# Patient Record
Sex: FEMALE | Race: WHITE | Employment: OTHER | URBAN - METROPOLITAN AREA
[De-identification: names, ages, dates, MRNs, and addresses within clinical notes are randomized per-mention and may not be internally consistent; named-entity substitution may affect disease eponyms.]

---

## 2017-01-23 ENCOUNTER — OFFICE VISIT (OUTPATIENT)
Dept: FAMILY MEDICINE CLINIC | Age: 67
End: 2017-01-23

## 2017-01-23 VITALS
DIASTOLIC BLOOD PRESSURE: 69 MMHG | WEIGHT: 280.3 LBS | HEIGHT: 63 IN | RESPIRATION RATE: 16 BRPM | SYSTOLIC BLOOD PRESSURE: 152 MMHG | HEART RATE: 56 BPM | BODY MASS INDEX: 49.66 KG/M2 | TEMPERATURE: 97.6 F

## 2017-01-23 DIAGNOSIS — M15.9 PRIMARY OSTEOARTHRITIS INVOLVING MULTIPLE JOINTS: ICD-10-CM

## 2017-01-23 DIAGNOSIS — M25.569 ARTHRALGIA OF LOWER LEG, UNSPECIFIED LATERALITY: ICD-10-CM

## 2017-01-23 DIAGNOSIS — M79.672 LEFT FOOT PAIN: Primary | ICD-10-CM

## 2017-01-23 DIAGNOSIS — E03.4 HYPOTHYROIDISM DUE TO ACQUIRED ATROPHY OF THYROID: ICD-10-CM

## 2017-01-23 DIAGNOSIS — E66.01 MORBID OBESITY WITH BMI OF 45.0-49.9, ADULT (HCC): ICD-10-CM

## 2017-01-23 DIAGNOSIS — M25.532 PAIN IN BOTH WRISTS: ICD-10-CM

## 2017-01-23 DIAGNOSIS — M25.531 PAIN IN BOTH WRISTS: ICD-10-CM

## 2017-01-23 PROCEDURE — 99214 OFFICE O/P EST MOD 30 MIN: CPT | Performed by: FAMILY MEDICINE

## 2017-01-23 RX ORDER — ALLOPURINOL 300 MG/1
300 TABLET ORAL DAILY
Qty: 30 TABLET | Refills: 3 | Status: SHIPPED | OUTPATIENT
Start: 2017-01-23 | End: 2017-07-11 | Stop reason: SDUPTHER

## 2017-01-23 ASSESSMENT — ENCOUNTER SYMPTOMS
EYE PAIN: 0
TROUBLE SWALLOWING: 0
COUGH: 0
SHORTNESS OF BREATH: 0
DIARRHEA: 0
ABDOMINAL PAIN: 0
NAUSEA: 0
BLOOD IN STOOL: 0
CONSTIPATION: 0
VOMITING: 0

## 2017-02-01 ENCOUNTER — OFFICE VISIT (OUTPATIENT)
Dept: PSYCHOLOGY | Age: 67
End: 2017-02-01

## 2017-02-01 DIAGNOSIS — E66.9 OBESITY, UNSPECIFIED OBESITY SEVERITY, UNSPECIFIED OBESITY TYPE: Primary | ICD-10-CM

## 2017-02-01 PROCEDURE — 96150 PR HEAL & BEHAV ASSESS,EA 15 MIN,INIT: CPT | Performed by: PSYCHOLOGIST

## 2017-02-01 PROCEDURE — 96152 PR HEAL & BEHAV INTERVENT,EA 15 MIN,INDIV: CPT | Performed by: PSYCHOLOGIST

## 2017-02-15 ENCOUNTER — OFFICE VISIT (OUTPATIENT)
Dept: PSYCHOLOGY | Age: 67
End: 2017-02-15

## 2017-02-15 DIAGNOSIS — E66.9 OBESITY, UNSPECIFIED OBESITY SEVERITY, UNSPECIFIED OBESITY TYPE: Primary | ICD-10-CM

## 2017-02-15 PROCEDURE — 96152 PR HEAL & BEHAV INTERVENT,EA 15 MIN,INDIV: CPT | Performed by: PSYCHOLOGIST

## 2017-02-21 ENCOUNTER — OFFICE VISIT (OUTPATIENT)
Dept: FAMILY MEDICINE CLINIC | Age: 67
End: 2017-02-21

## 2017-02-21 VITALS
HEIGHT: 62 IN | HEART RATE: 51 BPM | WEIGHT: 282.4 LBS | SYSTOLIC BLOOD PRESSURE: 164 MMHG | DIASTOLIC BLOOD PRESSURE: 76 MMHG | RESPIRATION RATE: 14 BRPM | BODY MASS INDEX: 51.97 KG/M2 | TEMPERATURE: 97.6 F

## 2017-02-21 DIAGNOSIS — I10 HTN, GOAL BELOW 140/80: ICD-10-CM

## 2017-02-21 DIAGNOSIS — M1A.0390 CHRONIC GOUT OF WRIST, UNSPECIFIED CAUSE, UNSPECIFIED LATERALITY: Primary | ICD-10-CM

## 2017-02-21 DIAGNOSIS — E03.4 HYPOTHYROIDISM DUE TO ACQUIRED ATROPHY OF THYROID: ICD-10-CM

## 2017-02-21 DIAGNOSIS — M72.2 PLANTAR FASCIA SYNDROME: ICD-10-CM

## 2017-02-21 DIAGNOSIS — M19.90 ARTHRITIS: ICD-10-CM

## 2017-02-21 DIAGNOSIS — M25.562 ARTHRALGIA OF LEFT LOWER LEG: ICD-10-CM

## 2017-02-21 PROCEDURE — 99214 OFFICE O/P EST MOD 30 MIN: CPT | Performed by: FAMILY MEDICINE

## 2017-02-21 RX ORDER — LISINOPRIL 10 MG/1
10 TABLET ORAL DAILY
Qty: 30 TABLET | Refills: 3 | Status: SHIPPED | OUTPATIENT
Start: 2017-02-21 | End: 2017-07-11 | Stop reason: SDUPTHER

## 2017-02-21 RX ORDER — COLCHICINE 0.6 MG/1
CAPSULE ORAL
Qty: 30 CAPSULE | Refills: 2 | Status: SHIPPED | OUTPATIENT
Start: 2017-02-21 | End: 2017-07-11 | Stop reason: SDUPTHER

## 2017-02-21 ASSESSMENT — ENCOUNTER SYMPTOMS
TROUBLE SWALLOWING: 0
SHORTNESS OF BREATH: 0
EYE PAIN: 0
COUGH: 0
ABDOMINAL PAIN: 0

## 2017-03-08 ENCOUNTER — TELEPHONE (OUTPATIENT)
Dept: FAMILY MEDICINE CLINIC | Age: 67
End: 2017-03-08

## 2017-05-26 ENCOUNTER — OFFICE VISIT (OUTPATIENT)
Dept: FAMILY MEDICINE CLINIC | Age: 67
End: 2017-05-26

## 2017-05-26 VITALS
BODY MASS INDEX: 52.33 KG/M2 | WEIGHT: 284.4 LBS | SYSTOLIC BLOOD PRESSURE: 157 MMHG | HEIGHT: 62 IN | TEMPERATURE: 97.7 F | DIASTOLIC BLOOD PRESSURE: 79 MMHG | HEART RATE: 47 BPM | RESPIRATION RATE: 16 BRPM

## 2017-05-26 DIAGNOSIS — E79.0 ELEVATED URIC ACID IN BLOOD: Primary | ICD-10-CM

## 2017-05-26 DIAGNOSIS — R79.83 HOMOCYSTEINEMIA: ICD-10-CM

## 2017-05-26 DIAGNOSIS — E03.9 HYPOTHYROIDISM, UNSPECIFIED TYPE: ICD-10-CM

## 2017-05-26 DIAGNOSIS — R63.5 ABNORMAL WEIGHT GAIN: ICD-10-CM

## 2017-05-26 DIAGNOSIS — J01.00 SUBACUTE MAXILLARY SINUSITIS: ICD-10-CM

## 2017-05-26 DIAGNOSIS — E78.89 LIPIDS ABNORMAL: ICD-10-CM

## 2017-05-26 DIAGNOSIS — R79.89 POSITIVE SEROLOGICAL TEST RESULT: ICD-10-CM

## 2017-05-26 DIAGNOSIS — K21.00 GASTROESOPHAGEAL REFLUX DISEASE WITH ESOPHAGITIS: ICD-10-CM

## 2017-05-26 DIAGNOSIS — M15.9 PRIMARY OSTEOARTHRITIS INVOLVING MULTIPLE JOINTS: ICD-10-CM

## 2017-05-26 DIAGNOSIS — E03.4 HYPOTHYROIDISM DUE TO ACQUIRED ATROPHY OF THYROID: ICD-10-CM

## 2017-05-26 DIAGNOSIS — R63.5 WEIGHT GAIN: ICD-10-CM

## 2017-05-26 DIAGNOSIS — G44.031 INTRACTABLE EPISODIC PAROXYSMAL HEMICRANIA: ICD-10-CM

## 2017-05-26 DIAGNOSIS — R89.4 NONSPECIFIC IMMUNOLOGICAL FINDINGS: ICD-10-CM

## 2017-05-26 DIAGNOSIS — R89.4 OTHER AND UNSPECIFIED NONSPECIFIC IMMUNOLOGICAL FINDINGS: ICD-10-CM

## 2017-05-26 DIAGNOSIS — E55.9 VITAMIN D DEFICIENCY: ICD-10-CM

## 2017-05-26 DIAGNOSIS — M25.569 ARTHRALGIA OF LOWER LEG, UNSPECIFIED LATERALITY: ICD-10-CM

## 2017-05-26 DIAGNOSIS — G56.00 CARPAL TUNNEL SYNDROME, UNSPECIFIED LATERALITY: ICD-10-CM

## 2017-05-26 DIAGNOSIS — B35.1 TOENAIL FUNGUS: ICD-10-CM

## 2017-05-26 PROCEDURE — 99214 OFFICE O/P EST MOD 30 MIN: CPT | Performed by: FAMILY MEDICINE

## 2017-05-26 RX ORDER — LEVOTHYROXINE SODIUM 0.12 MG/1
125 TABLET ORAL DAILY
Qty: 90 TABLET | Refills: 3 | Status: SHIPPED | OUTPATIENT
Start: 2017-05-26 | End: 2017-07-11 | Stop reason: SDUPTHER

## 2017-05-26 RX ORDER — NAPROXEN 500 MG/1
500 TABLET ORAL PRN
COMMUNITY
End: 2020-03-09

## 2017-05-26 RX ORDER — LIOTHYRONINE SODIUM 5 UG/1
5 TABLET ORAL DAILY
Qty: 90 TABLET | Refills: 3 | Status: SHIPPED | OUTPATIENT
Start: 2017-05-26 | End: 2017-07-11 | Stop reason: SDUPTHER

## 2017-05-26 ASSESSMENT — ENCOUNTER SYMPTOMS
ROS SKIN COMMENTS: HAIR LOSS
BACK PAIN: 1
ABDOMINAL DISTENTION: 1
ABDOMINAL PAIN: 1

## 2017-07-11 ENCOUNTER — OFFICE VISIT (OUTPATIENT)
Dept: FAMILY MEDICINE CLINIC | Age: 67
End: 2017-07-11

## 2017-07-11 VITALS
TEMPERATURE: 98 F | SYSTOLIC BLOOD PRESSURE: 140 MMHG | HEIGHT: 64 IN | BODY MASS INDEX: 48.83 KG/M2 | WEIGHT: 286 LBS | DIASTOLIC BLOOD PRESSURE: 78 MMHG | HEART RATE: 58 BPM

## 2017-07-11 DIAGNOSIS — R77.8 OTHER SPECIFIED ABNORMALITIES OF PLASMA PROTEINS: Primary | ICD-10-CM

## 2017-07-11 DIAGNOSIS — M25.569 ARTHRALGIA OF LOWER LEG, UNSPECIFIED LATERALITY: ICD-10-CM

## 2017-07-11 DIAGNOSIS — I10 HTN, GOAL BELOW 140/80: ICD-10-CM

## 2017-07-11 DIAGNOSIS — M1A.0390 CHRONIC GOUT OF WRIST, UNSPECIFIED CAUSE, UNSPECIFIED LATERALITY: ICD-10-CM

## 2017-07-11 DIAGNOSIS — R63.5 WEIGHT GAIN: ICD-10-CM

## 2017-07-11 DIAGNOSIS — E03.9 HYPOTHYROIDISM, UNSPECIFIED TYPE: ICD-10-CM

## 2017-07-11 DIAGNOSIS — E78.89 LIPIDS ABNORMAL: ICD-10-CM

## 2017-07-11 DIAGNOSIS — M79.672 LEFT FOOT PAIN: ICD-10-CM

## 2017-07-11 DIAGNOSIS — Z12.31 VISIT FOR SCREENING MAMMOGRAM: ICD-10-CM

## 2017-07-11 PROCEDURE — 99214 OFFICE O/P EST MOD 30 MIN: CPT | Performed by: FAMILY MEDICINE

## 2017-07-11 PROCEDURE — G0009 ADMIN PNEUMOCOCCAL VACCINE: HCPCS | Performed by: FAMILY MEDICINE

## 2017-07-11 PROCEDURE — 90670 PCV13 VACCINE IM: CPT | Performed by: FAMILY MEDICINE

## 2017-07-11 PROCEDURE — G8510 SCR DEP NEG, NO PLAN REQD: HCPCS | Performed by: FAMILY MEDICINE

## 2017-07-11 PROCEDURE — 3288F FALL RISK ASSESSMENT DOCD: CPT | Performed by: FAMILY MEDICINE

## 2017-07-11 RX ORDER — COLCHICINE 0.6 MG/1
CAPSULE ORAL
Qty: 30 CAPSULE | Refills: 5 | Status: SHIPPED | OUTPATIENT
Start: 2017-07-11 | End: 2017-07-11

## 2017-07-11 RX ORDER — LIOTHYRONINE SODIUM 5 UG/1
5 TABLET ORAL DAILY
Qty: 90 TABLET | Refills: 1 | Status: SHIPPED | OUTPATIENT
Start: 2017-07-11 | End: 2017-09-18 | Stop reason: SDUPTHER

## 2017-07-11 RX ORDER — LEVOTHYROXINE SODIUM 0.12 MG/1
125 TABLET ORAL DAILY
Qty: 90 TABLET | Refills: 1 | Status: SHIPPED | OUTPATIENT
Start: 2017-07-11 | End: 2017-09-18 | Stop reason: SDUPTHER

## 2017-07-11 RX ORDER — ALLOPURINOL 300 MG/1
300 TABLET ORAL DAILY
Qty: 30 TABLET | Refills: 5 | Status: SHIPPED | OUTPATIENT
Start: 2017-07-11 | End: 2017-07-11

## 2017-07-11 RX ORDER — LISINOPRIL 10 MG/1
10 TABLET ORAL DAILY
Qty: 30 TABLET | Refills: 5 | Status: SHIPPED | OUTPATIENT
Start: 2017-07-11 | End: 2017-09-18 | Stop reason: SDUPTHER

## 2017-07-11 ASSESSMENT — ENCOUNTER SYMPTOMS
ABDOMINAL PAIN: 0
NAUSEA: 0
COUGH: 0
EYE PAIN: 0
VOMITING: 0
TROUBLE SWALLOWING: 0
SHORTNESS OF BREATH: 0
CONSTIPATION: 0
BLOOD IN STOOL: 0
DIARRHEA: 0

## 2017-07-11 ASSESSMENT — PATIENT HEALTH QUESTIONNAIRE - PHQ9
SUM OF ALL RESPONSES TO PHQ QUESTIONS 1-9: 0
2. FEELING DOWN, DEPRESSED OR HOPELESS: 0
SUM OF ALL RESPONSES TO PHQ9 QUESTIONS 1 & 2: 0
1. LITTLE INTEREST OR PLEASURE IN DOING THINGS: 0

## 2017-07-14 ENCOUNTER — TELEPHONE (OUTPATIENT)
Dept: FAMILY MEDICINE CLINIC | Age: 67
End: 2017-07-14

## 2017-07-31 ENCOUNTER — HOSPITAL ENCOUNTER (OUTPATIENT)
Age: 67
Discharge: HOME | End: 2017-07-31
Payer: MEDICARE

## 2017-07-31 DIAGNOSIS — E03.4 HYPOTHYROIDISM DUE TO ACQUIRED ATROPHY OF THYROID: ICD-10-CM

## 2017-07-31 DIAGNOSIS — R79.89 POSITIVE SEROLOGICAL TEST RESULT: ICD-10-CM

## 2017-07-31 DIAGNOSIS — B35.1 TOENAIL FUNGUS: ICD-10-CM

## 2017-07-31 DIAGNOSIS — R77.8 OTHER SPECIFIED ABNORMALITIES OF PLASMA PROTEINS: ICD-10-CM

## 2017-07-31 DIAGNOSIS — M25.569 ARTHRALGIA OF LOWER LEG, UNSPECIFIED LATERALITY: ICD-10-CM

## 2017-07-31 DIAGNOSIS — E78.89 LIPIDS ABNORMAL: ICD-10-CM

## 2017-07-31 DIAGNOSIS — G44.031 INTRACTABLE EPISODIC PAROXYSMAL HEMICRANIA: ICD-10-CM

## 2017-07-31 DIAGNOSIS — K21.00 GASTROESOPHAGEAL REFLUX DISEASE WITH ESOPHAGITIS: ICD-10-CM

## 2017-07-31 DIAGNOSIS — E03.9 HYPOTHYROIDISM, UNSPECIFIED TYPE: ICD-10-CM

## 2017-07-31 DIAGNOSIS — R63.5 ABNORMAL WEIGHT GAIN: ICD-10-CM

## 2017-07-31 DIAGNOSIS — G56.00 CARPAL TUNNEL SYNDROME, UNSPECIFIED LATERALITY: ICD-10-CM

## 2017-07-31 DIAGNOSIS — R79.83 HOMOCYSTEINEMIA: ICD-10-CM

## 2017-07-31 DIAGNOSIS — E79.0 ELEVATED URIC ACID IN BLOOD: ICD-10-CM

## 2017-07-31 DIAGNOSIS — J01.00 SUBACUTE MAXILLARY SINUSITIS: ICD-10-CM

## 2017-07-31 DIAGNOSIS — R89.4 NONSPECIFIC IMMUNOLOGICAL FINDINGS: ICD-10-CM

## 2017-07-31 DIAGNOSIS — E55.9 VITAMIN D DEFICIENCY: ICD-10-CM

## 2017-07-31 DIAGNOSIS — R63.5 WEIGHT GAIN: ICD-10-CM

## 2017-07-31 DIAGNOSIS — R89.4 OTHER AND UNSPECIFIED NONSPECIFIC IMMUNOLOGICAL FINDINGS: ICD-10-CM

## 2017-07-31 DIAGNOSIS — M15.9 PRIMARY OSTEOARTHRITIS INVOLVING MULTIPLE JOINTS: ICD-10-CM

## 2017-07-31 LAB
ALBUMIN SERPL-MCNC: 3.7 G/DL (ref 3.5–5.1)
ALP BLD-CCNC: 72 U/L (ref 38–126)
ALT SERPL-CCNC: 23 U/L (ref 11–66)
AST SERPL-CCNC: 31 U/L (ref 5–40)
BASOPHILS # BLD: 0.7 %
BASOPHILS ABSOLUTE: 0.1 THOU/MM3 (ref 0–0.1)
BILIRUB SERPL-MCNC: 0.8 MG/DL (ref 0.3–1.2)
BILIRUBIN DIRECT: < 0.2 MG/DL (ref 0–0.3)
CALCIUM SERPL-MCNC: 9 MG/DL (ref 8.5–10.5)
EOSINOPHIL # BLD: 2.5 %
EOSINOPHILS ABSOLUTE: 0.2 THOU/MM3 (ref 0–0.4)
HCT VFR BLD CALC: 42.1 % (ref 37–47)
HEMOGLOBIN: 14 GM/DL (ref 12–16)
LYMPHOCYTES # BLD: 31.4 %
LYMPHOCYTES ABSOLUTE: 2.4 THOU/MM3 (ref 1–4.8)
MAGNESIUM: 1.8 MG/DL (ref 1.6–2.4)
MCH RBC QN AUTO: 29.5 PG (ref 27–31)
MCHC RBC AUTO-ENTMCNC: 33.3 GM/DL (ref 33–37)
MCV RBC AUTO: 88.7 FL (ref 81–99)
MONOCYTES # BLD: 7.5 %
MONOCYTES ABSOLUTE: 0.6 THOU/MM3 (ref 0.4–1.3)
NUCLEATED RED BLOOD CELLS: 0 /100 WBC
PDW BLD-RTO: 14.4 % (ref 11.5–14.5)
PLATELET # BLD: 266 THOU/MM3 (ref 130–400)
PMV BLD AUTO: 8.1 MCM (ref 7.4–10.4)
PTH INTACT: 43.5 PG/ML (ref 15–65)
RBC # BLD: 4.75 MILL/MM3 (ref 4.2–5.4)
RBC # BLD: NORMAL 10*6/UL
RHEUMATOID FACTOR: < 10 IU/ML (ref 0–13)
SEDIMENTATION RATE, ERYTHROCYTE: 49 MM/HR (ref 0–20)
SEG NEUTROPHILS: 57.9 %
SEGMENTED NEUTROPHILS ABSOLUTE COUNT: 4.5 THOU/MM3 (ref 1.8–7.7)
TOTAL PROTEIN: 8 G/DL (ref 6.1–8)
URIC ACID: 8.9 MG/DL (ref 2.4–5.7)
VITAMIN D 25-HYDROXY: 41 NG/ML (ref 30–100)
WBC # BLD: 7.8 THOU/MM3 (ref 4.8–10.8)

## 2017-07-31 PROCEDURE — 86038 ANTINUCLEAR ANTIBODIES: CPT

## 2017-07-31 PROCEDURE — 83735 ASSAY OF MAGNESIUM: CPT

## 2017-07-31 PROCEDURE — 84550 ASSAY OF BLOOD/URIC ACID: CPT

## 2017-07-31 PROCEDURE — 83090 ASSAY OF HOMOCYSTEINE: CPT

## 2017-07-31 PROCEDURE — 83970 ASSAY OF PARATHORMONE: CPT

## 2017-07-31 PROCEDURE — 84165 PROTEIN E-PHORESIS SERUM: CPT

## 2017-07-31 PROCEDURE — 83883 ASSAY NEPHELOMETRY NOT SPEC: CPT

## 2017-07-31 PROCEDURE — 82310 ASSAY OF CALCIUM: CPT

## 2017-07-31 PROCEDURE — 82306 VITAMIN D 25 HYDROXY: CPT

## 2017-07-31 PROCEDURE — 86430 RHEUMATOID FACTOR TEST QUAL: CPT

## 2017-07-31 PROCEDURE — 84156 ASSAY OF PROTEIN URINE: CPT

## 2017-07-31 PROCEDURE — 86335 IMMUNFIX E-PHORSIS/URINE/CSF: CPT

## 2017-07-31 PROCEDURE — 85025 COMPLETE CBC W/AUTO DIFF WBC: CPT

## 2017-07-31 PROCEDURE — 36415 COLL VENOUS BLD VENIPUNCTURE: CPT

## 2017-07-31 PROCEDURE — 85651 RBC SED RATE NONAUTOMATED: CPT

## 2017-07-31 PROCEDURE — 84160 ASSAY OF PROTEIN ANY SOURCE: CPT

## 2017-07-31 PROCEDURE — 80076 HEPATIC FUNCTION PANEL: CPT

## 2017-07-31 PROCEDURE — 86141 C-REACTIVE PROTEIN HS: CPT

## 2017-07-31 PROCEDURE — 86235 NUCLEAR ANTIGEN ANTIBODY: CPT

## 2017-08-01 ENCOUNTER — HOSPITAL ENCOUNTER (OUTPATIENT)
Dept: WOMENS IMAGING | Age: 67
Discharge: HOME | End: 2017-08-01
Payer: MEDICARE

## 2017-08-01 DIAGNOSIS — Z12.31 VISIT FOR SCREENING MAMMOGRAM: ICD-10-CM

## 2017-08-01 LAB
ANTI SSA: NORMAL
ANTI SSB: 0 AU/ML (ref 0–40)
ANTI-SMITH: 0 AU/ML (ref 0–40)
C-REACTIVE PROTEIN, HIGH SENSITIVITY: 25.4 MG/L
HOMOCYSTEINE, TOTAL: 9 UMOL/L

## 2017-08-01 PROCEDURE — G0202 SCR MAMMO BI INCL CAD: HCPCS

## 2017-08-02 ENCOUNTER — TELEPHONE (OUTPATIENT)
Dept: FAMILY MEDICINE CLINIC | Age: 67
End: 2017-08-02

## 2017-08-02 LAB
ANA SCREEN: NORMAL
PROTEIN ELECTROPHORESIS, SERUM: NORMAL
PROTEIN ELECTROPHORESIS, URINE: NORMAL

## 2017-08-03 ENCOUNTER — TELEPHONE (OUTPATIENT)
Dept: FAMILY MEDICINE CLINIC | Age: 67
End: 2017-08-03

## 2017-08-03 DIAGNOSIS — R79.82 ELEVATED C-REACTIVE PROTEIN (CRP): Primary | ICD-10-CM

## 2017-08-03 DIAGNOSIS — R70.0 ELEVATED SED RATE: ICD-10-CM

## 2017-08-07 ENCOUNTER — OFFICE VISIT (OUTPATIENT)
Dept: FAMILY MEDICINE CLINIC | Age: 67
End: 2017-08-07
Payer: MEDICARE

## 2017-08-07 VITALS
BODY MASS INDEX: 51.07 KG/M2 | TEMPERATURE: 97.7 F | WEIGHT: 288.2 LBS | SYSTOLIC BLOOD PRESSURE: 134 MMHG | HEIGHT: 63 IN | DIASTOLIC BLOOD PRESSURE: 82 MMHG | RESPIRATION RATE: 12 BRPM | HEART RATE: 60 BPM

## 2017-08-07 DIAGNOSIS — R89.4 OTHER AND UNSPECIFIED NONSPECIFIC IMMUNOLOGICAL FINDINGS: ICD-10-CM

## 2017-08-07 DIAGNOSIS — M25.561 PAIN IN BOTH KNEES, UNSPECIFIED CHRONICITY: ICD-10-CM

## 2017-08-07 DIAGNOSIS — E55.9 VITAMIN D DEFICIENCY: ICD-10-CM

## 2017-08-07 DIAGNOSIS — M25.562 PAIN IN BOTH KNEES, UNSPECIFIED CHRONICITY: ICD-10-CM

## 2017-08-07 DIAGNOSIS — J01.00 SUBACUTE MAXILLARY SINUSITIS: ICD-10-CM

## 2017-08-07 DIAGNOSIS — E03.4 HYPOTHYROIDISM DUE TO ACQUIRED ATROPHY OF THYROID: ICD-10-CM

## 2017-08-07 DIAGNOSIS — M15.9 PRIMARY OSTEOARTHRITIS INVOLVING MULTIPLE JOINTS: ICD-10-CM

## 2017-08-07 DIAGNOSIS — R79.83 HOMOCYSTEINEMIA: ICD-10-CM

## 2017-08-07 DIAGNOSIS — B35.1 TOENAIL FUNGUS: ICD-10-CM

## 2017-08-07 DIAGNOSIS — R89.4 NONSPECIFIC IMMUNOLOGICAL FINDINGS: ICD-10-CM

## 2017-08-07 DIAGNOSIS — K21.00 GASTROESOPHAGEAL REFLUX DISEASE WITH ESOPHAGITIS: ICD-10-CM

## 2017-08-07 DIAGNOSIS — R63.5 ABNORMAL WEIGHT GAIN: ICD-10-CM

## 2017-08-07 DIAGNOSIS — R63.5 WEIGHT GAIN: ICD-10-CM

## 2017-08-07 DIAGNOSIS — R79.89 POSITIVE SEROLOGICAL TEST RESULT: ICD-10-CM

## 2017-08-07 DIAGNOSIS — G44.031 INTRACTABLE PAROXYSMAL HEMICRANIA, UNSPECIFIED CHRONICITY PATTERN: ICD-10-CM

## 2017-08-07 DIAGNOSIS — E78.89 LIPIDS ABNORMAL: ICD-10-CM

## 2017-08-07 PROCEDURE — 99214 OFFICE O/P EST MOD 30 MIN: CPT | Performed by: FAMILY MEDICINE

## 2017-08-07 RX ORDER — ALLOPURINOL 300 MG/1
1 TABLET ORAL PRN
COMMUNITY
Start: 2017-07-11 | End: 2018-02-09 | Stop reason: ALTCHOICE

## 2017-08-07 ASSESSMENT — ENCOUNTER SYMPTOMS
BACK PAIN: 1
NAUSEA: 1

## 2017-08-08 ENCOUNTER — TELEPHONE (OUTPATIENT)
Dept: FAMILY MEDICINE CLINIC | Age: 67
End: 2017-08-08

## 2017-08-08 ENCOUNTER — HOSPITAL ENCOUNTER (OUTPATIENT)
Dept: WOMENS IMAGING | Age: 67
Discharge: HOME | End: 2017-08-08
Payer: MEDICARE

## 2017-08-08 DIAGNOSIS — Z00.6 EXAMINATION FOR NORMAL COMPARISON OR CONTROL IN CLINICAL RESEARCH: ICD-10-CM

## 2017-08-08 PROCEDURE — 3209999900 MAM COMPARISON OF OUTSIDE IMAGES

## 2017-08-16 ENCOUNTER — TELEPHONE (OUTPATIENT)
Dept: FAMILY MEDICINE CLINIC | Age: 67
End: 2017-08-16

## 2017-08-16 DIAGNOSIS — E79.0 ELEVATED URIC ACID IN BLOOD: ICD-10-CM

## 2017-08-16 DIAGNOSIS — R77.8 ABNORMAL SPEP: Primary | ICD-10-CM

## 2017-09-18 ENCOUNTER — OFFICE VISIT (OUTPATIENT)
Dept: FAMILY MEDICINE CLINIC | Age: 67
End: 2017-09-18
Payer: MEDICARE

## 2017-09-18 VITALS
HEIGHT: 64 IN | BODY MASS INDEX: 49 KG/M2 | TEMPERATURE: 97.8 F | SYSTOLIC BLOOD PRESSURE: 127 MMHG | DIASTOLIC BLOOD PRESSURE: 62 MMHG | RESPIRATION RATE: 12 BRPM | WEIGHT: 287 LBS | HEART RATE: 56 BPM | OXYGEN SATURATION: 98 %

## 2017-09-18 DIAGNOSIS — E03.9 HYPOTHYROIDISM, UNSPECIFIED TYPE: ICD-10-CM

## 2017-09-18 DIAGNOSIS — I10 HTN, GOAL BELOW 140/80: ICD-10-CM

## 2017-09-18 DIAGNOSIS — R79.83 HOMOCYSTEINEMIA: ICD-10-CM

## 2017-09-18 DIAGNOSIS — M25.569 ARTHRALGIA OF LOWER LEG, UNSPECIFIED LATERALITY: Primary | ICD-10-CM

## 2017-09-18 DIAGNOSIS — E78.89 LIPIDS ABNORMAL: ICD-10-CM

## 2017-09-18 DIAGNOSIS — E03.4 HYPOTHYROIDISM DUE TO ACQUIRED ATROPHY OF THYROID: ICD-10-CM

## 2017-09-18 PROCEDURE — 99214 OFFICE O/P EST MOD 30 MIN: CPT | Performed by: FAMILY MEDICINE

## 2017-09-18 RX ORDER — LEVOTHYROXINE SODIUM 0.12 MG/1
125 TABLET ORAL DAILY
Qty: 90 TABLET | Refills: 1 | Status: SHIPPED | OUTPATIENT
Start: 2017-09-18 | End: 2018-06-08 | Stop reason: SDUPTHER

## 2017-09-18 RX ORDER — LISINOPRIL 10 MG/1
10 TABLET ORAL DAILY
Qty: 30 TABLET | Refills: 5 | Status: SHIPPED | OUTPATIENT
Start: 2017-09-18 | End: 2018-07-09 | Stop reason: SDUPTHER

## 2017-09-18 RX ORDER — LIOTHYRONINE SODIUM 5 UG/1
5 TABLET ORAL DAILY
Qty: 90 TABLET | Refills: 1 | Status: SHIPPED | OUTPATIENT
Start: 2017-09-18 | End: 2018-06-08 | Stop reason: SDUPTHER

## 2017-09-18 ASSESSMENT — ENCOUNTER SYMPTOMS
VOMITING: 0
EYE PAIN: 0
TROUBLE SWALLOWING: 0
DIARRHEA: 0
ABDOMINAL PAIN: 0
SHORTNESS OF BREATH: 0
NAUSEA: 0
BLOOD IN STOOL: 0
CONSTIPATION: 0
COUGH: 0

## 2017-11-02 ENCOUNTER — HOSPITAL ENCOUNTER (OUTPATIENT)
Age: 67
Discharge: HOME | End: 2017-11-02
Payer: MEDICARE

## 2017-11-02 DIAGNOSIS — M25.561 PAIN IN BOTH KNEES, UNSPECIFIED CHRONICITY: ICD-10-CM

## 2017-11-02 DIAGNOSIS — R63.5 ABNORMAL WEIGHT GAIN: ICD-10-CM

## 2017-11-02 DIAGNOSIS — E03.4 HYPOTHYROIDISM DUE TO ACQUIRED ATROPHY OF THYROID: ICD-10-CM

## 2017-11-02 DIAGNOSIS — R79.89 POSITIVE SEROLOGICAL TEST RESULT: ICD-10-CM

## 2017-11-02 DIAGNOSIS — B35.1 TOENAIL FUNGUS: ICD-10-CM

## 2017-11-02 DIAGNOSIS — R63.5 WEIGHT GAIN: ICD-10-CM

## 2017-11-02 DIAGNOSIS — R89.4 NONSPECIFIC IMMUNOLOGICAL FINDINGS: ICD-10-CM

## 2017-11-02 DIAGNOSIS — G44.031 INTRACTABLE PAROXYSMAL HEMICRANIA, UNSPECIFIED CHRONICITY PATTERN: ICD-10-CM

## 2017-11-02 DIAGNOSIS — R79.83 HOMOCYSTEINEMIA: ICD-10-CM

## 2017-11-02 DIAGNOSIS — K21.00 GASTROESOPHAGEAL REFLUX DISEASE WITH ESOPHAGITIS: ICD-10-CM

## 2017-11-02 DIAGNOSIS — E55.9 VITAMIN D DEFICIENCY: ICD-10-CM

## 2017-11-02 DIAGNOSIS — M15.9 PRIMARY OSTEOARTHRITIS INVOLVING MULTIPLE JOINTS: ICD-10-CM

## 2017-11-02 DIAGNOSIS — M25.562 PAIN IN BOTH KNEES, UNSPECIFIED CHRONICITY: ICD-10-CM

## 2017-11-02 DIAGNOSIS — E78.89 LIPIDS ABNORMAL: ICD-10-CM

## 2017-11-02 DIAGNOSIS — J01.00 SUBACUTE MAXILLARY SINUSITIS: ICD-10-CM

## 2017-11-02 LAB
ANISOCYTOSIS: ABNORMAL
BASOPHILS # BLD: 0.6 %
BASOPHILS ABSOLUTE: 0.1 THOU/MM3 (ref 0–0.1)
CALCIUM SERPL-MCNC: 9.5 MG/DL (ref 8.5–10.5)
EOSINOPHIL # BLD: 2 %
EOSINOPHILS ABSOLUTE: 0.2 THOU/MM3 (ref 0–0.4)
ESTRADIOL LEVEL: 113.2 PG/ML
HCT VFR BLD CALC: 44.8 % (ref 37–47)
HEMOGLOBIN: 14.7 GM/DL (ref 12–16)
LYMPHOCYTES # BLD: 26.8 %
LYMPHOCYTES ABSOLUTE: 2.7 THOU/MM3 (ref 1–4.8)
MAGNESIUM: 2.2 MG/DL (ref 1.6–2.4)
MCH RBC QN AUTO: 29.1 PG (ref 27–31)
MCHC RBC AUTO-ENTMCNC: 32.8 GM/DL (ref 33–37)
MCV RBC AUTO: 88.9 FL (ref 81–99)
MONOCYTES # BLD: 7.1 %
MONOCYTES ABSOLUTE: 0.7 THOU/MM3 (ref 0.4–1.3)
NUCLEATED RED BLOOD CELLS: 0 /100 WBC
PDW BLD-RTO: 14.8 % (ref 11.5–14.5)
PLATELET # BLD: 304 THOU/MM3 (ref 130–400)
PMV BLD AUTO: 8.2 MCM (ref 7.4–10.4)
PTH INTACT: 36.3 PG/ML (ref 15–65)
RBC # BLD: 5.04 MILL/MM3 (ref 4.2–5.4)
SEDIMENTATION RATE, ERYTHROCYTE: 35 MM/HR (ref 0–20)
SEG NEUTROPHILS: 63.5 %
SEGMENTED NEUTROPHILS ABSOLUTE COUNT: 6.5 THOU/MM3 (ref 1.8–7.7)
URIC ACID: 9 MG/DL (ref 2.4–5.7)
VITAMIN D 25-HYDROXY: 32 NG/ML (ref 30–100)
WBC # BLD: 10.2 THOU/MM3 (ref 4.8–10.8)

## 2017-11-02 PROCEDURE — 82627 DEHYDROEPIANDROSTERONE: CPT

## 2017-11-02 PROCEDURE — 84403 ASSAY OF TOTAL TESTOSTERONE: CPT

## 2017-11-02 PROCEDURE — 84550 ASSAY OF BLOOD/URIC ACID: CPT

## 2017-11-02 PROCEDURE — 83970 ASSAY OF PARATHORMONE: CPT

## 2017-11-02 PROCEDURE — 82626 DEHYDROEPIANDROSTERONE: CPT

## 2017-11-02 PROCEDURE — 84270 ASSAY OF SEX HORMONE GLOBUL: CPT

## 2017-11-02 PROCEDURE — 86141 C-REACTIVE PROTEIN HS: CPT

## 2017-11-02 PROCEDURE — 82306 VITAMIN D 25 HYDROXY: CPT

## 2017-11-02 PROCEDURE — 85651 RBC SED RATE NONAUTOMATED: CPT

## 2017-11-02 PROCEDURE — 83735 ASSAY OF MAGNESIUM: CPT

## 2017-11-02 PROCEDURE — 36415 COLL VENOUS BLD VENIPUNCTURE: CPT

## 2017-11-02 PROCEDURE — 82310 ASSAY OF CALCIUM: CPT

## 2017-11-02 PROCEDURE — 85025 COMPLETE CBC W/AUTO DIFF WBC: CPT

## 2017-11-02 PROCEDURE — 82670 ASSAY OF TOTAL ESTRADIOL: CPT

## 2017-11-03 LAB
C-REACTIVE PROTEIN, HIGH SENSITIVITY: 45 MG/L
DHEAS (DHEA SULFATE): 235 UG/DL (ref 13–130)

## 2017-11-04 LAB — DHEA UNCONJUGATED: 4.26 NG/ML (ref 0.63–4.7)

## 2017-11-05 LAB — TESTOSTERONE, FREE W SHGB, FEMALES/CHILDREN: NORMAL

## 2017-11-09 ENCOUNTER — OFFICE VISIT (OUTPATIENT)
Dept: FAMILY MEDICINE CLINIC | Age: 67
End: 2017-11-09
Payer: MEDICARE

## 2017-11-09 VITALS
HEART RATE: 49 BPM | SYSTOLIC BLOOD PRESSURE: 124 MMHG | WEIGHT: 284 LBS | TEMPERATURE: 97.5 F | DIASTOLIC BLOOD PRESSURE: 80 MMHG | BODY MASS INDEX: 50.32 KG/M2 | OXYGEN SATURATION: 98 % | HEIGHT: 63 IN

## 2017-11-09 DIAGNOSIS — K90.89 OTHER INTESTINAL MALABSORPTION (CODE): ICD-10-CM

## 2017-11-09 DIAGNOSIS — J01.00 SUBACUTE MAXILLARY SINUSITIS: ICD-10-CM

## 2017-11-09 DIAGNOSIS — R79.83 HOMOCYSTEINEMIA: ICD-10-CM

## 2017-11-09 DIAGNOSIS — G44.049 CHRONIC PAROXYSMAL HEMICRANIA, NOT INTRACTABLE: ICD-10-CM

## 2017-11-09 DIAGNOSIS — E03.8 HYPOTHYROIDISM DUE TO HASHIMOTO'S THYROIDITIS: ICD-10-CM

## 2017-11-09 DIAGNOSIS — R89.4 NONSPECIFIC IMMUNOLOGICAL FINDINGS: ICD-10-CM

## 2017-11-09 DIAGNOSIS — E55.9 VITAMIN D DEFICIENCY: ICD-10-CM

## 2017-11-09 DIAGNOSIS — R10.9 FLANK PAIN: Primary | ICD-10-CM

## 2017-11-09 DIAGNOSIS — M25.561 ACUTE PAIN OF RIGHT KNEE: ICD-10-CM

## 2017-11-09 DIAGNOSIS — E06.3 HYPOTHYROIDISM DUE TO HASHIMOTO'S THYROIDITIS: ICD-10-CM

## 2017-11-09 DIAGNOSIS — E78.89 LIPIDS ABNORMAL: ICD-10-CM

## 2017-11-09 DIAGNOSIS — R63.5 WEIGHT GAIN: ICD-10-CM

## 2017-11-09 DIAGNOSIS — K21.00 GASTROESOPHAGEAL REFLUX DISEASE WITH ESOPHAGITIS: ICD-10-CM

## 2017-11-09 DIAGNOSIS — M15.9 PRIMARY OSTEOARTHRITIS INVOLVING MULTIPLE JOINTS: ICD-10-CM

## 2017-11-09 DIAGNOSIS — B35.1 TOENAIL FUNGUS: ICD-10-CM

## 2017-11-09 DIAGNOSIS — R79.89 POSITIVE SEROLOGICAL TEST RESULT: ICD-10-CM

## 2017-11-09 DIAGNOSIS — R73.9 BLOOD GLUCOSE ELEVATED: ICD-10-CM

## 2017-11-09 DIAGNOSIS — R63.5 ABNORMAL WEIGHT GAIN: ICD-10-CM

## 2017-11-09 PROCEDURE — 99214 OFFICE O/P EST MOD 30 MIN: CPT | Performed by: FAMILY MEDICINE

## 2017-11-09 RX ORDER — NITROFURANTOIN 25; 75 MG/1; MG/1
100 CAPSULE ORAL 2 TIMES DAILY
Qty: 20 CAPSULE | Refills: 0 | Status: SHIPPED | OUTPATIENT
Start: 2017-11-09 | End: 2017-11-19

## 2017-12-13 ENCOUNTER — TELEPHONE (OUTPATIENT)
Dept: FAMILY MEDICINE CLINIC | Age: 67
End: 2017-12-13

## 2017-12-13 NOTE — LETTER
[address] [place] [address]  Phone: [phone]  Fax: [phone] Geoff Debby,         December 18, 2017    [facility]  [place] [address] [address]  [place] [address]      Dear Ronny George:    Our office has been trying to contact you. Please contact our office at your first convenience. Our phone number is [phone] and our hours are Monday through Friday 08:00-16:00. If you have any questions or concerns, please don't hesitate to call.     Sincerely,        Rashad Driscoll DO

## 2018-02-02 ENCOUNTER — HOSPITAL ENCOUNTER (OUTPATIENT)
Age: 68
Discharge: HOME | End: 2018-02-02
Payer: MEDICARE

## 2018-02-02 DIAGNOSIS — R10.9 FLANK PAIN: ICD-10-CM

## 2018-02-02 DIAGNOSIS — R79.83 HOMOCYSTEINEMIA: ICD-10-CM

## 2018-02-02 DIAGNOSIS — B35.1 TOENAIL FUNGUS: ICD-10-CM

## 2018-02-02 DIAGNOSIS — J01.00 SUBACUTE MAXILLARY SINUSITIS: ICD-10-CM

## 2018-02-02 DIAGNOSIS — K90.89 OTHER INTESTINAL MALABSORPTION (CODE): ICD-10-CM

## 2018-02-02 DIAGNOSIS — R73.9 BLOOD GLUCOSE ELEVATED: ICD-10-CM

## 2018-02-02 DIAGNOSIS — M15.9 PRIMARY OSTEOARTHRITIS INVOLVING MULTIPLE JOINTS: ICD-10-CM

## 2018-02-02 DIAGNOSIS — R63.5 WEIGHT GAIN: ICD-10-CM

## 2018-02-02 DIAGNOSIS — E03.8 HYPOTHYROIDISM DUE TO HASHIMOTO'S THYROIDITIS: ICD-10-CM

## 2018-02-02 DIAGNOSIS — E78.89 LIPIDS ABNORMAL: ICD-10-CM

## 2018-02-02 DIAGNOSIS — R89.4 NONSPECIFIC IMMUNOLOGICAL FINDINGS: ICD-10-CM

## 2018-02-02 DIAGNOSIS — G44.049 CHRONIC PAROXYSMAL HEMICRANIA, NOT INTRACTABLE: ICD-10-CM

## 2018-02-02 DIAGNOSIS — E06.3 HYPOTHYROIDISM DUE TO HASHIMOTO'S THYROIDITIS: ICD-10-CM

## 2018-02-02 DIAGNOSIS — R63.5 ABNORMAL WEIGHT GAIN: ICD-10-CM

## 2018-02-02 DIAGNOSIS — K21.00 GASTROESOPHAGEAL REFLUX DISEASE WITH ESOPHAGITIS: ICD-10-CM

## 2018-02-02 DIAGNOSIS — R79.89 POSITIVE SEROLOGICAL TEST RESULT: ICD-10-CM

## 2018-02-02 LAB
BILIRUBIN URINE: NEGATIVE
BLOOD, URINE: NEGATIVE
CHARACTER, URINE: CLEAR
COLOR: NORMAL
GLUCOSE URINE: NEGATIVE MG/DL
KETONES, URINE: NEGATIVE
LEUKOCYTE ESTERASE, URINE: NEGATIVE
NITRITE, URINE: NEGATIVE
PH UA: 5
PROTEIN UA: NEGATIVE
SEDIMENTATION RATE, ERYTHROCYTE: 43 MM/HR (ref 0–20)
SPECIFIC GRAVITY, URINE: <= 1.005 (ref 1–1.03)
URIC ACID: 10 MG/DL (ref 2.4–5.7)
UROBILINOGEN, URINE: 0.2 EU/DL

## 2018-02-02 PROCEDURE — 36415 COLL VENOUS BLD VENIPUNCTURE: CPT

## 2018-02-02 PROCEDURE — 84550 ASSAY OF BLOOD/URIC ACID: CPT

## 2018-02-02 PROCEDURE — 81003 URINALYSIS AUTO W/O SCOPE: CPT

## 2018-02-02 PROCEDURE — 85651 RBC SED RATE NONAUTOMATED: CPT

## 2018-02-02 PROCEDURE — 86141 C-REACTIVE PROTEIN HS: CPT

## 2018-02-03 LAB — C-REACTIVE PROTEIN, HIGH SENSITIVITY: 30.4 MG/L

## 2018-02-09 ENCOUNTER — OFFICE VISIT (OUTPATIENT)
Dept: FAMILY MEDICINE CLINIC | Age: 68
End: 2018-02-09
Payer: MEDICARE

## 2018-02-09 ENCOUNTER — HOSPITAL ENCOUNTER (OUTPATIENT)
Age: 68
Discharge: HOME | End: 2018-02-09
Payer: MEDICARE

## 2018-02-09 VITALS
DIASTOLIC BLOOD PRESSURE: 84 MMHG | RESPIRATION RATE: 16 BRPM | BODY MASS INDEX: 48.94 KG/M2 | HEART RATE: 102 BPM | TEMPERATURE: 97.8 F | SYSTOLIC BLOOD PRESSURE: 123 MMHG | WEIGHT: 276.2 LBS | HEIGHT: 63 IN

## 2018-02-09 DIAGNOSIS — R79.83 HOMOCYSTEINEMIA: ICD-10-CM

## 2018-02-09 DIAGNOSIS — R89.4 NONSPECIFIC IMMUNOLOGICAL FINDINGS: ICD-10-CM

## 2018-02-09 DIAGNOSIS — E06.3 HYPOTHYROIDISM DUE TO HASHIMOTO'S THYROIDITIS: ICD-10-CM

## 2018-02-09 DIAGNOSIS — G44.001 INTRACTABLE CLUSTER HEADACHE SYNDROME, UNSPECIFIED CHRONICITY PATTERN: ICD-10-CM

## 2018-02-09 DIAGNOSIS — M25.562 PAIN IN BOTH KNEES, UNSPECIFIED CHRONICITY: ICD-10-CM

## 2018-02-09 DIAGNOSIS — B35.1 TOENAIL FUNGUS: ICD-10-CM

## 2018-02-09 DIAGNOSIS — M25.561 PAIN IN BOTH KNEES, UNSPECIFIED CHRONICITY: ICD-10-CM

## 2018-02-09 DIAGNOSIS — M15.9 PRIMARY OSTEOARTHRITIS INVOLVING MULTIPLE JOINTS: ICD-10-CM

## 2018-02-09 DIAGNOSIS — E78.89 LIPIDS ABNORMAL: ICD-10-CM

## 2018-02-09 DIAGNOSIS — E55.9 VITAMIN D DEFICIENCY: ICD-10-CM

## 2018-02-09 DIAGNOSIS — J01.00 SUBACUTE MAXILLARY SINUSITIS: ICD-10-CM

## 2018-02-09 DIAGNOSIS — R79.89 POSITIVE SEROLOGICAL TEST RESULT: ICD-10-CM

## 2018-02-09 DIAGNOSIS — G56.00 CARPAL TUNNEL SYNDROME, UNSPECIFIED LATERALITY: ICD-10-CM

## 2018-02-09 DIAGNOSIS — E03.8 HYPOTHYROIDISM DUE TO HASHIMOTO'S THYROIDITIS: ICD-10-CM

## 2018-02-09 DIAGNOSIS — K21.00 GASTROESOPHAGEAL REFLUX DISEASE WITH ESOPHAGITIS: ICD-10-CM

## 2018-02-09 DIAGNOSIS — R63.5 ABNORMAL WEIGHT GAIN: ICD-10-CM

## 2018-02-09 DIAGNOSIS — M25.569 ARTHRALGIA OF LOWER LEG, UNSPECIFIED LATERALITY: ICD-10-CM

## 2018-02-09 DIAGNOSIS — R63.5 WEIGHT GAIN: ICD-10-CM

## 2018-02-09 LAB
ANION GAP SERPL CALCULATED.3IONS-SCNC: 14 MEQ/L (ref 8–16)
ANISOCYTOSIS: ABNORMAL
BASOPHILS # BLD: 0.5 %
BASOPHILS ABSOLUTE: 0 THOU/MM3 (ref 0–0.1)
BUN BLDV-MCNC: 10 MG/DL (ref 7–22)
CALCIUM SERPL-MCNC: 9.4 MG/DL (ref 8.5–10.5)
CHLORIDE BLD-SCNC: 102 MEQ/L (ref 98–111)
CO2: 25 MEQ/L (ref 23–33)
CREAT SERPL-MCNC: 0.6 MG/DL (ref 0.4–1.2)
EOSINOPHIL # BLD: 2.3 %
EOSINOPHILS ABSOLUTE: 0.2 THOU/MM3 (ref 0–0.4)
GFR SERPL CREATININE-BSD FRML MDRD: > 90 ML/MIN/1.73M2
GLUCOSE BLD-MCNC: 97 MG/DL (ref 70–108)
HCT VFR BLD CALC: 44.6 % (ref 37–47)
HEMOGLOBIN: 14.7 GM/DL (ref 12–16)
LYMPHOCYTES # BLD: 30.4 %
LYMPHOCYTES ABSOLUTE: 2.7 THOU/MM3 (ref 1–4.8)
MAGNESIUM: 2 MG/DL (ref 1.6–2.4)
MCH RBC QN AUTO: 29.2 PG (ref 27–31)
MCHC RBC AUTO-ENTMCNC: 32.9 GM/DL (ref 33–37)
MCV RBC AUTO: 88.9 FL (ref 81–99)
MONOCYTES # BLD: 6.8 %
MONOCYTES ABSOLUTE: 0.6 THOU/MM3 (ref 0.4–1.3)
NUCLEATED RED BLOOD CELLS: 0 /100 WBC
PDW BLD-RTO: 15.2 % (ref 11.5–14.5)
PLATELET # BLD: 304 THOU/MM3 (ref 130–400)
PMV BLD AUTO: 8.6 FL (ref 7.4–10.4)
POTASSIUM SERPL-SCNC: 4 MEQ/L (ref 3.5–5.2)
PTH INTACT: 33.5 PG/ML (ref 15–65)
RBC # BLD: 5.02 MILL/MM3 (ref 4.2–5.4)
SEDIMENTATION RATE, ERYTHROCYTE: 43 MM/HR (ref 0–20)
SEG NEUTROPHILS: 60 %
SEGMENTED NEUTROPHILS ABSOLUTE COUNT: 5.3 THOU/MM3 (ref 1.8–7.7)
SODIUM BLD-SCNC: 141 MEQ/L (ref 135–145)
URIC ACID: 9.6 MG/DL (ref 2.4–5.7)
WBC # BLD: 8.9 THOU/MM3 (ref 4.8–10.8)

## 2018-02-09 PROCEDURE — 83970 ASSAY OF PARATHORMONE: CPT

## 2018-02-09 PROCEDURE — 86141 C-REACTIVE PROTEIN HS: CPT

## 2018-02-09 PROCEDURE — 85025 COMPLETE CBC W/AUTO DIFF WBC: CPT

## 2018-02-09 PROCEDURE — 36415 COLL VENOUS BLD VENIPUNCTURE: CPT

## 2018-02-09 PROCEDURE — 84550 ASSAY OF BLOOD/URIC ACID: CPT

## 2018-02-09 PROCEDURE — 99214 OFFICE O/P EST MOD 30 MIN: CPT | Performed by: FAMILY MEDICINE

## 2018-02-09 PROCEDURE — 83090 ASSAY OF HOMOCYSTEINE: CPT

## 2018-02-09 PROCEDURE — 85651 RBC SED RATE NONAUTOMATED: CPT

## 2018-02-09 PROCEDURE — 80048 BASIC METABOLIC PNL TOTAL CA: CPT

## 2018-02-09 PROCEDURE — 83735 ASSAY OF MAGNESIUM: CPT

## 2018-02-09 ASSESSMENT — ENCOUNTER SYMPTOMS: BACK PAIN: 1

## 2018-02-09 NOTE — PROGRESS NOTES
daily ., Disp: , Rfl:     B Complex-Biotin-FA (B-100 TR) TBCR, Take 1 tablet by mouth Taking 1-2 times per day, Disp: , Rfl:     aspirin (LUCY ASPIRIN) 325 MG tablet, Take 325 mg by mouth daily Ice cream or half and half Taking 1-2 tabs daily, Disp: , Rfl:     MAGNESIUM CITRATE PO, Take 200 mg by mouth 4 times daily (with meals and nightly) Now, [facility] at [facility] is 200 mg TABLET before and as finish breakfast and dinner, Disp: , Rfl:     Ferrous Sulfate (IRON) 325 (65 FE) MG TABS, Take 1 tablet by mouth once a week , Disp: , Rfl:   Allergies: Review of patient's allergies indicates no known allergies. ,  Immunizations:   Immunization History   Administered Date(s) Administered    Influenza Virus Vaccine 11/03/2011, 10/09/2014, 11/17/2015    Influenza, MDCK, Preservative free 10/31/2017    Influenza, Quadv, 3 yrs and older, IM, Preservative Free 01/19/2017    Pneumococcal 13-valent Conjugate ([id number]) 07/11/2017    Tdap (Boostrix, Adacel) 10/09/2014    Zoster 05/05/2014        History of Present Illness:     Leah's had concerns including 3 Month Follow-Up (WEE review labs completed 02/02/2018); Wrist Pain (bilateral wrists); Arthritis; Hypothyroidism; Other (surgery on Left foot 01/02/2018 with Dr. Graciela Biggs); Weight Gain; and Other (trouble getting enough water and dehydration). Ish Morales  presents to the [facility] today for;   Chief Complaint   Patient presents with    3 Month Follow-Up     WEE review labs completed 02/02/2018    Wrist Pain     bilateral wrists    Arthritis    Hypothyroidism    Other     surgery on Left foot 01/02/2018 with Dr. Garrett Hector Weight Gain    Other     trouble getting enough water and dehydration   , ,  abnormal labs follow up and these conditions as she  Is looking today for:     Carpal tunnel syndrome, unspecified laterality    Gastroesophageal reflux disease with esophagitis    Intractable cluster headache syndrome, unspecified chronicity Weight gain    13. Subacute maxillary sinusitis    14. Homocysteinemia ([address]    15. Lipids abnormal    16. Vitamin D deficiency      Assessment and Plan:  After reviewing the patients chief complaints, reviewing their lab findings in great detail (with the patient and those accompanying them) which correlate to their chief complaints, symptoms, and or medical conditions; suggestions were made relating to changes in diet and or supplements which may improve the complaints and which will be reflected in their future lab findings; Chief Complaint   Patient presents with    3 Month Follow-Up     WEE review labs completed 02/02/2018    Wrist Pain     bilateral wrists    Arthritis    Hypothyroidism    Other     surgery on Left foot 01/02/2018 with Dr. Kashif Stuart Weight Gain    Other     trouble getting enough water and dehydration   ;    Plans for the next visits:  - Abnormal and non-optimal Labs were ordered today to be repeated in the next 120-365 days to assess changes from adjustments in nutrition and or nutrients. - Patient instructed when having a blood draw to ask the  to divide their lab draws into multiple draws over several days if not feeling good at the time of the lab draw or if either prefers to do several smaller blood draws over several days  - Patient instructed to check with insurer before each lab draw and to go to the lab which the insurer directs them for the most cost effective lab draw with the least patient's cost  - Saran Lopez  will be scheduled subsequent to those results. Haydee Carnes will bring in her drink and food log to her next visit    Chronic Problems Addressed on this Visit:                                   1.  Intensity of Service; Uncontrolled items at this visit;   Chief Complaint   Patient presents with    3 Month Follow-Up     WEE review labs completed 02/02/2018    Wrist Pain     bilateral wrists    Arthritis    Hypothyroidism    Other     surgery on Left foot B-100 time released balanced B complex tabs not containing Vit C in tablet  - Cinnamon bark 500 mg (with Chromium but not extracts)   some brands list 1000 mg / serving of 2 500 mg capsules,    some brands have 1000 mg caps with the chromium but capsule size is huge  - Calcium carbonate/citrate, magnesium oxide/citrate, Vit D 3  as 3-4 tabs/caps/serving     Some Local Brands may contain Zinc which is acceptable for the first bottle or two  - Magnesium oxide 250 mg tabs for those having < 2 bowel movements daily  - Magnesium citrate TABLETS  or Slow Mag, or magnesium gluconate if having > 2 bowel movement/day  - Centrum Silver or look-a-like for most patients, Centrum plain or look-a-like with iron  - Vitamin D-3 comes as 1,000 IU or 2,000 IU or 5,000 IU gel caps or Liquid drops      Some brands containing or derived from soy oil or corn oil are OK if not allergic to soy  - Elemental Iron 65 mg tabs at bedtime is available over the counter if need more iron     Usually turns bowel movements grey, green or black but not a concern  - Apricot Kernel Oil (by Now) for dry skin and use in sensitive perineal area skin    Nutrients for ongoing use by Mail order for less expense from www.amazon. com, Link[employer], www.RooT   - Triple Strength Fish Oil , Softgels   - B-100 time released balanced B complex   - Cinnamon bark 500 mg with or without Chromium but not extract (ineffective)  - Calcium carbonate 1000 mg, Magnesium oxide 500 mg, Vit D 3 best as individual  - Magnesium oxide 250 mg, 400 mg, or 500 mg tabs if < 2 bowel movements daily  - Multivitamin Seniors for most patients, One Daily or Item with iron  - Vit D 3  1,000IU ,   2,000 IU,  5,000 IU, can start low and buy larger on 2nd bottle     Some brands containing or derived from soy oil or corn oil are OK if not allergic to soy    Nutrients for Special Needs by Mail order for less expense;  - Elemental Iron 65 mg tabs if need more iron for low iron on

## 2018-02-11 LAB
C-REACTIVE PROTEIN, HIGH SENSITIVITY: 38 MG/L
HOMOCYSTEINE, TOTAL: 10 UMOL/L

## 2018-02-28 ENCOUNTER — OFFICE VISIT (OUTPATIENT)
Dept: FAMILY MEDICINE CLINIC | Age: 68
End: 2018-02-28
Payer: MEDICARE

## 2018-02-28 VITALS
DIASTOLIC BLOOD PRESSURE: 76 MMHG | RESPIRATION RATE: 16 BRPM | HEIGHT: 63 IN | TEMPERATURE: 97.6 F | SYSTOLIC BLOOD PRESSURE: 139 MMHG | HEART RATE: 49 BPM | WEIGHT: 276.6 LBS | BODY MASS INDEX: 49.01 KG/M2

## 2018-02-28 DIAGNOSIS — M25.532 LEFT WRIST PAIN: ICD-10-CM

## 2018-02-28 DIAGNOSIS — K90.89 OTHER INTESTINAL MALABSORPTION (CODE): ICD-10-CM

## 2018-02-28 DIAGNOSIS — M25.569 ARTHRALGIA OF LOWER LEG, UNSPECIFIED LATERALITY: ICD-10-CM

## 2018-02-28 DIAGNOSIS — G44.031 INTRACTABLE EPISODIC PAROXYSMAL HEMICRANIA: ICD-10-CM

## 2018-02-28 DIAGNOSIS — E79.0 ABNORMAL BLOOD LEVEL OF URIC ACID: Primary | ICD-10-CM

## 2018-02-28 DIAGNOSIS — M15.9 PRIMARY OSTEOARTHRITIS INVOLVING MULTIPLE JOINTS: ICD-10-CM

## 2018-02-28 PROCEDURE — 99214 OFFICE O/P EST MOD 30 MIN: CPT | Performed by: FAMILY MEDICINE

## 2018-02-28 RX ORDER — ALLOPURINOL 100 MG/1
100 TABLET ORAL DAILY
Qty: 100 TABLET | Refills: 3 | Status: SHIPPED | OUTPATIENT
Start: 2018-02-28 | End: 2018-06-08

## 2018-02-28 NOTE — PROGRESS NOTES
drugs.    Medications/Allergies/Immunizations:     Her current medication(s) include   Current Outpatient Prescriptions:     allopurinol (ZYLOPRIM) 100 MG tablet, Take 1 tablet by mouth daily, Disp: 100 tablet, Rfl: 3    lisinopril (PRINIVIL;ZESTRIL) 10 MG tablet, Take 1 tablet by mouth daily (Patient taking differently: Take 10 mg by mouth daily Taking 1/2 tab daily), Disp: 30 tablet, Rfl: 5    liothyronine (CYTOMEL) 5 MCG tablet, Take 1 tablet by mouth daily, Disp: 90 tablet, Rfl: 1    levothyroxine (SYNTHROID) 125 MCG tablet, Take 1 tablet by mouth daily, Disp: 90 tablet, Rfl: 1    Lysine 500 MG TABS, Take 1 tablet by mouth 4 times daily (before meals and nightly) , Disp: , Rfl:     Menaquinone-7 (VITAMIN K2 PO), Take 1 capsule by mouth 2 times daily, Disp: , Rfl:     naproxen (NAPROSYN) 500 MG tablet, Take 500 mg by mouth as needed for Pain, Disp: , Rfl:     Omega 3 1000 MG CAPS, Take 1 capsule by mouth 2 times daily 1 before and as finish breakfast and dinner , Disp: , Rfl:     Specialty Vitamins Products (MAGNESIUM, AMINO ACID CHELATE,) 133 MG tablet, Take 2 tablets by mouth daily , Disp: , Rfl:     DHEA 25 MG CAPS, Take 1 capsule by mouth daily. , Disp: , Rfl:     Ferrous Sulfate (IRON) 325 (65 FE) MG TABS, Take 1 tablet by mouth once a week , Disp: , Rfl:     Cinnamon 500 MG CAPS, Take 2 capsules by mouth 4 times daily (before meals and nightly) , Disp: , Rfl:     Vitamin D (CHOLECALCIFEROL) 1000 UNITS CAPS capsule, Take 5,000 Units by mouth daily . , Disp: , Rfl:     B Complex-Biotin-FA (B-100 TR) TBCR, Take 1 tablet by mouth 3 times daily (before meals) , Disp: , Rfl:     aspirin (LUCY ASPIRIN) 325 MG tablet, Take 325 mg by mouth daily Ice cream or half and half Taking 1-2 tabs daily, Disp: , Rfl:     ascorbic acid (VITAMIN C) 1000 MG tablet, Take 500 mg by mouth 4 times daily (before meals and nightly) , Disp: , Rfl:   Allergies: Patient has no known allergies. ,  Immunizations: Immunization History   Administered Date(s) Administered    Influenza Virus Vaccine 11/03/2011, 10/09/2014, 11/17/2015    Influenza, MDCK, Preservative free 10/31/2017    Influenza, Rachelle Caban, 3 yrs and older, IM, Preservative Free 01/19/2017    Pneumococcal 13-valent Conjugate (Leamon Passey) 07/11/2017    Tdap (Boostrix, Adacel) 10/09/2014    Zoster Live (Zostavax) 05/05/2014        History of Present Illness:     Leah's had concerns including 2 Week Follow-Up (WEE- discuss uric acid and allopurinol) and Discuss Labs (hasn't made any supplement changes yet). Jose Solis  presents to the [address] today for;   Chief Complaint   Patient presents with    2 Week Follow-Up     WEE- discuss uric acid and allopurinol    Discuss Labs     hasn't made any supplement changes yet   , ,  abnormal labs follow up and these conditions as she  Is looking today for:     Abnormal blood level of uric acid    Left wrist pain    Intractable episodic paroxysmal hemicrania    Arthralgia of lower leg, unspecified laterality    Primary osteoarthritis involving multiple joints    Other intestinal malabsorption (CODE)          Review of Systems   Musculoskeletal: Positive for joint swelling. All other systems reviewed and are negative. Objective:   Physical Exam   Constitutional: She is oriented to person, place, and time. She appears well-developed and well-nourished. HENT:   Head: Normocephalic. Pulmonary/Chest: Effort normal and breath sounds normal.   Neurological: She is alert and oriented to person, place, and time. Psychiatric: She has a normal mood and affect. Thought content normal.   Nursing note and vitals reviewed.       Assessment:      Laboratory Data:   Lab results were searched in Care Everywhere and/or those brought by the pateint were reviewed today with Jonathan Frederick and she has a copy of their most recent labs to take home with them as noted below;       Imaging Data:   Imaging Data: Improved items reviewed at this visit; Stable items noted at this visit;  2. Patient's foods and drinks were reviewed with the patient,       - Gely Mckeon will bring food+drink symptom log to next visit for inclusion in their record      - 75 better food list reviewed & given to patient with the omega 6 food list to avoid         - Gluten in corn and oats abstracts sheet reviewed and given to the patient today   3. Greater than 25 minutes were spent face to face on this visit of which >50% was for counseling and coordination of care. Patient's foods, drinks and supplements were reviewed with the patient,   - they will bring a food drink symptom log to future visits for inclusion in their record    - 75 better food list reviewed & given to patient along with the omega 6 food list to avoid      - Gluten in corn and oats abstracts sheet reviewed and given to the patient today    - 51 Foods containing Latex-like proteins was reviewed and copy given to the patient     - Nutrient Supplements list provided and copy given to the patient     - [employer] [employer] web site offered to patient to review at their convenience by staff with login information    - www.efaeducation. org site reviewed with the patient so they can identify better foods    - www.cornicopia. org site reviewed with the patient so they can reduce carrageenan by reviewing the carrageenan buyers guide on that site and picking safer foods    Note:   I have discussed with the patient that with all nutraceuticals, there is often mixed data and emerging research which needs to be monitored; as well as an array of NIH fact sheets on nutrients and supplements. If I have recommended cinnamon at the request of this patient to assist them in control of their blood sugar, triglyceride and or weight issues.   I discussed that the patient's clinical use of cinnamon bark, calcium, magnesium, Vitamin D and pharmaceutical grade [employer] #[id number] fish

## 2018-06-01 ENCOUNTER — HOSPITAL ENCOUNTER (OUTPATIENT)
Age: 68
Discharge: HOME | End: 2018-06-01
Payer: MEDICARE

## 2018-06-01 DIAGNOSIS — M25.532 LEFT WRIST PAIN: ICD-10-CM

## 2018-06-01 DIAGNOSIS — E79.0 ABNORMAL BLOOD LEVEL OF URIC ACID: ICD-10-CM

## 2018-06-01 DIAGNOSIS — M25.569 ARTHRALGIA OF LOWER LEG, UNSPECIFIED LATERALITY: ICD-10-CM

## 2018-06-01 DIAGNOSIS — G44.031 INTRACTABLE EPISODIC PAROXYSMAL HEMICRANIA: ICD-10-CM

## 2018-06-01 DIAGNOSIS — M15.9 PRIMARY OSTEOARTHRITIS INVOLVING MULTIPLE JOINTS: ICD-10-CM

## 2018-06-01 DIAGNOSIS — K90.89 OTHER INTESTINAL MALABSORPTION (CODE): ICD-10-CM

## 2018-06-01 LAB
CALCIUM SERPL-MCNC: 9.6 MG/DL (ref 8.5–10.5)
MAGNESIUM: 2 MG/DL (ref 1.6–2.4)
PTH INTACT: 30.4 PG/ML (ref 15–65)
URIC ACID: 9.7 MG/DL (ref 2.4–5.7)
VITAMIN D 25-HYDROXY: 38 NG/ML (ref 30–100)

## 2018-06-01 PROCEDURE — 36415 COLL VENOUS BLD VENIPUNCTURE: CPT

## 2018-06-01 PROCEDURE — 84550 ASSAY OF BLOOD/URIC ACID: CPT

## 2018-06-01 PROCEDURE — 82306 VITAMIN D 25 HYDROXY: CPT

## 2018-06-01 PROCEDURE — 83970 ASSAY OF PARATHORMONE: CPT

## 2018-06-01 PROCEDURE — 83735 ASSAY OF MAGNESIUM: CPT

## 2018-06-01 PROCEDURE — 82310 ASSAY OF CALCIUM: CPT

## 2018-06-08 ENCOUNTER — OFFICE VISIT (OUTPATIENT)
Dept: FAMILY MEDICINE CLINIC | Age: 68
End: 2018-06-08
Payer: MEDICARE

## 2018-06-08 VITALS
BODY MASS INDEX: 49.96 KG/M2 | SYSTOLIC BLOOD PRESSURE: 132 MMHG | HEART RATE: 51 BPM | HEIGHT: 63 IN | WEIGHT: 282 LBS | TEMPERATURE: 97.6 F | DIASTOLIC BLOOD PRESSURE: 64 MMHG

## 2018-06-08 DIAGNOSIS — E06.3 HYPOTHYROIDISM DUE TO HASHIMOTO'S THYROIDITIS: ICD-10-CM

## 2018-06-08 DIAGNOSIS — M15.9 PRIMARY OSTEOARTHRITIS INVOLVING MULTIPLE JOINTS: ICD-10-CM

## 2018-06-08 DIAGNOSIS — R63.5 ABNORMAL WEIGHT GAIN: ICD-10-CM

## 2018-06-08 DIAGNOSIS — G44.011 INTRACTABLE EPISODIC CLUSTER HEADACHE: ICD-10-CM

## 2018-06-08 DIAGNOSIS — E55.9 VITAMIN D DEFICIENCY: ICD-10-CM

## 2018-06-08 DIAGNOSIS — M25.532 LEFT WRIST PAIN: ICD-10-CM

## 2018-06-08 DIAGNOSIS — E79.0 ABNORMAL BLOOD LEVEL OF URIC ACID: ICD-10-CM

## 2018-06-08 DIAGNOSIS — R79.83 HOMOCYSTEINEMIA: ICD-10-CM

## 2018-06-08 DIAGNOSIS — E03.9 HYPOTHYROIDISM, UNSPECIFIED TYPE: ICD-10-CM

## 2018-06-08 DIAGNOSIS — B35.1 TOENAIL FUNGUS: ICD-10-CM

## 2018-06-08 DIAGNOSIS — K90.89 OTHER INTESTINAL MALABSORPTION (CODE): ICD-10-CM

## 2018-06-08 DIAGNOSIS — E03.8 HYPOTHYROIDISM DUE TO HASHIMOTO'S THYROIDITIS: ICD-10-CM

## 2018-06-08 DIAGNOSIS — M25.569 ARTHRALGIA OF LOWER LEG, UNSPECIFIED LATERALITY: ICD-10-CM

## 2018-06-08 DIAGNOSIS — R63.5 WEIGHT GAIN: ICD-10-CM

## 2018-06-08 DIAGNOSIS — E78.89 LIPIDS ABNORMAL: ICD-10-CM

## 2018-06-08 DIAGNOSIS — R89.4 NONSPECIFIC IMMUNOLOGICAL FINDINGS: ICD-10-CM

## 2018-06-08 DIAGNOSIS — K21.00 GASTROESOPHAGEAL REFLUX DISEASE WITH ESOPHAGITIS: ICD-10-CM

## 2018-06-08 DIAGNOSIS — J01.00 SUBACUTE MAXILLARY SINUSITIS: ICD-10-CM

## 2018-06-08 DIAGNOSIS — G56.00 CARPAL TUNNEL SYNDROME, UNSPECIFIED LATERALITY: ICD-10-CM

## 2018-06-08 DIAGNOSIS — R79.89 POSITIVE SEROLOGICAL TEST RESULT: ICD-10-CM

## 2018-06-08 PROCEDURE — 99214 OFFICE O/P EST MOD 30 MIN: CPT | Performed by: FAMILY MEDICINE

## 2018-06-08 RX ORDER — LEVOTHYROXINE SODIUM 0.12 MG/1
125 TABLET ORAL DAILY
Qty: 90 TABLET | Refills: 1 | Status: SHIPPED | OUTPATIENT
Start: 2018-06-08 | End: 2018-12-03 | Stop reason: SDUPTHER

## 2018-06-08 RX ORDER — LIOTHYRONINE SODIUM 5 UG/1
5 TABLET ORAL DAILY
Qty: 90 TABLET | Refills: 1 | Status: SHIPPED | OUTPATIENT
Start: 2018-06-08 | End: 2018-12-03 | Stop reason: SDUPTHER

## 2018-06-08 ASSESSMENT — ENCOUNTER SYMPTOMS: ABDOMINAL DISTENTION: 1

## 2018-07-09 ENCOUNTER — OFFICE VISIT (OUTPATIENT)
Dept: FAMILY MEDICINE CLINIC | Age: 68
End: 2018-07-09
Payer: MEDICARE

## 2018-07-09 VITALS
OXYGEN SATURATION: 99 % | BODY MASS INDEX: 49.19 KG/M2 | DIASTOLIC BLOOD PRESSURE: 63 MMHG | HEART RATE: 51 BPM | HEIGHT: 63 IN | SYSTOLIC BLOOD PRESSURE: 123 MMHG | WEIGHT: 277.6 LBS | RESPIRATION RATE: 12 BRPM

## 2018-07-09 DIAGNOSIS — I10 HTN, GOAL BELOW 140/80: ICD-10-CM

## 2018-07-09 DIAGNOSIS — E03.9 HYPOTHYROIDISM, UNSPECIFIED TYPE: ICD-10-CM

## 2018-07-09 DIAGNOSIS — Z00.00 ROUTINE GENERAL MEDICAL EXAMINATION AT A HEALTH CARE FACILITY: Primary | ICD-10-CM

## 2018-07-09 PROCEDURE — G0439 PPPS, SUBSEQ VISIT: HCPCS | Performed by: FAMILY MEDICINE

## 2018-07-09 RX ORDER — LIOTHYRONINE SODIUM 5 UG/1
5 TABLET ORAL DAILY
Qty: 90 TABLET | Refills: 1 | Status: CANCELLED | OUTPATIENT
Start: 2018-07-09

## 2018-07-09 RX ORDER — LISINOPRIL 10 MG/1
10 TABLET ORAL DAILY
Qty: 90 TABLET | Refills: 1 | Status: SHIPPED | OUTPATIENT
Start: 2018-07-09 | End: 2018-11-07 | Stop reason: SDUPTHER

## 2018-07-09 RX ORDER — LEVOTHYROXINE SODIUM 0.12 MG/1
125 TABLET ORAL DAILY
Qty: 90 TABLET | Refills: 1 | Status: CANCELLED | OUTPATIENT
Start: 2018-07-09

## 2018-07-09 ASSESSMENT — ENCOUNTER SYMPTOMS
NAUSEA: 0
EYE PAIN: 0
ABDOMINAL PAIN: 0
CONSTIPATION: 0
TROUBLE SWALLOWING: 0
BLOOD IN STOOL: 0
DIARRHEA: 0
SHORTNESS OF BREATH: 0
COUGH: 0
VOMITING: 0

## 2018-07-09 ASSESSMENT — PATIENT HEALTH QUESTIONNAIRE - PHQ9: SUM OF ALL RESPONSES TO PHQ QUESTIONS 1-9: 0

## 2018-07-09 ASSESSMENT — ANXIETY QUESTIONNAIRES: GAD7 TOTAL SCORE: 1

## 2018-07-09 ASSESSMENT — LIFESTYLE VARIABLES: HOW OFTEN DO YOU HAVE A DRINK CONTAINING ALCOHOL: 0

## 2018-07-09 NOTE — PROGRESS NOTES
oriented to person, place, and time. She appears well-developed and well-nourished. No distress. HENT:   Head: Normocephalic and atraumatic. Right Ear: External ear normal.   Left Ear: External ear normal.   Eyes: Conjunctivae and EOM are normal. Right eye exhibits no discharge. Left eye exhibits no discharge. No scleral icterus. Neck: Normal range of motion. Pulmonary/Chest: Effort normal.   Musculoskeletal: She exhibits no edema. Neurological: She is alert and oriented to person, place, and time. No cranial nerve deficit. Skin: Skin is warm and dry. No rash noted. She is not diaphoretic. No erythema. Psychiatric: She has a normal mood and affect. Her behavior is normal. Judgment and thought content normal.   Nursing note and vitals reviewed. Lab Results   Component Value Date    WBC 8.9 02/09/2018    HGB 14.7 02/09/2018    HCT 44.6 02/09/2018     02/09/2018    CHOL 147 11/02/2016    TRIG 44 11/02/2016    HDL 55 11/02/2016    LDLDIRECT 80.00 12/12/2013    LDLCALC 83 11/02/2016    AST 31 07/31/2017     02/09/2018    K 4.0 02/09/2018     02/09/2018    CREATININE 0.6 02/09/2018    BUN 10 02/09/2018    CO2 25 02/09/2018    TSH 0.835 03/22/2017    LABA1C 6.0 11/02/2016    LABGLOM >90 02/09/2018    MG 2.0 06/01/2018    CALCIUM 9.6 06/01/2018    VITD25 38 06/01/2018       Imaging Results:    No results found. Assessment:      Diagnosis Orders   1. Routine general medical examination at a health care facility     2. Hypothyroidism, unspecified type  levothyroxine (SYNTHROID) 125 MCG tablet   3. HTN, goal below 140/80  lisinopril (PRINIVIL;ZESTRIL) 10 MG tablet       Plan:         Ketogenic diet can also help to lose weight if the blueberries and chex are too boring. This would be only meat and cheese and vegetables switching over from sugar to fat as energy.     Will see You Marks in August    did see Dr Charlie Hutchinson for the EGD - Elan Mott would also explain things to you like he Take 1 capsule by mouth 2 times daily Yes Historical Provider, MD   naproxen (NAPROSYN) 500 MG tablet Take 500 mg by mouth as needed for Pain Yes Historical Provider, MD   Omega 3 1000 MG CAPS Take 1 capsule by mouth 2 times daily  Yes Historical Provider, MD   DHEA 25 MG CAPS Take 1 capsule by mouth daily. Yes Historical Provider, MD   Cinnamon 500 MG CAPS Take 2 capsules by mouth 2 times daily  Yes Historical Provider, MD   Vitamin D (CHOLECALCIFEROL) 1000 UNITS CAPS capsule Take 5,000 Units by mouth daily .  Yes Historical Provider, MD   B Complex-Biotin-FA (B-100 TR) TBCR Take 1 tablet by mouth 2 times daily  Yes Historical Provider, MD   aspirin (LUYC ASPIRIN) 325 MG tablet Take 325 mg by mouth daily  Yes Historical Provider, MD       Past Medical History:   Diagnosis Date    Carpal tunnel syndrome     GERD (gastroesophageal reflux disease)     Headache(784.0)     Hypothyroidism     Osteoarthritis     Tendonitis of both wrists 2017    Toenail fungus      Past Surgical History:   Procedure Laterality Date    CARPAL TUNNEL RELEASE      both hands    Lizbeth Villasenorinstein Left 01/02/2018    JEFRYO Dr. Jorgito Foster Left 09/2010    Left Total Knee    JOINT REPLACEMENT Right 08/2016    Right Total Knee       Family History   Problem Relation Age of Onset    Stroke Mother     Arrhythmia Mother     Other Mother         Barry's Syndrome    Heart Disease Mother     Coronary Art Dis Mother     Diabetes Father     Other Sister         Barry's Syndrome    Arrhythmia Sister     Cancer Brother         Stomach Cancer    Stillborn Child     Early Death Child     Pacemaker Sister     Heart Attack Brother     Coronary Art Dis Brother     Other Brother         Barry's Syndrome       CareTeam (Including outside providers/suppliers regularly involved in providing care):   Patient Care Team:  Simon Cowart Maintenance Status  Immunization History   Administered Date(s) Administered    Influenza Virus Vaccine 11/03/2011, 10/09/2014, 11/17/2015    Influenza, MDCK, Preservative free 10/31/2017    Influenza, Quadv, 3 yrs and older, IM, Preservative Free 01/19/2017    Pneumococcal 13-valent Conjugate ([id number]) 07/11/2017    Tdap (Boostrix, Adacel) 10/09/2014    Zoster Live (Zostavax) 05/05/2014        Health Maintenance   Topic Date Due    Shingles Vaccine (1 of 2 - 2 Dose Series) 03/07/2001    A1C test (Diabetic or Prediabetic)  11/02/2017    TSH testing  03/22/2018    Pneumococcal low/med risk (2 of 2 - PPSV23) 07/11/2018    Flu vaccine (1) 09/01/2018    Potassium monitoring  02/09/2019    Creatinine monitoring  02/09/2019    Breast cancer screen  08/01/2019    Colon cancer screen colonoscopy  07/01/2020    Lipid screen  11/02/2021    DTaP/Tdap/Td vaccine (2 - Td) 10/09/2024    DEXA (modify frequency per FRAX score)  Addressed    Hepatitis C screen  Addressed     Recommendations for Preventive Services Due: see orders.   Recommended screening schedule for the next 5-10 years is provided to the patient in written form: see Patient Instructions/AVS.

## 2018-07-09 NOTE — PATIENT INSTRUCTIONS
power of . Your living will is used only if you can't make or communicate decisions for yourself anymore. If you become able to make decisions again, you can accept or refuse any treatment, no matter what you wrote in your living will. Your state may offer an online registry. This is a place where you can store your living will online so the doctors and nurses who need to treat you can find it right away. What should you think about when creating a living will? Talk about your end-of-life wishes with your family members and your doctor. Let them know what you want. That way the people making decisions for you won't be surprised by your choices. Think about these questions as you make your living will:  Do you know enough about life support methods that might be used? If not, talk to your doctor so you know what might be done if you can't breathe on your own, your heart stops, or you're unable to swallow. What things would you still want to be able to do after you receive life-support methods? Would you want to be able to walk? To speak? To eat on your own? To live without the help of machines? If you have a choice, where do you want to be cared for? In your home? At a hospital or nursing home? Do you want certain Gnosticism practices performed if you become very ill? If you have a choice at the end of your life, where would you prefer to die? At home? In a hospital or nursing home? Somewhere else? Would you prefer to be buried or cremated? Do you want your organs to be donated after you die? What should you do with your living will? Make sure that your family members and your health care agent have copies of your living will. Give your doctor a copy of your living will to keep in your medical record. If you have more than one doctor, make sure that each one has a copy. You may want to put a copy of your living will where it can be easily found. Where can you learn more?   Go to [employer] disclaims any warranty or liability for your use of this information. Patient Education            Current as of: April 3, 2017  Content Version: 11.6  © 6274-1217 [employer]. Care instructions adapted under license by [facility] ([facility]). If you have questions about a medical condition or this instruction, always ask your healthcare professional. [address] any warranty or liability for your use of this information. Patient Education        Preventing Outdoor Falls: Care Instructions  Your Care Instructions    Worries about falls don't need to keep you indoors. Outdoor activities like walking have big benefits for your health. You will need to watch your step and learn a few safety measures. If you are worried about having a fall outdoors, ask your doctor about exercises, classes, or physical therapy that may help. You can learn ways to gain strength, flexibility, and balance. Ask if it might help to use a cane or walker. You can make your time outdoors safer with a few simple measures. Follow-up care is a key part of your treatment and safety. Be sure to make and go to all appointments, and call your doctor if you are having problems. It's also a good idea to know your test results and keep a list of the medicines you take. How can you prevent falls outdoors? Wear shoes with firm soles and low heels. If you have to walk on an icy surface, use grippers that can be worn over your shoes in bad weather. Be extra careful if weather is bad. Walk on the grass when the sidewalks are slick. If you live in a place that gets snow and ice in the winter, sprinkle salt on slippery stairs and sidewalks. Be careful getting on or off buses and trains or getting in and out of cars. If handrails are available, use them. Be careful when you cross the street. Look for crosswalks or places where curb cuts or ramps are present.   Try not to hurry, especially if

## 2018-08-06 ENCOUNTER — HOSPITAL ENCOUNTER (OUTPATIENT)
Age: 68
Discharge: HOME | End: 2018-08-06
Payer: MEDICARE

## 2018-08-06 DIAGNOSIS — K90.89 OTHER INTESTINAL MALABSORPTION (CODE): ICD-10-CM

## 2018-08-06 DIAGNOSIS — R89.4 NONSPECIFIC IMMUNOLOGICAL FINDINGS: ICD-10-CM

## 2018-08-06 DIAGNOSIS — R63.5 ABNORMAL WEIGHT GAIN: ICD-10-CM

## 2018-08-06 DIAGNOSIS — M25.569 ARTHRALGIA OF LOWER LEG, UNSPECIFIED LATERALITY: ICD-10-CM

## 2018-08-06 DIAGNOSIS — K21.00 GASTROESOPHAGEAL REFLUX DISEASE WITH ESOPHAGITIS: ICD-10-CM

## 2018-08-06 DIAGNOSIS — B35.1 TOENAIL FUNGUS: ICD-10-CM

## 2018-08-06 DIAGNOSIS — E03.9 HYPOTHYROIDISM, UNSPECIFIED TYPE: ICD-10-CM

## 2018-08-06 DIAGNOSIS — M25.532 LEFT WRIST PAIN: ICD-10-CM

## 2018-08-06 DIAGNOSIS — J01.00 SUBACUTE MAXILLARY SINUSITIS: ICD-10-CM

## 2018-08-06 DIAGNOSIS — E79.0 ABNORMAL BLOOD LEVEL OF URIC ACID: ICD-10-CM

## 2018-08-06 DIAGNOSIS — E06.3 HYPOTHYROIDISM DUE TO HASHIMOTO'S THYROIDITIS: ICD-10-CM

## 2018-08-06 DIAGNOSIS — G44.011 INTRACTABLE EPISODIC CLUSTER HEADACHE: ICD-10-CM

## 2018-08-06 DIAGNOSIS — E03.8 HYPOTHYROIDISM DUE TO HASHIMOTO'S THYROIDITIS: ICD-10-CM

## 2018-08-06 DIAGNOSIS — R63.5 WEIGHT GAIN: ICD-10-CM

## 2018-08-06 DIAGNOSIS — R79.83 HOMOCYSTEINEMIA: ICD-10-CM

## 2018-08-06 DIAGNOSIS — E78.89 LIPIDS ABNORMAL: ICD-10-CM

## 2018-08-06 DIAGNOSIS — R79.89 POSITIVE SEROLOGICAL TEST RESULT: ICD-10-CM

## 2018-08-06 DIAGNOSIS — E55.9 VITAMIN D DEFICIENCY: ICD-10-CM

## 2018-08-06 DIAGNOSIS — G56.00 CARPAL TUNNEL SYNDROME, UNSPECIFIED LATERALITY: ICD-10-CM

## 2018-08-06 DIAGNOSIS — M15.9 PRIMARY OSTEOARTHRITIS INVOLVING MULTIPLE JOINTS: ICD-10-CM

## 2018-08-06 LAB
ALBUMIN SERPL-MCNC: 3.7 G/DL (ref 3.5–5.1)
ALP BLD-CCNC: 75 U/L (ref 38–126)
ALT SERPL-CCNC: 24 U/L (ref 11–66)
ANION GAP SERPL CALCULATED.3IONS-SCNC: 16 MEQ/L (ref 8–16)
AST SERPL-CCNC: 30 U/L (ref 5–40)
AVERAGE GLUCOSE: 129 MG/DL (ref 70–126)
BASOPHILS # BLD: 0.9 %
BASOPHILS ABSOLUTE: 0.1 THOU/MM3 (ref 0–0.1)
BILIRUB SERPL-MCNC: 0.6 MG/DL (ref 0.3–1.2)
BILIRUBIN DIRECT: < 0.2 MG/DL (ref 0–0.3)
BUN BLDV-MCNC: 13 MG/DL (ref 7–22)
CALCIUM SERPL-MCNC: 9.6 MG/DL (ref 8.5–10.5)
CHLORIDE BLD-SCNC: 103 MEQ/L (ref 98–111)
CHOLESTEROL, TOTAL: 157 MG/DL (ref 100–199)
CO2: 23 MEQ/L (ref 23–33)
CREAT SERPL-MCNC: 0.8 MG/DL (ref 0.4–1.2)
EOSINOPHIL # BLD: 2.6 %
EOSINOPHILS ABSOLUTE: 0.2 THOU/MM3 (ref 0–0.4)
ERYTHROCYTE [DISTWIDTH] IN BLOOD BY AUTOMATED COUNT: 14.4 % (ref 11.5–14.5)
ERYTHROCYTE [DISTWIDTH] IN BLOOD BY AUTOMATED COUNT: 46.4 FL (ref 35–45)
ESTRADIOL LEVEL: 97.5 PG/ML
FOLLICLE STIMULATING HORMONE: 11.4 MIU/ML (ref 16–160)
GFR SERPL CREATININE-BSD FRML MDRD: 71 ML/MIN/1.73M2
GLUCOSE BLD-MCNC: 117 MG/DL (ref 70–108)
HBA1C MFR BLD: 6.3 % (ref 4.4–6.4)
HCT VFR BLD CALC: 45.3 % (ref 37–47)
HDLC SERPL-MCNC: 58 MG/DL
HEMOGLOBIN: 15 GM/DL (ref 12–16)
IMMATURE GRANS (ABS): 0.03 THOU/MM3 (ref 0–0.07)
IMMATURE GRANULOCYTES: 0.4 %
LDL CHOLESTEROL CALCULATED: 88 MG/DL
LUTEINIZING HORMONE: 14.1 MIU/ML (ref 3.3–70.6)
LYMPHOCYTES # BLD: 36.1 %
LYMPHOCYTES ABSOLUTE: 3.1 THOU/MM3 (ref 1–4.8)
MAGNESIUM: 2 MG/DL (ref 1.6–2.4)
MCH RBC QN AUTO: 29.2 PG (ref 26–33)
MCHC RBC AUTO-ENTMCNC: 33.1 GM/DL (ref 32.2–35.5)
MCV RBC AUTO: 88.1 FL (ref 81–99)
MONOCYTES # BLD: 7.3 %
MONOCYTES ABSOLUTE: 0.6 THOU/MM3 (ref 0.4–1.3)
NUCLEATED RED BLOOD CELLS: 0 /100 WBC
PLATELET # BLD: 325 THOU/MM3 (ref 130–400)
PMV BLD AUTO: 9.6 FL (ref 9.4–12.4)
POTASSIUM SERPL-SCNC: 4.4 MEQ/L (ref 3.5–5.2)
PROGESTERONE LEVEL: < 0.05 NG/ML
PTH INTACT: 42.8 PG/ML (ref 15–65)
RBC # BLD: 5.14 MILL/MM3 (ref 4.2–5.4)
RHEUMATOID FACTOR: < 10 IU/ML (ref 0–13)
SEDIMENTATION RATE, ERYTHROCYTE: 33 MM/HR (ref 0–20)
SEG NEUTROPHILS: 52.7 %
SEGMENTED NEUTROPHILS ABSOLUTE COUNT: 4.5 THOU/MM3 (ref 1.8–7.7)
SODIUM BLD-SCNC: 142 MEQ/L (ref 135–145)
T3 FREE: 3.32 PG/ML (ref 2.02–4.43)
T3 TOTAL: 116 NG/DL (ref 72–181)
T4 FREE: 1.67 NG/DL (ref 0.93–1.76)
THYROXINE (T4): 9.4 UG/DL (ref 4.5–12)
TOTAL PROTEIN: 7.7 G/DL (ref 6.1–8)
TRIGL SERPL-MCNC: 55 MG/DL (ref 0–199)
TSH SERPL DL<=0.05 MIU/L-ACNC: 0.03 UIU/ML (ref 0.4–4.2)
URIC ACID: 9.5 MG/DL (ref 2.4–5.7)
VITAMIN D 25-HYDROXY: 39 NG/ML (ref 30–100)
WBC # BLD: 8.5 THOU/MM3 (ref 4.8–10.8)

## 2018-08-06 PROCEDURE — 83970 ASSAY OF PARATHORMONE: CPT

## 2018-08-06 PROCEDURE — 83036 HEMOGLOBIN GLYCOSYLATED A1C: CPT

## 2018-08-06 PROCEDURE — 86376 MICROSOMAL ANTIBODY EACH: CPT

## 2018-08-06 PROCEDURE — 80053 COMPREHEN METABOLIC PANEL: CPT

## 2018-08-06 PROCEDURE — 83001 ASSAY OF GONADOTROPIN (FSH): CPT

## 2018-08-06 PROCEDURE — 84550 ASSAY OF BLOOD/URIC ACID: CPT

## 2018-08-06 PROCEDURE — 86038 ANTINUCLEAR ANTIBODIES: CPT

## 2018-08-06 PROCEDURE — 84403 ASSAY OF TOTAL TESTOSTERONE: CPT

## 2018-08-06 PROCEDURE — 82626 DEHYDROEPIANDROSTERONE: CPT

## 2018-08-06 PROCEDURE — 85025 COMPLETE CBC W/AUTO DIFF WBC: CPT

## 2018-08-06 PROCEDURE — 80061 LIPID PANEL: CPT

## 2018-08-06 PROCEDURE — 84443 ASSAY THYROID STIM HORMONE: CPT

## 2018-08-06 PROCEDURE — 83002 ASSAY OF GONADOTROPIN (LH): CPT

## 2018-08-06 PROCEDURE — 82248 BILIRUBIN DIRECT: CPT

## 2018-08-06 PROCEDURE — 82670 ASSAY OF TOTAL ESTRADIOL: CPT

## 2018-08-06 PROCEDURE — 84270 ASSAY OF SEX HORMONE GLOBUL: CPT

## 2018-08-06 PROCEDURE — 84436 ASSAY OF TOTAL THYROXINE: CPT

## 2018-08-06 PROCEDURE — 85651 RBC SED RATE NONAUTOMATED: CPT

## 2018-08-06 PROCEDURE — 83735 ASSAY OF MAGNESIUM: CPT

## 2018-08-06 PROCEDURE — 86141 C-REACTIVE PROTEIN HS: CPT

## 2018-08-06 PROCEDURE — 82306 VITAMIN D 25 HYDROXY: CPT

## 2018-08-06 PROCEDURE — 84439 ASSAY OF FREE THYROXINE: CPT

## 2018-08-06 PROCEDURE — 84481 FREE ASSAY (FT-3): CPT

## 2018-08-06 PROCEDURE — 86430 RHEUMATOID FACTOR TEST QUAL: CPT

## 2018-08-06 PROCEDURE — 84144 ASSAY OF PROGESTERONE: CPT

## 2018-08-06 PROCEDURE — 83516 IMMUNOASSAY NONANTIBODY: CPT

## 2018-08-06 PROCEDURE — 83090 ASSAY OF HOMOCYSTEINE: CPT

## 2018-08-06 PROCEDURE — 84480 ASSAY TRIIODOTHYRONINE (T3): CPT

## 2018-08-06 PROCEDURE — 82627 DEHYDROEPIANDROSTERONE: CPT

## 2018-08-06 PROCEDURE — 36415 COLL VENOUS BLD VENIPUNCTURE: CPT

## 2018-08-07 LAB
C-REACTIVE PROTEIN, HIGH SENSITIVITY: 32.3 MG/L
HOMOCYSTEINE, TOTAL: 10 UMOL/L
THYROID PEROXIDASE ANTIBODY: 0.3 IU/ML (ref 0–9)

## 2018-08-08 LAB
ANA SCREEN: NORMAL
DHEAS (DHEA SULFATE): 171 UG/DL (ref 13–130)
GLIADIN PEPTIDE IGG, IGA: NORMAL

## 2018-08-09 LAB
DHEA UNCONJUGATED: 4.15 NG/ML (ref 0.63–4.7)
TESTOSTERONE, FREE W SHGB, FEMALES/CHILDREN: NORMAL

## 2018-08-10 ENCOUNTER — OFFICE VISIT (OUTPATIENT)
Dept: FAMILY MEDICINE CLINIC | Age: 68
End: 2018-08-10
Payer: MEDICARE

## 2018-08-10 VITALS
HEIGHT: 63 IN | OXYGEN SATURATION: 97 % | HEART RATE: 55 BPM | WEIGHT: 273.6 LBS | TEMPERATURE: 97.9 F | SYSTOLIC BLOOD PRESSURE: 128 MMHG | BODY MASS INDEX: 48.48 KG/M2 | DIASTOLIC BLOOD PRESSURE: 74 MMHG | RESPIRATION RATE: 20 BRPM

## 2018-08-10 DIAGNOSIS — J01.00 SUBACUTE MAXILLARY SINUSITIS: ICD-10-CM

## 2018-08-10 DIAGNOSIS — R63.5 WEIGHT GAIN: ICD-10-CM

## 2018-08-10 DIAGNOSIS — R89.4 NONSPECIFIC IMMUNOLOGICAL FINDINGS: ICD-10-CM

## 2018-08-10 DIAGNOSIS — R79.83 HOMOCYSTEINEMIA: ICD-10-CM

## 2018-08-10 DIAGNOSIS — M25.562 PAIN IN BOTH KNEES, UNSPECIFIED CHRONICITY: ICD-10-CM

## 2018-08-10 DIAGNOSIS — B35.1 TOENAIL FUNGUS: ICD-10-CM

## 2018-08-10 DIAGNOSIS — G56.03 BILATERAL CARPAL TUNNEL SYNDROME: ICD-10-CM

## 2018-08-10 DIAGNOSIS — E03.8 HYPOTHYROIDISM DUE TO HASHIMOTO'S THYROIDITIS: ICD-10-CM

## 2018-08-10 DIAGNOSIS — G44.211 INTRACTABLE EPISODIC TENSION-TYPE HEADACHE: ICD-10-CM

## 2018-08-10 DIAGNOSIS — M15.9 PRIMARY OSTEOARTHRITIS INVOLVING MULTIPLE JOINTS: ICD-10-CM

## 2018-08-10 DIAGNOSIS — M25.561 PAIN IN BOTH KNEES, UNSPECIFIED CHRONICITY: ICD-10-CM

## 2018-08-10 DIAGNOSIS — K90.89 OTHER INTESTINAL MALABSORPTION: Primary | ICD-10-CM

## 2018-08-10 DIAGNOSIS — E55.9 VITAMIN D DEFICIENCY: ICD-10-CM

## 2018-08-10 DIAGNOSIS — R79.89 POSITIVE SEROLOGICAL TEST RESULT: ICD-10-CM

## 2018-08-10 DIAGNOSIS — E06.3 HYPOTHYROIDISM DUE TO HASHIMOTO'S THYROIDITIS: ICD-10-CM

## 2018-08-10 DIAGNOSIS — M25.532 LEFT WRIST PAIN: ICD-10-CM

## 2018-08-10 DIAGNOSIS — R63.5 ABNORMAL WEIGHT GAIN: ICD-10-CM

## 2018-08-10 DIAGNOSIS — M25.569 ARTHRALGIA OF LOWER LEG, UNSPECIFIED LATERALITY: ICD-10-CM

## 2018-08-10 DIAGNOSIS — K21.00 GASTROESOPHAGEAL REFLUX DISEASE WITH ESOPHAGITIS: ICD-10-CM

## 2018-08-10 DIAGNOSIS — E78.89 LIPIDS ABNORMAL: ICD-10-CM

## 2018-08-10 DIAGNOSIS — E79.0 ABNORMAL BLOOD LEVEL OF URIC ACID: ICD-10-CM

## 2018-08-10 DIAGNOSIS — K21.9 GASTROESOPHAGEAL REFLUX DISEASE, ESOPHAGITIS PRESENCE NOT SPECIFIED: ICD-10-CM

## 2018-08-10 PROCEDURE — 99214 OFFICE O/P EST MOD 30 MIN: CPT | Performed by: FAMILY MEDICINE

## 2018-08-10 RX ORDER — GRAPE SEED OIL 100 %
500 OIL (ML) MISCELLANEOUS DAILY
COMMUNITY

## 2018-08-10 NOTE — PROGRESS NOTES
8/10/2018    Coleman Carrel (:  1950) is a 80 y.o. female, here for evaluation of the following medical concerns:    HPI Coleman Carrel is a 80 y.o. White female. Brice Callaway  presents to the [address] [place] [address] today for   Chief Complaint   Patient presents with    Follow-up     2 Month Follow-up Wee Protocol and review labs completed 2018    Other     Discuss immunizations    Hypothyroidism     Hashimoto's   ,  and;   1. Other intestinal malabsorption    2. Bilateral carpal tunnel syndrome    3. Gastroesophageal reflux disease with esophagitis    4. Intractable episodic tension-type headache    5. Hypothyroidism due to Hashimoto's thyroiditis    6. Arthralgia of lower leg, unspecified laterality    7. Abnormal weight gain    8. Toenail fungus    9. Primary osteoarthritis involving multiple joints    10. Nonspecific immunological findings    11. Gastroesophageal reflux disease, esophagitis presence not specified    12. Positive serological test result    13. Pain in both knees, unspecified chronicity    14. Weight gain    15. Subacute maxillary sinusitis    16. Homocysteinemia ([address]    17. Lipids abnormal    18. Vitamin D deficiency    19. Abnormal blood level of uric acid    20. Left wrist pain      I have reviewed Coleman Carrel medical, surgical and other pertinent history in detail, and have updated medication and allergy information in the computerized patient record. Clinical Care Team:     -Referring Provider for today's consult: Self Referred  -Primary Care Provider: Maria Teresa Grady DO    Medical/Surgical History:   She  has a past medical history of Carpal tunnel syndrome; GERD (gastroesophageal reflux disease); Headache(784.0); Hypothyroidism; Osteoarthritis; Tendonitis of both wrists; and Toenail fungus. Her  has a past surgical history that includes Carpal tunnel release; hernia repair;  section;  section;  section;  Foot surgery (Left, 2018); joint replacement (Left, 09/2010); and joint replacement (Right, 08/2016). Family/Social History:     Her family history includes Arrhythmia in her mother and sister; Cancer in her brother; Coronary Art Dis in her brother and mother; Diabetes in her father; Early Death in her child; Heart Attack in her brother; Heart Disease in her mother; Other in her brother, mother, and sister; Pacemaker in her sister; Janes Greenhouse in her child; Stroke in her mother. She  reports that she has never smoked. She has never used smokeless tobacco. She reports that she drinks alcohol. She reports that she does not use drugs. Medications/Allergies/Immunizations:     Her current medication(s) include   Current Outpatient Prescriptions:     Grape Seed OIL, 500 mg by Does not apply route daily Oregan Grape Root, Disp: , Rfl:     lisinopril (PRINIVIL;ZESTRIL) 10 MG tablet, Take 1 tablet by mouth daily, Disp: 90 tablet, Rfl: 1    levothyroxine (SYNTHROID) 125 MCG tablet, Take 1 tablet by mouth daily, Disp: 90 tablet, Rfl: 1    liothyronine (CYTOMEL) 5 MCG tablet, Take 1 tablet by mouth daily, Disp: 90 tablet, Rfl: 1    Lysine 500 MG TABS, Take 1 tablet by mouth 2 times daily , Disp: , Rfl:     Menaquinone-7 (VITAMIN K2 PO), Take 1 capsule by mouth 2 times daily, Disp: , Rfl:     naproxen (NAPROSYN) 500 MG tablet, Take 500 mg by mouth as needed for Pain, Disp: , Rfl:     Omega 3 1000 MG CAPS, Take 2 capsules by mouth 4 times daily (before meals and nightly) , Disp: , Rfl:     DHEA 25 MG CAPS, Take 1 capsule by mouth daily. , Disp: , Rfl:     Cinnamon 500 MG CAPS, Take 3 capsules by mouth 4 times daily (before meals and nightly) , Disp: , Rfl:     Vitamin D (CHOLECALCIFEROL) 1000 UNITS CAPS capsule, Take 7,000 Units by mouth daily . , Disp: , Rfl:     B Complex-Biotin-FA (B-100 TR) TBCR, Take 1 tablet by mouth three times daily Before and as finish breakfast and before lunch, Disp: , Rfl:     aspirin (LUCY ASPIRIN) 325 MG systems reviewed and are negative. Prior to Visit Medications    Medication Sig Taking? Authorizing Provider   Grape Seed  mg by Does not apply route daily Oregan Grape Root Yes Historical Provider, MD   lisinopril (PRINIVIL;ZESTRIL) 10 MG tablet Take 1 tablet by mouth daily Yes Rinku Moore DO   levothyroxine (SYNTHROID) 125 MCG tablet Take 1 tablet by mouth daily Yes Adebayo Morrison MD   liothyronine (CYTOMEL) 5 MCG tablet Take 1 tablet by mouth daily Yes Adebayo Morrison MD   Lysine 500 MG TABS Take 1 tablet by mouth 2 times daily  Yes Historical Provider, MD   Menaquinone-7 (VITAMIN K2 PO) Take 1 capsule by mouth 2 times daily Yes Historical Provider, MD   naproxen (NAPROSYN) 500 MG tablet Take 500 mg by mouth as needed for Pain Yes Historical Provider, MD   Omega 3 1000 MG CAPS Take 2 capsules by mouth 4 times daily (before meals and nightly)  Yes Historical Provider, MD   DHEA 25 MG CAPS Take 1 capsule by mouth daily. Yes Historical Provider, MD   Cinnamon 500 MG CAPS Take 3 capsules by mouth 4 times daily (before meals and nightly)  Yes Historical Provider, MD   Vitamin D (CHOLECALCIFEROL) 1000 UNITS CAPS capsule Take 7,000 Units by mouth daily .  Yes Historical Provider, MD   B Complex-Biotin-FA (B-100 TR) TBCR Take 1 tablet by mouth three times daily Before and as finish breakfast and before lunch Yes Historical Provider, MD   aspirin (LUCY ASPIRIN) 325 MG tablet Take 325 mg by mouth daily  Yes Historical Provider, MD        No Known Allergies    Past Medical History:   Diagnosis Date    Carpal tunnel syndrome     GERD (gastroesophageal reflux disease)     Headache(784.0)     Hypothyroidism     Osteoarthritis     Tendonitis of both wrists 2017    Toenail fungus        Past Surgical History:   Procedure Laterality Date    CARPAL TUNNEL RELEASE      both hands     SECTION       SECTION      Kimberly Salmeron Left 2018    AM[facility] Combs HERNIA REPAIR      JOINT REPLACEMENT Left 09/2010    Left Total Knee    JOINT REPLACEMENT Right 08/2016    Right Total Knee       Social History     Social History    Marital status:      Spouse name: N/A    Number of children: N/A    Years of education: N/A     Occupational History    Not on file. Social History Main Topics    Smoking status: Never Smoker    Smokeless tobacco: Never Used    Alcohol use Yes      Comment: rarely    Drug use: No    Sexual activity: Not on file     Other Topics Concern    Not on file     Social History Narrative    No narrative on file        Family History   Problem Relation Age of Onset    Stroke Mother     Arrhythmia Mother     Other Mother         Barry's Syndrome    Heart Disease Mother     Coronary Art Dis Mother     Diabetes Father     Other Sister         Barry's Syndrome    Arrhythmia Sister     Cancer Brother         Stomach Cancer    Stillborn Child     Early Death Child     Pacemaker Sister     Heart Attack Brother     Coronary Art Dis Brother     Other Brother         Barry's Syndrome       Vitals:    08/10/18 0957   BP: 128/74   Site: Left Arm  Comment: Forearm   Position: Sitting   Cuff Size: Large Adult   Pulse: 55   Resp: 20   Temp: 97.9 °F (36.6 °C)   TempSrc: Oral   SpO2: 97%   Weight: 273 lb 9.6 oz (124.1 kg)   Height: 5' 3\" (1.6 m)     Estimated body mass index is 48.47 kg/m² as calculated from the following:    Height as of this encounter: 5' 3\" (1.6 m). Weight as of this encounter: 273 lb 9.6 oz (124.1 kg). Physical Exam   Constitutional: She is oriented to person, place, and time. She appears well-developed and well-nourished. HENT:   Head: Normocephalic. Pulmonary/Chest: Effort normal.   Neurological: She is alert and oriented to person, place, and time. Psychiatric: She has a normal mood and affect. Thought content normal.   Nursing note and vitals reviewed. ASSESSMENT/PLAN:  1.  Other intestinal the omega 6 food list to avoid         - Gluten in corn and oats abstracts sheet reviewed and given to the patient today   3. Greater than 25 minutes were spent face to face on this visit of which >50% was for counseling and coordination of care. Patient's foods, drinks and supplements were reviewed with the patient,   - they will bring a food drink symptom log to future visits for inclusion in their record    - 75 better food list reviewed & given to patient along with the omega 6 food list to avoid      - Gluten in corn and oats abstracts sheet reviewed and given to the patient today    - 51 Foods containing Latex-like proteins was reviewed and copy given to the patient     - Nutrient Supplements list provided and copy given to the patient     - [employer] web site offered to patient to review at their convenience by staff with login information    - www.efaeducation. org site reviewed with the patient so they can identify better foods    - www.cornicopia. org site reviewed with the patient so they can reduce carrageenan by reviewing the carrageenan buyers guide on that site and picking safer foods    Note:   I have discussed with the patient that with all nutraceuticals, there is often mixed data and emerging research which needs to be monitored; as well as an array of NIH fact sheets on nutrients and supplements. If I have recommended cinnamon at the request of this patient to assist them in control of their blood sugar, triglyceride and or weight issues. I discussed that the patient's clinical use of cinnamon bark, calcium, magnesium, Vitamin D and pharmaceutical grade CVS #[id number] fish oil or triple-strength fish oil, and balanced B-50 or B-100 time-released B complex which does not contain Vit C in the tablet. The dose will be for a time-limited trial to determine their individual effectiveness and tolerance in this patient.   I also referred the patient to the reviewing such items the first bottle or two  - Magnesium oxide 250 mg tabs for those having < 2 bowel movements daily  - Magnesium citrate TABLETS  or Slow Mag, or magnesium gluconate if having > 2 bowel movement/day  - Centrum Silver or look-a-like for most patients, Centrum plain or look-a-like with iron  - Vitamin D-3 comes as 1,000 IU or 2,000 IU or 5,000 IU gel caps or Liquid drops      Some brands containing or derived from soy oil or corn oil are OK if not allergic to soy  - Elemental Iron 65 mg tabs at bedtime is available over the counter if need more iron     Usually turns bowel movements grey, green or black but not a concern  - Apricot Kernel Oil (by Now) for dry skin and use in sensitive perineal area skin    Nutrients for ongoing use by Mail order for less expense from www.amazon. com, [employer], [employer]   - Triple Strength Fish Oil , Softgels   - B-100 time released balanced B complex   - Cinnamon bark 500 mg with or without Chromium but not extract (ineffective)  - Calcium carbonate 1000 mg, Magnesium oxide 500 mg, Vit D 3 best as individual  - Magnesium oxide 250 mg, 400 mg, or 500 mg tabs if < 2 bowel movements daily  - Multivitamin Seniors for most patients, One Daily or Item with iron  - Vit D 3  1,000IU ,   2,000 IU,  5,000 IU, can start low and buy larger on 2nd bottle     Some brands containing or derived from soy oil or corn oil are OK if not allergic to soy    Nutrients for Special Needs by Mail order for less expense;  - Elemental Iron 65 mg tabs if need more iron for low iron on labs    Usually turns bowel movements grey, green or black but not a concern  - Time released Niacin 250 mg for cold intolerance, low libido or impotence  - DHEA 10 mg, 25 mg, or 50 mg for improving DHEA levels on labs if having Fatigue    If stools too loose substitute for your Magnesium oxide using;   Magnesium citrate 200 mg tabs(NOT liquid, NOT caps) amazon. com  Magnesium gluconate 550 mg by Paulo at to half of latex-sensitive patients show allergic reactions to fruits (avocados, bananas, kiwifruits, papayas, peaches),   Annals of Allergy, 1994. These plants contain the same proteins that are allergens in latex. People with fruit allergies should warn physicians before undergoing procedures which may cause anaphylactic reaction if in contact with latex gloves. Some of the common foods with defined cross-reactivity to latex are avocado, banana, kiwi, chestnut, raw potato, tomato, stone fruits (e.g., peach, cherry), hazelnut, melons, celery, carrot, apple, pear, papaya, and almond. Foods with less well-defined cross-reactivity to latex are peanuts, peppers, citrus fruits, coconut, pineapple, naveed, fig, passion fruit, Ugli fruit, and grape    This fruit/latex cross-reactivity is worsened by ethylene, a gas used to hasten commercial ripening. In nature, plants produce low levels of the hormone ethylene, which regulates germination, flowering, and ripening. Forced ripening by high ethylene concentrations, plants produce allergenic wound-repair proteins, which are similar to wound-repair proteins made during the tapping of rubber trees. Sensitive individuals who ingest the fruit get a higher dose and worse reaction. Some people may even first become sensitized to latex through fruit. Can food processing increase the concentrations of allergenic proteins? Latex-sensitized children (and adults) in [place] often experience allergic reactions after eating bananas ripened artificially with ethylene. In the [place], food distribution centers treat unripe bananas and other produce with ethylene to ripen; not commonly done in [facility] since fruit is tree-ripened there. Does treatment of food with ethylene induce banana proteins that cross-react with latex?  (Miguel Angel et al.    References:   Latex in Foods Allergy, http://ehp.niehs.nih.gov/members/2003/5811/5811.html    Search web for [employer] in

## 2018-11-02 ENCOUNTER — HOSPITAL ENCOUNTER (OUTPATIENT)
Age: 68
Discharge: HOME | End: 2018-11-02
Payer: MEDICARE

## 2018-11-02 DIAGNOSIS — M25.562 PAIN IN BOTH KNEES, UNSPECIFIED CHRONICITY: ICD-10-CM

## 2018-11-02 DIAGNOSIS — E06.3 HYPOTHYROIDISM DUE TO HASHIMOTO'S THYROIDITIS: ICD-10-CM

## 2018-11-02 DIAGNOSIS — J01.00 SUBACUTE MAXILLARY SINUSITIS: ICD-10-CM

## 2018-11-02 DIAGNOSIS — G56.03 BILATERAL CARPAL TUNNEL SYNDROME: ICD-10-CM

## 2018-11-02 DIAGNOSIS — E78.89 LIPIDS ABNORMAL: ICD-10-CM

## 2018-11-02 DIAGNOSIS — R79.89 POSITIVE SEROLOGICAL TEST RESULT: ICD-10-CM

## 2018-11-02 DIAGNOSIS — E79.0 ABNORMAL BLOOD LEVEL OF URIC ACID: ICD-10-CM

## 2018-11-02 DIAGNOSIS — R63.5 WEIGHT GAIN: ICD-10-CM

## 2018-11-02 DIAGNOSIS — R63.5 ABNORMAL WEIGHT GAIN: ICD-10-CM

## 2018-11-02 DIAGNOSIS — M25.569 ARTHRALGIA OF LOWER LEG, UNSPECIFIED LATERALITY: ICD-10-CM

## 2018-11-02 DIAGNOSIS — R79.83 HOMOCYSTEINEMIA: ICD-10-CM

## 2018-11-02 DIAGNOSIS — K90.89 OTHER INTESTINAL MALABSORPTION: ICD-10-CM

## 2018-11-02 DIAGNOSIS — M15.9 PRIMARY OSTEOARTHRITIS INVOLVING MULTIPLE JOINTS: ICD-10-CM

## 2018-11-02 DIAGNOSIS — M25.561 PAIN IN BOTH KNEES, UNSPECIFIED CHRONICITY: ICD-10-CM

## 2018-11-02 DIAGNOSIS — M25.532 LEFT WRIST PAIN: ICD-10-CM

## 2018-11-02 DIAGNOSIS — E55.9 VITAMIN D DEFICIENCY: ICD-10-CM

## 2018-11-02 DIAGNOSIS — K21.00 GASTROESOPHAGEAL REFLUX DISEASE WITH ESOPHAGITIS: ICD-10-CM

## 2018-11-02 DIAGNOSIS — K21.9 GASTROESOPHAGEAL REFLUX DISEASE, ESOPHAGITIS PRESENCE NOT SPECIFIED: ICD-10-CM

## 2018-11-02 DIAGNOSIS — E03.8 HYPOTHYROIDISM DUE TO HASHIMOTO'S THYROIDITIS: ICD-10-CM

## 2018-11-02 DIAGNOSIS — R89.4 NONSPECIFIC IMMUNOLOGICAL FINDINGS: ICD-10-CM

## 2018-11-02 DIAGNOSIS — B35.1 TOENAIL FUNGUS: ICD-10-CM

## 2018-11-02 DIAGNOSIS — G44.211 INTRACTABLE EPISODIC TENSION-TYPE HEADACHE: ICD-10-CM

## 2018-11-02 LAB
BASOPHILS # BLD: 1 %
BASOPHILS ABSOLUTE: 0.1 THOU/MM3 (ref 0–0.1)
CALCIUM SERPL-MCNC: 9.9 MG/DL (ref 8.5–10.5)
CHOLESTEROL, TOTAL: 158 MG/DL (ref 100–199)
EOSINOPHIL # BLD: 2.7 %
EOSINOPHILS ABSOLUTE: 0.3 THOU/MM3 (ref 0–0.4)
ERYTHROCYTE [DISTWIDTH] IN BLOOD BY AUTOMATED COUNT: 13.9 % (ref 11.5–14.5)
ERYTHROCYTE [DISTWIDTH] IN BLOOD BY AUTOMATED COUNT: 45.3 FL (ref 35–45)
ESTRADIOL LEVEL: 86.7 PG/ML
HCT VFR BLD CALC: 46 % (ref 37–47)
HDLC SERPL-MCNC: 62 MG/DL
HEMOGLOBIN: 14.9 GM/DL (ref 12–16)
IMMATURE GRANS (ABS): 0.03 THOU/MM3 (ref 0–0.07)
IMMATURE GRANULOCYTES: 0.3 %
LDL CHOLESTEROL CALCULATED: 83 MG/DL
LYMPHOCYTES # BLD: 30.3 %
LYMPHOCYTES ABSOLUTE: 2.9 THOU/MM3 (ref 1–4.8)
MAGNESIUM: 2.3 MG/DL (ref 1.6–2.4)
MCH RBC QN AUTO: 29.2 PG (ref 26–33)
MCHC RBC AUTO-ENTMCNC: 32.4 GM/DL (ref 32.2–35.5)
MCV RBC AUTO: 90.2 FL (ref 81–99)
MONOCYTES # BLD: 6.7 %
MONOCYTES ABSOLUTE: 0.6 THOU/MM3 (ref 0.4–1.3)
NUCLEATED RED BLOOD CELLS: 0 /100 WBC
PLATELET # BLD: 336 THOU/MM3 (ref 130–400)
PMV BLD AUTO: 10.1 FL (ref 9.4–12.4)
PTH INTACT: 39.3 PG/ML (ref 15–65)
RBC # BLD: 5.1 MILL/MM3 (ref 4.2–5.4)
SEDIMENTATION RATE, ERYTHROCYTE: 62 MM/HR (ref 0–20)
SEG NEUTROPHILS: 59 %
SEGMENTED NEUTROPHILS ABSOLUTE COUNT: 5.7 THOU/MM3 (ref 1.8–7.7)
TRIGL SERPL-MCNC: 63 MG/DL (ref 0–199)
VITAMIN D 25-HYDROXY: 45 NG/ML (ref 30–100)
WBC # BLD: 9.7 THOU/MM3 (ref 4.8–10.8)

## 2018-11-02 PROCEDURE — 86141 C-REACTIVE PROTEIN HS: CPT

## 2018-11-02 PROCEDURE — 82627 DEHYDROEPIANDROSTERONE: CPT

## 2018-11-02 PROCEDURE — 84403 ASSAY OF TOTAL TESTOSTERONE: CPT

## 2018-11-02 PROCEDURE — 82670 ASSAY OF TOTAL ESTRADIOL: CPT

## 2018-11-02 PROCEDURE — 83970 ASSAY OF PARATHORMONE: CPT

## 2018-11-02 PROCEDURE — 85025 COMPLETE CBC W/AUTO DIFF WBC: CPT

## 2018-11-02 PROCEDURE — 82306 VITAMIN D 25 HYDROXY: CPT

## 2018-11-02 PROCEDURE — 83735 ASSAY OF MAGNESIUM: CPT

## 2018-11-02 PROCEDURE — 85651 RBC SED RATE NONAUTOMATED: CPT

## 2018-11-02 PROCEDURE — 82310 ASSAY OF CALCIUM: CPT

## 2018-11-02 PROCEDURE — 84270 ASSAY OF SEX HORMONE GLOBUL: CPT

## 2018-11-02 PROCEDURE — 36415 COLL VENOUS BLD VENIPUNCTURE: CPT

## 2018-11-02 PROCEDURE — 82626 DEHYDROEPIANDROSTERONE: CPT

## 2018-11-02 PROCEDURE — 80061 LIPID PANEL: CPT

## 2018-11-03 LAB — C-REACTIVE PROTEIN, HIGH SENSITIVITY: 70.4 MG/L

## 2018-11-04 LAB — DHEAS (DHEA SULFATE): 152 UG/DL (ref 13–130)

## 2018-11-06 LAB
DHEA UNCONJUGATED: 2.94 NG/ML (ref 0.63–4.7)
TESTOSTERONE, FREE W SHGB, FEMALES/CHILDREN: NORMAL

## 2018-11-07 ENCOUNTER — OFFICE VISIT (OUTPATIENT)
Dept: FAMILY MEDICINE CLINIC | Age: 68
End: 2018-11-07
Payer: MEDICARE

## 2018-11-07 VITALS
RESPIRATION RATE: 12 BRPM | SYSTOLIC BLOOD PRESSURE: 122 MMHG | WEIGHT: 270.6 LBS | DIASTOLIC BLOOD PRESSURE: 62 MMHG | TEMPERATURE: 98.2 F | HEIGHT: 63 IN | BODY MASS INDEX: 47.95 KG/M2

## 2018-11-07 DIAGNOSIS — E06.3 HYPOTHYROIDISM DUE TO HASHIMOTO'S THYROIDITIS: ICD-10-CM

## 2018-11-07 DIAGNOSIS — R63.5 ABNORMAL WEIGHT GAIN: ICD-10-CM

## 2018-11-07 DIAGNOSIS — M25.532 LEFT WRIST PAIN: ICD-10-CM

## 2018-11-07 DIAGNOSIS — R89.4 NONSPECIFIC IMMUNOLOGICAL FINDINGS: ICD-10-CM

## 2018-11-07 DIAGNOSIS — E03.8 HYPOTHYROIDISM DUE TO HASHIMOTO'S THYROIDITIS: ICD-10-CM

## 2018-11-07 DIAGNOSIS — R79.83 HOMOCYSTEINEMIA: ICD-10-CM

## 2018-11-07 DIAGNOSIS — K21.00 GASTROESOPHAGEAL REFLUX DISEASE WITH ESOPHAGITIS: ICD-10-CM

## 2018-11-07 DIAGNOSIS — E78.89 LIPIDS ABNORMAL: ICD-10-CM

## 2018-11-07 DIAGNOSIS — G56.00 CARPAL TUNNEL SYNDROME, UNSPECIFIED LATERALITY: ICD-10-CM

## 2018-11-07 DIAGNOSIS — B35.1 TOENAIL FUNGUS: ICD-10-CM

## 2018-11-07 DIAGNOSIS — M15.9 PRIMARY OSTEOARTHRITIS INVOLVING MULTIPLE JOINTS: ICD-10-CM

## 2018-11-07 DIAGNOSIS — J01.00 SUBACUTE MAXILLARY SINUSITIS: ICD-10-CM

## 2018-11-07 DIAGNOSIS — R79.89 POSITIVE SEROLOGICAL TEST RESULT: ICD-10-CM

## 2018-11-07 DIAGNOSIS — K90.89 OTHER INTESTINAL MALABSORPTION: ICD-10-CM

## 2018-11-07 DIAGNOSIS — G44.001 INTRACTABLE CLUSTER HEADACHE SYNDROME, UNSPECIFIED CHRONICITY PATTERN: ICD-10-CM

## 2018-11-07 DIAGNOSIS — R63.5 WEIGHT GAIN: ICD-10-CM

## 2018-11-07 DIAGNOSIS — E55.9 VITAMIN D DEFICIENCY: ICD-10-CM

## 2018-11-07 DIAGNOSIS — M25.569 ARTHRALGIA OF LOWER LEG, UNSPECIFIED LATERALITY: ICD-10-CM

## 2018-11-07 DIAGNOSIS — E79.0 ABNORMAL BLOOD LEVEL OF URIC ACID: ICD-10-CM

## 2018-11-07 DIAGNOSIS — I10 HTN, GOAL BELOW 140/80: ICD-10-CM

## 2018-11-07 PROCEDURE — 99214 OFFICE O/P EST MOD 30 MIN: CPT | Performed by: FAMILY MEDICINE

## 2018-11-07 RX ORDER — MAGNESIUM CHLORIDE 71.5 G/G
1 TABLET ORAL
COMMUNITY
End: 2022-10-06

## 2018-11-07 RX ORDER — LISINOPRIL 10 MG/1
10 TABLET ORAL DAILY
Qty: 90 TABLET | Refills: 1 | Status: SHIPPED | OUTPATIENT
Start: 2018-11-07 | End: 2018-12-10 | Stop reason: SDUPTHER

## 2018-11-07 NOTE — PROGRESS NOTES
magnesium gluconate if having > 2 bowel movement/day  - Centrum Silver or look-a-like for most patients, Centrum plain or look-a-like with iron  - Vitamin D-3 comes as 1,000 IU or 2,000 IU or 5,000 IU gel caps or Liquid drops      Some brands containing or derived from soy oil or corn oil are OK if not allergic to soy  - Elemental Iron 65 mg tabs at bedtime is available over the counter if need more iron     Usually turns bowel movements grey, green or black but not a concern  - Apricot Kernel Oil (by Now) for dry skin and use in sensitive perineal area skin    Nutrients for ongoing use by Mail order for less expense from www.amazon. com, wwwFusion Smoothies, wwwRelay Network   - Triple Strength Fish Oil , Softgels   - B-100 time released balanced B complex   - Cinnamon bark 500 mg with or without Chromium but not extract (ineffective)  - Calcium carbonate 1000 mg, Magnesium oxide 500 mg, Vit D 3 best as individual  - Magnesium oxide 250 mg, 400 mg, or 500 mg tabs if < 2 bowel movements daily  - Multivitamin Seniors for most patients, One Daily or Item with iron  - Vit D 3  1,000IU ,   2,000 IU,  5,000 IU, can start low and buy larger on 2nd bottle     Some brands containing or derived from soy oil or corn oil are OK if not allergic to soy    Nutrients for Special Needs by Mail order for less expense;  - Elemental Iron 65 mg tabs if need more iron for low iron on labs    Usually turns bowel movements grey, green or black but not a concern  - Time released Niacin 250 mg for cold intolerance, low libido or impotence  - DHEA 10 mg, 25 mg, or 50 mg for improving DHEA levels on labs if having Fatigue    If stools too loose substitute for your Magnesium oxide using;   Magnesium citrate 200 mg tabs(NOT liquid, NOT caps) amazon. com  Magnesium gluconate 550 mg by [employer] at [employer]  Magnesium chloride foot soaks or body sprays  www.[employer]. [employer]   Magnesium chloride flakes for soaks, or magnesium spray or cream    Food requires little of no ethylene exposure in food distribution so has less latex content. Out of season, use canned, frozen or dried since processed ripe and are latex lower!!! Month     [place] [employer] Produ[employer]  January, February, March: use canned, frozen or dried fruits since lower in latex  April; asparagus, radishes  May; asparagus, broccoli, green onions, greens, peas, radishes, rhubarb  June; asparagus, beets, beans, broccoli, cabbage, cantaloupe, carrots, green onions, greens, lettuce, onions, parsley, peas, radishes, rhubarb, strawberries, watermelons  July; beans, beets, blueberries, broccoli, cabbage, cantaloupe, carrots, cauliflower, celery, cucumbers, eggplant, grapes, green onions, greens, lettuce, onions, parsley, peas, peaches, bell peppers, potatoes, radishes, summer raspberries, squash, sweet corn, tomatoes, turnips, watermelons  August; apples, beans, beets, blueberries, cabbage, cantaloupe, carrots, cauliflower, celery, cucumbers, eggplant, grapes, green onions, greens, lettuce, onions, parsley, peas, peaches, pears, bell peppers, potatoes, radishes, squash, sweet corn, tomatoes, turnips, watermelons  September; apples, beans, beets, blueberries, cabbage, cantaloupe, carrots, cauliflower, celery, cucumbers, eggplant, grapes, green onions, greens, lettuce, onions, parsley, peas, peaches, pears, bell peppers, plums, potatoes, pumpkins, radishes, fall red raspberries, squash, sweet corn, tomatoes, turnips, watermelons  October; apples, beets, broccoli, cabbage, carrots, cauliflower, celery, green onions, greens, lettuce, parsley, peas, pears, potatoes, pumpkins, radishes, fall red raspberries, squash, turnips  November; broccoli, cabbage, carrots, parsley, pears, peas  December: use canned, frozen or dried fruits since lower in latex    Up to half of latex-sensitive patients show allergic reactions to fruits (avocados, bananas, kiwifruits, papayas, peaches),   Annals of Allergy, 1994.  These plants

## 2018-11-07 NOTE — LETTER
[address] [place] [address],[address] [facility]  Jaswant Wilkinson 84  Phone: [phone]  Fax: [phone]    Kamran Thomas MD        November 7, 2018     Eneidajackie Shankar  [address]  [place] [id number]  [address] [place] [address]      Dear Tonio Anderson: These are your final results, so review them, send questions, and come back in to see me if not clear on how to proceed on to your correct path, with the correct supplements, the 75 Better Foods which do not require fish oil, etc    Resulted Orders   Calcium   Result Value Ref Range    Calcium 9.9 8.5 - 10.5 mg/dL      Comment:      Performed at [address] [place] [id number]   Magnesium   Result Value Ref Range    Magnesium 2.3 1.6 - 2.4 mg/dL      Comment:      Performed at [address] [place] [address], [address]   Vitamin D 25 Hydroxy   Result Value Ref Range    Vit D, 25-Hydroxy 45 30 - 100 ng/ml      Comment:      Vitamin D Status           Range    Deficiency                 <20 ng/ml  Insuffiency                20-30 ng/ml  Sufficiency                 ng/ml  Toxicity                   >100 ng/ml  Performed at [address] [place] [id number]     PTH, Intact   Result Value Ref Range    Pth Intact 39.3 15.0 - 65.0 pg/mL      Comment:      Performed at [address] [place] [id number]   Lipid Panel   Result Value Ref Range    Cholesterol, Total 158 100 - 199 mg/dL      Comment:           <200          Desirable       200 - 239     Borderline High       >239          High      Triglycerides 63 0 - 199 mg/dL      Comment:           <150          Desirable       150 - 199     Borderline High       200 - 499     High       >449          Very High       Ranges are based upon NCEP/ATP III guidelines. HDL 62 mg/dL      Comment:           Refer to General Chemistry for CHOL and TRIG results. HDL CLASSIFICATIONS FOR PATIENTS > 21YEARS OLD. <40               Undesirable (Major Risk Factor)       >60               Protective (Negative Risk Factor)      LDL Calculated 83 mg/dL      Comment:           Refer to General Chemistry for CHOL and TRIG results. LDL CLASSIFICATIONS FOR PATIENTS >21YEARS OLD:          ** Determination Invalid if TRIG >400 **       <100          Optimal       100 - 129     Near or Above Optimal       130 - 159     Borderline High       160 - 189     High Risk       >189          Very High Risk  Performed at [address] [place] [address], [address]     CBC Auto Differential   Result Value Ref Range    WBC 9.7 4.8 - 10.8 thou/mm3    RBC 5.10 4.20 - 5.40 mill/mm3    Hemoglobin 14.9 12.0 - 16.0 gm/dl    Hematocrit 46.0 37.0 - 47.0 %    MCV 90.2 81.0 - 99.0 fL    MCH 29.2 26.0 - 33.0 pg    MCHC 32.4 32.2 - 35.5 gm/dl    RDW-CV 13.9 11.5 - 14.5 %    RDW-SD 45.3 (H) 35.0 - 45.0 fL    Platelets 227 451 - 999 thou/mm3    MPV 10.1 9.4 - 12.4 fL    Seg Neutrophils 59.0 %    Lymphocytes 30.3 %    Monocytes 6.7 %    Eosinophils 2.7 %    Basophils 1.0 %    Immature Granulocytes 0.3 %    Segs Absolute 5.7 1.8 - 7.7 thou/mm3    Lymphocytes # 2.9 1.0 - 4.8 thou/mm3    Monocytes # 0.6 0.4 - 1.3 thou/mm3    Eosinophils # 0.3 0.0 - 0.4 thou/mm3    Basophils # 0.1 0.0 - 0.1 thou/mm3    Immature Grans (Abs) 0.03 0.00 - 0.07 thou/mm3    nRBC 0 /100 wbc      Comment:      Performed at [address] [place] [id number]   High sensitivity CRP   Result Value Ref Range    CRP High Sensitivity 70.4 (H) <=3.0 mg/L      Comment:      INTERPRETIVE INFORMATION:  CRP, High Sensitivity  Patients with higher hs-CRP concentrations are more likely to  develop stroke, myocardial infarction, and severe peripheral  vascular disease. CRP is a nonspecific marker of inflammation and a variety of  conditions other than atherosclerosis may cause elevated  concentrations.  If the first result is greater than 3.0 mg/L, Result Value Ref Range    TESTOSTERONE, FREE W SHGB, FEMALES/CHILDREN SEE BELOW       Comment:      Testosterone, LC-MS/MS                          85 H   5-32          ng/dL  Total Testosterone, Females 18 years and older   Premenopausal  9-55 ng/dL   Postmenopausal 5-32 ng/dL  REFERENCE INTERVAL: Testosterone, LC-MS/MS  Access complete set of age- and/or gender-specific reference  intervals for this test in the [facility] [facility] Test Directory  ([facility]). Test developed and characteristics determined by Corey Jimenez. See Compliance Statement B: [facility]/CS  Sex Hormone Binding Globulin                    73             nmol/L  REFERENCE INTERVAL: Sex Hormone Binding Globulin  Access complete set of age- and/or gender-specific reference  intervals for this test in the [employer] Test Directory  ([facility]). Testosterone, Free LC-MS/MS                    8.6 H   0.6-3.8       pg/mL  To convert to pmol/L, multiply pg/mL by 3.47  The concentration of Free Testosterone is derived from a  mathematical expression based on the constant for the binding of  testoste  jono to sex hormone binding globulin. REFERENCE INTERVAL: Testosterone, Free LC-MS/MS  Access complete set of age- and/or gender-specific reference  intervals for this test in the [employer] Test Directory  ([facility]). Test developed and characteristics determined by Corey Jimenez. See Compliance Statement B: [facility]/CS  Performed by Corey Jimenez,  [place] , [address] [facility] [phone]  www. Angelita Lees MD - Lab.  Director     Estradiol   Result Value Ref Range    Estradiol 86.7 pg/mL      Comment:      Normally Menstruating Females    Follicular Phase      19 - 247 pg/ml    Mid-Cycle             36 - 571 pg/ml    Luteal Phase          22 - 256 pg/ml  Postmenopausal          <7 - 45 pg/ml  Males                   12 - 41 pg/ml Performed at [place] [facility], [address]       11/7/2018      Dear Russel Naranjo can consider these Rylan Bauer suggestions based on these results received from your recent lab visit. Always check with each of your other health advisors / doctors before making any changes. As we do, you can research any issues at www.nih.gov     Send any questions by My Chart or call for a lab review appointment with me at which time I can also write follow-up lab orders based on symptoms, conditions and results    If you do not use My Chart, then call for an appointment in the office within the next few weeks so we can update your supplement list to correctly include these suggestions and get going    Bring your questions, bottles, food drink symptom log and outside lab reports when you come in to discuss in more detail why/how you might benefit from adding or changing each item. You may record your visit or bring someone with you to take notes. Bring to each and every visit, all your bottles, your food-drink-symptom log, and all outside test results to be integrated into the Rylan Bauer protocol being developed just for you. Life changes are not easy, the Wee protocol considers the complexity of altering your symptom-generating diet to the more healthful Wee MAREN-2 based the 76 Better Health Foods which are gluten, carrageenan, latex-reduced, as well as glycemic controlled. We are fortunate to have been available to assist you in developing a your personalized change plan for Better Health in 120 days and beyond. The Choices are always yours. Your Options to improve your health over the next 120 days from these results are;    Since your [place] stabilize your entire metabolic system:   For your PTH to reach our ideal goal of less than 15;:  Lab Results   Component Value Date    IPTH 39.3 11/02/2018    Which is ~ 39.3 % of your bone being resorbed by your body to adjust the For these, you can add a 500 mg cinnamon cap before each meal or at mealtimes and at bedtime up to 12 caps/day, Cinnamon lowers triglycerides to <100(when fish oil added), lowers A1c to <5.5 when magnesium added, and also lowers LDL and total Cholesterol plus boosts weight loss and energy level if needed. Note: Patients new to cinnamon can start with 1 cap before meals (mealtimes) and at bedtime chased with 2+ ounces of Half and Half to prevent acid reflux symptoms  Chromium enhances cinnamon's effect, but Cinnamon Extracts Do Not Work,     For lowering Total Cholesterol and LDL Cholesterol, we want PTH less than 15:  Lab Results   Component Value Date    CHOL 158 11/02/2018     Lab Results   Component Value Date    LDLCALC 83 11/02/2018   These are good    To reverse Inflammation Related to Omega 6 Plant oils from seeds, nuts, poultry: For your Monocytes, Eosinophils, RDW, and Platelets stability, you can;  Lab Results   Component Value Date    MONOPCT 6.7 11/02/2018     Lab Results   Component Value Date    LABEOS 2.7 11/02/2018     Lab Results   Component Value Date    RDW 15.2 02/09/2018     Lab Results   Component Value Date     11/02/2018   You can add 1 [employer] pharmaceutical grade oil before each meal (mealtimes) and at bedtime to reduce your Monocyte % by 1 % for each % >6% and to get your Eosinophil % (allergies) to <2%. Triple strength fish oil will not work quite as well as pharmaceutical  Do not add fish oil if on warfarin or thinners, but remove more offending foods  Best results come if you remove seeds, nuts, flax, soy, avocado, hummus, granola, poultry and chips from your diet to reduce plant oil injury; see www.efaeducation. org    Search how each of your foods reacts from very good to awful in your body at http://smzsv7emmfiq. com/  Flax and Servando seeds are Not Healthful so AVOID these    Homocystinemia,MTHFR related Short-term Memory, Mental Fog; & Dementia;    For your Homocysteine:

## 2018-11-07 NOTE — PATIENT INSTRUCTIONS
1. You may receive a survey about your visit with us today. The feedback from our patients helps us identify what is working well and where the service to all patients can be enhanced. Thank you! 2. Please bring in ALL medications BOTTLES, including inhalers, herbal supplements, over the counter, prescribed & non-prescribed medicine. The office would like actual medication bottles and a list.  3. Please note our OFFICE POLICIES:  a. Prior to getting your labs drawn, please check with your insurance company for benefits and eligibility of lab services. Often, insurance companies cover certain tests for preventative visits only. It is patient's responsibility to see what is covered. b. We are unable to change a diagnosis after the test has been performed. c. Lab orders will not be re-printed. Please hold onto your original lab orders and take them to your lab to be completed. d. If you no show your schedule appointment three times, you be dismissed from this practice. e. [place] must be completed 24 hours prior to your schedule appointment. 4. If the list below has been completed, PLEASE FAX RECORDS TO OUR OFFICE @ [phone]. Once the records have been received we will update your records at our office. Health Maintenance Due   Topic Date Due    Shingles Vaccine (1 of 2 - 2 Dose Series) 07/05/2014    Pneumococcal low/med risk (2 of 2 - PPSV23) 07/11/2018    Flu vaccine (1) 09/01/2018       Schedule your 2018 flu vaccine with Dr. Vicky Montoya call [phone]!   [facility], for anyone 9 years or older   [address]   Every Monday   8:00am-11:00am and 1:00pm-3:00pm

## 2018-12-03 DIAGNOSIS — E03.9 HYPOTHYROIDISM, UNSPECIFIED TYPE: ICD-10-CM

## 2018-12-04 RX ORDER — LIOTHYRONINE SODIUM 5 UG/1
TABLET ORAL
Qty: 90 TABLET | Refills: 1 | Status: SHIPPED | OUTPATIENT
Start: 2018-12-04 | End: 2018-12-10 | Stop reason: SDUPTHER

## 2018-12-04 RX ORDER — LEVOTHYROXINE SODIUM 0.12 MG/1
TABLET ORAL
Qty: 90 TABLET | Refills: 1 | Status: SHIPPED | OUTPATIENT
Start: 2018-12-04 | End: 2018-12-10 | Stop reason: SDUPTHER

## 2018-12-04 NOTE — TELEPHONE ENCOUNTER
Last visit- 6/8/2018  Next visit- 12/01/18    Requested Prescriptions     Pending Prescriptions Disp Refills    liothyronine (CYTOMEL) 5 MCG tablet [Pharmacy Med Name: LIOTHYRONINE SOD 5 MCG TAB] 90 tablet 1     Sig: take 1 tablet by mouth once daily    levothyroxine (SYNTHROID) 125 MCG tablet [Pharmacy Med Name: LEVOTHYROXINE 125 MCG TABLET] 90 tablet 1     Sig: take 1 tablet by mouth once daily

## 2018-12-10 ENCOUNTER — OFFICE VISIT (OUTPATIENT)
Dept: FAMILY MEDICINE CLINIC | Age: 68
End: 2018-12-10
Payer: MEDICARE

## 2018-12-10 VITALS
DIASTOLIC BLOOD PRESSURE: 78 MMHG | HEIGHT: 59 IN | BODY MASS INDEX: 55.92 KG/M2 | TEMPERATURE: 97.5 F | HEART RATE: 52 BPM | SYSTOLIC BLOOD PRESSURE: 124 MMHG | WEIGHT: 277.4 LBS | OXYGEN SATURATION: 100 %

## 2018-12-10 DIAGNOSIS — E03.9 HYPOTHYROIDISM, UNSPECIFIED TYPE: ICD-10-CM

## 2018-12-10 DIAGNOSIS — I10 HTN, GOAL BELOW 140/80: ICD-10-CM

## 2018-12-10 PROCEDURE — 99214 OFFICE O/P EST MOD 30 MIN: CPT | Performed by: FAMILY MEDICINE

## 2018-12-10 RX ORDER — LIOTHYRONINE SODIUM 5 UG/1
TABLET ORAL
Qty: 90 TABLET | Refills: 1 | Status: SHIPPED | OUTPATIENT
Start: 2018-12-10 | End: 2019-06-03 | Stop reason: SDUPTHER

## 2018-12-10 RX ORDER — LEVOTHYROXINE SODIUM 0.12 MG/1
TABLET ORAL
Qty: 90 TABLET | Refills: 1 | Status: SHIPPED | OUTPATIENT
Start: 2018-12-10 | End: 2019-06-03 | Stop reason: SDUPTHER

## 2018-12-10 RX ORDER — LISINOPRIL 10 MG/1
10 TABLET ORAL DAILY
Qty: 90 TABLET | Refills: 1 | Status: SHIPPED | OUTPATIENT
Start: 2018-12-10 | End: 2019-06-03 | Stop reason: SDUPTHER

## 2018-12-10 ASSESSMENT — ENCOUNTER SYMPTOMS
DIARRHEA: 0
BLOOD IN STOOL: 0
SHORTNESS OF BREATH: 0
NAUSEA: 0
CONSTIPATION: 0
EYE PAIN: 0
COUGH: 0
VOMITING: 0
ABDOMINAL PAIN: 0
TROUBLE SWALLOWING: 0

## 2018-12-10 NOTE — PROGRESS NOTES
Adult   Pulse: 52   Temp: 97.5 °F (36.4 °C)   TempSrc: Oral   SpO2: 100%   Weight: 277 lb 6.4 oz (125.8 kg)   Height: 4' 11.45\" (1.51 m)       Body mass index is 55.18 kg/m². Wt Readings from Last 3 Encounters:   12/10/18 277 lb 6.4 oz (125.8 kg)   11/07/18 270 lb 9.6 oz (122.7 kg)   08/10/18 273 lb 9.6 oz (124.1 kg)     BP Readings from Last 3 Encounters:   12/10/18 124/78   11/07/18 122/62   08/10/18 128/74       Physical Exam   Constitutional: She is oriented to person, place, and time. She appears well-developed and well-nourished. No distress. HENT:   Head: Normocephalic and atraumatic. Right Ear: External ear normal.   Left Ear: External ear normal.   Eyes: Conjunctivae and EOM are normal. Right eye exhibits no discharge. Left eye exhibits no discharge. No scleral icterus. Neck: Normal range of motion. Cardiovascular: Normal rate, regular rhythm and normal heart sounds. No murmur heard. Pulmonary/Chest: Effort normal and breath sounds normal.   Musculoskeletal: She exhibits no edema. Neurological: She is alert and oriented to person, place, and time. No cranial nerve deficit. Skin: Skin is warm and dry. No rash noted. She is not diaphoretic. No erythema. Psychiatric: She has a normal mood and affect. Her behavior is normal. Judgment and thought content normal.   Nursing note and vitals reviewed.         Lab Results   Component Value Date    WBC 9.7 11/02/2018    HGB 14.9 11/02/2018    HCT 46.0 11/02/2018     11/02/2018    CHOL 158 11/02/2018    TRIG 63 11/02/2018    HDL 62 11/02/2018    LDLDIRECT 80.00 12/12/2013    LDLCALC 83 11/02/2018    AST 30 08/06/2018     08/06/2018    K 4.4 08/06/2018     08/06/2018    CREATININE 0.8 08/06/2018    BUN 13 08/06/2018    CO2 23 08/06/2018    TSH 0.031 (L) 08/06/2018    LABA1C 6.3 08/06/2018    LABGLOM 71 (A) 08/06/2018    MG 2.3 11/02/2018    CALCIUM 9.9 11/02/2018    VITD25 45 11/02/2018       Imaging Results:    No results

## 2019-02-02 ENCOUNTER — HOSPITAL ENCOUNTER (OUTPATIENT)
Age: 69
Discharge: HOME | End: 2019-02-02
Payer: MEDICARE

## 2019-02-02 DIAGNOSIS — R63.5 WEIGHT GAIN: ICD-10-CM

## 2019-02-02 DIAGNOSIS — M15.9 PRIMARY OSTEOARTHRITIS INVOLVING MULTIPLE JOINTS: ICD-10-CM

## 2019-02-02 DIAGNOSIS — R89.4 NONSPECIFIC IMMUNOLOGICAL FINDINGS: ICD-10-CM

## 2019-02-02 DIAGNOSIS — K21.00 GASTROESOPHAGEAL REFLUX DISEASE WITH ESOPHAGITIS: ICD-10-CM

## 2019-02-02 DIAGNOSIS — E55.9 VITAMIN D DEFICIENCY: ICD-10-CM

## 2019-02-02 DIAGNOSIS — E03.8 HYPOTHYROIDISM DUE TO HASHIMOTO'S THYROIDITIS: ICD-10-CM

## 2019-02-02 DIAGNOSIS — M25.569 ARTHRALGIA OF LOWER LEG, UNSPECIFIED LATERALITY: ICD-10-CM

## 2019-02-02 DIAGNOSIS — E06.3 HYPOTHYROIDISM DUE TO HASHIMOTO'S THYROIDITIS: ICD-10-CM

## 2019-02-02 DIAGNOSIS — G44.001 INTRACTABLE CLUSTER HEADACHE SYNDROME, UNSPECIFIED CHRONICITY PATTERN: ICD-10-CM

## 2019-02-02 DIAGNOSIS — J01.00 SUBACUTE MAXILLARY SINUSITIS: ICD-10-CM

## 2019-02-02 DIAGNOSIS — K90.89 OTHER INTESTINAL MALABSORPTION: ICD-10-CM

## 2019-02-02 DIAGNOSIS — E78.89 LIPIDS ABNORMAL: ICD-10-CM

## 2019-02-02 DIAGNOSIS — R63.5 ABNORMAL WEIGHT GAIN: ICD-10-CM

## 2019-02-02 DIAGNOSIS — R79.83 HOMOCYSTEINEMIA: ICD-10-CM

## 2019-02-02 DIAGNOSIS — G56.00 CARPAL TUNNEL SYNDROME, UNSPECIFIED LATERALITY: ICD-10-CM

## 2019-02-02 DIAGNOSIS — E79.0 ABNORMAL BLOOD LEVEL OF URIC ACID: ICD-10-CM

## 2019-02-02 DIAGNOSIS — M25.532 LEFT WRIST PAIN: ICD-10-CM

## 2019-02-02 LAB
BASOPHILS # BLD: 0.7 %
BASOPHILS ABSOLUTE: 0.1 THOU/MM3 (ref 0–0.1)
CALCIUM SERPL-MCNC: 9.6 MG/DL (ref 8.5–10.5)
EOSINOPHIL # BLD: 3.1 %
EOSINOPHILS ABSOLUTE: 0.3 THOU/MM3 (ref 0–0.4)
ERYTHROCYTE [DISTWIDTH] IN BLOOD BY AUTOMATED COUNT: 14.4 % (ref 11.5–14.5)
ERYTHROCYTE [DISTWIDTH] IN BLOOD BY AUTOMATED COUNT: 46.6 FL (ref 35–45)
HCT VFR BLD CALC: 43.4 % (ref 37–47)
HEMOGLOBIN: 14.1 GM/DL (ref 12–16)
IMMATURE GRANS (ABS): 0.02 THOU/MM3 (ref 0–0.07)
IMMATURE GRANULOCYTES: 0.2 %
LYMPHOCYTES # BLD: 30.7 %
LYMPHOCYTES ABSOLUTE: 2.6 THOU/MM3 (ref 1–4.8)
MAGNESIUM: 2.2 MG/DL (ref 1.6–2.4)
MCH RBC QN AUTO: 29.1 PG (ref 26–33)
MCHC RBC AUTO-ENTMCNC: 32.5 GM/DL (ref 32.2–35.5)
MCV RBC AUTO: 89.7 FL (ref 81–99)
MONOCYTES # BLD: 7.6 %
MONOCYTES ABSOLUTE: 0.6 THOU/MM3 (ref 0.4–1.3)
NUCLEATED RED BLOOD CELLS: 0 /100 WBC
PLATELET # BLD: 304 THOU/MM3 (ref 130–400)
PMV BLD AUTO: 10 FL (ref 9.4–12.4)
PTH INTACT: 25.7 PG/ML (ref 15–65)
RBC # BLD: 4.84 MILL/MM3 (ref 4.2–5.4)
SEDIMENTATION RATE, ERYTHROCYTE: 42 MM/HR (ref 0–20)
SEG NEUTROPHILS: 57.7 %
SEGMENTED NEUTROPHILS ABSOLUTE COUNT: 4.8 THOU/MM3 (ref 1.8–7.7)
VITAMIN D 25-HYDROXY: 39 NG/ML (ref 30–100)
WBC # BLD: 8.4 THOU/MM3 (ref 4.8–10.8)

## 2019-02-02 PROCEDURE — 82310 ASSAY OF CALCIUM: CPT

## 2019-02-02 PROCEDURE — 85651 RBC SED RATE NONAUTOMATED: CPT

## 2019-02-02 PROCEDURE — 83970 ASSAY OF PARATHORMONE: CPT

## 2019-02-02 PROCEDURE — 86141 C-REACTIVE PROTEIN HS: CPT

## 2019-02-02 PROCEDURE — 36415 COLL VENOUS BLD VENIPUNCTURE: CPT

## 2019-02-02 PROCEDURE — 82306 VITAMIN D 25 HYDROXY: CPT

## 2019-02-02 PROCEDURE — 85025 COMPLETE CBC W/AUTO DIFF WBC: CPT

## 2019-02-02 PROCEDURE — 83735 ASSAY OF MAGNESIUM: CPT

## 2019-02-06 LAB — C-REACTIVE PROTEIN, HIGH SENSITIVITY: 28.1 MG/L

## 2019-02-07 ENCOUNTER — OFFICE VISIT (OUTPATIENT)
Dept: FAMILY MEDICINE CLINIC | Age: 69
End: 2019-02-07
Payer: MEDICARE

## 2019-02-07 VITALS
SYSTOLIC BLOOD PRESSURE: 134 MMHG | RESPIRATION RATE: 10 BRPM | DIASTOLIC BLOOD PRESSURE: 64 MMHG | TEMPERATURE: 97.7 F | HEIGHT: 59 IN | BODY MASS INDEX: 53.79 KG/M2 | WEIGHT: 266.8 LBS

## 2019-02-07 DIAGNOSIS — R79.83 HOMOCYSTEINEMIA: ICD-10-CM

## 2019-02-07 DIAGNOSIS — R63.5 WEIGHT GAIN: ICD-10-CM

## 2019-02-07 DIAGNOSIS — M15.9 PRIMARY OSTEOARTHRITIS INVOLVING MULTIPLE JOINTS: ICD-10-CM

## 2019-02-07 DIAGNOSIS — R79.89 POSITIVE SEROLOGICAL TEST RESULT: ICD-10-CM

## 2019-02-07 DIAGNOSIS — E06.3 HYPOTHYROIDISM DUE TO HASHIMOTO'S THYROIDITIS: ICD-10-CM

## 2019-02-07 DIAGNOSIS — K21.00 GASTROESOPHAGEAL REFLUX DISEASE WITH ESOPHAGITIS: ICD-10-CM

## 2019-02-07 DIAGNOSIS — R89.4 NONSPECIFIC IMMUNOLOGICAL FINDINGS: ICD-10-CM

## 2019-02-07 DIAGNOSIS — M25.532 LEFT WRIST PAIN: ICD-10-CM

## 2019-02-07 DIAGNOSIS — E78.89 LIPIDS ABNORMAL: ICD-10-CM

## 2019-02-07 DIAGNOSIS — G44.001 INTRACTABLE CLUSTER HEADACHE SYNDROME, UNSPECIFIED CHRONICITY PATTERN: ICD-10-CM

## 2019-02-07 DIAGNOSIS — B35.1 TOENAIL FUNGUS: ICD-10-CM

## 2019-02-07 DIAGNOSIS — K90.89 OTHER INTESTINAL MALABSORPTION: ICD-10-CM

## 2019-02-07 DIAGNOSIS — M25.569 ARTHRALGIA OF LOWER LEG, UNSPECIFIED LATERALITY: ICD-10-CM

## 2019-02-07 DIAGNOSIS — E03.8 HYPOTHYROIDISM DUE TO HASHIMOTO'S THYROIDITIS: ICD-10-CM

## 2019-02-07 DIAGNOSIS — E55.9 VITAMIN D DEFICIENCY: ICD-10-CM

## 2019-02-07 DIAGNOSIS — R63.5 ABNORMAL WEIGHT GAIN: ICD-10-CM

## 2019-02-07 DIAGNOSIS — J01.00 SUBACUTE MAXILLARY SINUSITIS: ICD-10-CM

## 2019-02-07 DIAGNOSIS — G56.03 BILATERAL CARPAL TUNNEL SYNDROME: ICD-10-CM

## 2019-02-07 DIAGNOSIS — E79.0 ABNORMAL BLOOD LEVEL OF URIC ACID: ICD-10-CM

## 2019-02-07 PROCEDURE — 99214 OFFICE O/P EST MOD 30 MIN: CPT | Performed by: FAMILY MEDICINE

## 2019-02-07 ASSESSMENT — PATIENT HEALTH QUESTIONNAIRE - PHQ9
SUM OF ALL RESPONSES TO PHQ9 QUESTIONS 1 & 2: 0
SUM OF ALL RESPONSES TO PHQ QUESTIONS 1-9: 0
1. LITTLE INTEREST OR PLEASURE IN DOING THINGS: 0
SUM OF ALL RESPONSES TO PHQ QUESTIONS 1-9: 0
2. FEELING DOWN, DEPRESSED OR HOPELESS: 0

## 2019-02-07 ASSESSMENT — ENCOUNTER SYMPTOMS: ABDOMINAL DISTENTION: 1

## 2019-02-18 ENCOUNTER — TELEPHONE (OUTPATIENT)
Dept: FAMILY MEDICINE CLINIC | Age: 69
End: 2019-02-18

## 2019-03-06 ENCOUNTER — OFFICE VISIT (OUTPATIENT)
Dept: FAMILY MEDICINE CLINIC | Age: 69
End: 2019-03-06
Payer: MEDICARE

## 2019-03-06 VITALS
BODY MASS INDEX: 50.16 KG/M2 | SYSTOLIC BLOOD PRESSURE: 136 MMHG | WEIGHT: 272.6 LBS | TEMPERATURE: 97.9 F | HEART RATE: 54 BPM | OXYGEN SATURATION: 98 % | HEIGHT: 62 IN | DIASTOLIC BLOOD PRESSURE: 80 MMHG

## 2019-03-06 DIAGNOSIS — E03.8 HYPOTHYROIDISM DUE TO HASHIMOTO'S THYROIDITIS: Primary | ICD-10-CM

## 2019-03-06 DIAGNOSIS — E06.3 HYPOTHYROIDISM DUE TO HASHIMOTO'S THYROIDITIS: Primary | ICD-10-CM

## 2019-03-06 PROCEDURE — 99213 OFFICE O/P EST LOW 20 MIN: CPT | Performed by: FAMILY MEDICINE

## 2019-03-06 ASSESSMENT — ENCOUNTER SYMPTOMS
EYE PAIN: 0
ABDOMINAL PAIN: 0
NAUSEA: 0
COUGH: 0
DIARRHEA: 0
SHORTNESS OF BREATH: 0
CONSTIPATION: 0
TROUBLE SWALLOWING: 0
VOMITING: 0
BLOOD IN STOOL: 0

## 2019-04-03 ENCOUNTER — OFFICE VISIT (OUTPATIENT)
Dept: FAMILY MEDICINE CLINIC | Age: 69
End: 2019-04-03
Payer: MEDICARE

## 2019-04-03 VITALS
SYSTOLIC BLOOD PRESSURE: 136 MMHG | OXYGEN SATURATION: 97 % | HEIGHT: 62 IN | BODY MASS INDEX: 48.4 KG/M2 | TEMPERATURE: 97.6 F | WEIGHT: 263 LBS | HEART RATE: 60 BPM | DIASTOLIC BLOOD PRESSURE: 66 MMHG

## 2019-04-03 DIAGNOSIS — I10 ESSENTIAL HYPERTENSION: ICD-10-CM

## 2019-04-03 DIAGNOSIS — E06.3 HYPOTHYROIDISM DUE TO HASHIMOTO'S THYROIDITIS: Primary | ICD-10-CM

## 2019-04-03 DIAGNOSIS — E03.8 HYPOTHYROIDISM DUE TO HASHIMOTO'S THYROIDITIS: Primary | ICD-10-CM

## 2019-04-03 DIAGNOSIS — M25.551 PAIN OF RIGHT HIP JOINT: ICD-10-CM

## 2019-04-03 PROCEDURE — 99213 OFFICE O/P EST LOW 20 MIN: CPT | Performed by: FAMILY MEDICINE

## 2019-04-03 ASSESSMENT — ENCOUNTER SYMPTOMS
EYE PAIN: 0
CONSTIPATION: 0
SHORTNESS OF BREATH: 0
TROUBLE SWALLOWING: 0
VOMITING: 0
BACK PAIN: 1
ABDOMINAL PAIN: 0
DIARRHEA: 0
BLOOD IN STOOL: 0
NAUSEA: 0
COUGH: 0

## 2019-04-03 NOTE — PATIENT INSTRUCTIONS
Will order the xray any time you would like - will keep up the weight loss    Will keep up the lower sugar diet    May need to decrease the lisinopril depending on how much weight you lose    Will see you back in a month    Will see Summer Aldrich in May

## 2019-04-03 NOTE — PROGRESS NOTES
Alex Miller is a 77 y.o. female who presents today for:  Chief Complaint   Patient presents with    Hypertension    Nutrition Counseling     keto group , down 6 lbs       Goals      Reduce sugar intake to X grams per day           HPI:     HPI     Her  just got 2 stents - has to do the heart diet    Having a tough time with all of the lettuce - has to be more vigilant with the lettuce and buns - and less processed foods -   Stopped buying the pop    Her knees have been replaced - the hip pain is still there - she takes asa to help that - the lighting will go through  eh and she feels like she may fall - happens more with worse weather or weather changes      Cold at the same time every day - but better lately - thinks the thyroid is doing ok so far  No question data found.     Past Medical History:   Diagnosis Date    Carpal tunnel syndrome     GERD (gastroesophageal reflux disease)     Headache(784.0)     Hypothyroidism     Osteoarthritis     Tendonitis of both wrists 2017    Toenail fungus       Past Surgical History:   Procedure Laterality Date    CARPAL TUNNEL RELEASE      both hands     SECTION       SECTION       SECTION      FOOT SURGERY Left 2018    JEFRYO Dr. Weiner Sat Left 2010    Left Total Knee    JOINT REPLACEMENT Right 2016    Right Total Knee     Family History   Problem Relation Age of Onset    Stroke Mother     Arrhythmia Mother     Other Mother         Barry's Syndrome    Heart Disease Mother     Coronary Art Dis Mother     Diabetes Father     Other Sister         Barry's Syndrome    Arrhythmia Sister     Cancer Brother         Stomach Cancer    Stillborn Child     Early Death Child     Pacemaker Sister     Heart Attack Brother     Coronary Art Dis Brother     Other Brother         Barry's Syndrome     Social History     Tobacco Use    Smoking status: Never Smoker    Smokeless tobacco: Never Used   Substance Use Topics    Alcohol use: Yes     Comment: rarely      Current Outpatient Medications   Medication Sig Dispense Refill    Magnesium ([employer] TRIPLE MAGNESIUM COMPLEX) 400 MG CAPS Take 1 capsule by mouth 2 times daily      levothyroxine (SYNTHROID) 125 MCG tablet take 1 tablet by mouth once daily 90 tablet 1    liothyronine (CYTOMEL) 5 MCG tablet take 1 tablet by mouth once daily 90 tablet 1    lisinopril (PRINIVIL;ZESTRIL) 10 MG tablet Take 1 tablet by mouth daily 90 tablet 1    magnesium cl-calcium carbonate (SLOW-MAG) 71.5-119 MG TBEC tablet Take 1 tablet by mouth daily Take before and when stressed with 2 extra fish oil      Grape Seed  mg by Does not apply route daily Oregan Grape Root      Lysine 500 MG TABS Take 1 tablet by mouth 2 times daily       Menaquinone-7 (VITAMIN K2 PO) Take 1 capsule by mouth 2 times daily      naproxen (NAPROSYN) 500 MG tablet Take 500 mg by mouth as needed for Pain      Omega 3 1000 MG CAPS Take 3 capsules by mouth 4 times daily (before meals and nightly)       DHEA 25 MG CAPS Take 1 capsule by mouth daily.  Cinnamon 500 MG CAPS Take 3 capsules by mouth 4 times daily (before meals and nightly)       Vitamin D (CHOLECALCIFEROL) 1000 UNITS CAPS capsule Take 7,000 Units by mouth daily .  B Complex-Biotin-FA (B-100 TR) TBCR Take 1 tablet by mouth 2 times daily Before and as finish breakfast and before lunch      aspirin (LUCY ASPIRIN) 325 MG tablet Take 325 mg by mouth daily        No current facility-administered medications for this visit.       No Known Allergies    Health Maintenance   Topic Date Due    Shingles Vaccine (2 of 3) 06/30/2014    Breast cancer screen  08/01/2019    A1C test (Diabetic or Prediabetic)  08/06/2019    TSH testing  08/06/2019    Potassium monitoring  08/06/2019    Creatinine monitoring  08/06/2019    Colon cancer screen colonoscopy  07/01/2020    Lipid screen  11/02/2023    DTaP/Tdap/Td vaccine (2 - Td) 10/09/2024    Flu vaccine  Completed    Pneumococcal 65+ years Vaccine  Completed    DEXA (modify frequency per FRAX score)  Addressed    Hepatitis C screen  Addressed       Subjective:      Review of Systems   Constitutional: Negative for chills, fatigue and fever. HENT: Negative for ear pain, postnasal drip and trouble swallowing. Eyes: Negative for pain and visual disturbance. Respiratory: Negative for cough and shortness of breath. Cardiovascular: Negative for chest pain and palpitations. Gastrointestinal: Negative for abdominal pain, blood in stool, constipation, diarrhea, nausea and vomiting. Musculoskeletal: Positive for back pain and gait problem. Skin: Negative for rash. Neurological: Negative for headaches. Objective:     Vitals:    04/03/19 1152   BP: 136/66   Site: Right Upper Arm   Position: Sitting   Cuff Size: Large Adult   Pulse: 60   Temp: 97.6 °F (36.4 °C)   TempSrc: Oral   SpO2: 97%   Weight: 263 lb (119.3 kg)   Height: 5' 2\" (1.575 m)       Body mass index is 48.1 kg/m². Wt Readings from Last 3 Encounters:   04/03/19 263 lb (119.3 kg)   03/06/19 272 lb 9.6 oz (123.7 kg)   02/07/19 266 lb 12.8 oz (121 kg)     BP Readings from Last 3 Encounters:   04/03/19 136/66   03/06/19 136/80   02/07/19 134/64       Physical Exam   Constitutional: She is oriented to person, place, and time. She appears well-developed and well-nourished. No distress. HENT:   Head: Normocephalic and atraumatic. Right Ear: External ear normal.   Left Ear: External ear normal.   Eyes: Conjunctivae and EOM are normal. Right eye exhibits no discharge. Left eye exhibits no discharge. No scleral icterus. Neck: Normal range of motion. Pulmonary/Chest: Effort normal.   Musculoskeletal: She exhibits no edema. Neurological: She is alert and oriented to person, place, and time. No cranial nerve deficit. Skin: Skin is warm and dry. No rash noted.  She is not continue current medications, diet and exercise. Patient agreed with treatment plan. Follow up as directed.      Electronically signed by Adina Ryan DO on 4/3/2019 at 12:53 PM

## 2019-05-03 ENCOUNTER — HOSPITAL ENCOUNTER (OUTPATIENT)
Age: 69
Discharge: HOME | End: 2019-05-03
Payer: MEDICARE

## 2019-05-03 DIAGNOSIS — E78.89 LIPIDS ABNORMAL: ICD-10-CM

## 2019-05-03 DIAGNOSIS — R79.83 HOMOCYSTEINEMIA: ICD-10-CM

## 2019-05-03 DIAGNOSIS — E03.8 HYPOTHYROIDISM DUE TO HASHIMOTO'S THYROIDITIS: ICD-10-CM

## 2019-05-03 DIAGNOSIS — K90.89 OTHER INTESTINAL MALABSORPTION: ICD-10-CM

## 2019-05-03 DIAGNOSIS — G56.03 BILATERAL CARPAL TUNNEL SYNDROME: ICD-10-CM

## 2019-05-03 DIAGNOSIS — R79.89 POSITIVE SEROLOGICAL TEST RESULT: ICD-10-CM

## 2019-05-03 DIAGNOSIS — G44.001 INTRACTABLE CLUSTER HEADACHE SYNDROME, UNSPECIFIED CHRONICITY PATTERN: ICD-10-CM

## 2019-05-03 DIAGNOSIS — M25.569 ARTHRALGIA OF LOWER LEG, UNSPECIFIED LATERALITY: ICD-10-CM

## 2019-05-03 DIAGNOSIS — B35.1 TOENAIL FUNGUS: ICD-10-CM

## 2019-05-03 DIAGNOSIS — R89.4 NONSPECIFIC IMMUNOLOGICAL FINDINGS: ICD-10-CM

## 2019-05-03 DIAGNOSIS — R63.5 ABNORMAL WEIGHT GAIN: ICD-10-CM

## 2019-05-03 DIAGNOSIS — R63.5 WEIGHT GAIN: ICD-10-CM

## 2019-05-03 DIAGNOSIS — M25.532 LEFT WRIST PAIN: ICD-10-CM

## 2019-05-03 DIAGNOSIS — M15.9 PRIMARY OSTEOARTHRITIS INVOLVING MULTIPLE JOINTS: ICD-10-CM

## 2019-05-03 DIAGNOSIS — J01.00 SUBACUTE MAXILLARY SINUSITIS: ICD-10-CM

## 2019-05-03 DIAGNOSIS — E06.3 HYPOTHYROIDISM DUE TO HASHIMOTO'S THYROIDITIS: ICD-10-CM

## 2019-05-03 DIAGNOSIS — E55.9 VITAMIN D DEFICIENCY: ICD-10-CM

## 2019-05-03 DIAGNOSIS — K21.00 GASTROESOPHAGEAL REFLUX DISEASE WITH ESOPHAGITIS: ICD-10-CM

## 2019-05-03 DIAGNOSIS — E79.0 ABNORMAL BLOOD LEVEL OF URIC ACID: ICD-10-CM

## 2019-05-03 LAB
AVERAGE GLUCOSE: 126 MG/DL (ref 70–126)
BASOPHILS # BLD: 0.8 %
BASOPHILS ABSOLUTE: 0.1 THOU/MM3 (ref 0–0.1)
CALCIUM SERPL-MCNC: 9.5 MG/DL (ref 8.5–10.5)
CHOLESTEROL, TOTAL: 145 MG/DL (ref 100–199)
EOSINOPHIL # BLD: 2.8 %
EOSINOPHILS ABSOLUTE: 0.2 THOU/MM3 (ref 0–0.4)
ERYTHROCYTE [DISTWIDTH] IN BLOOD BY AUTOMATED COUNT: 14.4 % (ref 11.5–14.5)
ERYTHROCYTE [DISTWIDTH] IN BLOOD BY AUTOMATED COUNT: 46.6 FL (ref 35–45)
ESTRADIOL LEVEL: 77.1 PG/ML
HBA1C MFR BLD: 6.2 % (ref 4.4–6.4)
HCT VFR BLD CALC: 41.1 % (ref 37–47)
HDLC SERPL-MCNC: 53 MG/DL
HEMOGLOBIN: 13.5 GM/DL (ref 12–16)
IMMATURE GRANS (ABS): 0.01 THOU/MM3 (ref 0–0.07)
IMMATURE GRANULOCYTES: 0.1 %
LDL CHOLESTEROL CALCULATED: 84 MG/DL
LYMPHOCYTES # BLD: 34.7 %
LYMPHOCYTES ABSOLUTE: 2.6 THOU/MM3 (ref 1–4.8)
MAGNESIUM: 2.3 MG/DL (ref 1.6–2.4)
MCH RBC QN AUTO: 29.5 PG (ref 26–33)
MCHC RBC AUTO-ENTMCNC: 32.8 GM/DL (ref 32.2–35.5)
MCV RBC AUTO: 89.7 FL (ref 81–99)
MONOCYTES # BLD: 8.2 %
MONOCYTES ABSOLUTE: 0.6 THOU/MM3 (ref 0.4–1.3)
NUCLEATED RED BLOOD CELLS: 0 /100 WBC
PLATELET # BLD: 270 THOU/MM3 (ref 130–400)
PMV BLD AUTO: 9.8 FL (ref 9.4–12.4)
PTH INTACT: 43.4 PG/ML (ref 15–65)
RBC # BLD: 4.58 MILL/MM3 (ref 4.2–5.4)
SEDIMENTATION RATE, ERYTHROCYTE: 48 MM/HR (ref 0–20)
SEG NEUTROPHILS: 53.4 %
SEGMENTED NEUTROPHILS ABSOLUTE COUNT: 4.1 THOU/MM3 (ref 1.8–7.7)
T3 TOTAL: 122 NG/DL (ref 72–181)
THYROXINE (T4): 8.9 UG/DL (ref 4.5–12)
TRIGL SERPL-MCNC: 42 MG/DL (ref 0–199)
TSH SERPL DL<=0.05 MIU/L-ACNC: 0.03 UIU/ML (ref 0.4–4.2)
VITAMIN D 25-HYDROXY: 45 NG/ML (ref 30–100)
WBC # BLD: 7.6 THOU/MM3 (ref 4.8–10.8)

## 2019-05-03 PROCEDURE — 83036 HEMOGLOBIN GLYCOSYLATED A1C: CPT

## 2019-05-03 PROCEDURE — 82626 DEHYDROEPIANDROSTERONE: CPT

## 2019-05-03 PROCEDURE — 85025 COMPLETE CBC W/AUTO DIFF WBC: CPT

## 2019-05-03 PROCEDURE — 86141 C-REACTIVE PROTEIN HS: CPT

## 2019-05-03 PROCEDURE — 36415 COLL VENOUS BLD VENIPUNCTURE: CPT

## 2019-05-03 PROCEDURE — 84443 ASSAY THYROID STIM HORMONE: CPT

## 2019-05-03 PROCEDURE — 84403 ASSAY OF TOTAL TESTOSTERONE: CPT

## 2019-05-03 PROCEDURE — 82670 ASSAY OF TOTAL ESTRADIOL: CPT

## 2019-05-03 PROCEDURE — 82627 DEHYDROEPIANDROSTERONE: CPT

## 2019-05-03 PROCEDURE — 82306 VITAMIN D 25 HYDROXY: CPT

## 2019-05-03 PROCEDURE — 83970 ASSAY OF PARATHORMONE: CPT

## 2019-05-03 PROCEDURE — 83735 ASSAY OF MAGNESIUM: CPT

## 2019-05-03 PROCEDURE — 84480 ASSAY TRIIODOTHYRONINE (T3): CPT

## 2019-05-03 PROCEDURE — 84270 ASSAY OF SEX HORMONE GLOBUL: CPT

## 2019-05-03 PROCEDURE — 83090 ASSAY OF HOMOCYSTEINE: CPT

## 2019-05-03 PROCEDURE — 86376 MICROSOMAL ANTIBODY EACH: CPT

## 2019-05-03 PROCEDURE — 83516 IMMUNOASSAY NONANTIBODY: CPT

## 2019-05-03 PROCEDURE — 85651 RBC SED RATE NONAUTOMATED: CPT

## 2019-05-03 PROCEDURE — 82310 ASSAY OF CALCIUM: CPT

## 2019-05-03 PROCEDURE — 84436 ASSAY OF TOTAL THYROXINE: CPT

## 2019-05-03 PROCEDURE — 80061 LIPID PANEL: CPT

## 2019-05-04 LAB
C-REACTIVE PROTEIN, HIGH SENSITIVITY: 29.5 MG/L
DHEAS (DHEA SULFATE): 156 UG/DL (ref 13–130)
HOMOCYSTEINE, TOTAL: 11 UMOL/L
THYROID PEROXIDASE ANTIBODY: 0.3 IU/ML (ref 0–9)

## 2019-05-05 LAB — GLIADIN PEPTIDE IGG, IGA: NORMAL

## 2019-05-06 LAB
DHEA UNCONJUGATED: 2.57 NG/ML (ref 0.63–4.7)
TESTOSTERONE, FREE W SHGB, FEMALES/CHILDREN: NORMAL

## 2019-05-07 ENCOUNTER — NURSE ONLY (OUTPATIENT)
Dept: LAB | Age: 69
End: 2019-05-07

## 2019-05-07 ENCOUNTER — OFFICE VISIT (OUTPATIENT)
Dept: FAMILY MEDICINE CLINIC | Age: 69
End: 2019-05-07
Payer: MEDICARE

## 2019-05-07 VITALS
RESPIRATION RATE: 12 BRPM | OXYGEN SATURATION: 98 % | SYSTOLIC BLOOD PRESSURE: 138 MMHG | HEIGHT: 62 IN | WEIGHT: 267.2 LBS | DIASTOLIC BLOOD PRESSURE: 70 MMHG | HEART RATE: 54 BPM | TEMPERATURE: 97.4 F | BODY MASS INDEX: 49.17 KG/M2

## 2019-05-07 DIAGNOSIS — E06.3 HYPOTHYROIDISM DUE TO HASHIMOTO'S THYROIDITIS: Primary | ICD-10-CM

## 2019-05-07 DIAGNOSIS — R79.83 HOMOCYSTEINEMIA: ICD-10-CM

## 2019-05-07 DIAGNOSIS — E03.8 HYPOTHYROIDISM DUE TO HASHIMOTO'S THYROIDITIS: Primary | ICD-10-CM

## 2019-05-07 DIAGNOSIS — R63.5 ABNORMAL WEIGHT GAIN: ICD-10-CM

## 2019-05-07 DIAGNOSIS — I10 ESSENTIAL HYPERTENSION: ICD-10-CM

## 2019-05-07 DIAGNOSIS — E55.9 VITAMIN D DEFICIENCY: ICD-10-CM

## 2019-05-07 DIAGNOSIS — K21.00 GASTROESOPHAGEAL REFLUX DISEASE WITH ESOPHAGITIS: ICD-10-CM

## 2019-05-07 DIAGNOSIS — K90.89 OTHER INTESTINAL MALABSORPTION: ICD-10-CM

## 2019-05-07 LAB
TOTAL CK: 69 U/L (ref 30–135)
TROPONIN T: < 0.01 NG/ML

## 2019-05-07 PROCEDURE — 99214 OFFICE O/P EST MOD 30 MIN: CPT | Performed by: FAMILY MEDICINE

## 2019-05-07 ASSESSMENT — ENCOUNTER SYMPTOMS
ABDOMINAL DISTENTION: 1
DIARRHEA: 1

## 2019-05-07 NOTE — LETTER
[address] [place] [address],[address] [address]  Phone: [phone]  Fax: [phone]    Carlota Koch MD        May 7, 2019     [address]   [address]  [place] [address]      Dear Sarah Hy:    Below are the final results from your recent visit:    Resulted Orders   Testosterone, Free W SHGB, Females/Child   Result Value Ref Range    TESTOSTERONE, FREE W SHGB, FEMALES/CHILDREN SEE BELOW       Comment:      Testosterone, LC-MS/MS                          62 H   5-32          ng/dL  Total Testosterone, Females 18 years and older   Premenopausal  9-55 ng/dL   Postmenopausal 5-32 ng/dL  REFERENCE INTERVAL: Testosterone, LC-MS/MS  Access complete set of age- and/or gender-specific reference  intervals for this test in the [facility] Test Directory  ([employer]). Test developed and characteristics determined by [facility]. See Compliance Statement B: [email or web address]  Sex Hormone Binding Globulin                    66             nmol/L  REFERENCE INTERVAL: Sex Hormone Binding Globulin  Access complete set of age- and/or gender-specific reference  intervals for this test in the [facility] Test Directory  ([employer]). Testosterone, Free LC-MS/MS                    6.7 H   0.6-3.8       pg/mL  To convert to pmol/L, multiply pg/mL by 3.47  The concentration of Free Testosterone is derived from a  mathematical expression based on the constant for the binding of  testoste  jono to sex hormone binding globulin. REFERENCE INTERVAL: Testosterone, Free LC-MS/MS  Access complete set of age- and/or gender-specific reference  intervals for this test in the [facility] Test Directory  ([employer]). Test developed and characteristics determined by [facility].  See Compliance Statement B: [email or web address]  Performed by [facility],  [place] , [address] [phone] [email or web address], Jennifer Gaytan MD - Lab. Director     Calcium   Result Value Ref Range    Calcium 9.5 8.5 - 10.5 mg/dL      Comment:      Performed at [address] [place] [id number]   Magnesium   Result Value Ref Range    Magnesium 2.3 1.6 - 2.4 mg/dL      Comment:      Performed at [address] [place] [address], [address]   Vitamin D 25 Hydroxy   Result Value Ref Range    Vit D, 25-Hydroxy 45 30 - 100 ng/ml      Comment:      Vitamin D Status           Range    Deficiency                 <20 ng/ml  Insuffiency                20-30 ng/ml  Sufficiency                 ng/ml  Toxicity                   >100 ng/ml  Performed at [address] [place] [address]     PTH, Intact   Result Value Ref Range    Pth Intact 43.4 15.0 - 65.0 pg/mL      Comment:      Performed at [address] [place] [address]   Lipid Panel   Result Value Ref Range    Cholesterol, Total 145 100 - 199 mg/dL      Comment:           <200          Desirable       200 - 239     Borderline High       >239          High      Triglycerides 42 0 - 199 mg/dL      Comment:           <150          Desirable       150 - 199     Borderline High       200 - 499     High       >449          Very High       Ranges are based upon NCEP/ATP III guidelines. HDL 53 mg/dL      Comment:           Refer to General Chemistry for CHOL and TRIG results. HDL CLASSIFICATIONS FOR PATIENTS > 21YEARS OLD. <40               Undesirable (Major Risk Factor)       >60               Protective (Negative Risk Factor)      LDL Calculated 84 mg/dL      Comment:           Refer to General Chemistry for CHOL and TRIG results.        LDL CLASSIFICATIONS FOR PATIENTS >21YEARS OLD:          ** Determination Invalid if TRIG >400 **       <100          Optimal       100 - 129     Near or Above Optimal       130 - 159     Borderline High       160 - 189     High Risk >189          Very High Risk  Performed at [address] [facility], [address]     CBC Auto Differential   Result Value Ref Range    WBC 7.6 4.8 - 10.8 thou/mm3    RBC 4.58 4.20 - 5.40 mill/mm3    Hemoglobin 13.5 12.0 - 16.0 gm/dl    Hematocrit 41.1 37.0 - 47.0 %    MCV 89.7 81.0 - 99.0 fL    MCH 29.5 26.0 - 33.0 pg    MCHC 32.8 32.2 - 35.5 gm/dl    RDW-CV 14.4 11.5 - 14.5 %    RDW-SD 46.6 (H) 35.0 - 45.0 fL    Platelets 992 964 - 660 thou/mm3    MPV 9.8 9.4 - 12.4 fL    Seg Neutrophils 53.4 %    Lymphocytes 34.7 %    Monocytes 8.2 %    Eosinophils 2.8 %    Basophils 0.8 %    Immature Granulocytes 0.1 %    Segs Absolute 4.1 1.8 - 7.7 thou/mm3    Lymphocytes # 2.6 1.0 - 4.8 thou/mm3    Monocytes # 0.6 0.4 - 1.3 thou/mm3    Eosinophils # 0.2 0.0 - 0.4 thou/mm3    Basophils # 0.1 0.0 - 0.1 thou/mm3    Immature Grans (Abs) 0.01 0.00 - 0.07 thou/mm3    nRBC 0 /100 wbc      Comment:      Performed at [address] [place] [address]   High sensitivity CRP   Result Value Ref Range    CRP High Sensitivity 29.5 (H) <=3.0 mg/L      Comment:      INTERPRETIVE INFORMATION:  CRP, High Sensitivity  Patients with higher hs-CRP concentrations are more likely to  develop stroke, myocardial infarction, and severe peripheral  vascular disease. CRP is a nonspecific marker of inflammation and a variety of  conditions other than atherosclerosis may cause elevated  concentrations. If the first result is greater than 3.0 mg/L,  recommend repeating test at least 2 weeks later in a metabolically  stable state, free of infection or acute illness. The lower of the  two results should be used to determine the patient's risk. Significantly decreased CRP values may result in specimens from  patients treated with carboxypenicillins. hs-CRP results are used to assign risk as follows (Clin Chem  2009;55:378-84):    less than 1.0 mg/L . Tres Varner .. low risk    1.0 - 3.0 mg/L . .......  average risk 3.1 - 9.9 mg/L . ....... high risk    greater than 9.9 mg/L . very high risk  Performed by [place] [address], [address] [facility] [phone]  www. [employer], Yvonne Bucio MD - Lab. Director     Sedimentation Rate   Result Value Ref Range    Sed Rate 48 (H) 0 - 20 mm/hr      Comment:      Performed at [address] [place] [address], [address]   Homocysteine, Serum   Result Value Ref Range    Homocysteine, Total 11 (H) <=10 umol/L      Comment:      INTERPRETIVE INFORMATION: Homocysteine, Total  Elevated total homocysteine (tHcy) concentrations may be  associated with vitamin B12 deficiency, folate deficiency, or  inherited disorders of methionine metabolism. tHcy may also be  used as a weak-graded risk factor for cardiovascular disease or  stroke. Performed by [facility]  [place] [address], [address] [phone]  www. Nahum Deleon MD - Lab. Director     Hemoglobin A1C   Result Value Ref Range    Hemoglobin A1C 6.2 4.4 - 6.4 %    AVERAGE GLUCOSE 126 70 - 126 mg/dL      Comment:      Performed at [address] [place] [address], [address]   TSH without Reflex   Result Value Ref Range    TSH 0.034 (L) 0.400 - 4.20 uIU/mL      Comment:      Performed at [address] [place] [address]   Thyroid Peroxidase Antibody   Result Value Ref Range    Thyroid Peroxidase Antibody 0.3 0.0 - 9.0 IU/mL      Comment:      Performed by [facility]  [facility], [address] [phone]  www. Nahum Deleon MD - Lab.  Director     T4   Result Value Ref Range    Thyroxine (T4) 8.9 4.5 - 12.0 ug/dL      Comment:      [facility] [address] [facility], [address] [place] [address] [phone]   T3   Result Value Ref Range    T3, Total 122 72 - 181 ng/dL      Comment:      Performed at [address] [place] [address]   Gliadin Antibody, IgG   Result Value Ref Range    GLIADIN PEPTIDE IGG, IGA SEE BELOW Comment:      Deamidated Gliadin Peptide (DGP) Ab,             5     0-19          Units  INTERPRETIVE INFORMATION: Deamidated Gliadin Peptide (DGP) Ab, IgA  19 Units or less: . .......... Negative  20-30 Units: . ............... Weak Positive  31 Units or greater: . ....... Positive  Deamidated Gliadin Peptide (DGP) Ab,             4     0-19          Units  INTERPRETIVE INFORMATION: Deamidated Gliadin Peptide                            (DGP) Ab, IgG  19 Units or less: . .......... Negative  20-30 Units: . ............... Weak Positive  31 Units or greater: . ....... Positive  Performed by [place] [address], [address] [phone]  www. Juan R Woods MD - Lab. Director     DHEA   Result Value Ref Range    DHEA UNCONJUGATED 2.570 0.630 - 4.70 ng/mL      Comment:      INTERPRETIVE INFORMATION: Dehydroepiandrosterone, Females 18 years  and older:    Postmenopausal: 0.60-5.73 ng/mL  REFERENCE INTERVAL: Dehydroepiandrosterone by [address] [place] [address]  Access complete set of age- and/or gender-specific reference  intervals for this test in the [facility] Test Directory  ([employer]). Test developed and characteristics determined by Corey Jimenez. See Compliance Statement B: [employer]/CS  Performed by [place] , [address] [phone]  www. Juan R Woods MD - Lab.  Director     Estradiol   Result Value Ref Range    Estradiol 77.1 pg/mL      Comment:      Normally Menstruating Females    Follicular Phase      19 - 247 pg/ml    Mid-Cycle             36 - 571 pg/ml    Luteal Phase          22 - 256 pg/ml  Postmenopausal          <7 - 45 pg/ml  Males                   12 - 41 pg/ml  Performed at [address] [place] [address], [address]     DHEA-Sulfate   Result Value Ref Range    DHEAS (DHEA Sulfate) 156 (H) 13 - 130 ug/dL      Comment:      REFERENCE INTERVAL: DHEAS  Access complete set of age- and/or gender-specific reference intervals for this test in the [employer] [facility] Test Directory  ([employer]). Performed by [facility],  [place] [address], [address] [phone]  www. Bro Plummer MD - Lab. Director       5/7/2019      Dear Sandra Silva can consider these [facility] suggestions based on these final results received so far from your recent lab visit. Always check with each of your other health advisors / doctors before making any changes. As we do, you can research any issues at www.nih.gov     Send any questions by My Chart or call for a lab review appointment with me at which time I can also write follow-up lab orders based on symptoms, conditions and results    If you do not use My Chart, then call for an appointment in the office within the next few weeks so we can update your supplement list to correctly include these suggestions and get going    Bring your questions, bottles, food drink symptom log and outside lab reports when you come in to discuss in more detail why/how you might benefit from adding or changing each item. You may record your visit or bring someone with you to take notes. Bring to each and every visit, all your bottles, your food-drink-symptom log, and all outside test results to be integrated into the [facility] protocol being developed just for you. Life changes are not easy, the Wee protocol considers the complexity of altering your symptom-generating diet to the more healthful Wee MAREN-2 based the 76 Better Health Foods which are gluten, carrageenan, latex-reduced, as well as glycemic controlled. We are fortunate to have been available to assist you in developing a your personalized change plan for Better Health in 120 days and beyond. The Choices are always yours. Your Options to improve your health over the next 120 days from these results are;    Since your [place] stabilize your entire metabolic system:   For your PTH to reach our ideal goal of less than 15;: Lab Results   Component Value Date    IPTH 43.4 05/03/2019    Which is ~ 38.4 % of your bone being resorbed by your body to adjust the relative levels of Vitamin D, Calcium and/or Magnesium by raising lowest. So you can help lower the highest one, and/or raising the lower ones to get PTH <15. For your Vitamin D to get into the ideal 50-60 range:  Lab Results   Component Value Date    VITD25 45 05/03/2019    You can add 1,000 IU more weekly of Vit D-3     For your Calcium to get into the ideal 9.5-9.6 range:  Lab Results   Component Value Date    CALCIUM 9.5 05/03/2019    You can use current calcium tabs and or calcium foods/day     For your Magnesium to get into the ideal 2.3-2.4 range:  Lab Results   Component Value Date    MG 2.3 05/03/2019    You can add 1 More magnesium/day, so try magnesium gluconate or Slow Mag(magnesium chloride) from local stores or online at [employer]    Your choice of magnesium type is based on how your bowels are moving currently;   so if bowels are;  · Sensitive to magnesium, loose already or having 2 + movements per day,, Slow Mag and magnesium gluconate are both available from [employer], [employer]. · Extremely sensitive to magnesium or having more than 3-4 movements per day you can try mgplusprotein tabs (go to www.mgplusprotein. com for a free 30 tab bottle or call [phone] ). If have loose bowels on this then come back in.  · If Not slow nor loose at this time with 1-3 movements per day, then you can:add Triple Magnesium Complex by [employer] or other companies, or magnesium citrate (which MUST BE TABLET FORM to not cause diarrhea), such as; Vitamin Shoppe, Solgar, Vitacost or Now brands, come as 200 mg TABLETS(some labeled 400 mg per serving of 2). Magnesium citrate as liquid or caps which then become liquid in the stomach are often use to cleanse bowels for colonoscopy. · If bowels too slow:  You can use magnesium oxide 250-500 mg tabs each dose, the 400 mg magnesium oxide tablet may not be as effective as a laxative    Tip:Magnesium before meals makes your bowels go; during or after meals not so much    To improve HDL & Carbohydrate Metabolism, Memory, Sleep, Bladder, Nocturia; For raising HDL cholesterol, Long-term Memory, & lowering C-reactive Protein:  Lab Results   Component Value Date    HDL 53 05/03/2019     Lab Results   Component Value Date    CRP 2.26 08/12/2014    CRPHS 29.5 05/03/2019    Be sure to get in a time-released(TR) B-50 or B-100 tablet before each meal(mealtime), up to 3 times / day  Since [place]. [place].foods are 38% deficient in B vitamins, to get HDL >60, CRP to 0.00, improve sleep by making your melatonin from B2, as well as not getting you up at night to empty your bladder. Note:  use time released B-50 or B-100 from [address] [facility], [place], or [employer] (not containing Vitamin C in the tablet)    For lowering your A1c,Triglycerides, low blood sugar symptoms and weight:   Lab Results   Component Value Date    LABA1C 6.2 05/03/2019     Lab Results   Component Value Date    TRIG 42 05/03/2019    For these, you can add more 500 mg cinnamon cap before each meal or at mealtimes and at bedtime up to 12 caps/day, Cinnamon lowers triglycerides to <100(when fish oil added), lowers A1c to <5.5 when magnesium added, and also lowers LDL and total Cholesterol plus boosts weight loss and energy level if needed.     Note: Patients new to cinnamon can start with 1 cap before meals (mealtimes) and at bedtime chased with 2+ ounces of Half and Half to prevent acid reflux symptoms  Chromium enhances cinnamon's effect, but Cinnamon Extracts Do Not Work,     For lowering Total Cholesterol and LDL Cholesterol, we want PTH less than 15:  Lab Results   Component Value Date    CHOL 145 05/03/2019     Lab Results   Component Value Date    LDLCALC 84 05/03/2019   you are good for these    To reverse Inflammation Related to Omega 6 Plant oils from seeds, nuts, poultry: For your Monocytes, Eosinophils, RDW, and Platelets stability, you can;  Lab Results   Component Value Date    MONOPCT 8.2 05/03/2019     Lab Results   Component Value Date    LABEOS 2.8 05/03/2019     Lab Results   Component Value Date    RDW 15.2 02/09/2018     Lab Results   Component Value Date     05/03/2019   You can add 1 [employer] pharmaceutical grade oil before each meal (mealtimes) and at bedtime to reduce your Monocyte % by 1 % for each % >6% and to get your Eosinophil % (allergies) to <2%. Triple strength fish oil will not work quite as well as pharmaceutical. Anthony Noirega not add fish oil if on warfarin or other blood thinners or if have elevated D-dimer level but remove more offending foods  As Always, best results come if you remove seeds, nuts, flax, soy, avocado, hummus, granola, poultry and chips from your diet to reduce plant oil injury; see www.efaeducation. org    Search how each of your foods reacts from very good to awful in your body at http://Echograph. Doctolib/  Flax and Servando seeds are Not Healthful so AVOID these    Homocystinemia,MTHFR related Short-term Memory, Mental Fog; & Dementia; For your Homocysteine:   Lab Results   Component Value Date    HOMOTOT 11 05/03/2019    You are at risk for having issues with Short-term memory or mental cloudiness, so you can use 1000 mcg of methyl B12 with 800 mcg methyl folate each morning by Luciano Aguilera Norlene [employer]avia. com)      Prescription Metanx, Foltanx or Deplin may be covered by some insurance plans so check with your pharmacy benefits or your pharmacy (if so call the office for an Rx and [employer] info    To Improve Adrenal, Hormonal Support, Brain and Body Energy Levels;   For your DHEA sulfate suggesting % brain energy:  Lab Results   Component Value Date    DHEAS 156 05/03/2019    DHEAUNCONJUGATED 2.570 05/03/2019     For your Testosterone suggesting % body energy:  Lab Results   Component Value Date    TESTFRFEMCHI SEE BELOW 05/03/2019 FTES SEE BELOW 03/22/2017   You can increase your dose of DHEA by 5-10 mg early each morning for better brain and body energy if you desire as reflected in DHEA-s, DHEA, testosterone and estradiol levels. See Johan Vitaliy. com for low 5 mg or 10 mg tabs    To Improve Thyroid Functions related to Grains(gluten),Carrageenan, Latex foods; For your TSH, T3,T4, and Thyroid Peroxidase(TPO) which are related to Gliadin IGA/IGG = % immune/ % bowel damage from grains, carrageenan in beer, juices and dairy products, and Latex-like proteins in foods:  Lab Results   Component Value Date    TSH 0.034 05/03/2019     Lab Results   Component Value Date    U8WAOMU 122 05/03/2019     Lab Results   Component Value Date    THYROXINE 8.9 05/03/2019     Lab Results   Component Value Date    TPO 0.3 05/03/2019     Lab Results   Component Value Date    GLIAIGGIGA SEE BELOW 05/03/2019    These are related to Gliadin IGA 5 which =  16 % immune damage and Gliadin IGG 4 which = 13 % bowel damage from grains and carrageenan in beer, juices and dairy products , Removing grains, carrageenan and latex-like-protein foods from the diet improves thyroid gland functioning, bowel health, and several auto-immune tests results as well as preventing grain brain, wheat belly and so many more symptoms / conditions. To Improve Auto-Immune Tests related to Grains, Latex and Plant oil in our diet;   For your Sedimentation Rate, Rheumatoid Factor, and LJ titer:  Lab Results   Component Value Date    SEDRATE 48 05/03/2019     Lab Results   Component Value Date    RF < 10 08/06/2018     Lab Results   Component Value Date    ANASCRN None Detected 08/06/2018    Remove grains, carrageenan and latex-like-proteins foods, and excess plant oil foods such as seeds, nuts, flax, soy, avocado, hummus, granola, poultry and chips to improve immune system    Uric Acid:   Lab Results   Component Value Date    URICACID 9.5 08/06/2018 Other Non-Optimal Lab results to review on your next visit; Reminder; Free On-line Classes on Nutritional Principles, Removing Toxic Foods, Celiac/ Gluten / Gliadin, 75 Foods for Better Health, Carrageenan, Natamycin and other toxic food additives have been video taped and are now available 24/7 on line to you at [email or web address]. [employer] once you activate your FREE login and password. To access these classes, call [phone] for login and [address] need a Lab Review Visit before I write future orders; These are your final results, so review them, then call to schedule a video lab review visit or come back in to see me if not clear on how to proceed on to your correct path, with the correct supplements, the 75 Better Foods which do not require fish oil, etc    Your Next Lab Orders based on this lab letter and/or your new or continuing issues need to happen During a Lab Review Visit OR at the Time of Your Next Visit. In keeping with accountable health plans, at that visit you can let us know which of the lab tests you feel you need to provide the results to indicate to you how you are doing. Before each lab draw, check with your insurer to be sure you are covered at the level you want at the lab you use before getting any of the tests done there. We recommend that you repeat the above non-optimal test(s) in 3 months if not satisfied with your health at that time or as discussed at your lab review visit. If you have any questions or concerns, please call my staff or send them by my chart.    If You Are Not on mychart, call for an appointment in the next week since it is important for you to start and continue correctly      Sincerely,        Gregory Craft MD

## 2019-05-07 NOTE — PROGRESS NOTES
[id number] [facility] [address] [facility]  Jaswant Wilkinson 84  Dept: [phone]  Dept Fax: [phone]  Loc: [address] [facility] Pro Miranda is a 77 y.o. White female. Honey Carter  presents to the [facility] today for   Chief Complaint   Patient presents with    3 Month Follow-Up    Discuss Medications    Other     review chest pain episode   ,  and;   1. Other forms of angina pectoris ([address]    2. Hypothyroidism due to Hashimoto's thyroiditis    3. Essential hypertension    4. Homocysteinemia ([address]    5. Gastroesophageal reflux disease with esophagitis    6. Abnormal weight gain    7. Vitamin D deficiency    8. Other intestinal malabsorption      I have reviewed [facility] medical, surgical and other pertinent history in detail, and have updated medication and allergy information in the computerized patientrecord. Clinical Care Team:     -Referring Provider for today's consult: Self Referred  -Primary Care Provider: Horacio Garcia DO    Medical/Surgical History:   She  has a past medical history of Carpal tunnel syndrome, GERD (gastroesophageal reflux disease), Headache(784.0), Hypothyroidism, Osteoarthritis, Tendonitis of both wrists, and Toenail fungus. Her  has a past surgical history that includes Carpal tunnel release; hernia repair;  section;  section;  section; Foot surgery (Left, 2018); joint replacement (Left, 2010); and joint replacement (Right, 2016). Family/Social History:     Her family history includes Arrhythmia in her mother and sister; Cancer in her brother; Coronary Art Dis in her brother and mother; Diabetes in her father; Early Death in her child; Heart Attack in her brother; Heart Disease in her mother; Other in her brother, mother, and sister; Pacemaker in her sister; Maxime Solan in her child; Stroke in her mother. She  reports that she has never smoked.  She has never used smokeless tobacco. She reports that she drinks alcohol. She reports that she does not use drugs. Medications/Allergies/Immunizations:     Her current medication(s) include   Current Outpatient Medications:     DPVJYDCLQ-IOZRUUENK-RDPCXTNBYR PO, Place 1 tablet under the tongue every morning (before breakfast) Jimbo B12 plus or [place] methylB6, [id number], methylfolate, Disp: , Rfl:     Magnesium (CVS TRIPLE MAGNESIUM COMPLEX) 400 MG CAPS, Take 1 capsule by mouth 2 times daily, Disp: , Rfl:     levothyroxine (SYNTHROID) 125 MCG tablet, take 1 tablet by mouth once daily, Disp: 90 tablet, Rfl: 1    liothyronine (CYTOMEL) 5 MCG tablet, take 1 tablet by mouth once daily, Disp: 90 tablet, Rfl: 1    lisinopril (PRINIVIL;ZESTRIL) 10 MG tablet, Take 1 tablet by mouth daily, Disp: 90 tablet, Rfl: 1    magnesium cl-calcium carbonate (SLOW-MAG) 71.5-119 MG TBEC tablet, Take 1 tablet by mouth daily Take before and when stressed with 2 extra fish oil, Disp: , Rfl:     Lysine 500 MG TABS, Take 1 tablet by mouth 2 times daily , Disp: , Rfl:     Menaquinone-7 (VITAMIN K2 PO), Take 1 capsule by mouth 2 times daily, Disp: , Rfl:     naproxen (NAPROSYN) 500 MG tablet, Take 500 mg by mouth as needed for Pain, Disp: , Rfl:     Omega 3 1000 MG CAPS, Take 3 capsules by mouth 4 times daily (before meals and nightly) , Disp: , Rfl:     DHEA 25 MG CAPS, Take 1 capsule by mouth daily. , Disp: , Rfl:     Cinnamon 500 MG CAPS, Take 3 capsules by mouth 4 times daily (before meals and nightly) , Disp: , Rfl:     Vitamin D (CHOLECALCIFEROL) 1000 UNITS CAPS capsule, Take 5,000 Units by mouth 2 times daily . , Disp: , Rfl:     B Complex-Biotin-FA (B-100 TR) TBCR, Take 1 tablet by mouth 3 times daily (before meals) Before and as finish breakfast and before lunch, Disp: , Rfl:     aspirin (LUCY ASPIRIN) 325 MG tablet, Take 325 mg by mouth daily , Disp: , Rfl:     Grape Seed OIL, 500 mg by Does not apply route daily [facility] Grape Root, Disp: , Rfl:   Allergies: Patient has no known allergies. ,  Immunizations:   Immunization History   Administered Date(s) Administered    Influenza Virus Vaccine 11/03/2011, 10/09/2014, 11/17/2015    Influenza, High Dose (Fluzone 65 yrs and older) 10/11/2018    Influenza, MDCK, Preservative free 10/31/2017    Influenza, Quadv, 3 yrs and older, IM, PF (Fluzone 3 yrs and older or Afluria 5 yrs and older) 01/19/2017    Influenza, Triv, inactivated, subunit, adjuvanted, IM (Fluad 65 yrs and older) 10/11/2018    Pneumococcal 13-valent Conjugate (Wrpmlcq56) 07/11/2017    Pneumococcal Polysaccharide (Zyecbljrl93) 11/05/2018    Tdap (Boostrix, Adacel) 10/09/2014    Zoster Live (Zostavax) 05/05/2014        History of PresentIllness:     Leah's had concerns including 3 Month Follow-Up; Discuss Medications; and Other (review chest pain episode). Boby Amaya  presents to the [email or web address] [place] today for;   Chief Complaint   Patient presents with    3 Month Follow-Up    Discuss Medications    Other     review chest pain episode   , ,  abnormal labs follow up and these conditions as she  Is looking today for:     1. Other forms of angina pectoris ([address]    2. Hypothyroidism due to Hashimoto's thyroiditis    3. Essential hypertension    4. Homocysteinemia ([facility]    5. Gastroesophageal reflux disease with esophagitis    6. Abnormal weight gain    7. Vitamin D deficiency    8. Other intestinal malabsorption      HPI    Subjective:     Review of Systems   Constitutional: Positive for fatigue and unexpected weight change. Cardiovascular: Positive for chest pain. Gastrointestinal: Positive for abdominal distention and diarrhea. Genitourinary: Positive for frequency. Neurological: Positive for headaches. Psychiatric/Behavioral: The patient is nervous/anxious. All other systems reviewed and are negative.       Objective:     /70 (Site: Left Upper Arm, Position: Sitting, Cuff Size: Large Adult)   Pulse 54   Temp 97.4 °F (36.3 °C) (Oral)   Resp 12   Ht 5' 2.01\" (1.575 m)   Wt 267 lb 3.2 oz (121.2 kg)   SpO2 98%   BMI 48.86 kg/m²   Physical Exam   Constitutional: She is oriented to person, place, and time. She appears well-developed and well-nourished. HENT:   Head: Normocephalic. Pulmonary/Chest: Effort normal.   Neurological: She is alert and oriented to person, place, and time. Psychiatric: She has a normal mood and affect. Thought content normal.   Nursing note and vitals reviewed. Laboratory Data:   Lab results were searched in Care Everywhere and/or those brought by the pateint were reviewed today with Letty Hauser and she has a copy of their most recent labs to take home with them as notedbelow;       Imaging Data:   Imaging Data:       Assessment & Plan:       Impression:  1. Other forms of angina pectoris ([address]    2. Hypothyroidism due to Hashimoto's thyroiditis    3. Essential hypertension    4. Homocysteinemia ([address]    5. Gastroesophageal reflux disease with esophagitis    6. Abnormal weight gain    7. Vitamin D deficiency    8. Other intestinal malabsorption      Assessment and Plan:  After reviewing the patients chief complaints, reviewing their labfindings in great detail (with the patient and those accompanying them) which correlate to their chief complaints, symptoms, and or medical conditions; suggestions were made relating to changes in diet and or supplementswhich may improve the complaints and which will be reflected in their future lab findings; Chief Complaint   Patient presents with    3 Month Follow-Up    Discuss Medications    Other     review chest pain episode   ;    Plans for the next visits:  - Abnormal and non-optimal Labs were ordered today to be repeated in the next 120-365 days to assess changes from adjustments in nutrition and or nutrients.    - Patient instructed when having ablood draw to ask the  to divide their lab draws into multiple draws over several days if not feeling good at the time of the lab draw or if either prefers to do several smaller blood draws over several days  -Patient instructed to check with insurer before each lab draw and to to to the lab which the insurer directs them for the most cost effective lab draw with the least patient's cost  - Benwood Oris  will be scheduled subsequentto those results. Sudarshan Sumner will bring in her drink and food log to her next visit    Chronic Problems Addressed on this Visit:                                   1.  Intensity of Service; Uncontrolled items at this visit; Chief Complaint   Patient presents with    3 Month Follow-Up    Discuss Medications    Other     review chest pain episode   ; Improved items at this visit; Stable items atthis visit;  2. Patients food and drinks were reviewed with the patient,       - [facility] will bring food+drink symptom log to next visit for inclusion in their record      - 75 better food list reviewed & given topatient with the omega 6 food list to avoid         - Gluten in corn and oats abstracts sheet reviewed and given to the patient today   3. Greater than 250minutes were spent face to face on this visit of which >50% was for counseling and coordination of care. Patients food and drinks were reviewed with thepatient,   - they will bring a food drink symptom log to future visits for inclusion in their record    - 75 better food list reviewed & given to patient along with the omega 6 food list to avoid      - Glutenin corn and oats abstracts sheet reviewed and given to the patient today    - 23 Foods containing Latex-like proteins was reviewed and copy to be taken if desired     - Nutrient Supplements list provided and copyto be taken if desired    - [employer]. [employer] web site offered to patient to review at their convenience by staff with login information    Note:  I have discussed with the patient that with all nutraceuticals, there is often mixed data and emerging research which needs to be monitored; as well as an array of NIHfact sheets on nutrients and supplements. If I have recommended cinnamon at the request of this patient to assist them in control of their blood sugar, triglyceride and or weight issues. I discussed that thepatient's clinical use of cinnamon bark, calcium, magnesium, Vitamin D and pharmaceutical grade CVS #162270 fish oil or triple-strength fish oil, and B-75 two phase time-released B complex by Randee Rodriguez will be for atime-limited trial to determine their individual effectiveness and safety in this patient. I also referred the patient to the NMCD: Nutrition, Metabolism, and Cardiovascular Diseases (journal) and concerns about long-termuse and hepatotoxicity of cinnamon and other nutrients and suggest they frequently search nih.gov for the latest non-proprietary information on nutriceuticals as well as consider a subscription to Clear Standards fordetails on reviewed supplements, or at the least review the nutrient files at [address] at [facility], [facility], an insulin mimetic, reduces some High Carbohydrate Dietary Impacts. Methylhydroxychalcone polymers insulin-enhancing properties in fat cells are responsible for enhanced glucose uptake, inhibiting hepatic HMG-CoA reductase and lowers lipids. www.jacn. org/content/20/4/327.full     But cinnamon with additivessuch as Chromium or Cinnamon Extract are not effective as insulin mimetics.  https://www.lópez.net/     Nutrients for Start up from [employer] or [employer] for ease to get started now ;  Sourav Contreras has some useable products;  - Triple Strength Fish Oil, enteric coated  - Vit D 3 5000 IU gel caps  - Iron ferrous sulfat 325 mg tabs  - Centrum Silver look-a-like for most patients, or  - Centrum plain look-a-like if need iron    Localpharmacies or chains such as CVS, Walgreen, Wal-mart, have;  - Triple Strength Fish Oil (enteric coated ifavailable) or    If not enteric coated, can take from freezer for less burps  - B-50 or B-100 time released balanced B complex tabs  - Cinnamon bark 500 mg (without Chromium or extracts)   some brands list 1000 mg / serving of 2 capsules,    some brands have 1000 mg caps with the undesireable chromium / extract  - Calcium carbonate/citrate, magnesium oxide/citrate, Vit D 3  as 3-4 tabs/caps/serving     Some Local Brands may contain Zincwhich is acceptable for the first bottle or two  - Magnesium oxide 250 mg tabs for those having < 2 bowel movements daily  - Magnesium citrate 200 mg if having > 2bowel movement/day  - Centrum Silver or look-a-like for most patients, Centrum plain or look-a-like with iron  - Vitamin D-3 comes as 1,000 IU or 2,000 IU or 5,000 IU gel caps or Liquid drops      Some brands containing or derived from soy oil or corn oil are OK if not allergic to soy  - Elemental Iron 65 mg tabsat bedtime is available over the counter if need more iron     Usually turns bowel movements grey, green or black but not a concern  - Apricot Kernel Oil (by Now) for dry skin sensitive perineal or perianal area skin    Nutrients for ongoing use by Mail order for less expense from [employer]. puritan.com ;  - Triple Strength Fish Oil , 240 Softgels Item [id number]  -B-100 time released balanced B complex Item #[id number]  - Cinnamon bark 500 mg without Chromium or extract Item #931300  - Calcium carbonate 1000 mg, Magnesium oxide 500 mg, Vit D 3  400 IU Item #[id number]  - Magnesium oxide 500 mg tabs Item #[id number] if less than 2 bowel movements daily  - [employer] Sen[employer] Item #[id number] for mostpatients, One Daily Item #035236 with iron  - Vit D 3  1,000 Item #[id number]      2,000 IU Item #542713  5,000 IU Item #592263     Some brands containing orderived from soy oil or corn oil are OK if not allergic to soy    Nutrients for Special Needs by Ann Marie Shell for less expense from www. [employer].com ;  -Elemental Iron 65 mg tabs Item #572961 if need more iron for low iron on labs    Usually turns bowel movements grey, green or black but not a concern  - Time released Niacin 250 mg Item #858962 for cold intolerance, low libido or impotence  - DHEA 50 mg Item #904511 for improving DHEA levels on labs if having Fatigue    If stools too loose substitute for your Magnesium oxide using;   Magnesium citrate 200 mg tabs(NOT liquid) at [employer]   Magnesium gluconate 550 mgby Liban at [employer] com or amazon. com  Magnesium chloride foot soaks or body sprays  www.Aptidata   Magnesium chloride flakes 14.99 Item #: [id number] if Backordered get spray    Food Drink Symptom Log;  I asked this patient to track these items and any other symptoms on their list on a weekly basis to documenttheir progress or lack of same. This can be done on the symptom tracking sheet I gave them at today's visit but looks like this:                                                      Rate on scale of 0-10 with zero = notnoticeable  Symptom:                            Week 1               2                 3                 4               Etc            Hair loss    Foot cramps    Paresthesia    Aches    IBS (irritable bowel)    Constipation    Diarrhea  Nocturia    (up to bathroom at night)    Fatigue/Energy level  Stress      On the other side of the sheet they can track their food, drink, environment, activity, symptoms etc      Avoiding Latex-like proteins inmy foods; Avocados, Bananas, Celery, Figs & Kiwi proteins have latex-like proteins to inflame our immunesystems  How Can I Have A Latex Allergy? Eating foods with latex-like protein exposes us to latex allergies. Our body cannot tell the differencebetween these latex-like proteins and latex from rubber products since many people are allergic to fruit, vegetables and latex. Read labels on pre-packaged foods.  This list to avoid is only a guide if you are known allergicto latex or have a latex rash on your chin, cheeks and lines on your neck and chest. The amount of latex is different in each food product or fruit variety. Foods to Avoid out of Season if not grown locally: Melon, Nectarine, Papaya, Cherry, Passion fruit, Plum, Chestnuts, and Tomato. Avocado, Banana, Celery, Figs, and Kiwi always contain Latex-like protein. Whats in Season? Strawberries taste better in June than December because June is strawberry season so buy locally grown produce \"in season\" for the best flavor, cost and less Latex. Locally grown produce notonly tastes great requires little of no ethylene exposure in food distribution so has less latex content. Out of season, use canned, frozen or dried sinceprocessed ripe and are latex lower!!!   Month     [place] LocallyGrown Produce  January, February, March: use canned, frozen or dried fruits since lower in latex  April; asparagus, radishes  May; asparagus, broccoli, green onions, greens, peas, radishes,rhubarb  June; asparagus, beets, beans, broccoli, cabbage, cantaloupe, carrots, green onions, greens, lettuce,onions, parsley, peas, radishes, rhubarb, strawberries, watermelons  July; beans, beets, blueberries,broccoli, cabbage, cantaloupe, carrots, cauliflower, celery, cucumbers, eggplant, grapes, green onions, greens, lettuce, onions, parsley, peas, peaches, bell peppers, potatoes, radishes, summer raspberries, squash, sweetcorn, tomatoes, turnips, watermelons  August; apples, beans, beets, blueberries, cabbage, cantaloupe, carrots,cauliflower, celery, cucumbers, eggplant, grapes, green onions, greens, lettuce, onions, parsley, peas, peaches, pears, bell peppers, potatoes, radishes, squash, sweet corn, tomatoes, turnips, watermelons  September; apples, beans, beets, blueberries, cabbage, cantaloupe, carrots, cauliflower, celery, cucumbers, eggplant, grapes,green onions, greens, lettuce, onions, parsley, peas, peaches, pears, bell peppers, plums, potatoes, pumpkins, radishes, fall red raspberries, squash, sweet corn, tomatoes, turnips, watermelons  October; apples, beets, broccoli, cabbage, carrots, cauliflower, celery, green onions, greens, lettuce, parsley, peas, pears, potatoes,pumpkins, radishes, fall red raspberries, squash, turnips  November; broccoli, cabbage, carrots, parsley,pears, peas  December: use canned, frozen ordried fruits since lower in latex    Upto half of latex-sensitive patients show allergic reactions to fruits (avocados, bananas, kiwifruits, papayas, peaches),   Annals of Allergy, 1994. These plants contain the same proteins that are allergens in latex. People with fruit allergies should warn physicians beforeundergoing procedures which may cause anaphylactic reaction if in contact with latex gloves. Some of the common foods with defined cross-reactivity to latexare avocado, banana, kiwi, chestnut, raw potato, tomato,stone fruits (e.g., peach, cherry), hazelnut, melons, celery, carrot, apple, pear, papaya, and almond. Foods with less well-defined cross-reactivity to latex are peanuts, peppers, citrus fruits, coconut, pineapple, naveed,fig, passion fruit, Ugli fruit, and grape    This fruit/latex cross-reactivity is worsened by ethylene, a gas used to hasten commercial ripening. In nature, plants produce low levels of the hormone ethylene, which regulates germination, flowering, and ripening. Forced ripening by high ethyleneconcentrations, plants produce allergenic wound-repair proteins, which are similar to wound-repair proteins made during the tapping of rubber trees. Sensitive individualswho ingest the fruit get a higher dose and worse reaction. Some people may even first become sensitized to latex through fruit. Can food processing increase theconcentrations of allergenic proteins?  Latex-sensitized children (and adults) in [place] often experience allergic reactions after eating bananas ripenedartificially

## 2019-05-08 ENCOUNTER — TELEPHONE (OUTPATIENT)
Dept: FAMILY MEDICINE CLINIC | Age: 69
End: 2019-05-08

## 2019-05-08 DIAGNOSIS — K21.9 GASTROESOPHAGEAL REFLUX DISEASE, ESOPHAGITIS PRESENCE NOT SPECIFIED: ICD-10-CM

## 2019-05-08 DIAGNOSIS — R79.83 HOMOCYSTEINEMIA: ICD-10-CM

## 2019-05-08 DIAGNOSIS — M15.8 OTHER OSTEOARTHRITIS INVOLVING MULTIPLE JOINTS: ICD-10-CM

## 2019-05-08 DIAGNOSIS — I10 ESSENTIAL HYPERTENSION: ICD-10-CM

## 2019-05-08 DIAGNOSIS — R63.5 ABNORMAL WEIGHT GAIN: Primary | ICD-10-CM

## 2019-05-08 DIAGNOSIS — E55.9 VITAMIN D DEFICIENCY: ICD-10-CM

## 2019-05-08 DIAGNOSIS — E03.8 OTHER SPECIFIED HYPOTHYROIDISM: ICD-10-CM

## 2019-05-08 DIAGNOSIS — K90.89 OTHER INTESTINAL MALABSORPTION: ICD-10-CM

## 2019-05-08 DIAGNOSIS — E78.89 LIPIDS ABNORMAL: ICD-10-CM

## 2019-05-08 NOTE — TELEPHONE ENCOUNTER
Pt called, was seen yesterday. Said she did not get lab orders to be done before her appt in August.  She however, did get the 2 lab orders done which were ordered yesterday. She wants to know if she can get orders to be done before the next visit? Not sure if the results of the CK Isoenzymes and Troponin will change on what would be ordered. Please advise.

## 2019-05-21 ENCOUNTER — OFFICE VISIT (OUTPATIENT)
Dept: FAMILY MEDICINE CLINIC | Age: 69
End: 2019-05-21
Payer: MEDICARE

## 2019-05-21 VITALS
TEMPERATURE: 97.6 F | HEART RATE: 123 BPM | DIASTOLIC BLOOD PRESSURE: 74 MMHG | SYSTOLIC BLOOD PRESSURE: 122 MMHG | OXYGEN SATURATION: 97 %

## 2019-05-21 DIAGNOSIS — R00.0 TACHYCARDIA: ICD-10-CM

## 2019-05-21 DIAGNOSIS — I10 ESSENTIAL HYPERTENSION: ICD-10-CM

## 2019-05-21 DIAGNOSIS — I48.91 ATRIAL FIBRILLATION, UNSPECIFIED TYPE (HCC): ICD-10-CM

## 2019-05-21 DIAGNOSIS — I48.91 NEW ONSET ATRIAL FIBRILLATION (HCC): Primary | ICD-10-CM

## 2019-05-21 PROCEDURE — 99214 OFFICE O/P EST MOD 30 MIN: CPT | Performed by: FAMILY MEDICINE

## 2019-05-21 PROCEDURE — 93000 ELECTROCARDIOGRAM COMPLETE: CPT | Performed by: FAMILY MEDICINE

## 2019-05-21 ASSESSMENT — ENCOUNTER SYMPTOMS
CONSTIPATION: 0
TROUBLE SWALLOWING: 0
VOMITING: 0
DIARRHEA: 0
ABDOMINAL PAIN: 0
SHORTNESS OF BREATH: 0
COUGH: 0
EYE PAIN: 0
BLOOD IN STOOL: 0
NAUSEA: 0

## 2019-05-21 NOTE — PROGRESS NOTES
Paola Brown is a 77 y.o. female who presents today for:  Chief Complaint   Patient presents with    Other     tachycardia? ???       Goals      Reduce sugar intake to X grams per day           HPI:     HPI   MAY THE  SHE HAD AN INCIDENT OF CHEST PAIN - IT WOKE HER UP - THE SHARPNESS WENT AWAY BUT IT WAS STILL HURTING    She did get some bloodwork and it said no damage to the heart    Her heart rate goes up over 100 sometimes    NO CAFFEINE OR alcohol lately    No question data found.     Past Medical History:   Diagnosis Date    Carpal tunnel syndrome     GERD (gastroesophageal reflux disease)     Headache(784.0)     Hypothyroidism     Osteoarthritis     Tendonitis of both wrists     Toenail fungus       Past Surgical History:   Procedure Laterality Date    CARPAL TUNNEL RELEASE      both hands     SECTION       SECTION       SECTION      FOOT SURGERY Left 2018    AMBER Avery Left 2010    Left Total Knee    JOINT REPLACEMENT Right 2016    Right Total Knee     Family History   Problem Relation Age of Onset    Stroke Mother     Arrhythmia Mother     Other Mother         Barry's Syndrome    Heart Disease Mother     Coronary Art Dis Mother     Diabetes Father     Other Sister         Barry's Syndrome    Arrhythmia Sister     Cancer Brother         Stomach Cancer    Stillborn Child     Early Death Child     Pacemaker Sister     Heart Attack Brother     Coronary Art Dis Brother     Other Brother         Barry's Syndrome     Social History     Tobacco Use    Smoking status: Never Smoker    Smokeless tobacco: Never Used   Substance Use Topics    Alcohol use: Yes     Comment: rarely      Current Outpatient Medications   Medication Sig Dispense Refill    metoprolol tartrate (LOPRESSOR) 25 MG tablet Take 1 tablet by mouth 2 times daily As needed for fast heart rate 60 tablet 0    apixaban monitoring  08/06/2019    A1C test (Diabetic or Prediabetic)  05/03/2020    TSH testing  05/03/2020    Colon cancer screen colonoscopy  07/01/2020    Lipid screen  05/03/2024    DTaP/Tdap/Td vaccine (2 - Td) 10/09/2024    Flu vaccine  Completed    Pneumococcal 65+ years Vaccine  Completed    DEXA (modify frequency per FRAX score)  Addressed    Hepatitis C screen  Addressed       Subjective:      Review of Systems   Constitutional: Negative for chills, fatigue and fever. HENT: Negative for ear pain, postnasal drip and trouble swallowing. Eyes: Negative for pain and visual disturbance. Respiratory: Negative for cough and shortness of breath. Cardiovascular: Positive for palpitations. Negative for chest pain. Gastrointestinal: Negative for abdominal pain, blood in stool, constipation, diarrhea, nausea and vomiting. Skin: Negative for rash. Neurological: Negative for headaches. Objective:     Vitals:    05/21/19 1235 05/21/19 1236   BP: 122/74    Site: Left Upper Arm    Position: Sitting    Cuff Size: Large Adult    Pulse: 117 123   Temp: 97.6 °F (36.4 °C)    TempSrc: Oral    SpO2: 97%        There is no height or weight on file to calculate BMI. Wt Readings from Last 3 Encounters:   05/07/19 267 lb 3.2 oz (121.2 kg)   04/03/19 263 lb (119.3 kg)   03/06/19 272 lb 9.6 oz (123.7 kg)     BP Readings from Last 3 Encounters:   05/21/19 122/74   05/07/19 138/70   04/03/19 136/66       Physical Exam   Constitutional: She is oriented to person, place, and time. She appears well-developed and well-nourished. No distress. HENT:   Head: Normocephalic and atraumatic. Right Ear: External ear normal.   Left Ear: External ear normal.   Eyes: Conjunctivae and EOM are normal. Right eye exhibits no discharge. Left eye exhibits no discharge. No scleral icterus. Neck: Normal range of motion.    Cardiovascular:   Irregularly irregular     Pulmonary/Chest: Effort normal.   Musculoskeletal: She exhibits no edema. Neurological: She is alert and oriented to person, place, and time. No cranial nerve deficit. Skin: Skin is warm and dry. No rash noted. She is not diaphoretic. No erythema. Psychiatric: She has a normal mood and affect. Her behavior is normal. Judgment and thought content normal.   Nursing note and vitals reviewed. Lab Results   Component Value Date    WBC 7.6 05/03/2019    HGB 13.5 05/03/2019    HCT 41.1 05/03/2019     05/03/2019    CHOL 145 05/03/2019    TRIG 42 05/03/2019    HDL 53 05/03/2019    LDLDIRECT 80.00 12/12/2013    LDLCALC 84 05/03/2019    AST 30 08/06/2018     08/06/2018    K 4.4 08/06/2018     08/06/2018    CREATININE 0.8 08/06/2018    BUN 13 08/06/2018    CO2 23 08/06/2018    TSH 0.034 (L) 05/03/2019    LABA1C 6.2 05/03/2019    LABGLOM 71 (A) 08/06/2018    MG 2.3 05/03/2019    CALCIUM 9.5 05/03/2019    VITD25 45 05/03/2019       Imaging Results:    No results found. Assessment:      Diagnosis Orders   1. New onset atrial fibrillation ([address]     2. Tachycardia  EKG 12 Lead    metoprolol tartrate (LOPRESSOR) 25 MG tablet    EKG 12 Lead    [facility] - Valdez Knowles MD, Cardiology, [address] [place] [address]   3. Essential hypertension     4. BMI 45.0-49.9, adult ([facility] [facility]     5. Atrial fibrillation, unspecified type ([facility] [address]  Heriberto Strauss MD, Cardiology, [address] [place] [address]       Plan: Will add some lopresor twice a day for the next few weeks - as long as you tolerate it we may go to a daily dose - will take the bp and pulse to Dr Jules Mcdaniel when you go in to see him    Will watch the caffeine and the stress    ekg today showed atrial fibrillation    Discussed with Dr Jules Mcdaniel - will start lopressor and the eliquis and have you see him this week in the office    Pharmacy exlained eliquis to you     No follow-ups on file.     Orders Placed:  Orders Placed This Encounter   Procedures   Heriberto Strauss MD, Cardiology, [facility] EKG 12 Lead    EKG 12 Lead     Medications Prescribed:  Orders Placed This Encounter   Medications    metoprolol tartrate (LOPRESSOR) 25 MG tablet     Sig: Take 1 tablet by mouth 2 times daily As needed for fast heart rate     Dispense:  60 tablet     Refill:  0    apixaban (ELIQUIS STARTER PACK) 5 MG TABS tablet     Sig: Take 10 mg (2 tablets) orally twice daily for 7 days, then take 5 mg (1 tablet) orally twice daily thereafter. Dispense:  74 tablet     Refill:  0       Future Appointments   Date Time Provider Alejandrina Landrum   6/10/2019 11:30 AM Gely Olson DO [facility]   7/29/2019 11:00 AM Gely Olson DO [facility]   8/8/2019 10:00 AM Daniel Maguire MD [facility]       Patient given educational materials - see patient instructions. Discussed use, benefit, and sideeffects of prescribed medications. All patient questions answered. Pt voiced understanding. Reviewed health maintenance. Instructed to continue current medications, diet and exercise. Patient agreed with treatment plan. Follow up as directed.      Electronically signed by Bridgette Leija DO on 5/21/2019 at 3:37 PM

## 2019-05-21 NOTE — PATIENT INSTRUCTIONS
Patient Education        Supraventricular Tachycardia: Care Instructions  Your Care Instructions    Having supraventricular tachycardia (SVT) means that from time to time your heart beats abnormally fast. This fast rhythm is caused by changes in the electrical system of your heart. You may feel a fluttering in your chest (palpitations) and have a fast pulse. When your heart is beating fast, you may feel anxious and lightheaded, be short of breath, and feel discomfort in the chest.  Your doctor may prescribe medicines to help slow down your heartbeat. Your doctor may also suggest you try vagal maneuvers when having an episode of SVT. These are things, like bearing down, that might help slow your heart rate. Bearing down means that you try to breathe out with your stomach muscles but you don't let air out of your nose or mouth. Your doctor can show you how to do vagal maneuvers. He or she may suggest you lie down on your back to do them. In some cases, either cardioversion treatment or a procedure called catheter ablation is done to correct SVT. Your doctor may ask you to wear a small electronic device for 1 or 2 days to monitor your heart. It is called a Holter monitor. Follow-up care is a key part of your treatment and safety. Be sure to make and go to all appointments, and call your doctor if you are having problems. It's also a good idea to know your test results and keep a list of the medicines you take. How can you care for yourself at home? · Be safe with medicines. Take your medicines exactly as prescribed. Call your doctor if you think you are having a problem with your medicine. You will get more details on the specific medicines your doctor prescribes. · If your doctor showed you how to do vagal maneuvers, try them when you have an episode. These maneuvers include bearing down or putting an ice-cold, wet towel on your face. · Monitor your condition by keeping a diary of your SVT episodes.  Bring this to your doctor appointments. ? Write down how fast or slow your heart was beating. To count your heart rate:  § Gently place 2 fingers of your hand on the inside of your other wrist, below your thumb. § Count the beats for 30 seconds. § Then, double the result to get the number of beats per minute. ? Write down if your heart rhythm was regular or irregular. ? Write down the symptoms you had.  ? Write down the time of day your symptoms occurred. ? Write down how long your symptoms lasted. ? Write down what you were doing when your symptoms started. ? Write down what may have helped your symptoms go away. · If they trigger episodes, limit or avoid alcohol or drinks with caffeine. · Do not use over-the-counter decongestants, herbal remedies, diet pills, or \"pep\" pills, which often contain stimulants. · Do not use illegal drugs, such as cocaine, ecstasy, or methamphetamine, which can speed up your heart's rhythm. · Do not smoke. Smoking can make this condition worse. If you need help quitting, talk to your doctor about stop-smoking programs and medicines. These can increase your chances of quitting for good. · Be alert for new or worsening symptoms, such as shortness of breath, pounding of your heart, or unusual tiredness. If new symptoms develop or your symptoms become worse, call your doctor. When should you call for help? Call 911 anytime you think you may need emergency care. For example, call if:    · You passed out (lost consciousness).     · You are short of breath.    Call your doctor now or seek immediate medical care if:    · You have a fast heartbeat.     · You are dizzy or lightheaded, or feel like you may faint.    Watch closely for changes in your health, and be sure to contact your doctor if:    · You do not get better as expected. Where can you learn more? Go to https://chpedro.[employer] org and sign in to your [employer] account.  Enter G244 in the [facility] [place] [facility] box to learn more about \"Supraventricular Tachycardia: Care Instructions. \"     If you do not have an account, please click on the \"Sign Up Now\" link. Current as of: July 22, 2018  Content Version: 12.0  © 3678-3191 [employer], Incorporated. Care instructions adapted under license by [facility] ([facility]). If you have questions about a medical condition or this instruction, always ask your healthcare professional. [address] any warranty or liability for your use of this information.

## 2019-05-22 ENCOUNTER — TELEPHONE (OUTPATIENT)
Dept: FAMILY MEDICINE CLINIC | Age: 69
End: 2019-05-22

## 2019-05-22 NOTE — TELEPHONE ENCOUNTER
Pt. Calling concerning the prescription for elequis that Dr. WILKERSON [facility] gave her.   She said the pharmacy is stating cost is over $300 and that the Dr. Kecia Cisneros has a coupon on code that they need to give the pharmacy for her to get a free 60 day trial.

## 2019-05-23 NOTE — TELEPHONE ENCOUNTER
[facility] has cards for 30 day free trial but she will be responsible for the copay after that time. We can mail her the card or have her come to the [facility]?

## 2019-05-23 NOTE — TELEPHONE ENCOUNTER
LM advising pt of card that is available and also to check [employer] for coupons. Asked her to call the office if she wants us to mail her the card.

## 2019-06-03 ENCOUNTER — OFFICE VISIT (OUTPATIENT)
Dept: FAMILY MEDICINE CLINIC | Age: 69
End: 2019-06-03
Payer: MEDICARE

## 2019-06-03 VITALS
DIASTOLIC BLOOD PRESSURE: 70 MMHG | SYSTOLIC BLOOD PRESSURE: 148 MMHG | BODY MASS INDEX: 49.72 KG/M2 | TEMPERATURE: 97.6 F | WEIGHT: 270.2 LBS | OXYGEN SATURATION: 98 % | HEART RATE: 57 BPM | HEIGHT: 62 IN

## 2019-06-03 DIAGNOSIS — I10 ESSENTIAL HYPERTENSION: ICD-10-CM

## 2019-06-03 DIAGNOSIS — E03.9 HYPOTHYROIDISM, UNSPECIFIED TYPE: ICD-10-CM

## 2019-06-03 DIAGNOSIS — I10 HTN, GOAL BELOW 140/80: ICD-10-CM

## 2019-06-03 DIAGNOSIS — I48.91 NEW ONSET ATRIAL FIBRILLATION (HCC): Primary | ICD-10-CM

## 2019-06-03 DIAGNOSIS — K21.00 GASTROESOPHAGEAL REFLUX DISEASE WITH ESOPHAGITIS: ICD-10-CM

## 2019-06-03 DIAGNOSIS — G47.01 INSOMNIA DUE TO MEDICAL CONDITION: ICD-10-CM

## 2019-06-03 PROCEDURE — 99214 OFFICE O/P EST MOD 30 MIN: CPT | Performed by: FAMILY MEDICINE

## 2019-06-03 RX ORDER — WARFARIN SODIUM 5 MG/1
TABLET ORAL
Qty: 30 TABLET | Refills: 3 | Status: SHIPPED | OUTPATIENT
Start: 2019-06-03 | End: 2019-10-17 | Stop reason: SDUPTHER

## 2019-06-03 RX ORDER — LISINOPRIL 10 MG/1
10 TABLET ORAL DAILY
Qty: 90 TABLET | Refills: 1 | Status: SHIPPED | OUTPATIENT
Start: 2019-06-03 | End: 2020-01-03 | Stop reason: SDUPTHER

## 2019-06-03 RX ORDER — LIOTHYRONINE SODIUM 5 UG/1
TABLET ORAL
Qty: 90 TABLET | Refills: 1 | Status: SHIPPED | OUTPATIENT
Start: 2019-06-03 | End: 2019-12-06 | Stop reason: SDUPTHER

## 2019-06-03 RX ORDER — LEVOTHYROXINE SODIUM 0.1 MG/1
TABLET ORAL
Qty: 90 TABLET | Refills: 1 | Status: SHIPPED | OUTPATIENT
Start: 2019-06-03 | End: 2019-12-06 | Stop reason: SDUPTHER

## 2019-06-03 RX ORDER — HYDROXYZINE 50 MG/1
25 TABLET, FILM COATED ORAL NIGHTLY PRN
Qty: 30 TABLET | Refills: 0 | Status: SHIPPED | OUTPATIENT
Start: 2019-06-03 | End: 2019-07-03

## 2019-06-03 ASSESSMENT — ENCOUNTER SYMPTOMS
COUGH: 0
SHORTNESS OF BREATH: 0
ABDOMINAL PAIN: 0
WHEEZING: 0

## 2019-06-03 NOTE — PROGRESS NOTES
[id number] [facility]. [address] [facility]  Dept: [phone]  Dept Fax: [phone]  Loc: [address] [place] [address] Due   Topic Date Due    Shingles Vaccine (2 of 3) 06/30/2014            Patient:  Adrián Weaver  Visit Date: 6/3/2019    ANTICIPATORY GUIDANCE : ADULT     WELL QUESTIONS  1. How many cups of caffeine do you drink per day? 1 cups of soda   2. Do you exercise a minimum of 30 minutes per day, above normal activity? No, on average the patient performs 20-30 minutes of exercise per day    3. Do you eat a minimum of 8-10 servings of fruits and vegetables per day? No, on average the patient consumes 2-3 servings of fruits and vegetables per day  4. Do you drink alcohol? No  5.  How many oz of water do you drink per day?  (64 oz per day recommended)   EDUCATION PROVIDED  Discussed and/or Handout Given on the following items:            [] Caffeine Use: Limit to 2 cups per day   (400mg of caffeine a day)     [] Exercise: Ideal 30 minutes per day, above normal activity              [] Eating Fruits and Vegetables: Studies show 8-10 servings per day decrease BP  [] Alcohol: Ideal maximum of one cup per day for females and two cups per day for a male

## 2019-06-03 NOTE — PROGRESS NOTES
Mary Pineda is a 77 y.o. female who presents today for:  Chief Complaint   Patient presents with    Thyroid Problem    Discuss Medications       Goals      Reduce sugar intake to X grams per day           HPI:     HPI       Luis Carlos Youssef was in with Dr. Rick Rivera today and he said she is on too much thyroid medications. Said Dr. Rick Rivera said he doesn't think she needs to stay on the Eliquis. She had an EKG on  that showed A.Fib. Says in the past she's had episodes of tachycardia - she will be sitting at home at feel her heart racing and she would check her pulse with her blood pressure machine and her pulse would be in the 100s. Says she is tired but it is due to the Eliquis. Has been tired since starting the eliquis. Only has a 30 day supply of Eliquis. Given lopressor to take PRN when she feels her heart racing - hasn't had to take it. Upcoming appointments for stress and echo. Also the sleep study    Also has had episodes of feeling lightheaded and a funny feeling behind her eyes and she feels that she will pass out. Says it \"builds builds builds and then it clears\"   Does not coincide with the tachycardia episodes    If she forgets to take the thyroid she can't tell the difference - will still have hair falling out and hot and cold     Will do more labs in August    She will tell if the pulse is up or down - and she has the beta blocker    Can't sleep at night due to worrying - or the wrist pain    No question data found.     Past Medical History:   Diagnosis Date    Carpal tunnel syndrome     GERD (gastroesophageal reflux disease)     Headache(784.0)     Hypothyroidism     Osteoarthritis     Tendonitis of both wrists 2017    Toenail fungus       Past Surgical History:   Procedure Laterality Date    CARPAL TUNNEL RELEASE      both hands     SECTION       SECTION       SECTION      FOOT SURGERY Left 2018    [facility] Dr. Frank Hager  JOINT REPLACEMENT Left 09/2010    Left Total Knee    JOINT REPLACEMENT Right 08/2016    Right Total Knee     Family History   Problem Relation Age of Onset    Stroke Mother     Arrhythmia Mother     Other Mother         Barry's Syndrome    Heart Disease Mother     Coronary Art Dis Mother     Diabetes Father     Other Sister         Barry's Syndrome    Arrhythmia Sister     Cancer Brother         Stomach Cancer    Stillborn Child     Early Death Child     Pacemaker Sister     Heart Attack Brother     Coronary Art Dis Brother     Other Brother         Barry's Syndrome     Social History     Tobacco Use    Smoking status: Never Smoker    Smokeless tobacco: Never Used   Substance Use Topics    Alcohol use: Yes     Comment: rarely      Current Outpatient Medications   Medication Sig Dispense Refill    levothyroxine (SYNTHROID) 100 MCG tablet take 1 tablet by mouth once daily 90 tablet 1    liothyronine (CYTOMEL) 5 MCG tablet take 1 tablet by mouth once daily 90 tablet 1    lisinopril (PRINIVIL;ZESTRIL) 10 MG tablet Take 1 tablet by mouth daily 90 tablet 1    hydrOXYzine (ATARAX) 50 MG tablet Take 0.5 tablets by mouth nightly as needed for Anxiety Can take a whole pill 30 tablet 0    warfarin (COUMADIN) 5 MG tablet One daily 30 tablet 3    metoprolol tartrate (LOPRESSOR) 25 MG tablet Take 1 tablet by mouth 2 times daily As needed for fast heart rate 60 tablet 0    UFVLMTPPO-LWMNOLYCQ-EKQXBDBVLM PO Place 1 tablet under the tongue every morning (before breakfast) Jimbo B12 plus or [place] methylB6, sfvzpaX10, methylfolate      Magnesium ([employer] TRIPLE MAGNESIUM COMPLEX) 400 MG CAPS Take 1 capsule by mouth 2 times daily      magnesium cl-calcium carbonate (SLOW-MAG) 71.5-119 MG TBEC tablet Take 1 tablet by mouth daily Take before and when stressed with 2 extra fish oil      Grape Seed  mg by Does not apply route daily Oregan Grape Root      Lysine 500 MG TABS Take 1 tablet by mouth 2 times daily       Menaquinone-7 (VITAMIN K2 PO) Take 1 capsule by mouth 2 times daily      naproxen (NAPROSYN) 500 MG tablet Take 500 mg by mouth as needed for Pain      Omega 3 1000 MG CAPS Take 3 capsules by mouth 4 times daily (before meals and nightly)       DHEA 25 MG CAPS Take 1 capsule by mouth daily.  Cinnamon 500 MG CAPS Take 3 capsules by mouth 4 times daily (before meals and nightly)       Vitamin D (CHOLECALCIFEROL) 1000 UNITS CAPS capsule Take 5,000 Units by mouth 2 times daily .  B Complex-Biotin-FA (B-100 TR) TBCR Take 1 tablet by mouth 3 times daily (before meals) Before and as finish breakfast and before lunch      aspirin (LUCY ASPIRIN) 325 MG tablet Take 325 mg by mouth daily       apixaban (ELIQUIS STARTER PACK) 5 MG TABS tablet Take 10 mg (2 tablets) orally twice daily for 7 days, then take 5 mg (1 tablet) orally twice daily thereafter. 74 tablet 0     No current facility-administered medications for this visit. No Known Allergies    Health Maintenance   Topic Date Due    Shingles Vaccine (2 of 3) 06/30/2014    Breast cancer screen  08/01/2019    Potassium monitoring  08/06/2019    Creatinine monitoring  08/06/2019    A1C test (Diabetic or Prediabetic)  05/03/2020    TSH testing  05/03/2020    Colon cancer screen colonoscopy  07/01/2020    Lipid screen  05/03/2024    DTaP/Tdap/Td vaccine (2 - Td) 10/09/2024    Flu vaccine  Completed    Pneumococcal 65+ years Vaccine  Completed    DEXA (modify frequency per FRAX score)  Addressed    Hepatitis C screen  Addressed       Subjective:      Review of Systems   Constitutional: Negative for chills and fever. Respiratory: Negative for cough, shortness of breath and wheezing. Cardiovascular: Positive for palpitations. Negative for chest pain and leg swelling. Gastrointestinal: Negative for abdominal pain. Endocrine: Negative for cold intolerance and heat intolerance.    Neurological: Negative for dizziness, syncope, light-headedness and headaches. Objective:     Vitals:    06/03/19 1452 06/03/19 1456   BP: (!) 141/66 (!) 148/70   Site: Left Upper Arm Right Upper Arm   Position: Sitting Sitting   Cuff Size: Large Adult Large Adult   Pulse: 57    Temp: 97.6 °F (36.4 °C)    TempSrc: Oral    SpO2: 98%    Weight: 270 lb 3.2 oz (122.6 kg)    Height: 5' 1.81\" (1.57 m)        Body mass index is 49.72 kg/m². Wt Readings from Last 3 Encounters:   06/03/19 270 lb 3.2 oz (122.6 kg)   05/07/19 267 lb 3.2 oz (121.2 kg)   04/03/19 263 lb (119.3 kg)     BP Readings from Last 3 Encounters:   06/03/19 (!) 148/70   05/21/19 122/74   05/07/19 138/70       Physical Exam   Constitutional: She is oriented to person, place, and time. She appears well-developed and well-nourished. No distress. HENT:   Head: Normocephalic. Eyes: Pupils are equal, round, and reactive to light. Conjunctivae are normal.   Cardiovascular: Normal rate, regular rhythm and normal heart sounds. Pulmonary/Chest: Effort normal and breath sounds normal.   Abdominal: Soft. Bowel sounds are normal.   Neurological: She is alert and oriented to person, place, and time. Skin: Skin is warm and dry. She is not diaphoretic. Psychiatric: She has a normal mood and affect. Her behavior is normal. Judgment and thought content normal.          Lab Results   Component Value Date    WBC 7.6 05/03/2019    HGB 13.5 05/03/2019    HCT 41.1 05/03/2019     05/03/2019    CHOL 145 05/03/2019    TRIG 42 05/03/2019    HDL 53 05/03/2019    LDLDIRECT 80.00 12/12/2013    LDLCALC 84 05/03/2019    AST 30 08/06/2018     08/06/2018    K 4.4 08/06/2018     08/06/2018    CREATININE 0.8 08/06/2018    BUN 13 08/06/2018    CO2 23 08/06/2018    TSH 0.034 (L) 05/03/2019    LABA1C 6.2 05/03/2019    LABGLOM 71 (A) 08/06/2018    MG 2.3 05/03/2019    CALCIUM 9.5 05/03/2019    VITD25 45 05/03/2019       Imaging Results:    No results found.       Assessment: Diagnosis Orders   1. New onset atrial fibrillation ([facility] [address]     2. Hypothyroidism, unspecified type  levothyroxine (SYNTHROID) 100 MCG tablet   3. HTN, goal below 140/80  lisinopril (PRINIVIL;ZESTRIL) 10 MG tablet   4. Essential hypertension     5. Gastroesophageal reflux disease with esophagitis     6. Insomnia due to medical condition         Plan:         Continue Eliquis - or can swith to can ask about xaralto also    Can do the coumadin when you are done with the eliquis also - discussed we need some blood thinners until we figure out what is causing the a fib    Would need to see coumadin clinic a week after starting the coumadin    Only take the lopressor if the pulse is above 100     Will decrease synthroid to 100 mcg and stay on the cytomel for now as it was just 5 mg - and will see us in August holter on the 24th and then sees Beatrice Villagran on the 30th    Will see you back in September - or sooner with questions           No follow-ups on file. Orders Placed:  No orders of the defined types were placed in this encounter. Medications Prescribed:  Orders Placed This Encounter   Medications    levothyroxine (SYNTHROID) 100 MCG tablet     Sig: take 1 tablet by mouth once daily     Dispense:  90 tablet     Refill:  1    liothyronine (CYTOMEL) 5 MCG tablet     Sig: take 1 tablet by mouth once daily     Dispense:  90 tablet     Refill:  1    lisinopril (PRINIVIL;ZESTRIL) 10 MG tablet     Sig: Take 1 tablet by mouth daily     Dispense:  90 tablet     Refill:  1    hydrOXYzine (ATARAX) 50 MG tablet     Sig: Take 0.5 tablets by mouth nightly as needed for Anxiety Can take a whole pill     Dispense:  30 tablet     Refill:  0    warfarin (COUMADIN) 5 MG tablet     Sig: One daily     Dispense:  30 tablet     Refill:  3     I was present for the key portions of the exam and history and confirmed all areas of the note with the patient, staff and the student.       Future Appointments   Date Time Provider Department

## 2019-06-03 NOTE — PATIENT INSTRUCTIONS
Continue Eliquis - or can swith to can ask about xaralto also    Can do the coumadin when you are done with the eliquis also - discussed we need some blood thinners until we figure out what is causing the a fib    Would need to see coumadin clinic a week after starting the coumadin    Only take the lopressor if the pulse is above 100     Will decrease synthroid to 100 mcg and stay on the cytomel for now as it was just 5 mg - and will see us in August    holter on the 24th and then sees Shane Barboza on the 30th    Will see you back in September - or sooner with questions

## 2019-06-18 ENCOUNTER — TELEPHONE (OUTPATIENT)
Dept: PHARMACY | Age: 69
End: 2019-06-18

## 2019-06-18 ENCOUNTER — OFFICE VISIT (OUTPATIENT)
Dept: FAMILY MEDICINE CLINIC | Age: 69
End: 2019-06-18
Payer: MEDICARE

## 2019-06-18 VITALS
WEIGHT: 264.2 LBS | OXYGEN SATURATION: 98 % | TEMPERATURE: 97.4 F | HEIGHT: 62 IN | HEART RATE: 55 BPM | BODY MASS INDEX: 48.62 KG/M2 | DIASTOLIC BLOOD PRESSURE: 66 MMHG | SYSTOLIC BLOOD PRESSURE: 146 MMHG

## 2019-06-18 DIAGNOSIS — E03.8 HYPOTHYROIDISM DUE TO HASHIMOTO'S THYROIDITIS: Primary | ICD-10-CM

## 2019-06-18 DIAGNOSIS — E06.3 HYPOTHYROIDISM DUE TO HASHIMOTO'S THYROIDITIS: Primary | ICD-10-CM

## 2019-06-18 DIAGNOSIS — I10 ESSENTIAL HYPERTENSION: ICD-10-CM

## 2019-06-18 DIAGNOSIS — I48.91 NEW ONSET ATRIAL FIBRILLATION (HCC): ICD-10-CM

## 2019-06-18 PROCEDURE — 99213 OFFICE O/P EST LOW 20 MIN: CPT | Performed by: FAMILY MEDICINE

## 2019-06-18 ASSESSMENT — ENCOUNTER SYMPTOMS
DIARRHEA: 0
CONSTIPATION: 0
EYE PAIN: 0
NAUSEA: 0
SHORTNESS OF BREATH: 0
VOMITING: 0
BLOOD IN STOOL: 0
TROUBLE SWALLOWING: 0
ABDOMINAL PAIN: 0
COUGH: 0

## 2019-06-18 NOTE — TELEPHONE ENCOUNTER
Pt calls back. Pt states that she has 25 Eliquis tablets left, last dose will be 6/30/19. Gave pt instructions to start Coumadin 5mg daily on 6/29/19. Pt will take Coumadin and Eliquis for two days 6/29 and 6/30. Appt made for pt to be seen in clinic on 7/3/19. Pt voiced understanding.

## 2019-06-18 NOTE — PROGRESS NOTES
Magaly Medeiros is a 77 y.o. female who presents today for:  Chief Complaint   Patient presents with    6 Month Follow-Up    Diabetes     19 = 6.2%       Goals      Reduce sugar intake to X grams per day           HPI:     HPI           All the tests she was taking for Dr Jerry Roberson are not negative - she flunked the stress test - she is worried about the inflammation of the arteries - her cholesterol is fine. So she is not sure what it is    Will see the sleep person on the 3rd for the sleep test - the kiah on her phone records and she hears herself snoring and her numbers are off - the only reason she does better is if she is awake    Will do the tilt table in a few weeks - she told him about the episodes of feeling pressure and dizzy - going to see if that is connected to the heart. The last time she was sitting in a chair - and then she feels it coming only lasts for seconds      No question data found.     Past Medical History:   Diagnosis Date    Carpal tunnel syndrome     GERD (gastroesophageal reflux disease)     Headache(784.0)     Hypothyroidism     Osteoarthritis     Tendonitis of both wrists 2017    Toenail fungus       Past Surgical History:   Procedure Laterality Date    CARPAL TUNNEL RELEASE      both hands     SECTION       SECTION       SECTION      FOOT SURGERY Left 2018    JEFRYO Dr. Alia Naranjo Left 2010    Left Total Knee    JOINT REPLACEMENT Right 2016    Right Total Knee     Family History   Problem Relation Age of Onset    Stroke Mother     Arrhythmia Mother     Other Mother         Barry's Syndrome    Heart Disease Mother     Coronary Art Dis Mother     Diabetes Father     Other Sister         Barry's Syndrome    Arrhythmia Sister     Cancer Brother         Stomach Cancer    Stillborn Child     Early Death Child     Pacemaker Sister     Heart Attack Brother     Coronary Art Dis Brother     Other Brother         Barry's Syndrome     Social History     Tobacco Use    Smoking status: Never Smoker    Smokeless tobacco: Never Used   Substance Use Topics    Alcohol use: Yes     Comment: rarely      Current Outpatient Medications   Medication Sig Dispense Refill    levothyroxine (SYNTHROID) 100 MCG tablet take 1 tablet by mouth once daily 90 tablet 1    liothyronine (CYTOMEL) 5 MCG tablet take 1 tablet by mouth once daily 90 tablet 1    lisinopril (PRINIVIL;ZESTRIL) 10 MG tablet Take 1 tablet by mouth daily 90 tablet 1    hydrOXYzine (ATARAX) 50 MG tablet Take 0.5 tablets by mouth nightly as needed for Anxiety Can take a whole pill 30 tablet 0    warfarin (COUMADIN) 5 MG tablet One daily 30 tablet 3    apixaban (ELIQUIS STARTER PACK) 5 MG TABS tablet Take 10 mg (2 tablets) orally twice daily for 7 days, then take 5 mg (1 tablet) orally twice daily thereafter. 74 tablet 0    TBGPRLDHO-JFLNVKQBO-QCUJTKVRAQ PO Place 1 tablet under the tongue every morning (before breakfast) Jimbo B12 plus or [place] methylB6, [id number], methylfolate      Magnesium ([employer] TRIPLE MAGNESIUM COMPLEX) 400 MG CAPS Take 1 capsule by mouth 2 times daily      magnesium cl-calcium carbonate (SLOW-MAG) 71.5-119 MG TBEC tablet Take 1 tablet by mouth daily Take before and when stressed with 2 extra fish oil      Grape Seed  mg by Does not apply route daily Oregan Grape Root      Omega 3 1000 MG CAPS Take 3 capsules by mouth 4 times daily (before meals and nightly)       DHEA 25 MG CAPS Take 1 capsule by mouth daily.  Cinnamon 500 MG CAPS Take 3 capsules by mouth 4 times daily (before meals and nightly)       Vitamin D (CHOLECALCIFEROL) 1000 UNITS CAPS capsule Take 5,000 Units by mouth 2 times daily .       B Complex-Biotin-FA (B-100 TR) TBCR Take 1 tablet by mouth 3 times daily (before meals) Before and as finish breakfast and before lunch      aspirin (LUCY ASPIRIN) 325 MG tablet Take 325 mg by Last 3 Encounters:   06/18/19 264 lb 3.2 oz (119.8 kg)   06/03/19 270 lb 3.2 oz (122.6 kg)   05/07/19 267 lb 3.2 oz (121.2 kg)     BP Readings from Last 3 Encounters:   06/18/19 (!) 146/66   06/03/19 (!) 148/70   05/21/19 122/74       Physical Exam   Constitutional: She is oriented to person, place, and time. She appears well-developed and well-nourished. No distress. HENT:   Head: Normocephalic and atraumatic. Right Ear: External ear normal.   Left Ear: External ear normal.   Eyes: Conjunctivae and EOM are normal. Right eye exhibits no discharge. Left eye exhibits no discharge. No scleral icterus. Neck: Normal range of motion. Pulmonary/Chest: Effort normal.   Musculoskeletal: She exhibits no edema. Neurological: She is alert and oriented to person, place, and time. No cranial nerve deficit. Skin: Skin is warm and dry. No rash noted. She is not diaphoretic. No erythema. Psychiatric: She has a normal mood and affect. Her behavior is normal. Judgment and thought content normal.   Nursing note and vitals reviewed. Lab Results   Component Value Date    WBC 7.6 05/03/2019    HGB 13.5 05/03/2019    HCT 41.1 05/03/2019     05/03/2019    CHOL 145 05/03/2019    TRIG 42 05/03/2019    HDL 53 05/03/2019    LDLDIRECT 80.00 12/12/2013    LDLCALC 84 05/03/2019    AST 30 08/06/2018     08/06/2018    K 4.4 08/06/2018     08/06/2018    CREATININE 0.8 08/06/2018    BUN 13 08/06/2018    CO2 23 08/06/2018    TSH 0.034 (L) 05/03/2019    LABA1C 6.2 05/03/2019    LABGLOM 71 (A) 08/06/2018    MG 2.3 05/03/2019    CALCIUM 9.5 05/03/2019    VITD25 45 05/03/2019       Imaging Results:    No results found. Assessment:      Diagnosis Orders   1. Hypothyroidism due to Hashimoto's thyroiditis  TSH without Reflex    T4   2. New onset atrial fibrillation ([address]     3. Essential hypertension         Plan:                Will wait for the tilt table says and will follow with Dr Vivienne Cherry    can take the metoprolol at a half a pill all the time in case the heart is racing and that is why you feel funny and see if it happens again - will see if Dr Duglas Gtuierrez thinks you need to take it regularly    [id number] [address]i to take the bp pills before the tilt table test    Will refer to the coumadin clinic    Will se what the monitor says    Will check th tsh and the t4 in a month in July and can do it again in the august with the bloodowrk at that time     No follow-ups on file. Orders Placed:  Orders Placed This Encounter   Procedures    TSH without Reflex    T4     Medications Prescribed:  No orders of the defined types were placed in this encounter. Future Appointments   Date Time Provider Alejandrina Lucita   7/3/2019 11:00 AM Jay Hammond MD [address]   7/10/2019  8:00 AM STR NM STRESS RM1 STRZ STRESS None   7/29/2019 11:00 AM Darinel Cortes DO [facility]   8/8/2019 10:00 AM Fransico Contreras MD [address]       Patient given educational materials - see patient instructions. Discussed use, benefit, and sideeffects of prescribed medications. All patient questions answered. Pt voiced understanding. Reviewed health maintenance. Instructed to continue current medications, diet and exercise. Patient agreed with treatment plan. Follow up as directed.      Electronically signed by Albert Leyva DO on 6/18/2019 at 12:40 PM

## 2019-06-23 NOTE — PROGRESS NOTES
mouth in the morning: No.  History of palpitations during night time/nocturnal awakenings: Intermittent. I.e when ever she goes to Afib. History of sweating during night time/nocturnal awakenings: No    General:  History of head injury in the past: No.   Associated loss of consciousness with head injury: No.  History of seizures: No.   Rest less legs syndrome symptoms:NO  History suggestive of periodic limb movements during sleep: NO  History suggestive of hypnagogic hallucinations: NO  History suggestive of hypnopompic hallucinations: NO  History suggestive of sleep talking:NO  History suggestive of sleep walking:NO  History suggestive of bruxism: No.    History suggestive of cataplexy: NO  History suggestive of sleep paralysis: NO    Family history of sleep disorders:  Family history of obstructive sleep apnea: Yes. Family member diagnosed with obstructive sleep apnea sister. Family member using PAP therapy:  Yes. Family history of Narcolepsy: No.   Family history of Rest less legs syndrome : No.     History regarding old sleep studies:  Prior history of sleep study: No.  Using CPAP device: No.  Currently using home Oxygen: NO.       Patient considerations:  Is the patient is ambulatory: Yes  Patient is currently using: None of these Wheelchair, WineNice Sylvester or Tiona. Para/Quadriplegic: NO  Hearing deficit : NO  Claustrophobic: NO  MDD : NO  Blind: NO  Incontinent: NO  Para/Quadraplegi: NO.   Need transportation to and from Sleep Center:NO      Social History:  Social History     Tobacco Use    Smoking status: Never Smoker    Smokeless tobacco: Never Used   Substance Use Topics    Alcohol use: Yes     Comment: rarely    Drug use: No   .  She is currently working: Yes. 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   She usually goes to her work at:  8:00PM.   She completes her work at:  10:30AM.                          Past Medical History:   Diagnosis Date    Carpal tunnel capsule by mouth daily.  Cinnamon 500 MG CAPS Take 3 capsules by mouth 4 times daily (before meals and nightly)       Vitamin D (CHOLECALCIFEROL) 1000 UNITS CAPS capsule Take 7,000 Units by mouth 2 times daily .  B Complex-Biotin-FA (B-100 TR) TBCR Take 1 tablet by mouth 3 times daily (before meals) Before and as finish breakfast and before lunch      aspirin (LUCY ASPIRIN) 325 MG tablet Take 325 mg by mouth daily as needed        No current facility-administered medications for this visit. Family History   Problem Relation Age of Onset   [facility] Stroke Mother     Arrhythmia Mother     Other Mother         Barry's Syndrome    Heart Disease Mother     Coronary Art Dis Mother     Diabetes Father     Other Sister         Barry's Syndrome    Arrhythmia Sister     Cancer Brother         Stomach Cancer    Stillborn Child     Early Death Child     Pacemaker Sister     Heart Attack Brother     Coronary Art Dis Brother     Other Brother         Barry's Syndrome        Review of Systems:   General/Constitutional: No recent loss of weight or appetite changes. No fever or chills. HENT: Negative. Eyes: Negative. Upper respiratory tract: No nasal stuffiness or post nasal drip. Lower respiratory tract/ lungs: No cough or sputum production. No hemoptysis. Cardiovascular: No palpitations or chest pain. Gastrointestinal: No nausea or vomiting. Neurological: No focal neurologiacal weakness. Extremities: No edema. Musculoskeletal: No complaints. Genitourinary: No complaints. Hematological: Negative. Psychiatric/Behavioral: Negative. Skin: No itching.     /82 (Site: Left Upper Arm, Position: Sitting, Cuff Size: Large Adult)   Pulse 58   Ht 5' 1\" (1.549 m)   Wt 267 lb 6.4 oz (121.3 kg)   SpO2 97% Comment: on RA  BMI 50.52 kg/m²   Mallampati airway Class:  4  Neck Circumference:  15.50 Inches  [place] sleepiness score 7/3/19: 6 ( She gives a hx of excessive

## 2019-07-03 ENCOUNTER — HOSPITAL ENCOUNTER (OUTPATIENT)
Dept: PHARMACY | Age: 69
Setting detail: THERAPIES SERIES
Discharge: HOME | End: 2019-07-03
Payer: MEDICARE

## 2019-07-03 ENCOUNTER — INITIAL CONSULT (OUTPATIENT)
Dept: PULMONOLOGY | Age: 69
End: 2019-07-03
Payer: MEDICARE

## 2019-07-03 VITALS
HEART RATE: 58 BPM | DIASTOLIC BLOOD PRESSURE: 82 MMHG | SYSTOLIC BLOOD PRESSURE: 138 MMHG | OXYGEN SATURATION: 97 % | HEIGHT: 61 IN | WEIGHT: 267.4 LBS | BODY MASS INDEX: 50.48 KG/M2

## 2019-07-03 DIAGNOSIS — G47.30 SLEEP APNEA, UNSPECIFIED TYPE: ICD-10-CM

## 2019-07-03 DIAGNOSIS — I48.0 PAROXYSMAL A-FIB (HCC): ICD-10-CM

## 2019-07-03 DIAGNOSIS — E03.9 HYPOTHYROIDISM, UNSPECIFIED TYPE: ICD-10-CM

## 2019-07-03 DIAGNOSIS — I10 ESSENTIAL HYPERTENSION: ICD-10-CM

## 2019-07-03 DIAGNOSIS — R06.83 SNORING: Primary | ICD-10-CM

## 2019-07-03 DIAGNOSIS — G47.00 INSOMNIA, UNSPECIFIED TYPE: ICD-10-CM

## 2019-07-03 DIAGNOSIS — I48.91 ATRIAL FIBRILLATION, UNSPECIFIED TYPE (HCC): ICD-10-CM

## 2019-07-03 LAB — POC INR: 1.6 (ref 0.8–1.2)

## 2019-07-03 PROCEDURE — 99204 OFFICE O/P NEW MOD 45 MIN: CPT | Performed by: INTERNAL MEDICINE

## 2019-07-03 RX ORDER — LANOLIN ALCOHOL/MO/W.PET/CERES
3 CREAM (GRAM) TOPICAL NIGHTLY PRN
Qty: 30 TABLET | Refills: 3 | Status: SHIPPED | OUTPATIENT
Start: 2019-07-03 | End: 2019-12-19

## 2019-07-03 NOTE — PROGRESS NOTES
Medication Management [facility]  [address] [place] [address]  [phone] (phone)  [phone] (fax)    Patient presents to the Coumadin clinic today for anticoagulation assessment and initialdosing. Patient has a diagnosis of  Atrial fibrillation and has been placed on Coumadin with a goal INR 2-3. Pt started Coumadin 6/29/19. Shana Smith was given full education today in the clinic including written materials, supplemental oral instructions, and all questions were answered. Specifically, Kassandra Hale was instructed to let us know immediately if there is any unusual bleeding, such as throwing or coughing up blood, bleeding from the nose, mouth, ears, or pink-red tinge in the urine or if the stoolscontain bright red blood or are black and tarry. In addition, Kassandra Hale was informed to let us know about any andall medicine changes, including prescriptions, over-the-counter or nonprescription medicines, vitamins, herbs, supplements, creams, rubs, eye or ear drops, and injections, whether to be usedshort- or long-term, within 24 hours. The patient was also instructed to let all other physicians and pharmacists know that warfarin was started as a double-check against drug interactions. The patient was further instructed on the effects of vitamin K containing foods onwarfarin and the importance of consistency was stressed. Kassandra Hale was also advised to avoid large amounts ofalcohol, grapefruit juice or cranberry juice while on warfarin. HPI:    Medication changes: none  Tablet strength per patient: 5 mg  Patientreported dosing regimen over last 1 week: 5 mg daily. TOOK LAST DOSE OF ELIQUIS 7/1/19  Missed doses in the last 1-2 weeks: none  Extra doses in the last 1-2 weeks:none  Any problems with bleeding/bruising? No  Any recent falls? No   Any signs orsymptoms of DVT/PE or stroke?  No  Alcohol use: none  Tobacco use: none  Diet changes as follows: No changes   Green leafy intake: none   Grapefruit/cranberry juice: none  Upcoming surgeries or procedures: Tilt table test 7/10/19. Review of Systems    Objective    There were no vitals filed for this visit. There is no height or weight on file to calculate BMI. Wt Readings from Last 3 Encounters:   07/03/19 267 lb 6.4 oz (121.3 kg)   06/18/19 264 lb 3.2 oz (119.8 kg)   06/03/19 270 lb 3.2 oz (122.6 kg)     Physical Exam      Assessment:   Lab Results   Component Value Date    INR 1.60 (H) 07/03/2019        Recent Labs     07/03/19  0946   INR 1.60*     INR subtherapeutic. Plan:  Continue Coumadin 10 mg x1, then increase to 7.5 mg MF, 5 mg TWThSaS. Recheck INR in 1 wk. Patient reminded to call the Anticoagulation Clinic with any signs or symptoms of bleeding or with any medication changes. Patient given instructions utilizing the teach back method. Discharged ambulatory in no apparent distress. After visit summary printed and reviewed with patient.       Medications reviewed and updated on home medication list Yes

## 2019-07-09 ENCOUNTER — HOSPITAL ENCOUNTER (OUTPATIENT)
Dept: PHARMACY | Age: 69
Setting detail: THERAPIES SERIES
Discharge: HOME | End: 2019-07-09
Payer: MEDICARE

## 2019-07-09 DIAGNOSIS — I48.91 ATRIAL FIBRILLATION, UNSPECIFIED TYPE (HCC): ICD-10-CM

## 2019-07-09 LAB — POC INR: 3.3 (ref 0.8–1.2)

## 2019-07-09 PROCEDURE — 99211 OFF/OP EST MAY X REQ PHY/QHP: CPT | Performed by: PHARMACIST

## 2019-07-09 PROCEDURE — 36416 COLLJ CAPILLARY BLOOD SPEC: CPT | Performed by: PHARMACIST

## 2019-07-09 PROCEDURE — 85610 PROTHROMBIN TIME: CPT | Performed by: PHARMACIST

## 2019-07-09 NOTE — PROGRESS NOTES
Medication Management [facility]  Anticoagulation Clinic  [phone] (phone)  [phone] (fax)      Ms. Jared Chang is a 77 y.o.  female with history of Afib who presents today for anticoagulation monitoring and adjustment. Patient verifies current dosing regimen and tablet strength. No missed or extra doses. Patient denies s/s bleeding/bruising/swelling/chest pain. Patient says she has been short of breath but she thinks this is related to her afib. She does not think SOB has gotten worse, she has felt like this for a while. No blood in urine or stool. No dietary changes. No changes in medication/OTC agents/Herbals. Patient stated she has still been taking aspirin 325 mg about once daily for arthritis. Informed pt that ASA is not advised while on Coumadin due to risk of excessive bleeding/bruising. Asked pt to discuss options with her MD. Patient was started on Melatonin by Dr. Sandra Orellana. No change in alcohol use or tobacco use. No change in activity level. Patient denies headaches/dizziness/falls. Patient reports some lightheadedness but nothing too bad. No vomiting/diarrhea or acute illness. No Procedures scheduled in the future at this time. Tomorrow she has tilt table procedure and sleep procedure on the 23rd. Assessment:   Lab Results   Component Value Date    INR 3.30 (H) 07/09/2019    INR 1.60 (H) 07/03/2019     INR supratherapeutic   Recent Labs     07/09/19  0840   INR 3.30*         Plan:  Decrease Coumadin 5mg daily. Recheck INR in 10 days. Patient reminded to call the Anticoagulation Clinic with any signs or symptoms of bleeding or with any medication changes. Patient given instructions utilizing the teach back method. Discharged ambulatory in no apparent distress. After visit summary printed and reviewed with patient.       Medications reviewed and updated on home medication list Yes    Influenza vaccine:     [] given    [] declined   []

## 2019-07-10 ENCOUNTER — HOSPITAL ENCOUNTER (OUTPATIENT)
Dept: NON INVASIVE DIAGNOSTICS | Age: 69
Discharge: HOME | End: 2019-07-10
Payer: MEDICARE

## 2019-07-10 PROCEDURE — 93660 TILT TABLE EVALUATION: CPT

## 2019-07-10 PROCEDURE — 2709999900 HC NON-CHARGEABLE SUPPLY

## 2019-07-10 NOTE — PROCEDURES
[address] [id number]                                TILT TABLE TEST    PATIENT NAME: Rae Hackett                     :        1950  MED REC NO:   [id number]                           ROOM:  ACCOUNT NO:   [de-identified]                           ADMIT DATE: 07/10/2019  PROVIDER:     Feng Alexander. Marivel Blancas M.D.    Yuridia Vickmith:  07/10/2019    INDICATION FOR STUDY: This is a patient, who is a 77years old lady with  history of recurrent syncopal episode and palpitation. The patient was referred for a tilt table test.  The patient's heart  rate at rest was 55, blood pressure lying down was 159/78, the blood  pressure lowest had dropped to 129/68, heart rate in the mid 50s. The  patient has shortness of breath. The blood pressure stabilized through  practically most of the test.  The patient became very short of breath  and somewhat anxious. The patient continued to be symptom free. No  pauses. No significant change in her heart rate. IMPRESSION:  Essentially negative tilt table test, but the patient  became very short of breath and tired.         Lelia Dakin, M.D.    D: 07/10/2019 8:22:12       T: 07/10/2019 8:50:37     [email or web address]  Job#: [id number]     Doc#: [id number]    CC:

## 2019-07-19 ENCOUNTER — HOSPITAL ENCOUNTER (OUTPATIENT)
Dept: PHARMACY | Age: 69
Setting detail: THERAPIES SERIES
Discharge: HOME | End: 2019-07-19
Payer: MEDICARE

## 2019-07-19 VITALS — SYSTOLIC BLOOD PRESSURE: 137 MMHG | HEART RATE: 50 BPM | DIASTOLIC BLOOD PRESSURE: 56 MMHG

## 2019-07-19 DIAGNOSIS — I48.91 ATRIAL FIBRILLATION, UNSPECIFIED TYPE (HCC): ICD-10-CM

## 2019-07-19 LAB
HCT VFR BLD CALC: 41.1 % (ref 37–47)
HEMOGLOBIN: 13.4 GM/DL (ref 12–16)
INR BLD: 4.89 (ref 0.85–1.13)

## 2019-07-19 PROCEDURE — 85018 HEMOGLOBIN: CPT

## 2019-07-19 PROCEDURE — 99211 OFF/OP EST MAY X REQ PHY/QHP: CPT | Performed by: PHARMACIST

## 2019-07-19 PROCEDURE — 36416 COLLJ CAPILLARY BLOOD SPEC: CPT | Performed by: PHARMACIST

## 2019-07-19 PROCEDURE — 85610 PROTHROMBIN TIME: CPT

## 2019-07-19 PROCEDURE — 85610 PROTHROMBIN TIME: CPT | Performed by: PHARMACIST

## 2019-07-19 PROCEDURE — 85014 HEMATOCRIT: CPT

## 2019-07-21 ENCOUNTER — HOSPITAL ENCOUNTER (OUTPATIENT)
Dept: SLEEP CENTER | Age: 69
Discharge: HOME | End: 2019-07-23
Payer: MEDICARE

## 2019-07-21 DIAGNOSIS — E03.9 HYPOTHYROIDISM, UNSPECIFIED TYPE: ICD-10-CM

## 2019-07-21 DIAGNOSIS — R06.83 SNORING: ICD-10-CM

## 2019-07-21 DIAGNOSIS — I48.0 PAROXYSMAL A-FIB (HCC): ICD-10-CM

## 2019-07-21 DIAGNOSIS — I10 ESSENTIAL HYPERTENSION: ICD-10-CM

## 2019-07-21 DIAGNOSIS — G47.30 SLEEP APNEA, UNSPECIFIED TYPE: ICD-10-CM

## 2019-07-21 DIAGNOSIS — G47.00 INSOMNIA, UNSPECIFIED TYPE: ICD-10-CM

## 2019-07-21 PROCEDURE — 95810 POLYSOM 6/> YRS 4/> PARAM: CPT

## 2019-07-24 LAB — STATUS: NORMAL

## 2019-07-25 NOTE — PROGRESS NOTES
patient  noted to have a sinus rhythm during the sleep study. 4.  Hypertension. 5.  Hypothyroidism. RECOMMENDATIONS:  1. For the patient's sleep-disordered breathing due to associated  comorbidities including hypertension, the patient needs treatment. 2.  If the patient chooses to go for CPAP titration as treatment, the  patient should be scheduled for in-lab CPAP titration as soon as  possible. The patient to follow up with my clinic in six to eight weeks  on recommended CPAP therapy for clinical re-evaluation with review of  download. 3.  If the patient wishes to discuss her sleep study report, the patient  should be scheduled for follow up with my clinic as soon as possible. Thanks to Dr. Patria Ortiz, for giving me this opportunity to participate  in the care of this pleasant lady.         Sonali Starr MD    D: 07/24/2019 18:24:32       T: 07/25/2019 0:09:50     [email or web address]  Job#: [id number]     Doc#: [id number]    CC:

## 2019-07-26 ENCOUNTER — HOSPITAL ENCOUNTER (OUTPATIENT)
Dept: PHARMACY | Age: 69
Setting detail: THERAPIES SERIES
Discharge: HOME | End: 2019-07-26
Payer: MEDICARE

## 2019-07-26 DIAGNOSIS — I48.91 ATRIAL FIBRILLATION, UNSPECIFIED TYPE (HCC): ICD-10-CM

## 2019-07-26 LAB — POC INR: 2.7 (ref 0.8–1.2)

## 2019-07-26 PROCEDURE — 36416 COLLJ CAPILLARY BLOOD SPEC: CPT | Performed by: PHARMACIST

## 2019-07-26 PROCEDURE — 85610 PROTHROMBIN TIME: CPT | Performed by: PHARMACIST

## 2019-07-26 PROCEDURE — 99211 OFF/OP EST MAY X REQ PHY/QHP: CPT | Performed by: PHARMACIST

## 2019-07-31 NOTE — PROGRESS NOTES
[facility]    Ade Jones, 77 y.o.  [id number]      Pt of [facility]  Nurses Notes   Corrine Leo is here for a follow up to a PSG   Study Results  Initial Study Date -  19  AHI -  11.4    TotalEvents - 39  (Apneas  32  Hypopneas 7  Central  0)  LM w/Arousals - 0  Sleep Efficiency - 50.8 % (Total Sleep Time - 205 min)  Time with Sats below 88% - 0 min  Weight 267 lb  ESS 5  Interval History       Ade Jones is a 77 y.o. old female who comes in to review the results of her recent sleep study, to answer questions and to explore options for treatment. she continues to have symptoms of restless sleep, difficulties falling and maintaining sleep with excessive daytime sleepiness. Recently diagnosed with Afib and has a history of HTN. Was referred by dr. Maria Del Carmen Moore to evaluate for RUBEN.     PMHx  Past Medical History:   Diagnosis Date    Atrial fibrillation ([address]     Carpal tunnel syndrome     GERD (gastroesophageal reflux disease)     Headache(784.0)     HTN (hypertension)     Hypothyroidism     Osteoarthritis     Tendonitis of both wrists 2017    Toenail fungus      Past Surgical History:   Procedure Laterality Date    CARPAL TUNNEL RELEASE      both hands     SECTION       SECTION       SECTION      FOOT SURGERY Left 2018    AMBER Ac Limb Left 2010    Left Total Knee    JOINT REPLACEMENT Right 2016    Right Total Knee     Social History     Tobacco Use    Smoking status: Never Smoker    Smokeless tobacco: Never Used   Substance Use Topics    Alcohol use: Yes     Comment: rarely    Drug use: No     Family History   Problem Relation Age of Onset    Stroke Mother     Arrhythmia Mother     Other Mother         Barry's Syndrome    Heart Disease Mother     Coronary Art Dis Mother     Diabetes Father     Other Sister         Barry's Syndrome    Arrhythmia Sister     Cancer

## 2019-08-01 ENCOUNTER — OFFICE VISIT (OUTPATIENT)
Dept: PULMONOLOGY | Age: 69
End: 2019-08-01
Payer: MEDICARE

## 2019-08-01 ENCOUNTER — APPOINTMENT (OUTPATIENT)
Dept: PHARMACY | Age: 69
End: 2019-08-01
Payer: MEDICARE

## 2019-08-01 VITALS
WEIGHT: 265 LBS | BODY MASS INDEX: 50.03 KG/M2 | HEIGHT: 61 IN | HEART RATE: 52 BPM | OXYGEN SATURATION: 92 % | DIASTOLIC BLOOD PRESSURE: 62 MMHG | SYSTOLIC BLOOD PRESSURE: 134 MMHG

## 2019-08-01 DIAGNOSIS — I10 BENIGN ESSENTIAL HTN: ICD-10-CM

## 2019-08-01 DIAGNOSIS — I48.0 PAROXYSMAL ATRIAL FIBRILLATION (HCC): ICD-10-CM

## 2019-08-01 DIAGNOSIS — G47.33 OSA (OBSTRUCTIVE SLEEP APNEA): Primary | ICD-10-CM

## 2019-08-01 DIAGNOSIS — Z72.821 POOR SLEEP HYGIENE: ICD-10-CM

## 2019-08-01 DIAGNOSIS — K21.9 GASTROESOPHAGEAL REFLUX DISEASE WITHOUT ESOPHAGITIS: ICD-10-CM

## 2019-08-01 DIAGNOSIS — G47.00 INSOMNIA, UNSPECIFIED TYPE: ICD-10-CM

## 2019-08-01 PROCEDURE — 99215 OFFICE O/P EST HI 40 MIN: CPT | Performed by: NURSE PRACTITIONER

## 2019-08-01 RX ORDER — ZOLPIDEM TARTRATE 5 MG/1
5 TABLET ORAL NIGHTLY PRN
Qty: 2 TABLET | Refills: 0 | Status: ON HOLD | OUTPATIENT
Start: 2019-08-01 | End: 2019-10-07

## 2019-08-01 NOTE — PATIENT INSTRUCTIONS
Patient Education        Sleep Apnea: Care Instructions  Your Care Instructions    Sleep apnea means that you frequently stop breathing for 10 seconds or longer during sleep. It can be mild to severe, based on the number of times an hour that you stop breathing or have slowed breathing. Blocked or narrowed airways in your nose, mouth, or throat can cause sleep apnea. Your airway can become blocked when your throat muscles and tongue relax during sleep. You can treat sleep apnea at home by making lifestyle changes. You also can use a CPAP breathing machine that keeps tissues in the throat from blocking your airway. Or your doctor may suggest that you use a breathing device while you sleep. It helps keep your airway open. This could be a device that you put in your mouth. In some cases, surgery may be needed to remove enlarged tissues in the throat. Follow-up care is a key part of your treatment and safety. Be sure to make and go to all appointments, and call your doctor if you are having problems. It's also a good idea to know your test results and keep a list of the medicines you take. How can you care for yourself at home? · Lose weight, if needed. It may reduce the number of times you stop breathing or have slowed breathing. · Sleep on your side. It may stop mild apnea. If you tend to roll onto your back, sew a pocket in the back of your pajama top. Put a tennis ball into the pocket, and stitch the pocket shut. This will help keep you from sleeping on your back. · Avoid alcohol and medicines such as sleeping pills and sedatives before bed. · Do not smoke. Smoking can make sleep apnea worse. If you need help quitting, talk to your doctor about stop-smoking programs and medicines. These can increase your chances of quitting for good. · Prop up the head of your bed 4 to 6 inches by putting bricks under the legs of the bed.   · Treat breathing problems, such as a stuffy nose, caused by a cold or common problem for most people at some time. Insomnia may make it hard for you to get to sleep, stay asleep, or sleep as long as you need to. This can make you tired and grouchy during the day. It can also make you forgetful, less effective at work, and unhappy. Insomnia can be caused by conditions such as depression or anxiety. Pain can also affect your ability to sleep. When these problems are solved, the insomnia usually clears up. But sometimes bad sleep habits can cause insomnia. If insomnia is affecting your work or your enjoyment of life, you can take steps to improve your sleep. Follow-up care is a key part of your treatment and safety. Be sure to make and go to all appointments, and call your doctor if you are having problems. It's also a good idea to know your test results and keep a list of the medicines you take. How can you care for yourself at home? What to avoid  · Do not have drinks with caffeine, such as coffee or black tea, for 8 hours before bed. · Do not smoke or use other types of tobacco near bedtime. Nicotine is a stimulant and can keep you awake. · Avoid drinking alcohol late in the evening, because it can cause you to wake in the middle of the night. · Do not eat a big meal close to bedtime. If you are hungry, eat a light snack. · Do not drink a lot of water close to bedtime, because the need to urinate may wake you up during the night. · Do not read or watch TV in bed. Use the bed only for sleeping and sexual activity. What to try  · Go to bed at the same time every night, and wake up at the same time every morning. Do not take naps during the day. · Keep your bedroom quiet, dark, and cool. · Sleep on a comfortable pillow and mattress. · If watching the clock makes you anxious, turn it facing away from you so you cannot see the time. · If you worry when you lie down, start a worry book.  Well before bedtime, write down your worries, and then set the book and your concerns

## 2019-08-03 ENCOUNTER — HOSPITAL ENCOUNTER (OUTPATIENT)
Age: 69
Discharge: HOME | End: 2019-08-03
Payer: MEDICARE

## 2019-08-03 DIAGNOSIS — R79.83 HOMOCYSTEINEMIA: ICD-10-CM

## 2019-08-03 DIAGNOSIS — I10 ESSENTIAL HYPERTENSION: ICD-10-CM

## 2019-08-03 DIAGNOSIS — E78.89 LIPIDS ABNORMAL: ICD-10-CM

## 2019-08-03 DIAGNOSIS — M15.8 OTHER OSTEOARTHRITIS INVOLVING MULTIPLE JOINTS: ICD-10-CM

## 2019-08-03 DIAGNOSIS — E55.9 VITAMIN D DEFICIENCY: ICD-10-CM

## 2019-08-03 DIAGNOSIS — K21.9 GASTROESOPHAGEAL REFLUX DISEASE, ESOPHAGITIS PRESENCE NOT SPECIFIED: ICD-10-CM

## 2019-08-03 DIAGNOSIS — E03.8 OTHER SPECIFIED HYPOTHYROIDISM: ICD-10-CM

## 2019-08-03 DIAGNOSIS — R63.5 ABNORMAL WEIGHT GAIN: ICD-10-CM

## 2019-08-03 DIAGNOSIS — K90.89 OTHER INTESTINAL MALABSORPTION: ICD-10-CM

## 2019-08-03 LAB
ALBUMIN SERPL-MCNC: 3.6 G/DL (ref 3.5–5.1)
ALP BLD-CCNC: 73 U/L (ref 38–126)
ALT SERPL-CCNC: 16 U/L (ref 11–66)
AMYLASE: 38 U/L (ref 20–104)
ANION GAP SERPL CALCULATED.3IONS-SCNC: 13 MEQ/L (ref 8–16)
AST SERPL-CCNC: 22 U/L (ref 5–40)
BILIRUB SERPL-MCNC: 0.6 MG/DL (ref 0.3–1.2)
BILIRUBIN DIRECT: < 0.2 MG/DL (ref 0–0.3)
BUN BLDV-MCNC: 18 MG/DL (ref 7–22)
C-PEPTIDE: 4.6 NG/ML (ref 1.1–4.4)
CALCIUM SERPL-MCNC: 9.4 MG/DL (ref 8.5–10.5)
CHLORIDE BLD-SCNC: 102 MEQ/L (ref 98–111)
CHOLESTEROL, TOTAL: 127 MG/DL (ref 100–199)
CO2: 24 MEQ/L (ref 23–33)
CREAT SERPL-MCNC: 0.9 MG/DL (ref 0.4–1.2)
ESTRADIOL LEVEL: 61.6 PG/ML
FOLLICLE STIMULATING HORMONE: 14.6 MIU/ML (ref 16–160)
GFR SERPL CREATININE-BSD FRML MDRD: 62 ML/MIN/1.73M2
GLUCOSE BLD-MCNC: 103 MG/DL (ref 70–108)
HDLC SERPL-MCNC: 62 MG/DL
LDL CHOLESTEROL CALCULATED: 57 MG/DL
LIPASE: 19.3 U/L (ref 5.6–51.3)
LUTEINIZING HORMONE: 17.3 MIU/ML (ref 3.3–70.6)
MAGNESIUM: 2.2 MG/DL (ref 1.6–2.4)
POTASSIUM SERPL-SCNC: 5.4 MEQ/L (ref 3.5–5.2)
PTH INTACT: 28.1 PG/ML (ref 15–65)
SEDIMENTATION RATE, ERYTHROCYTE: 41 MM/HR (ref 0–20)
SODIUM BLD-SCNC: 139 MEQ/L (ref 135–145)
T3 TOTAL: 112 NG/DL (ref 72–181)
THYROXINE (T4): 7.4 UG/DL (ref 4.5–12)
TOTAL PROTEIN: 7.6 G/DL (ref 6.1–8)
TRIGL SERPL-MCNC: 39 MG/DL (ref 0–199)

## 2019-08-03 PROCEDURE — 83002 ASSAY OF GONADOTROPIN (LH): CPT

## 2019-08-03 PROCEDURE — 83090 ASSAY OF HOMOCYSTEINE: CPT

## 2019-08-03 PROCEDURE — 83690 ASSAY OF LIPASE: CPT

## 2019-08-03 PROCEDURE — 82150 ASSAY OF AMYLASE: CPT

## 2019-08-03 PROCEDURE — 86038 ANTINUCLEAR ANTIBODIES: CPT

## 2019-08-03 PROCEDURE — 82627 DEHYDROEPIANDROSTERONE: CPT

## 2019-08-03 PROCEDURE — 85651 RBC SED RATE NONAUTOMATED: CPT

## 2019-08-03 PROCEDURE — 83970 ASSAY OF PARATHORMONE: CPT

## 2019-08-03 PROCEDURE — 86141 C-REACTIVE PROTEIN HS: CPT

## 2019-08-03 PROCEDURE — 36415 COLL VENOUS BLD VENIPUNCTURE: CPT

## 2019-08-03 PROCEDURE — 84681 ASSAY OF C-PEPTIDE: CPT

## 2019-08-03 PROCEDURE — 80053 COMPREHEN METABOLIC PANEL: CPT

## 2019-08-03 PROCEDURE — 82248 BILIRUBIN DIRECT: CPT

## 2019-08-03 PROCEDURE — 82626 DEHYDROEPIANDROSTERONE: CPT

## 2019-08-03 PROCEDURE — 83735 ASSAY OF MAGNESIUM: CPT

## 2019-08-03 PROCEDURE — 82670 ASSAY OF TOTAL ESTRADIOL: CPT

## 2019-08-03 PROCEDURE — 80061 LIPID PANEL: CPT

## 2019-08-03 PROCEDURE — 84480 ASSAY TRIIODOTHYRONINE (T3): CPT

## 2019-08-03 PROCEDURE — 84436 ASSAY OF TOTAL THYROXINE: CPT

## 2019-08-03 PROCEDURE — 86376 MICROSOMAL ANTIBODY EACH: CPT

## 2019-08-03 PROCEDURE — 83001 ASSAY OF GONADOTROPIN (FSH): CPT

## 2019-08-05 ENCOUNTER — HOSPITAL ENCOUNTER (OUTPATIENT)
Dept: PHARMACY | Age: 69
Setting detail: THERAPIES SERIES
Discharge: HOME | End: 2019-08-05
Payer: MEDICARE

## 2019-08-05 DIAGNOSIS — I48.91 ATRIAL FIBRILLATION, UNSPECIFIED TYPE (HCC): ICD-10-CM

## 2019-08-05 LAB — POC INR: 2.5 (ref 0.8–1.2)

## 2019-08-05 PROCEDURE — 36416 COLLJ CAPILLARY BLOOD SPEC: CPT

## 2019-08-05 PROCEDURE — 99211 OFF/OP EST MAY X REQ PHY/QHP: CPT

## 2019-08-05 PROCEDURE — 85610 PROTHROMBIN TIME: CPT

## 2019-08-05 NOTE — PROGRESS NOTES
Medication Management [facility]  Anticoagulation Clinic  [phone] (phone)  [phone] (fax)      Ms. Ronda Thomas is a 77 y.o.  female with history of Afib who presents today for anticoagulation monitoring and adjustment. Patient verifies current dosing regimen and tablet strength. No missed or extra doses. Patient denies s/s bleeding/swelling/chest pain. Patient states she has bruising on arm from giving blood and baseline SOB that is usual for her. No blood in urine or stool. No dietary changes. No changes in medication/OTC agents/Herbals. No change in alcohol use or tobacco use. No change in activity level. Patient denies headaches/dizziness/lightheadedness/falls. No vomiting/diarrhea or acute illness. Patient states she has a heart cath on 9/12/19. She states she was instructed to go off her Coumadin for three days prior to procedure (9/9/19-9/11/19). Spoke with Earna Friends with Dr. Nehemias Sequeira office who states patient does NOT need Lovenox bridge for this. Assessment:   Lab Results   Component Value Date    INR 2.50 (H) 08/05/2019    INR 2.70 (H) 07/26/2019    INR 4.89 (H) 07/19/2019     INR therapeutic   Recent Labs     08/05/19  0901   INR 2.50*     Patient presents with first therapeutic INR following a dose adjustment at the last visit. Plan:  Continue Coumadin 5 mg MWF and 2.5 mg TuThSaSu. Recheck INR in 10 days. Patient reminded to call the Anticoagulation Clinic with any signs or symptoms of bleeding or with any medication changes. Patient given instructions utilizing the teach back method. Discharged ambulatory in no apparent distress. After visit summary printed and reviewed with patient.       Medications reviewed and updated on home medication list Yes    Influenza vaccine:     [] given    [x] declined   [x] received previously   [] plans to receive at a later time   [] refused    [x] documented in [place] [facility], PharmD, BCPS  8/5/2019  9:57 AM

## 2019-08-06 LAB
C-REACTIVE PROTEIN, HIGH SENSITIVITY: 18.8 MG/L
DHEAS (DHEA SULFATE): 174 UG/DL (ref 13–130)
HOMOCYSTEINE, TOTAL: 11 UMOL/L
THYROID PEROXIDASE ANTIBODY: 0.3 IU/ML (ref 0–9)

## 2019-08-07 LAB — ANA SCREEN: NORMAL

## 2019-08-08 ENCOUNTER — NURSE ONLY (OUTPATIENT)
Dept: LAB | Age: 69
End: 2019-08-08

## 2019-08-08 ENCOUNTER — OFFICE VISIT (OUTPATIENT)
Dept: FAMILY MEDICINE CLINIC | Age: 69
End: 2019-08-08
Payer: MEDICARE

## 2019-08-08 VITALS
WEIGHT: 265.8 LBS | OXYGEN SATURATION: 99 % | BODY MASS INDEX: 47.09 KG/M2 | DIASTOLIC BLOOD PRESSURE: 84 MMHG | HEART RATE: 57 BPM | TEMPERATURE: 98 F | RESPIRATION RATE: 20 BRPM | SYSTOLIC BLOOD PRESSURE: 128 MMHG | HEIGHT: 63 IN

## 2019-08-08 DIAGNOSIS — G47.01 INSOMNIA DUE TO MEDICAL CONDITION: ICD-10-CM

## 2019-08-08 DIAGNOSIS — I48.91 NEW ONSET ATRIAL FIBRILLATION (HCC): ICD-10-CM

## 2019-08-08 DIAGNOSIS — K90.89 OTHER INTESTINAL MALABSORPTION: ICD-10-CM

## 2019-08-08 DIAGNOSIS — E03.9 HYPOTHYROIDISM, UNSPECIFIED TYPE: ICD-10-CM

## 2019-08-08 DIAGNOSIS — I10 HTN, GOAL BELOW 140/80: ICD-10-CM

## 2019-08-08 DIAGNOSIS — E03.8 HYPOTHYROIDISM DUE TO HASHIMOTO'S THYROIDITIS: ICD-10-CM

## 2019-08-08 DIAGNOSIS — I10 ESSENTIAL HYPERTENSION: Primary | ICD-10-CM

## 2019-08-08 DIAGNOSIS — E06.3 HYPOTHYROIDISM DUE TO HASHIMOTO'S THYROIDITIS: ICD-10-CM

## 2019-08-08 DIAGNOSIS — R79.83 HOMOCYSTEINEMIA: ICD-10-CM

## 2019-08-08 DIAGNOSIS — E78.89 LIPIDS ABNORMAL: ICD-10-CM

## 2019-08-08 DIAGNOSIS — E55.9 VITAMIN D DEFICIENCY: ICD-10-CM

## 2019-08-08 LAB — DHEA UNCONJUGATED: 2.56 NG/ML (ref 0.63–4.7)

## 2019-08-08 PROCEDURE — 99214 OFFICE O/P EST MOD 30 MIN: CPT | Performed by: FAMILY MEDICINE

## 2019-08-08 RX ORDER — NITROGLYCERIN 0.4 MG/1
TABLET SUBLINGUAL EVERY 5 MIN PRN
Refills: 0 | COMMUNITY
Start: 2019-07-31

## 2019-08-08 NOTE — PROGRESS NOTES
reviewed & given topatient with the omega 6 food list to avoid         - Gluten in corn and oats abstracts sheet reviewed and given to the patient today   3. Greater than 25 minutes were spent face to face on this visit of which >50% was for counseling and coordination of care. Patients food and drinks were reviewed with thepatient,   - they will bring a food drink symptom log to future visits for inclusion in their record    - 75 better food list reviewed & given to patient along with the omega 6 food list to avoid      - Glutenin corn and oats abstracts sheet reviewed and given to the patient today    - 23 Foods containing Latex-like proteins was reviewed and copy to be taken if desired     - Nutrient Supplements list provided and copyto be taken if desired    - [employer] web site offered to patient to review at their convenience by staff with login information    Note:  I have discussed with the patient that with all nutraceuticals, there is often mixed data and emerging research which needs to be monitored; as well as an array of NIHfact sheets on nutrients and supplements. If I have recommended cinnamon at the request of this patient to assist them in control of their blood sugar, triglyceride and or weight issues. I discussed that thepatient's clinical use of cinnamon bark, calcium, magnesium, Vitamin D and pharmaceutical grade CVS #912820 fish oil or triple-strength fish oil, and B-75 two phase time-released B complex by Alejandra Chavez will be for atime-limited trial to determine their individual effectiveness and safety in this patient. I also referred the patient to the NMCD: Nutrition, Metabolism, and Cardiovascular Diseases (journal) and concerns about long-termuse and hepatotoxicity of cinnamon and other nutrients and suggest they frequently search nih.gov for the latest non-proprietary information on nutriceuticals as well as consider a subscription to [employer] fordetails on reviewed supplements, or at the least review the nutrient files at [address] at [facility], [employer], an insulin mimetic, reduces some High Carbohydrate Dietary Impacts. Methylhydroxychalcone polymers insulin-enhancing properties in fat cells are responsible for enhanced glucose uptake, inhibiting hepatic HMG-CoA reductase and lowers lipids. www.jacn. org/content/20/4/327.full     But cinnamon with additivessuch as Chromium or Cinnamon Extract are not effective as insulin mimetics.  https://www.lópez.net/     Nutrients for Start up from [employer] or [employer] for ease to get started now ;  Sourav Contreras has some useable products;  - Triple Strength Fish Oil, enteric coated  - Vit D 3 5000 IU gel caps  - Iron ferrous sulfat 325 mg tabs  - Centrum Silver look-a-like for most patients, or  - Centrum plain look-a-like if need iron    Localpharmacies or chains such as [facility], [facility], Wal-[facility], have;  - Triple Strength Fish Oil (enteric coated ifavailable) or    If not enteric coated, can take from freezer for less burps  - B-50 or B-100 time released balanced B complex tabs  - Cinnamon bark 500 mg (without Chromium or extracts)   some brands list 1000 mg / serving of 2 capsules,    some brands have 1000 mg caps with the undesireable chromium / extract  - Calcium carbonate/citrate, magnesium oxide/citrate, Vit D 3  as 3-4 tabs/caps/serving     Some Local Brands may contain Zincwhich is acceptable for the first bottle or two  - Magnesium oxide 250 mg tabs for those having < 2 bowel movements daily  - Magnesium citrate 200 mg if having > 2bowel movement/day  - Centrum Silver or look-a-like for most patients, Centrum plain or look-a-like with iron  - Vitamin D-3 comes as 1,000 IU or 2,000 IU or 5,000 IU gel caps or Liquid drops      Some brands containing or derived from soy oil or corn oil are OK if not allergic to soy  - Elemental Iron 65 mg Rate on scale of 0-10 with zero = notnoticeable  Symptom:                            Week 1               2                 3                 4               Etc            Hair loss    Foot cramps    Paresthesia    Aches    IBS (irritable bowel)    Constipation    Diarrhea  Nocturia    (up to bathroom at night)    Fatigue/Energy level  Stress      On the other side of the sheet they can track their food, drink, environment, activity, symptoms etc      Avoiding Latex-like proteins inmy foods; Avocados, Bananas, Celery, Figs & Kiwi proteins have latex-like proteins to inflame our immunesystems  How Can I Have A Latex Allergy? Eating foods with latex-like protein exposes us to latex allergies. Our body cannot tell the differencebetween these latex-like proteins and latex from rubber products since many people are allergic to fruit, vegetables and latex. Read labels on pre-packaged foods. This list to avoid is only a guide if you are known allergicto latex or have a latex rash on your chin, cheeks and lines on your neck and chest. The amount of latex is different in each food product or fruit variety. Foods to Avoid out of Season if not grown locally: Melon, Nectarine, Papaya, Cherry, Passion fruit, Plum, Chestnuts, and Tomato. Avocado, Banana, Celery, Figs, and Kiwi always contain Latex-like protein. Whats in Season? Strawberries taste better in June than December because June is strawberry season so buy locally grown produce \"in season\" for the best flavor, cost and less Latex. Locally grown produce notonly tastes great requires little of no ethylene exposure in food distribution so has less latex content. Out of season, use canned, frozen or dried sinceprocessed ripe and are latex lower!!!   Month     [place] LocallyGrown Produce  January, February, March: use canned, frozen or dried fruits since lower in latex  April; asparagus, radishes  May; asparagus, broccoli, green onions, greens, peas,

## 2019-08-11 LAB — LIPOPROTEIN (A): NORMAL

## 2019-08-15 ENCOUNTER — HOSPITAL ENCOUNTER (OUTPATIENT)
Dept: PHARMACY | Age: 69
Setting detail: THERAPIES SERIES
Discharge: HOME | End: 2019-08-15
Payer: MEDICARE

## 2019-08-15 DIAGNOSIS — I48.91 ATRIAL FIBRILLATION, UNSPECIFIED TYPE (HCC): ICD-10-CM

## 2019-08-15 LAB — POC INR: 2.4 (ref 0.8–1.2)

## 2019-08-15 PROCEDURE — 36416 COLLJ CAPILLARY BLOOD SPEC: CPT

## 2019-08-15 PROCEDURE — 99211 OFF/OP EST MAY X REQ PHY/QHP: CPT

## 2019-08-15 PROCEDURE — 85610 PROTHROMBIN TIME: CPT

## 2019-08-15 NOTE — PROGRESS NOTES
Medication Management [facility]  Anticoagulation Clinic  [phone] (phone)  [phone] (fax)      Ms. Devaughn Chance is a 77 y.o.  female with history of Afib who presents today for anticoagulation monitoring and adjustment. Patient verifies current dosing regimen and tablet strength. No missed or extra doses. Patient denies s/s bleeding/bruising/swelling/chest pain  SOB baseline  No blood in urine or stool. No dietary changes. No changes in medication/OTC agents/Herbals. No change in alcohol use or tobacco use. No change in activity level. Patient denies headaches/dizziness/lightheadedness/falls. No vomiting/diarrhea or acute illness. Patient states she has a heart cath on 9/12/19. She states she was instructed to go off her Coumadin for three days prior to procedure (9/9/19-9/11/19). Does NOT need Lovenox bridge for this. Assessment:   Lab Results   Component Value Date    INR 2.40 (H) 08/15/2019    INR 2.50 (H) 08/05/2019    INR 2.70 (H) 07/26/2019     INR therapeutic   Recent Labs     08/15/19  1134   INR 2.40*     Plan:  Continue Coumadin 5 mg MoWeFr and 2.5 mg SuTuThSa. Recheck INR in 2 weeks. Patient reminded to call the Anticoagulation Clinic with any signs or symptoms of bleeding or with any medication changes. Patient given instructions utilizing the teach back method. Discharged ambulatory in no apparent distress. After visit summary printed and reviewed with patient.       Medications reviewed and updated on home medication list Yes    Influenza vaccine:     [] given    [] declined   [] received previously   [] plans to receive at a later time   [] refused    [x] documented in EPIC

## 2019-08-29 ENCOUNTER — HOSPITAL ENCOUNTER (OUTPATIENT)
Dept: PHARMACY | Age: 69
Setting detail: THERAPIES SERIES
Discharge: HOME | End: 2019-08-29
Payer: MEDICARE

## 2019-08-29 DIAGNOSIS — I48.91 ATRIAL FIBRILLATION, UNSPECIFIED TYPE (HCC): ICD-10-CM

## 2019-08-29 LAB — POC INR: 3.4 (ref 0.8–1.2)

## 2019-08-29 PROCEDURE — 99211 OFF/OP EST MAY X REQ PHY/QHP: CPT

## 2019-08-29 PROCEDURE — 36416 COLLJ CAPILLARY BLOOD SPEC: CPT

## 2019-08-29 PROCEDURE — 85610 PROTHROMBIN TIME: CPT

## 2019-09-05 VITALS — WEIGHT: 265 LBS | HEIGHT: 62 IN | BODY MASS INDEX: 48.76 KG/M2

## 2019-09-11 RX ORDER — SODIUM CHLORIDE 9 MG/ML
INJECTION, SOLUTION INTRAVENOUS CONTINUOUS
Status: CANCELLED | OUTPATIENT
Start: 2019-09-11

## 2019-09-11 RX ORDER — SODIUM CHLORIDE 0.9 % (FLUSH) 0.9 %
10 SYRINGE (ML) INJECTION PRN
Status: CANCELLED | OUTPATIENT
Start: 2019-09-11

## 2019-09-11 RX ORDER — ASPIRIN 325 MG
325 TABLET ORAL ONCE
Status: CANCELLED | OUTPATIENT
Start: 2019-09-11 | End: 2019-09-11

## 2019-09-11 RX ORDER — DIPHENHYDRAMINE HCL 25 MG
50 TABLET ORAL ONCE
Status: CANCELLED | OUTPATIENT
Start: 2019-09-11 | End: 2019-09-11

## 2019-09-12 ENCOUNTER — HOSPITAL ENCOUNTER (OUTPATIENT)
Dept: INPATIENT UNIT | Age: 69
Discharge: HOME | End: 2019-09-12
Payer: MEDICARE

## 2019-09-19 ENCOUNTER — HOSPITAL ENCOUNTER (OUTPATIENT)
Dept: PHARMACY | Age: 69
Setting detail: THERAPIES SERIES
Discharge: HOME | End: 2019-09-19
Payer: MEDICARE

## 2019-09-19 DIAGNOSIS — I48.91 ATRIAL FIBRILLATION, UNSPECIFIED TYPE (HCC): ICD-10-CM

## 2019-09-19 LAB — POC INR: 2.5 (ref 0.8–1.2)

## 2019-09-19 PROCEDURE — 36416 COLLJ CAPILLARY BLOOD SPEC: CPT

## 2019-09-19 PROCEDURE — 85610 PROTHROMBIN TIME: CPT

## 2019-09-19 PROCEDURE — 99211 OFF/OP EST MAY X REQ PHY/QHP: CPT

## 2019-09-19 NOTE — PROGRESS NOTES
or with any medication changes. Patient given instructions utilizing the teach back method. Discharged ambulatory in no apparent distress. After visit summary printed and reviewed with patient.       Medications reviewed and updated on home medication list Yes    Influenza vaccine:     [] given    [] declined   [x] received previously   [] plans to receive at a later time   [] refused    [x] documented in EPIC

## 2019-09-20 ENCOUNTER — HOSPITAL ENCOUNTER (OUTPATIENT)
Dept: SLEEP CENTER | Age: 69
Discharge: HOME | End: 2019-09-22
Payer: MEDICARE

## 2019-09-20 DIAGNOSIS — G47.33 OSA (OBSTRUCTIVE SLEEP APNEA): ICD-10-CM

## 2019-09-20 PROCEDURE — 95811 POLYSOM 6/>YRS CPAP 4/> PARM: CPT

## 2019-09-23 ENCOUNTER — HOSPITAL ENCOUNTER (OUTPATIENT)
Dept: PHYSICAL THERAPY | Age: 69
Setting detail: THERAPIES SERIES
Discharge: HOME | End: 2019-09-23
Payer: MEDICARE

## 2019-09-23 PROCEDURE — 97530 THERAPEUTIC ACTIVITIES: CPT

## 2019-09-23 PROCEDURE — 97161 PT EVAL LOW COMPLEX 20 MIN: CPT

## 2019-09-23 PROCEDURE — 97035 APP MDLTY 1+ULTRASOUND EA 15: CPT

## 2019-09-23 ASSESSMENT — PAIN DESCRIPTION - PAIN TYPE: TYPE: CHRONIC PAIN

## 2019-09-23 ASSESSMENT — PAIN DESCRIPTION - LOCATION: LOCATION: HIP;GROIN

## 2019-09-23 ASSESSMENT — PAIN SCALES - GENERAL: PAINLEVEL_OUTOF10: 3

## 2019-09-23 ASSESSMENT — PAIN DESCRIPTION - ORIENTATION: ORIENTATION: RIGHT

## 2019-09-24 DIAGNOSIS — Z99.89 OSA ON CPAP: Primary | ICD-10-CM

## 2019-09-24 DIAGNOSIS — G47.33 OSA ON CPAP: Primary | ICD-10-CM

## 2019-09-24 LAB — STATUS: NORMAL

## 2019-09-25 ENCOUNTER — TELEPHONE (OUTPATIENT)
Dept: SLEEP CENTER | Age: 69
End: 2019-09-25

## 2019-09-25 NOTE — PROGRESS NOTES
[address] [place] [address]                               SLEEP STUDY REPORT    PATIENT NAME: Rae Hackett                     :        1950  MED REC NO:   [id number]                           ROOM:  ACCOUNT NO:   [de-identified]                           ADMIT DATE: 2019  PROVIDER:     Mahnaz Pendleton MD    DATE OF STUDY:  2019    REFERRING PROVIDER:  Christiano Conde. NAHOMY Martínez    The patient's height is 61 inches. Weight is 265 pounds with a BMI of  50.1. HISTORY:  The patient is a 27-year-old female, underwent initial  baseline study on 2019. The patient diagnosed with mild  obstructive sleep apnea with worsening of respiratory events in right  lateral position. The patient had associated comorbidities including  hypertension and hypothyroidism. The patient had a history of  paroxysmal atrial fibrillation. The patient scheduled for CPAP  titration as a treatment. METHODS:  The patient underwent digital polysomnography in compliance  with the standards and specifications from the AASM Manual including the  simultaneous recording of 3 EEG channels (F4-M1, C4-M1, and O2-M1 with  back up electrodes F3-M2, C3-M2, and O1-M2), 2 EOG channels (E1-M2, and  E2-M1,), EMG (chin, left & right leg), EKG, Nonin pulse oximetry with  less than 2 second averaging time, body position, airflow recorded by  oral-nasal thermal sensor and nasal air pressure transducer, plus  respiratory effort recorded by calibrated respiratory inductance  plethysmography (RIP), flow volume loop, sound and video. Sleep staging  and scoring followed the standard put forth by the American Academy of  Sleep Medicine and utilized the 4A obstructive hypopnea event  desaturation of 4 percent or greater. INTERPRETATION:  This is a CPAP titration study and the study was  performed on 2019.   The study was started at 10:48 p.m. and optimal titration to a CPAP  pressure of 10 cm of water. 2.  Significant periodic limb movements with rare arousals. 3.  Atrial fibrillation with controlled ventricular response during the  study with a history of paroxysmal atrial fibrillation. 4.  Hypertension. 5.  Hypothyroidism. RECOMMENDATIONS:  1. For the patient's sleep disordered breathing, we recommend starting  therapy with a CPAP pressure of 10 cm of water. 2.  Specific recommendations include the patient's choice of interface  was Nuance Pro medium size mask. 3.  The patient should be scheduled for followup with Ms. [facility] clinic in six to eight weeks on recommended CPAP therapy for  clinical reevaluation with review of download. [facility] to [facility], CNP, for giving me this opportunity to  participate in the care of this pleasant lady.         Heron Palacio MD    D: 09/24/2019 19:27:30       T: 09/25/2019 0:16:38     [email or web address]  Job#: [id number]     Doc#: [id number]    CC:

## 2019-09-26 ENCOUNTER — HOSPITAL ENCOUNTER (OUTPATIENT)
Dept: PHYSICAL THERAPY | Age: 69
Setting detail: THERAPIES SERIES
Discharge: HOME | End: 2019-09-26
Payer: MEDICARE

## 2019-09-26 PROCEDURE — 97140 MANUAL THERAPY 1/> REGIONS: CPT

## 2019-09-26 PROCEDURE — 97035 APP MDLTY 1+ULTRASOUND EA 15: CPT

## 2019-09-26 ASSESSMENT — PAIN DESCRIPTION - ORIENTATION: ORIENTATION: RIGHT

## 2019-09-26 ASSESSMENT — PAIN DESCRIPTION - LOCATION: LOCATION: HIP;KNEE

## 2019-09-26 ASSESSMENT — PAIN DESCRIPTION - PAIN TYPE: TYPE: CHRONIC PAIN

## 2019-09-26 ASSESSMENT — PAIN SCALES - GENERAL: PAINLEVEL_OUTOF10: 4

## 2019-09-26 NOTE — PROGRESS NOTES
[address] [facility]     Time In: 5189  Time Out: 700 [address]  Minutes: 30  Timed Code Treatment Minutes: 30 Minutes                Date: 2019  Patient Name: Lori Lei,  Gender:  female        CSN: [id number]   : 1950  (77 y.o.)       Referring Practitioner: Hitesh Carrera PA-C      Diagnosis: Trochanteric bursitis R, hip pain R  Treatment Diagnosis: trochanteric bursitis R, hip pain R, impairment in gait    Additional Pertinent Hx: HTN, irregular heart beat, obesity, arthritis, short of breath, sleep problems. L TKR , R TKR                   General:  PT Visit Information  Onset Date: 19  PT Insurance Information: Medicare, ionto/HP/CP not covered. Aetna secondary  Total # of Visits to Date: 2  Plan of Care/Certification Expiration Date: 10/28/19  Progress Note Counter: 2/10               Subjective:  Chart Reviewed: Yes  Patient assessed for rehabilitation services?: Yes  Family / Caregiver Present: No  Comments: next physician visit PRN. Symptoms increase with walking, lifting from the floor, laying on R side, getting into the car  Other (Comment): script: eval and treat 3X/week X 4 weeks, US/phono R hip ITBS     Subjective: patient tried to get medication for phono but not covered by insurance, so will continue with US.  This felt good for a short time after 1st visit but then she made supper and hip sore again     Pain:  Patient Currently in Pain: Yes  Pain Level: 4  Pain Type: Chronic pain  Pain Location: Hip, Knee  Pain Orientation: Right      Objective                                                                                                                          Exercises  Exercise 2: US  1mhz, 100%, , 1.5 cm2 to R greater trochanter with gel today X 8 min  Exercise 3: seated ITB stretch, standing ITB stretch: 3 X 30 sec R  Exercise 4: manual therapy/massage: myofascial release X 8 min with hawkgrips to

## 2019-09-27 ENCOUNTER — HOSPITAL ENCOUNTER (OUTPATIENT)
Dept: PHYSICAL THERAPY | Age: 69
Setting detail: THERAPIES SERIES
Discharge: HOME | End: 2019-09-27
Payer: MEDICARE

## 2019-09-27 PROCEDURE — 97140 MANUAL THERAPY 1/> REGIONS: CPT

## 2019-09-27 PROCEDURE — 97035 APP MDLTY 1+ULTRASOUND EA 15: CPT

## 2019-09-27 ASSESSMENT — PAIN DESCRIPTION - LOCATION: LOCATION: HIP;KNEE

## 2019-09-27 ASSESSMENT — PAIN DESCRIPTION - ORIENTATION: ORIENTATION: RIGHT

## 2019-09-27 ASSESSMENT — PAIN SCALES - GENERAL: PAINLEVEL_OUTOF10: 2

## 2019-09-27 NOTE — PROGRESS NOTES
[address] [facility]     Time In: 5689  Time Out: 1615  Minutes: 30  Timed Code Treatment Minutes: 30 Minutes                Date: 2019  Patient Name: Myrna Kee,  Gender:  female        CSN: [id number]   : 1950  (77 y.o.)       Referring Practitioner: Haseeb Yanes. María Florez PA-C      Diagnosis: Trochanteric bursitis R, hip pain R  Treatment Diagnosis: trochanteric bursitis R, hip pain R, impairment in gait    Additional Pertinent Hx: HTN, irregular heart beat, obesity, arthritis, short of breath, sleep problems. L TKR , R TKR                   General:  PT Visit Information  Onset Date: 19  PT Insurance Information: Medicare, ionto/HP/CP not covered. Aetna secondary  Total # of Visits to Date: 3  Plan of Care/Certification Expiration Date: 10/28/19  Progress Note Counter: 3/10               Subjective:  Chart Reviewed: Yes  Patient assessed for rehabilitation services?: Yes  Family / Caregiver Present: No  Comments: next physician visit PRN. Symptoms increase with walking, lifting from the floor, laying on R side, getting into the car  Other (Comment): script: eval and treat 3X/week X 4 weeks, US/phono R hip ITBS     Subjective: was a little stiff today, took naproxyn prior to therapy yesterday. Thinks yesterday's treatment was helpful.  better at night, doesn't hurt as soon before she has to roll     Pain:  Patient Currently in Pain: Yes  Pain Assessment: 0-10  Pain Level: 2  Pain Location: Hip, Knee  Pain Orientation: Right      Objective                                                                                                                          Exercises  Exercise 1: bike: seat 1 X 2 min-started to get SOB with gentle peddling-hold this  Exercise 2: US  1mhz, 100%, , 1.5 cm2 to R greater trochanter with gel today X 8 min  Exercise 4: manual therapy/massage: myofascial release X 8 min with hawkgrips to tensor fascia farhat and ITB R         Activity Tolerance:  Activity Tolerance: Patient Tolerated treatment well    Assessment:  Assessment: much less tightness R ITB and trochanteric bursa and less tenderness  Prognosis: Good       Patient Education:  Patient Education: seated ITB stretch, standing ITB stretch                      Plan:  Times per week: 2-3X/week  Plan weeks: 4 weeks/12 visits  Specific instructions for Next Treatment: US/phono, manual therapy/massage PRN.  ROM, stretching, strength, balance  Current Treatment Recommendations: ROM, Strengthening, Balance Training, Functional Mobility Training, IADL Training, Manual Therapy - Soft Tissue Mobilization, Home Exercise Program, Pain Management, Modalities, Positioning, Patient/Caregiver Education & Training, Equipment Evaluation, Education, & procurement  Plan Comment: progress as chika    Goals:  Patient goals : less pain    Short term goals  Time Frame for Short term goals: see LTG due to short ELOS    Long term goals  Time Frame for Long term goals : 4 weeks/12 visits  Long term goal 1: decrese pain lateral L hip to allow patient to walk 1 hour for shopping with no increase in pain  Long term goal 2: increase R knee flexion >105 degrees, improved ease to adduct R leg for Obers test for symptom control with ADL's  Long term goal 3: mild tenderness and tightness R ITG for improved symptom control  Long term goal 4: I HEP to achieve above goals    Shi Ko, PT  [phone]

## 2019-10-01 ENCOUNTER — HOSPITAL ENCOUNTER (OUTPATIENT)
Dept: PHYSICAL THERAPY | Age: 69
Setting detail: THERAPIES SERIES
Discharge: HOME | End: 2019-10-01
Payer: MEDICARE

## 2019-10-01 PROCEDURE — 97035 APP MDLTY 1+ULTRASOUND EA 15: CPT

## 2019-10-01 PROCEDURE — 97140 MANUAL THERAPY 1/> REGIONS: CPT

## 2019-10-01 ASSESSMENT — PAIN SCALES - GENERAL: PAINLEVEL_OUTOF10: 3

## 2019-10-02 ENCOUNTER — HOSPITAL ENCOUNTER (OUTPATIENT)
Dept: PHYSICAL THERAPY | Age: 69
Setting detail: THERAPIES SERIES
Discharge: HOME | End: 2019-10-02
Payer: MEDICARE

## 2019-10-02 PROCEDURE — 97530 THERAPEUTIC ACTIVITIES: CPT

## 2019-10-02 PROCEDURE — 97035 APP MDLTY 1+ULTRASOUND EA 15: CPT

## 2019-10-02 ASSESSMENT — PAIN DESCRIPTION - PAIN TYPE: TYPE: CHRONIC PAIN

## 2019-10-02 ASSESSMENT — PAIN DESCRIPTION - LOCATION: LOCATION: HIP;GROIN

## 2019-10-02 ASSESSMENT — PAIN DESCRIPTION - ORIENTATION: ORIENTATION: RIGHT

## 2019-10-02 ASSESSMENT — PAIN SCALES - GENERAL: PAINLEVEL_OUTOF10: 5

## 2019-10-04 ENCOUNTER — HOSPITAL ENCOUNTER (OUTPATIENT)
Dept: PHYSICAL THERAPY | Age: 69
Setting detail: THERAPIES SERIES
Discharge: HOME | End: 2019-10-04
Payer: MEDICARE

## 2019-10-04 PROCEDURE — 97035 APP MDLTY 1+ULTRASOUND EA 15: CPT

## 2019-10-04 PROCEDURE — 97140 MANUAL THERAPY 1/> REGIONS: CPT

## 2019-10-04 ASSESSMENT — PAIN DESCRIPTION - LOCATION: LOCATION: KNEE;HIP

## 2019-10-04 ASSESSMENT — PAIN DESCRIPTION - ORIENTATION: ORIENTATION: RIGHT

## 2019-10-04 ASSESSMENT — PAIN SCALES - GENERAL: PAINLEVEL_OUTOF10: 3

## 2019-10-04 ASSESSMENT — PAIN DESCRIPTION - PAIN TYPE: TYPE: CHRONIC PAIN

## 2019-10-07 PROCEDURE — 93460 R&L HRT ART/VENTRICLE ANGIO: CPT | Performed by: INTERNAL MEDICINE

## 2019-10-10 ENCOUNTER — APPOINTMENT (OUTPATIENT)
Dept: PHYSICAL THERAPY | Age: 69
End: 2019-10-10
Payer: MEDICARE

## 2019-10-11 ENCOUNTER — HOSPITAL ENCOUNTER (OUTPATIENT)
Dept: PHYSICAL THERAPY | Age: 69
Setting detail: THERAPIES SERIES
Discharge: HOME | End: 2019-10-11
Payer: MEDICARE

## 2019-10-11 PROCEDURE — 97140 MANUAL THERAPY 1/> REGIONS: CPT

## 2019-10-11 PROCEDURE — 97035 APP MDLTY 1+ULTRASOUND EA 15: CPT

## 2019-10-11 PROCEDURE — 97530 THERAPEUTIC ACTIVITIES: CPT

## 2019-10-11 ASSESSMENT — PAIN DESCRIPTION - LOCATION: LOCATION: HIP;KNEE

## 2019-10-11 ASSESSMENT — PAIN SCALES - GENERAL: PAINLEVEL_OUTOF10: 2

## 2019-10-11 ASSESSMENT — PAIN DESCRIPTION - ORIENTATION: ORIENTATION: RIGHT

## 2019-10-11 ASSESSMENT — PAIN DESCRIPTION - PAIN TYPE: TYPE: CHRONIC PAIN

## 2019-10-14 ENCOUNTER — HOSPITAL ENCOUNTER (OUTPATIENT)
Dept: PHYSICAL THERAPY | Age: 69
Setting detail: THERAPIES SERIES
Discharge: HOME | End: 2019-10-14
Payer: MEDICARE

## 2019-10-14 PROCEDURE — 97035 APP MDLTY 1+ULTRASOUND EA 15: CPT

## 2019-10-14 PROCEDURE — 97530 THERAPEUTIC ACTIVITIES: CPT

## 2019-10-14 ASSESSMENT — PAIN DESCRIPTION - LOCATION: LOCATION: LEG;HIP;KNEE

## 2019-10-14 ASSESSMENT — PAIN DESCRIPTION - ORIENTATION: ORIENTATION: RIGHT

## 2019-10-14 ASSESSMENT — PAIN SCALES - GENERAL: PAINLEVEL_OUTOF10: 4

## 2019-10-14 ASSESSMENT — PAIN DESCRIPTION - PAIN TYPE: TYPE: CHRONIC PAIN

## 2019-10-16 ENCOUNTER — HOSPITAL ENCOUNTER (OUTPATIENT)
Dept: PHYSICAL THERAPY | Age: 69
Setting detail: THERAPIES SERIES
Discharge: HOME | End: 2019-10-16
Payer: MEDICARE

## 2019-10-16 PROCEDURE — 97530 THERAPEUTIC ACTIVITIES: CPT

## 2019-10-16 PROCEDURE — 97035 APP MDLTY 1+ULTRASOUND EA 15: CPT

## 2019-10-16 ASSESSMENT — PAIN SCALES - GENERAL: PAINLEVEL_OUTOF10: 4

## 2019-10-16 ASSESSMENT — PAIN DESCRIPTION - LOCATION: LOCATION: HIP

## 2019-10-16 ASSESSMENT — PAIN DESCRIPTION - ORIENTATION: ORIENTATION: RIGHT

## 2019-10-16 ASSESSMENT — PAIN DESCRIPTION - PAIN TYPE: TYPE: CHRONIC PAIN

## 2019-10-17 ENCOUNTER — HOSPITAL ENCOUNTER (OUTPATIENT)
Dept: PHARMACY | Age: 69
Setting detail: THERAPIES SERIES
Discharge: HOME | End: 2019-10-17
Payer: MEDICARE

## 2019-10-17 DIAGNOSIS — I48.91 ATRIAL FIBRILLATION, UNSPECIFIED TYPE (HCC): ICD-10-CM

## 2019-10-17 LAB — POC INR: 3.9 (ref 0.8–1.2)

## 2019-10-17 PROCEDURE — 99212 OFFICE O/P EST SF 10 MIN: CPT

## 2019-10-17 PROCEDURE — 36416 COLLJ CAPILLARY BLOOD SPEC: CPT

## 2019-10-17 PROCEDURE — 85610 PROTHROMBIN TIME: CPT

## 2019-10-17 RX ORDER — WARFARIN SODIUM 5 MG/1
TABLET ORAL
Qty: 30 TABLET | Refills: 11 | Status: SHIPPED | OUTPATIENT
Start: 2019-10-17 | End: 2019-12-23 | Stop reason: ALTCHOICE

## 2019-10-18 ENCOUNTER — HOSPITAL ENCOUNTER (OUTPATIENT)
Dept: PHYSICAL THERAPY | Age: 69
Setting detail: THERAPIES SERIES
Discharge: HOME | End: 2019-10-18
Payer: MEDICARE

## 2019-10-18 PROCEDURE — 97530 THERAPEUTIC ACTIVITIES: CPT

## 2019-10-23 ENCOUNTER — HOSPITAL ENCOUNTER (OUTPATIENT)
Dept: PHYSICAL THERAPY | Age: 69
Setting detail: THERAPIES SERIES
Discharge: HOME | End: 2019-10-23
Payer: MEDICARE

## 2019-10-23 PROCEDURE — 97530 THERAPEUTIC ACTIVITIES: CPT

## 2019-10-23 PROCEDURE — 97035 APP MDLTY 1+ULTRASOUND EA 15: CPT

## 2019-10-23 ASSESSMENT — PAIN DESCRIPTION - LOCATION: LOCATION: HIP

## 2019-10-23 ASSESSMENT — PAIN DESCRIPTION - PAIN TYPE: TYPE: CHRONIC PAIN

## 2019-10-23 ASSESSMENT — PAIN SCALES - GENERAL: PAINLEVEL_OUTOF10: 2

## 2019-10-23 ASSESSMENT — PAIN DESCRIPTION - ORIENTATION: ORIENTATION: RIGHT

## 2019-10-25 ENCOUNTER — HOSPITAL ENCOUNTER (OUTPATIENT)
Dept: PHYSICAL THERAPY | Age: 69
Setting detail: THERAPIES SERIES
Discharge: HOME | End: 2019-10-25
Payer: MEDICARE

## 2019-10-25 PROCEDURE — 97035 APP MDLTY 1+ULTRASOUND EA 15: CPT

## 2019-10-25 PROCEDURE — 97530 THERAPEUTIC ACTIVITIES: CPT

## 2019-10-25 ASSESSMENT — PAIN DESCRIPTION - LOCATION: LOCATION: HIP;GROIN

## 2019-10-25 ASSESSMENT — PAIN DESCRIPTION - PAIN TYPE: TYPE: CHRONIC PAIN

## 2019-10-25 ASSESSMENT — PAIN DESCRIPTION - ORIENTATION: ORIENTATION: RIGHT

## 2019-10-25 ASSESSMENT — PAIN SCALES - GENERAL: PAINLEVEL_OUTOF10: 1

## 2019-10-31 ENCOUNTER — HOSPITAL ENCOUNTER (OUTPATIENT)
Dept: PHARMACY | Age: 69
Setting detail: THERAPIES SERIES
Discharge: HOME | End: 2019-10-31
Payer: MEDICARE

## 2019-10-31 DIAGNOSIS — I48.91 ATRIAL FIBRILLATION, UNSPECIFIED TYPE (HCC): ICD-10-CM

## 2019-10-31 PROBLEM — Z79.01 ANTICOAGULATED ON COUMADIN: Status: ACTIVE | Noted: 2019-10-31

## 2019-10-31 LAB — POC INR: 2.5 (ref 0.8–1.2)

## 2019-10-31 PROCEDURE — 85610 PROTHROMBIN TIME: CPT

## 2019-10-31 PROCEDURE — 36416 COLLJ CAPILLARY BLOOD SPEC: CPT

## 2019-10-31 PROCEDURE — 99211 OFF/OP EST MAY X REQ PHY/QHP: CPT

## 2019-11-02 ENCOUNTER — HOSPITAL ENCOUNTER (OUTPATIENT)
Age: 69
Discharge: HOME | End: 2019-11-02
Payer: MEDICARE

## 2019-11-02 DIAGNOSIS — E03.9 HYPOTHYROIDISM, UNSPECIFIED TYPE: ICD-10-CM

## 2019-11-02 DIAGNOSIS — K90.89 OTHER INTESTINAL MALABSORPTION: ICD-10-CM

## 2019-11-02 DIAGNOSIS — I48.91 NEW ONSET ATRIAL FIBRILLATION (HCC): ICD-10-CM

## 2019-11-02 DIAGNOSIS — I10 ESSENTIAL HYPERTENSION: ICD-10-CM

## 2019-11-02 DIAGNOSIS — E03.8 HYPOTHYROIDISM DUE TO HASHIMOTO'S THYROIDITIS: ICD-10-CM

## 2019-11-02 DIAGNOSIS — E06.3 HYPOTHYROIDISM DUE TO HASHIMOTO'S THYROIDITIS: ICD-10-CM

## 2019-11-02 DIAGNOSIS — R79.83 HOMOCYSTEINEMIA: ICD-10-CM

## 2019-11-02 DIAGNOSIS — E78.89 LIPIDS ABNORMAL: ICD-10-CM

## 2019-11-02 DIAGNOSIS — G47.01 INSOMNIA DUE TO MEDICAL CONDITION: ICD-10-CM

## 2019-11-02 DIAGNOSIS — E55.9 VITAMIN D DEFICIENCY: ICD-10-CM

## 2019-11-02 DIAGNOSIS — I10 HTN, GOAL BELOW 140/80: ICD-10-CM

## 2019-11-02 LAB
BASOPHILS # BLD: 1 %
BASOPHILS ABSOLUTE: 0.1 THOU/MM3 (ref 0–0.1)
CALCIUM SERPL-MCNC: 9.7 MG/DL (ref 8.5–10.5)
EOSINOPHIL # BLD: 3.6 %
EOSINOPHILS ABSOLUTE: 0.3 THOU/MM3 (ref 0–0.4)
ERYTHROCYTE [DISTWIDTH] IN BLOOD BY AUTOMATED COUNT: 14.5 % (ref 11.5–14.5)
ERYTHROCYTE [DISTWIDTH] IN BLOOD BY AUTOMATED COUNT: 49.1 FL (ref 35–45)
HCT VFR BLD CALC: 42 % (ref 37–47)
HEMOGLOBIN: 13.6 GM/DL (ref 12–16)
IMMATURE GRANS (ABS): 0.03 THOU/MM3 (ref 0–0.07)
IMMATURE GRANULOCYTES: 0.4 %
LYMPHOCYTES # BLD: 28.9 %
LYMPHOCYTES ABSOLUTE: 2.3 THOU/MM3 (ref 1–4.8)
MAGNESIUM: 2.1 MG/DL (ref 1.6–2.4)
MCH RBC QN AUTO: 29.8 PG (ref 26–33)
MCHC RBC AUTO-ENTMCNC: 32.4 GM/DL (ref 32.2–35.5)
MCV RBC AUTO: 92.1 FL (ref 81–99)
MONOCYTES # BLD: 7.6 %
MONOCYTES ABSOLUTE: 0.6 THOU/MM3 (ref 0.4–1.3)
NUCLEATED RED BLOOD CELLS: 0 /100 WBC
PLATELET # BLD: 294 THOU/MM3 (ref 130–400)
PMV BLD AUTO: 9.6 FL (ref 9.4–12.4)
PTH INTACT: 29.2 PG/ML (ref 15–65)
RBC # BLD: 4.56 MILL/MM3 (ref 4.2–5.4)
SEDIMENTATION RATE, ERYTHROCYTE: 60 MM/HR (ref 0–20)
SEG NEUTROPHILS: 58.5 %
SEGMENTED NEUTROPHILS ABSOLUTE COUNT: 4.6 THOU/MM3 (ref 1.8–7.7)
VITAMIN D 25-HYDROXY: 36 NG/ML (ref 30–100)
WBC # BLD: 7.9 THOU/MM3 (ref 4.8–10.8)

## 2019-11-02 PROCEDURE — 36415 COLL VENOUS BLD VENIPUNCTURE: CPT

## 2019-11-02 PROCEDURE — 85651 RBC SED RATE NONAUTOMATED: CPT

## 2019-11-02 PROCEDURE — 86141 C-REACTIVE PROTEIN HS: CPT

## 2019-11-02 PROCEDURE — 82310 ASSAY OF CALCIUM: CPT

## 2019-11-02 PROCEDURE — 85025 COMPLETE CBC W/AUTO DIFF WBC: CPT

## 2019-11-02 PROCEDURE — 82306 VITAMIN D 25 HYDROXY: CPT

## 2019-11-02 PROCEDURE — 83735 ASSAY OF MAGNESIUM: CPT

## 2019-11-02 PROCEDURE — 83970 ASSAY OF PARATHORMONE: CPT

## 2019-11-05 LAB — C-REACTIVE PROTEIN, HIGH SENSITIVITY: 30.4 MG/L

## 2019-11-07 ENCOUNTER — OFFICE VISIT (OUTPATIENT)
Dept: PULMONOLOGY | Age: 69
End: 2019-11-07
Payer: MEDICARE

## 2019-11-07 VITALS
WEIGHT: 264 LBS | HEART RATE: 60 BPM | BODY MASS INDEX: 48.58 KG/M2 | DIASTOLIC BLOOD PRESSURE: 66 MMHG | SYSTOLIC BLOOD PRESSURE: 128 MMHG | HEIGHT: 62 IN | OXYGEN SATURATION: 99 %

## 2019-11-07 DIAGNOSIS — G47.33 OSA ON CPAP: Primary | ICD-10-CM

## 2019-11-07 DIAGNOSIS — Z99.89 OSA ON CPAP: Primary | ICD-10-CM

## 2019-11-07 DIAGNOSIS — G47.61 PLMD (PERIODIC LIMB MOVEMENT DISORDER): ICD-10-CM

## 2019-11-07 PROCEDURE — 99213 OFFICE O/P EST LOW 20 MIN: CPT | Performed by: NURSE PRACTITIONER

## 2019-11-08 ENCOUNTER — OFFICE VISIT (OUTPATIENT)
Dept: FAMILY MEDICINE CLINIC | Age: 69
End: 2019-11-08
Payer: MEDICARE

## 2019-11-08 VITALS
TEMPERATURE: 98.1 F | HEIGHT: 62 IN | HEART RATE: 62 BPM | OXYGEN SATURATION: 98 % | BODY MASS INDEX: 48.56 KG/M2 | RESPIRATION RATE: 12 BRPM | SYSTOLIC BLOOD PRESSURE: 124 MMHG | WEIGHT: 263.89 LBS | DIASTOLIC BLOOD PRESSURE: 68 MMHG

## 2019-11-08 DIAGNOSIS — I10 HTN, GOAL BELOW 140/80: ICD-10-CM

## 2019-11-08 DIAGNOSIS — K21.00 GASTROESOPHAGEAL REFLUX DISEASE WITH ESOPHAGITIS: ICD-10-CM

## 2019-11-08 DIAGNOSIS — E06.3 HYPOTHYROIDISM DUE TO HASHIMOTO'S THYROIDITIS: ICD-10-CM

## 2019-11-08 DIAGNOSIS — E03.8 HYPOTHYROIDISM DUE TO HASHIMOTO'S THYROIDITIS: ICD-10-CM

## 2019-11-08 DIAGNOSIS — E55.9 VITAMIN D DEFICIENCY: ICD-10-CM

## 2019-11-08 DIAGNOSIS — G47.01 INSOMNIA DUE TO MEDICAL CONDITION: ICD-10-CM

## 2019-11-08 DIAGNOSIS — R79.83 HOMOCYSTEINEMIA: ICD-10-CM

## 2019-11-08 DIAGNOSIS — E78.89 LIPIDS ABNORMAL: ICD-10-CM

## 2019-11-08 DIAGNOSIS — I10 ESSENTIAL HYPERTENSION: Primary | ICD-10-CM

## 2019-11-08 DIAGNOSIS — R73.9 BLOOD GLUCOSE ELEVATED: ICD-10-CM

## 2019-11-08 DIAGNOSIS — I48.91 NEW ONSET ATRIAL FIBRILLATION (HCC): ICD-10-CM

## 2019-11-08 DIAGNOSIS — K90.89 OTHER INTESTINAL MALABSORPTION: ICD-10-CM

## 2019-11-08 DIAGNOSIS — E03.9 HYPOTHYROIDISM, UNSPECIFIED TYPE: ICD-10-CM

## 2019-11-08 PROCEDURE — 99214 OFFICE O/P EST MOD 30 MIN: CPT | Performed by: FAMILY MEDICINE

## 2019-11-08 ASSESSMENT — ENCOUNTER SYMPTOMS
ALLERGIC/IMMUNOLOGIC NEGATIVE: 1
APNEA: 1
EYES NEGATIVE: 1

## 2019-11-12 ENCOUNTER — OFFICE VISIT (OUTPATIENT)
Dept: FAMILY MEDICINE CLINIC | Age: 69
End: 2019-11-12
Payer: MEDICARE

## 2019-11-12 ENCOUNTER — HOSPITAL ENCOUNTER (OUTPATIENT)
Dept: PHARMACY | Age: 69
Setting detail: THERAPIES SERIES
Discharge: HOME | End: 2019-11-12
Payer: MEDICARE

## 2019-11-12 VITALS
BODY MASS INDEX: 46.92 KG/M2 | RESPIRATION RATE: 12 BRPM | HEART RATE: 53 BPM | SYSTOLIC BLOOD PRESSURE: 132 MMHG | WEIGHT: 264.8 LBS | DIASTOLIC BLOOD PRESSURE: 60 MMHG | TEMPERATURE: 97.8 F | HEIGHT: 63 IN

## 2019-11-12 DIAGNOSIS — Z00.00 ROUTINE GENERAL MEDICAL EXAMINATION AT A HEALTH CARE FACILITY: ICD-10-CM

## 2019-11-12 DIAGNOSIS — I48.91 ATRIAL FIBRILLATION, UNSPECIFIED TYPE (HCC): ICD-10-CM

## 2019-11-12 DIAGNOSIS — Z12.31 ENCOUNTER FOR SCREENING MAMMOGRAM FOR BREAST CANCER: ICD-10-CM

## 2019-11-12 LAB — POC INR: 3 (ref 0.8–1.2)

## 2019-11-12 PROCEDURE — G0438 PPPS, INITIAL VISIT: HCPCS | Performed by: STUDENT IN AN ORGANIZED HEALTH CARE EDUCATION/TRAINING PROGRAM

## 2019-11-12 PROCEDURE — 85610 PROTHROMBIN TIME: CPT

## 2019-11-12 PROCEDURE — 36416 COLLJ CAPILLARY BLOOD SPEC: CPT

## 2019-11-12 PROCEDURE — 99211 OFF/OP EST MAY X REQ PHY/QHP: CPT

## 2019-11-12 ASSESSMENT — LIFESTYLE VARIABLES
HOW OFTEN DO YOU HAVE SIX OR MORE DRINKS ON ONE OCCASION: 0
HAS A RELATIVE, FRIEND, DOCTOR, OR ANOTHER HEALTH PROFESSIONAL EXPRESSED CONCERN ABOUT YOUR DRINKING OR SUGGESTED YOU CUT DOWN: 0
HOW OFTEN DO YOU HAVE A DRINK CONTAINING ALCOHOL: 1
HOW OFTEN DURING THE LAST YEAR HAVE YOU HAD A FEELING OF GUILT OR REMORSE AFTER DRINKING: 0
AUDIT-C TOTAL SCORE: 1
HOW OFTEN DURING THE LAST YEAR HAVE YOU FOUND THAT YOU WERE NOT ABLE TO STOP DRINKING ONCE YOU HAD STARTED: 0
HOW OFTEN DURING THE LAST YEAR HAVE YOU FAILED TO DO WHAT WAS NORMALLY EXPECTED FROM YOU BECAUSE OF DRINKING: 0
HOW MANY STANDARD DRINKS CONTAINING ALCOHOL DO YOU HAVE ON A TYPICAL DAY: 0
AUDIT TOTAL SCORE: 1
HOW OFTEN DURING THE LAST YEAR HAVE YOU NEEDED AN ALCOHOLIC DRINK FIRST THING IN THE MORNING TO GET YOURSELF GOING AFTER A NIGHT OF HEAVY DRINKING: 0
HOW OFTEN DURING THE LAST YEAR HAVE YOU BEEN UNABLE TO REMEMBER WHAT HAPPENED THE NIGHT BEFORE BECAUSE YOU HAD BEEN DRINKING: 0
HAVE YOU OR SOMEONE ELSE BEEN INJURED AS A RESULT OF YOUR DRINKING: 0

## 2019-11-12 ASSESSMENT — PATIENT HEALTH QUESTIONNAIRE - PHQ9
SUM OF ALL RESPONSES TO PHQ QUESTIONS 1-9: 0
SUM OF ALL RESPONSES TO PHQ QUESTIONS 1-9: 0

## 2019-11-20 ENCOUNTER — PATIENT MESSAGE (OUTPATIENT)
Dept: FAMILY MEDICINE CLINIC | Age: 69
End: 2019-11-20

## 2019-11-20 ENCOUNTER — TELEPHONE (OUTPATIENT)
Dept: FAMILY MEDICINE CLINIC | Age: 69
End: 2019-11-20

## 2019-11-20 DIAGNOSIS — I48.91 NEW ONSET ATRIAL FIBRILLATION (HCC): Primary | ICD-10-CM

## 2019-11-20 DIAGNOSIS — Z79.01 ANTICOAGULATED: ICD-10-CM

## 2019-12-03 ENCOUNTER — HOSPITAL ENCOUNTER (OUTPATIENT)
Dept: PHARMACY | Age: 69
Setting detail: THERAPIES SERIES
Discharge: HOME | End: 2019-12-03
Payer: MEDICARE

## 2019-12-03 DIAGNOSIS — I48.91 ATRIAL FIBRILLATION, UNSPECIFIED TYPE (HCC): ICD-10-CM

## 2019-12-03 LAB — POC INR: 3.6 (ref 0.8–1.2)

## 2019-12-03 PROCEDURE — 36416 COLLJ CAPILLARY BLOOD SPEC: CPT

## 2019-12-03 PROCEDURE — 99211 OFF/OP EST MAY X REQ PHY/QHP: CPT

## 2019-12-03 PROCEDURE — 85610 PROTHROMBIN TIME: CPT

## 2019-12-03 RX ORDER — MULTIVIT WITH MINERALS/LUTEIN
3 TABLET ORAL NIGHTLY PRN
Refills: 0 | COMMUNITY
Start: 2019-11-15 | End: 2021-02-04

## 2019-12-05 DIAGNOSIS — E03.9 HYPOTHYROIDISM, UNSPECIFIED TYPE: ICD-10-CM

## 2019-12-06 DIAGNOSIS — E03.9 HYPOTHYROIDISM, UNSPECIFIED TYPE: ICD-10-CM

## 2019-12-06 RX ORDER — LIOTHYRONINE SODIUM 5 UG/1
TABLET ORAL
Qty: 90 TABLET | Refills: 1 | Status: SHIPPED | OUTPATIENT
Start: 2019-12-06 | End: 2020-06-04 | Stop reason: SDUPTHER

## 2019-12-06 RX ORDER — LEVOTHYROXINE SODIUM 0.1 MG/1
TABLET ORAL
Qty: 90 TABLET | Refills: 1 | Status: SHIPPED | OUTPATIENT
Start: 2019-12-06 | End: 2020-06-04 | Stop reason: SDUPTHER

## 2019-12-10 RX ORDER — LEVOTHYROXINE SODIUM 0.1 MG/1
TABLET ORAL
Qty: 90 TABLET | Refills: 1 | Status: SHIPPED | OUTPATIENT
Start: 2019-12-10 | End: 2020-03-09 | Stop reason: SDUPTHER

## 2019-12-10 RX ORDER — LIOTHYRONINE SODIUM 5 UG/1
TABLET ORAL
Qty: 90 TABLET | Refills: 1 | Status: SHIPPED | OUTPATIENT
Start: 2019-12-10 | End: 2020-03-09 | Stop reason: SDUPTHER

## 2019-12-19 ENCOUNTER — HOSPITAL ENCOUNTER (OUTPATIENT)
Dept: PHARMACY | Age: 69
Setting detail: THERAPIES SERIES
Discharge: HOME | End: 2019-12-19
Payer: MEDICARE

## 2019-12-19 DIAGNOSIS — I48.91 ATRIAL FIBRILLATION, UNSPECIFIED TYPE (HCC): ICD-10-CM

## 2019-12-19 LAB — POC INR: 3.3 (ref 0.8–1.2)

## 2019-12-19 PROCEDURE — 85610 PROTHROMBIN TIME: CPT

## 2019-12-19 PROCEDURE — 99211 OFF/OP EST MAY X REQ PHY/QHP: CPT

## 2019-12-19 PROCEDURE — 36416 COLLJ CAPILLARY BLOOD SPEC: CPT

## 2019-12-23 ENCOUNTER — TELEPHONE (OUTPATIENT)
Dept: PHARMACY | Age: 69
End: 2019-12-23

## 2019-12-23 ENCOUNTER — TELEPHONE (OUTPATIENT)
Dept: FAMILY MEDICINE CLINIC | Age: 69
End: 2019-12-23

## 2020-01-03 RX ORDER — LISINOPRIL 10 MG/1
10 TABLET ORAL DAILY
Qty: 90 TABLET | Refills: 1 | Status: SHIPPED | OUTPATIENT
Start: 2020-01-03 | End: 2020-07-23 | Stop reason: SDUPTHER

## 2020-01-08 ENCOUNTER — NURSE ONLY (OUTPATIENT)
Dept: LAB | Age: 70
End: 2020-01-08

## 2020-01-08 LAB
APTT: 32.2 SECONDS (ref 22–38)
D-DIMER QUANTITATIVE: 2498 NG/ML FEU (ref 0–500)
INR BLD: 1.11 (ref 0.85–1.13)

## 2020-01-09 ENCOUNTER — TELEPHONE (OUTPATIENT)
Dept: FAMILY MEDICINE CLINIC | Age: 70
End: 2020-01-09

## 2020-01-09 NOTE — TELEPHONE ENCOUNTER
Thanks!  Did not realize she as taken off of coumadin recently - can she actually get the d dimer and be seen next week

## 2020-01-09 NOTE — TELEPHONE ENCOUNTER
Lm for pt to come in for an appt today or tomorrow to review lab results. Spoke to , asked if she was ill or anything recently. He said she had the flu for about 3 days. Not sure if needs to be seen, since it can cause elavation in d dimer. Please advise.

## 2020-01-09 NOTE — TELEPHONE ENCOUNTER
Spoke to Dr. Khadijah Bernabe, she said since she has had the flu, lets wait 2 weeks, gt a d dimer before hand and they will review. Patient reports she is having a dull headache, started taking Aspirin 325 daily today bid. Agreed to be seen in 2 weeks and get a d dimer before being seen 1/23/19 @ 1220. Dr. Toni Rodriges told her to take an aspirin 325 daily since he took her off coumadin recently.

## 2020-01-15 ENCOUNTER — NURSE ONLY (OUTPATIENT)
Dept: LAB | Age: 70
End: 2020-01-15

## 2020-01-15 LAB — D-DIMER QUANTITATIVE: 6009 NG/ML FEU (ref 0–500)

## 2020-01-16 ENCOUNTER — OFFICE VISIT (OUTPATIENT)
Dept: FAMILY MEDICINE CLINIC | Age: 70
End: 2020-01-16
Payer: MEDICARE

## 2020-01-16 VITALS
OXYGEN SATURATION: 98 % | HEART RATE: 50 BPM | WEIGHT: 262 LBS | SYSTOLIC BLOOD PRESSURE: 139 MMHG | DIASTOLIC BLOOD PRESSURE: 69 MMHG | HEIGHT: 63 IN | BODY MASS INDEX: 46.42 KG/M2 | RESPIRATION RATE: 12 BRPM

## 2020-01-16 PROCEDURE — 99214 OFFICE O/P EST MOD 30 MIN: CPT | Performed by: NURSE PRACTITIONER

## 2020-01-16 PROCEDURE — 93000 ELECTROCARDIOGRAM COMPLETE: CPT | Performed by: NURSE PRACTITIONER

## 2020-01-16 PROCEDURE — G8510 SCR DEP NEG, NO PLAN REQD: HCPCS | Performed by: NURSE PRACTITIONER

## 2020-01-16 ASSESSMENT — ENCOUNTER SYMPTOMS
ABDOMINAL DISTENTION: 0
EYE REDNESS: 0
DIARRHEA: 0
BLOOD IN STOOL: 0
RHINORRHEA: 0
NAUSEA: 0
SHORTNESS OF BREATH: 0
EYE DISCHARGE: 0
COUGH: 0
CONSTIPATION: 0
SORE THROAT: 0
ABDOMINAL PAIN: 0
COLOR CHANGE: 0
ANAL BLEEDING: 0

## 2020-01-16 ASSESSMENT — PATIENT HEALTH QUESTIONNAIRE - PHQ9
SUM OF ALL RESPONSES TO PHQ9 QUESTIONS 1 & 2: 0
SUM OF ALL RESPONSES TO PHQ QUESTIONS 1-9: 0
SUM OF ALL RESPONSES TO PHQ QUESTIONS 1-9: 0
1. LITTLE INTEREST OR PLEASURE IN DOING THINGS: 0
2. FEELING DOWN, DEPRESSED OR HOPELESS: 0

## 2020-01-16 NOTE — PROGRESS NOTES
[address] [facility]  [phone] (phone)  [phone] (fax)    Visit Date: 1/16/2020    Frieda Meeks is a 77 y.o. female who presents today for:  Chief Complaint   Patient presents with    Discuss Labs     D-dimer     HPI:     Here to discuss recent D-dimer - according to patient her Coumadin was stopped due to a 24 holter monitor being fine - HX a-fib. A week ago Monday around 0630 and she had severe dizziness and she fell back on the bed - then she started throwing up. She checked her pulse at that time and her rate was in the 130's. Cardiologist is Dr. Walter Kerns - he has done a hearth cath - was told no blockage - told her the issues was from her sleep - had a sleep study done - uses a CPAP    Goes back to see Dr. Walter Kerns in June    Had a D-Dimer done 1/8/2020: 2498.00, yesterday it was 6009.00    Denies chest pain, shortness of breath, vision changes, headaches, palpitations, numbness or tingling in extremities.      She also has elevated CRP and Sed Rate - c/o pain in legs  HPI  Health Maintenance   Topic Date Due    Breast cancer screen  08/01/2019    Shingles Vaccine (3 of 3) 01/08/2020    A1C test (Diabetic or Prediabetic)  05/03/2020    TSH testing  05/03/2020    Colon cancer screen colonoscopy  07/01/2020    Potassium monitoring  10/07/2020    Creatinine monitoring  10/07/2020    Annual Wellness Visit (AWV)  11/11/2020    Lipid screen  10/07/2024    DTaP/Tdap/Td vaccine (2 - Td) 10/09/2024    Flu vaccine  Completed    Pneumococcal 65+ years Vaccine  Completed    DEXA (modify frequency per FRAX score)  Addressed    Hepatitis C screen  Addressed     Past Medical History:   Diagnosis Date    A-fib ([facility] [address]     Atrial fibrillation (HCC)     Carpal tunnel syndrome     GERD (gastroesophageal reflux disease)     Headache(784.0)     HTN (hypertension)     Hypothyroidism     Osteoarthritis     Sleep apnea 07/21/2019    Mild    Tendonitis of both wrists 2017    (NITROSTAT) 0.4 MG SL tablet Place under the tongue every 5 minutes as needed for Chest pain (If third one does not relieve pain, call 9-1-1.)   0    diclofenac sodium 1 % GEL as needed for Pain       NONFORMULARY Take 2 tablets by mouth every evening Magtein by Now before supper and at bedtime      metoprolol tartrate (LOPRESSOR) 25 MG tablet Take 1 tablet by mouth 2 times daily As needed for fast heart rate 60 tablet 0    BEMZMAOJN-UFOYHGFOB-ZMVYPORJGE PO Place 1 tablet under the tongue 2 times daily Jimbo B12 plus or [place] methylB6, otnbthV23, methylfolate       Magnesium ([employer] TRIPLE MAGNESIUM COMPLEX) 400 MG CAPS Take 1 capsule by mouth 2 times daily Indications: Magnesium Deficiency       magnesium cl-calcium carbonate (SLOW-MAG) 71.5-119 MG TBEC tablet Take 1 tablet by mouth daily as needed Take before and when stressed with 2 extra fish oil       Grape Seed  mg by Does not apply route daily Oregan Grape Root      Lysine 500 MG TABS Take 1 tablet by mouth 2 times daily       naproxen (NAPROSYN) 500 MG tablet Take 500 mg by mouth as needed for Pain (take with food)       Omega 3 1000 MG CAPS Take 3 capsules by mouth 4 times daily (before meals and nightly) Indications: Cardiovascular Risk Reduction       DHEA 25 MG CAPS Take 1 capsule by mouth daily.  Cinnamon 500 MG CAPS Take 3 capsules by mouth 4 times daily (before meals and nightly)       Vitamin D (CHOLECALCIFEROL) 1000 UNITS CAPS capsule Take 7,000 Units by mouth daily Indications: Treatment with Vitamin D .      B Complex-Biotin-FA (B-100 TR) TBCR Take 1 tablet by mouth 3 times daily (before meals) Before and as finish breakfast and before lunch       No current facility-administered medications for this visit. No Known Allergies    Subjective:    Review of Systems   Constitutional: Negative for chills, fatigue and fever. HENT: Negative for congestion, ear pain, postnasal drip, rhinorrhea and sore throat.     Eyes: Negative Tenderness: There is no guarding. Musculoskeletal: Normal range of motion. General: No tenderness or deformity. Skin:     Coloration: Skin is not pale. Findings: No erythema or rash (On exposed areas). Neurological:      Mental Status: She is alert. Gait: Gait normal.   Psychiatric:         Speech: Speech normal.         Behavior: Behavior normal.         Thought Content: Thought content normal.         Judgment: Judgment normal.         Lab Results   Component Value Date    WBC 7.9 11/02/2019    HGB 13.6 11/02/2019    HCT 42.0 11/02/2019     11/02/2019    CHOL 130 10/07/2019    TRIG 61 10/07/2019    HDL 55 10/07/2019    LDLDIRECT 80.00 12/12/2013    LDLCALC 63 10/07/2019    AST 24 10/07/2019     10/07/2019    K 4.9 10/07/2019     10/07/2019    CREATININE 1.2 10/07/2019    BUN 18 10/07/2019    CO2 25 10/07/2019    TSH 0.034 (L) 05/03/2019    INR 1.11 01/08/2020    LABA1C 6.2 05/03/2019    LABGLOM 45 (A) 10/07/2019    MG 2.1 11/02/2019    CALCIUM 9.7 11/02/2019    VITD25 36 11/02/2019     Assessment:       Diagnosis Orders   1. Elevated d-dimer  EKG 12 Lead    High sensitivity CRP    Sedimentation Rate    D-Dimer, Quantitative    VL DUP LOWER EXTREMITY VENOUS BILATERAL   2. Essential hypertension  EKG 12 Lead    High sensitivity CRP    Sedimentation Rate    D-Dimer, Quantitative   3. Chronic atrial fibrillation  EKG 12 Lead    High sensitivity CRP    Sedimentation Rate    D-Dimer, Quantitative    VL DUP LOWER EXTREMITY VENOUS BILATERAL   4.  Pain in both lower extremities  High sensitivity CRP    Sedimentation Rate    D-Dimer, Quantitative    VL DUP LOWER EXTREMITY VENOUS BILATERAL     Plan:   EKG today  Will repeat the D-Dimer, along with a CRP and Sed Rate  Will get a doppler of the legs  Take your 325 mg Aspirin daily  Back next week     If you develop any symptoms: chest pain, palpitations, shortness of breath, numbness or tingling in arms or legs, headache, vision

## 2020-01-16 NOTE — PATIENT INSTRUCTIONS
is 1 medium-sized piece of fruit, ½ cup chopped or canned fruit, 1/4 cup dried fruit, or 4 ounces (½ cup) of fruit juice. Choose fruit more often than fruit juice. · Eat 4 to 5 servings of vegetables each day. A serving is 1 cup of lettuce or raw leafy vegetables, ½ cup of chopped or cooked vegetables, or 4 ounces (½ cup) of vegetable juice. Choose vegetables more often than vegetable juice. · Get 2 to 3 servings of low-fat and fat-free dairy each day. A serving is 8 ounces of milk, 1 cup of yogurt, or 1 ½ ounces of cheese. · Eat 6 to 8 servings of grains each day. A serving is 1 slice of bread, 1 ounce of dry cereal, or ½ cup of cooked rice, pasta, or cooked cereal. Try to choose whole-grain products as much as possible. · Limit lean meat, poultry, and fish to 2 servings each day. A serving is 3 ounces, about the size of a deck of cards. · Eat 4 to 5 servings of nuts, seeds, and legumes (cooked dried beans, lentils, and split peas) each week. A serving is 1/3 cup of nuts, 2 tablespoons of seeds, or ½ cup of cooked beans or peas. · Limit fats and oils to 2 to 3 servings each day. A serving is 1 teaspoon of vegetable oil or 2 tablespoons of salad dressing. · Limit sweets and added sugars to 5 servings or less a week. A serving is 1 tablespoon jelly or jam, ½ cup sorbet, or 1 cup of lemonade. · Eat less than 2,300 milligrams (mg) of sodium a day. If you limit your sodium to 1,500 mg a day, you can lower your blood pressure even more. Tips for success  · Start small. Do not try to make dramatic changes to your diet all at once. You might feel that you are missing out on your favorite foods and then be more likely to not follow the plan. Make small changes, and stick with them. Once those changes become habit, add a few more changes. · Try some of the following:  ? Make it a goal to eat a fruit or vegetable at every meal and at snacks.  This will make it easy to get the recommended amount of fruits and warmth, or redness. ? Red streaks leading from the sore area. ? Pus draining from a place on your leg. ? A fever.     · You cannot bear weight on your leg.    Watch closely for changes in your health, and be sure to contact your doctor if:    · You do not get better as expected. Where can you learn more? Go to https://chpepiceweb.Wazoku. org and sign in to your Ludi account. Enter P774 in the Needium box to learn more about \"Leg Pain: Care Instructions. \"     If you do not have an account, please click on the \"Sign Up Now\" link. Current as of: June 26, 2019  Content Version: 12.3  © 3045-5916 Healthwise, Decisionlink. Care instructions adapted under license by [facility] ([facility]). If you have questions about a medical condition or this instruction, always ask your healthcare professional. [place] [address] any warranty or liability for your use of this information. Patient Education        High Blood Pressure: Care Instructions  Overview    It's normal for blood pressure to go up and down throughout the day. But if it stays up, you have high blood pressure. Another name for high blood pressure is hypertension. Despite what a lot of people think, high blood pressure usually doesn't cause headaches or make you feel dizzy or lightheaded. It usually has no symptoms. But it does increase your risk of stroke, heart attack, and other problems. You and your doctor will talk about your risks of these problems based on your blood pressure. Your doctor will give you a goal for your blood pressure. Your goal will be based on your health and your age. Lifestyle changes, such as eating healthy and being active, are always important to help lower blood pressure. You might also take medicine to reach your blood pressure goal.  Follow-up care is a key part of your treatment and safety. Be sure to make and go to all appointments, and call your doctor if you are having problems. It's also a good idea to know your test results and keep a list of the medicines you take. How can you care for yourself at home? Medical treatment  · If you stop taking your medicine, your blood pressure will go back up. You may take one or more types of medicine to lower your blood pressure. Be safe with medicines. Take your medicine exactly as prescribed. Call your doctor if you think you are having a problem with your medicine. · Talk to your doctor before you start taking aspirin every day. Aspirin can help certain people lower their risk of a heart attack or stroke. But taking aspirin isn't right for everyone, because it can cause serious bleeding. · See your doctor regularly. You may need to see the doctor more often at first or until your blood pressure comes down. · If you are taking blood pressure medicine, talk to your doctor before you take decongestants or anti-inflammatory medicine, such as ibuprofen. Some of these medicines can raise blood pressure. · Learn how to check your blood pressure at home. Lifestyle changes  · Stay at a healthy weight. This is especially important if you put on weight around the waist. Losing even 10 pounds can help you lower your blood pressure. · If your doctor recommends it, get more exercise. Walking is a good choice. Bit by bit, increase the amount you walk every day. Try for at least 30 minutes on most days of the week. You also may want to swim, bike, or do other activities. · Avoid or limit alcohol. Talk to your doctor about whether you can drink any alcohol. · Try to limit how much sodium you eat to less than 2,300 milligrams (mg) a day. Your doctor may ask you to try to eat less than 1,500 mg a day. · Eat plenty of fruits (such as bananas and oranges), vegetables, legumes, whole grains, and low-fat dairy products. · Lower the amount of saturated fat in your diet. Saturated fat is found in animal products such as milk, cheese, and meat.  Limiting these is higher or the bottom number is higher, or both.     · You think you may be having side effects from your blood pressure medicine. Where can you learn more? Go to https://chpepiceweb.Undertone. org and sign in to your Aha Mobile account. Enter X174 in the [facility] box to learn more about \"High Blood Pressure: Care Instructions. \"     If you do not have an account, please click on the \"Sign Up Now\" link. Current as of: April 9, 2019  Content Version: 12.3  © 9996-2465 Healthwise, MOLI. Care instructions adapted under license by [facility] ([facility]). If you have questions about a medical condition or this instruction, always ask your healthcare professional. [address] any warranty or liability for your use of this information. Patient Education        Atrial Fibrillation: Care Instructions  Your Care Instructions    Atrial fibrillation is an irregular and often fast heartbeat. Treating this condition is important for several reasons. It can cause blood clots, which can travel from your heart to your brain and cause a stroke. If you have a fast heartbeat, you may feel lightheaded, dizzy, and weak. An irregular heartbeat can also increase your risk for heart failure. Atrial fibrillation is often the result of another heart condition, such as high blood pressure or coronary artery disease. Making changes to improve your heart condition will help you stay healthy and active. Follow-up care is a key part of your treatment and safety. Be sure to make and go to all appointments, and call your doctor if you are having problems. It's also a good idea to know your test results and keep a list of the medicines you take. How can you care for yourself at home? Medicines    · Take your medicines exactly as prescribed. Call your doctor if you think you are having a problem with your medicine.  You will get more details on the specific medicines your doctor prescribes.     Place two fingers on the artery at the palm side of your wrist, in line with your thumb. If your heartbeat seems uneven or fast, talk to your doctor. When should you call for help? Call 911 anytime you think you may need emergency care. For example, call if:    · You have symptoms of a heart attack. These may include:  ? Chest pain or pressure, or a strange feeling in the chest.  ? Sweating. ? Shortness of breath. ? Nausea or vomiting. ? Pain, pressure, or a strange feeling in the back, neck, jaw, or upper belly or in one or both shoulders or arms. ? Lightheadedness or sudden weakness. ? A fast or irregular heartbeat. After you call  911, the  may tell you to chew 1 adult-strength or 2 to 4 low-dose aspirin. Wait for an ambulance. Do not try to drive yourself.     · You have symptoms of a stroke. These may include:  ? Sudden numbness, tingling, weakness, or loss of movement in your face, arm, or leg, especially on only one side of your body. ? Sudden vision changes. ? Sudden trouble speaking. ? Sudden confusion or trouble understanding simple statements. ? Sudden problems with walking or balance. ? A sudden, severe headache that is different from past headaches.     · You passed out (lost consciousness).    Call your doctor now or seek immediate medical care if:    · You have new or increased shortness of breath.     · You feel dizzy or lightheaded, or you feel like you may faint.     · Your heart rate becomes irregular.     · You can feel your heart flutter in your chest or skip heartbeats. Tell your doctor if these symptoms are new or worse.    Watch closely for changes in your health, and be sure to contact your doctor if you have any problems. Where can you learn more? Go to https://ArtSquarepealekeb.Gaosouyi. org and sign in to your InsideView account. Enter U020 in the CodeCombat box to learn more about \"Atrial Fibrillation: Care Instructions. \"     If you do not have an

## 2020-01-20 ENCOUNTER — TELEPHONE (OUTPATIENT)
Dept: FAMILY MEDICINE CLINIC | Age: 70
End: 2020-01-20

## 2020-01-20 ENCOUNTER — NURSE ONLY (OUTPATIENT)
Dept: LAB | Age: 70
End: 2020-01-20

## 2020-01-20 LAB
C-REACTIVE PROTEIN, HIGH SENSITIVITY: 16.8 MG/L
D-DIMER QUANTITATIVE: 1797 NG/ML FEU (ref 0–500)
SEDIMENTATION RATE, ERYTHROCYTE: 39 MM/HR (ref 0–20)

## 2020-01-20 NOTE — TELEPHONE ENCOUNTER
----- Message from Abdirashid Deleon DO sent at 1/19/2020 10:51 AM EST -----  The repeat was still high - can we see you Monday?

## 2020-01-20 NOTE — TELEPHONE ENCOUNTER
Called [phone], spoke with Veronique Chaparro, aware of results. Patients last appointment was : 1/16/2020 = was seen already  Labs were completed today.   Patients next appointment is : 1/23/2020

## 2020-01-21 ENCOUNTER — TELEPHONE (OUTPATIENT)
Dept: FAMILY MEDICINE CLINIC | Age: 70
End: 2020-01-21

## 2020-01-21 ENCOUNTER — HOSPITAL ENCOUNTER (OUTPATIENT)
Dept: INTERVENTIONAL RADIOLOGY/VASCULAR | Age: 70
Discharge: HOME | End: 2020-01-21
Payer: MEDICARE

## 2020-01-21 PROCEDURE — 93970 EXTREMITY STUDY: CPT

## 2020-01-21 NOTE — TELEPHONE ENCOUNTER
Called [phone], spoke with Nick Paz, aware of results. Patient states no future questions or concerns at this time. Status:  Final result   Visible to patient:  Yes (Rudyt) Dx:  Essential hypertension; Chronic atria. .. Ref Range & Units 1d ago 6d ago   D-Dimer, Quant 0.00 - 500.0 ng/ml FEU 1797. 00High   6009. 00High  CM

## 2020-01-22 ENCOUNTER — TELEPHONE (OUTPATIENT)
Dept: FAMILY MEDICINE CLINIC | Age: 70
End: 2020-01-22

## 2020-01-22 NOTE — TELEPHONE ENCOUNTER
Called [phone], spoke with Corrine Montague, aware of results. Patient states no future questions or concerns at this time.

## 2020-01-23 ENCOUNTER — OFFICE VISIT (OUTPATIENT)
Dept: FAMILY MEDICINE CLINIC | Age: 70
End: 2020-01-23
Payer: MEDICARE

## 2020-01-23 VITALS
HEIGHT: 63 IN | HEART RATE: 59 BPM | WEIGHT: 261.91 LBS | SYSTOLIC BLOOD PRESSURE: 125 MMHG | OXYGEN SATURATION: 98 % | DIASTOLIC BLOOD PRESSURE: 60 MMHG | RESPIRATION RATE: 12 BRPM | BODY MASS INDEX: 46.41 KG/M2

## 2020-01-23 PROCEDURE — 99215 OFFICE O/P EST HI 40 MIN: CPT | Performed by: FAMILY MEDICINE

## 2020-01-23 RX ORDER — DOXYCYCLINE HYCLATE 100 MG
TABLET ORAL
Qty: 40 TABLET | Refills: 0 | Status: SHIPPED | OUTPATIENT
Start: 2020-01-23 | End: 2020-03-09 | Stop reason: ALTCHOICE

## 2020-01-23 NOTE — PROGRESS NOTES
Family Medicine Clinic Visit    Jorgito Fountain presents for   Chief Complaint   Patient presents with    Discuss Labs       HPI: Patient had a d-dimer drawn for nonacute purposes approximately 10 days ago. She reports at that time she had no shortness of breath or dizziness lightheadedness. No blood in the sputum. She was feeling fine at the time the lab was drawn. Approximately 10 days ago she had an episode of acute onset dizziness that was positional.  She reports that it came on when she sat up in bed in the morning. Got worse whenever she turned her head. No lateralizing signs or symptoms including numbness or weakness in the extremities. No slurred speech. She has a chronic inflammatory disease of an unknown nature with chronically elevated CRP and ESR. She was initially diagnosed with A. fib several years ago. However underwent sleep study and was placed on CPAP and more recent monitor showed no A. fib events within the past 2 months. She was taken off of Coumadin at that time. She now takes aspirin 325 mg daily. ROS:  denies CP, SOB, N/V/D    Past Medical History:   Diagnosis Date    [facility]     Atrial fibrillation (HCC)     Carpal tunnel syndrome     GERD (gastroesophageal reflux disease)     Headache(784.0)     HTN (hypertension)     Hypothyroidism     Osteoarthritis     Sleep apnea 07/21/2019    Mild    Tendonitis of both wrists 2017    Toenail fungus      No Known Allergies    Current Outpatient Medications   Medication Sig Dispense Refill    doxycycline hyclate (VIBRA-TABS) 100 MG tablet Bid x 7 days then 1 tab daily for the next month.  40 tablet 0    lisinopril (PRINIVIL;ZESTRIL) 10 MG tablet Take 1 tablet by mouth daily 90 tablet 1    Methylsulfonylmethane (MSM PO) Take 1 tablet by mouth daily as needed (pain)      levothyroxine (SYNTHROID) 100 MCG tablet take 1 tablet by mouth once daily 90 tablet 1    liothyronine (CYTOMEL) 5 MCG tablet take 1 tablet by mouth

## 2020-03-02 ENCOUNTER — NURSE ONLY (OUTPATIENT)
Dept: LAB | Age: 70
End: 2020-03-02

## 2020-03-02 LAB
AVERAGE GLUCOSE: 129 MG/DL (ref 70–126)
BASOPHILS # BLD: 0.7 %
BASOPHILS ABSOLUTE: 0.1 THOU/MM3 (ref 0–0.1)
C-REACTIVE PROTEIN: 2.47 MG/DL (ref 0–1)
CALCIUM SERPL-MCNC: 9.6 MG/DL (ref 8.5–10.5)
D-DIMER QUANTITATIVE: 1242 NG/ML FEU (ref 0–500)
EOSINOPHIL # BLD: 2.8 %
EOSINOPHILS ABSOLUTE: 0.3 THOU/MM3 (ref 0–0.4)
ERYTHROCYTE [DISTWIDTH] IN BLOOD BY AUTOMATED COUNT: 14.6 % (ref 11.5–14.5)
ERYTHROCYTE [DISTWIDTH] IN BLOOD BY AUTOMATED COUNT: 49.4 FL (ref 35–45)
ESTRADIOL LEVEL: 76.3 PG/ML
HBA1C MFR BLD: 6.3 % (ref 4.4–6.4)
HCT VFR BLD CALC: 40.6 % (ref 37–47)
HEMOGLOBIN: 12.8 GM/DL (ref 12–16)
IMMATURE GRANS (ABS): 0.03 THOU/MM3 (ref 0–0.07)
IMMATURE GRANULOCYTES: 0.3 %
INR BLD: 1.1 (ref 0.85–1.13)
LYMPHOCYTES # BLD: 30.7 %
LYMPHOCYTES ABSOLUTE: 2.9 THOU/MM3 (ref 1–4.8)
MAGNESIUM: 2.1 MG/DL (ref 1.6–2.4)
MCH RBC QN AUTO: 29.1 PG (ref 26–33)
MCHC RBC AUTO-ENTMCNC: 31.5 GM/DL (ref 32.2–35.5)
MCV RBC AUTO: 92.3 FL (ref 81–99)
MONOCYTES # BLD: 5.9 %
MONOCYTES ABSOLUTE: 0.6 THOU/MM3 (ref 0.4–1.3)
NUCLEATED RED BLOOD CELLS: 0 /100 WBC
PLATELET # BLD: 280 THOU/MM3 (ref 130–400)
PMV BLD AUTO: 10 FL (ref 9.4–12.4)
PROGESTERONE LEVEL: < 0.05 NG/ML
PTH INTACT: 27.7 PG/ML (ref 15–65)
RBC # BLD: 4.4 MILL/MM3 (ref 4.2–5.4)
RHEUMATOID FACTOR: < 10 IU/ML (ref 0–13)
SEDIMENTATION RATE, ERYTHROCYTE: 41 MM/HR (ref 0–20)
SEG NEUTROPHILS: 59.6 %
SEGMENTED NEUTROPHILS ABSOLUTE COUNT: 5.7 THOU/MM3 (ref 1.8–7.7)
TSH SERPL DL<=0.05 MIU/L-ACNC: 0.68 UIU/ML (ref 0.4–4.2)
VITAMIN D 25-HYDROXY: 36 NG/ML (ref 30–100)
WBC # BLD: 9.6 THOU/MM3 (ref 4.8–10.8)

## 2020-03-03 ENCOUNTER — TELEPHONE (OUTPATIENT)
Dept: FAMILY MEDICINE CLINIC | Age: 70
End: 2020-03-03

## 2020-03-03 LAB
HOMOCYSTEINE: 9.7 UMOL/L
IGE: 53 IU/ML
THYROXINE (T4): 7 UG/DL (ref 4.5–12)

## 2020-03-04 LAB
ANA SCREEN: NORMAL
GLIADIN PEPTIDE IGG: 0.6 U/ML

## 2020-03-05 LAB — DHEAS (DHEA SULFATE): 113 UG/DL (ref 13–130)

## 2020-03-06 LAB
DHEA UNCONJUGATED: 2.38 NG/ML (ref 0.63–4.7)
TESTOSTERONE, FREE W SHGB, FEMALES/CHILDREN: NORMAL

## 2020-03-09 ENCOUNTER — OFFICE VISIT (OUTPATIENT)
Dept: FAMILY MEDICINE CLINIC | Age: 70
End: 2020-03-09
Payer: MEDICARE

## 2020-03-09 ENCOUNTER — TELEPHONE (OUTPATIENT)
Dept: FAMILY MEDICINE CLINIC | Age: 70
End: 2020-03-09

## 2020-03-09 VITALS
HEIGHT: 63 IN | WEIGHT: 267.8 LBS | OXYGEN SATURATION: 98 % | HEART RATE: 64 BPM | TEMPERATURE: 97.6 F | DIASTOLIC BLOOD PRESSURE: 70 MMHG | BODY MASS INDEX: 47.45 KG/M2 | RESPIRATION RATE: 12 BRPM | SYSTOLIC BLOOD PRESSURE: 138 MMHG

## 2020-03-09 PROCEDURE — 99214 OFFICE O/P EST MOD 30 MIN: CPT | Performed by: FAMILY MEDICINE

## 2020-03-09 RX ORDER — LORATADINE 10 MG/1
10 TABLET ORAL
Qty: 180 TABLET | Refills: 3 | Status: SHIPPED | OUTPATIENT
Start: 2020-03-09 | End: 2022-02-28

## 2020-03-09 ASSESSMENT — ENCOUNTER SYMPTOMS
SINUS PAIN: 1
APNEA: 1

## 2020-03-09 NOTE — PROGRESS NOTES
brother and mother; Diabetes in her father; Early Death in her child; Heart Attack in her brother; Heart Disease in her mother; Other in her brother, mother, and sister; Pacemaker in her sister; Alberto Cooneyll in her child; Stroke in her mother. She  reports that she has never smoked. She has never used smokeless tobacco. She reports current alcohol use. She reports that she does not use drugs.     Medications/Allergies/Immunizations:     Her current medication(s) include   Current Outpatient Medications:     loratadine (CLARITIN) 10 MG tablet, Take 1 tablet by mouth 2 times daily (before meals), Disp: 180 tablet, Rfl: 3    lisinopril (PRINIVIL;ZESTRIL) 10 MG tablet, Take 1 tablet by mouth daily, Disp: 90 tablet, Rfl: 1    Methylsulfonylmethane (MSM PO), Take 1 tablet by mouth daily as needed (pain), Disp: , Rfl:     liothyronine (CYTOMEL) 5 MCG tablet, take 1 tablet by mouth once daily, Disp: 90 tablet, Rfl: 1    levothyroxine (SYNTHROID) 100 MCG tablet, take 1 tablet by mouth once daily, Disp: 90 tablet, Rfl: 1    RA MELATONIN 3 MG TABS tablet, , Disp: , Rfl: 0    nitroGLYCERIN (NITROSTAT) 0.4 MG SL tablet, Place under the tongue every 5 minutes as needed for Chest pain (If third one does not relieve pain, call 9-1-1.) , Disp: , Rfl: 0    NONFORMULARY, Take 2 tablets by mouth every evening Magtein by Now before supper and at bedtime, Disp: , Rfl:     VQCUZSSDT-TKAESHTKU-IBXRJRMEFF PO, Place 1 tablet under the tongue 2 times daily Jimbo B12 plus or [place] methylB6, [id number], methylfolate , Disp: , Rfl:     Magnesium (CVS TRIPLE MAGNESIUM COMPLEX) 400 MG CAPS, Take 1 capsule by mouth 4 times daily Indications: Magnesium Deficiency , Disp: , Rfl:     magnesium cl-calcium carbonate (SLOW-MAG) 71.5-119 MG TBEC tablet, Take 1 tablet by mouth daily as needed Take before and when stressed with 2 extra fish oil , Disp: , Rfl:     Grape Seed OIL, 500 mg by Does not apply route daily Oregan Grape Root, Disp: , Rfl:   Lysine 500 MG TABS, Take 1 tablet by mouth 2 times daily , Disp: , Rfl:     Omega 3 1000 MG CAPS, Take 3 capsules by mouth 4 times daily (before meals and nightly) Indications: Cardiovascular Risk Reduction , Disp: , Rfl:     DHEA 25 MG CAPS, Take 1 capsule by mouth daily 1/4 tab on Sat and Sunday, Disp: , Rfl:     Cinnamon 500 MG CAPS, Take 3 capsules by mouth 4 times daily (before meals and nightly) , Disp: , Rfl:     Vitamin D (CHOLECALCIFEROL) 1000 UNITS CAPS capsule, Take 7,000 Units by mouth daily Indications: Treatment with Vitamin D ., Disp: , Rfl:     B Complex-Biotin-FA (B-100 TR) TBCR, Take 1 tablet by mouth 3 times daily (before meals) Before and as finish breakfast and before lunch, Disp: , Rfl:     diclofenac sodium 1 % GEL, as needed for Pain , Disp: , Rfl:     metoprolol tartrate (LOPRESSOR) 25 MG tablet, Take 1 tablet by mouth 2 times daily As needed for fast heart rate (Patient not taking: Reported on 3/9/2020), Disp: 60 tablet, Rfl: 0  Allergies: Patient has no known allergies. ,  Immunizations:   Immunization History   Administered Date(s) Administered    Influenza Virus Vaccine 11/03/2011, 10/09/2014, 11/17/2015    Influenza, High Dose (Fluzone 65 yrs and older) 10/11/2018    Influenza, MDCK, Preservative free 10/31/2017    Influenza, Quadv, IM, PF (6 mo and older Fluzone, Flulaval, Fluarix, and 3 yrs and older Afluria) 01/19/2017    Influenza, Triv, inactivated, subunit, adjuvanted, IM (Fluad 65 yrs and older) 10/11/2018, 09/17/2019    Pneumococcal Conjugate 13-valent ([id number]) 07/11/2017    Pneumococcal Polysaccharide (Katewfvud89) 11/05/2018    Tdap (Boostrix, Adacel) 10/09/2014    Zoster Live (Zostavax) 05/05/2014    Zoster Recombinant (Shingrix) 11/13/2019        History of PresentIllness:     Leah's had concerns including Follow-up (2 month); Dizziness; Atrial Fibrillation (Feb 4th); and Nausea. Kenya Hubbard  presents to the [address] [place] [address] today for;   Chief Complaint   Patient presents with    Follow-up     2 month    Dizziness    Atrial Fibrillation     Feb 4th    Nausea   , ,  abnormal labs follow up and these conditions as she  Is looking today for:     2. Elevated C-reactive protein (CRP)    3. Essential hypertension    4. HTN, goal below 140/80    5. Pain in both lower extremities    6. Chronic atrial fibrillation    7. Hypothyroidism, unspecified type    8. New onset atrial fibrillation ([facility]    9. Anticoagulated    10. Routine general medical examination at a health care facility    11. Encounter for screening mammogram for breast cancer    12. Paroxysmal atrial fibrillation (HCC)    13. BMI 45.0-49.9, adult ([facility]    14. [facility]      HPI    Subjective:     Review of Systems   HENT: Positive for sinus pain and sneezing. Respiratory: Positive for apnea. Cardiovascular: Positive for palpitations. Musculoskeletal: Positive for arthralgias, gait problem and joint swelling. All other systems reviewed and are negative. Objective:     /70 (Site: Right Lower Arm, Position: Sitting, Cuff Size: Medium Adult)   Pulse 64   Temp 97.6 °F (36.4 °C) (Oral)   Resp 12   Ht 5' 2.99\" (1.6 m)   Wt 267 lb 12.8 oz (121.5 kg)   SpO2 98%   BMI 47.45 kg/m²   Physical Exam  Vitals signs and nursing note reviewed. Constitutional:       Appearance: Normal appearance. HENT:      Head: Normocephalic. Pulmonary:      Effort: Pulmonary effort is normal.   Neurological:      Mental Status: She is alert. Psychiatric:         Mood and Affect: Mood normal.         Thought Content: Thought content normal.            Laboratory Data:   Lab results were searched in Care Everywhere and/or those brought by the pateint were reviewed today with Mikey Levy and she has a copy of their most recent labs to take home with them as notedbelow;       Imaging Data:   Imaging Data:       Assessment & Plan:       Impression:  1. Elevated d-dimer    2. Elevated C-reactive protein (CRP)    3. Essential hypertension    4. HTN, goal below 140/80    5. Pain in both lower extremities    6. Chronic atrial fibrillation    7. Hypothyroidism, unspecified type    8. New onset atrial fibrillation ([facility]    9. Anticoagulated    10. Routine general medical examination at a health care facility    11. Encounter for screening mammogram for breast cancer    12. Paroxysmal atrial fibrillation (HCC)    13. BMI 45.0-49.9, adult ([facility]    14. Homocysteinemia ([facility]      Assessment and Plan:  After reviewing the patients chief complaints, reviewing their labfindings in great detail (with the patient and those accompanying them) which correlate to their chief complaints, symptoms, and or medical conditions; suggestions were made relating to changes in diet and or supplementswhich may improve the complaints and which will be reflected in their future lab findings; Chief Complaint   Patient presents with    Follow-up     2 month    Dizziness    Atrial Fibrillation     Feb 4th    Nausea   ;    Plans for the next visits:  - Abnormal and non-optimal Labs were ordered today to be repeated in the next 120-365 days to assess changes from adjustments in nutrition and or nutrients. - Patient instructed when having ablood draw to ask the  to divide their lab draws into multiple draws over several days if not feeling good at the time of the lab draw or if either prefers to do several smaller blood draws over several days  -Patient instructed to check with insurer before each lab draw and to to to the lab which the insurer directs them for the most cost effective lab draw with the least patient's cost  - Dolores Herrera  will be scheduled subsequentto those results. Nava Efren will bring in her drink and food log to her next visit    Chronic Problems Addressed on this Visit:                                   1.  Intensity of Service; Uncontrolled items at this visit;   Chief Complaint   Patient presents with    Follow-up     2 month    Dizziness    Atrial Fibrillation     Feb 4th    Nausea   ; Improved items at this visit; Stable items atthis visit;  2. Patients food and drinks were reviewed with the patient,       - Sal Mccord will bring food+drink symptom log to next visit for inclusion in their record      - 75 better food list reviewed & given topatient with the omega 6 food list to avoid         - Gluten in corn and oats abstracts sheet reviewed and given to the patient today   3. Greater than 25 minutes were spent face to face on this visit of which >50% was for counseling and coordination of care. Patients food and drinks were reviewed with thepatient,   - they will bring a food drink symptom log to future visits for inclusion in their record    - 75 better food list reviewed & given to patient along with the omega 6 food list to avoid      - Glutenin corn and oats abstracts sheet reviewed and given to the patient today    - 23 Foods containing Latex-like proteins was reviewed and copy to be taken if desired     - Nutrient Supplements list provided and copyto be taken if desired    - [employer]. Tout web site offered to patient to review at their convenience by staff with login information    Note:  I have discussed with the patient that with all nutraceuticals, there is often mixed data and emerging research which needs to be monitored; as well as an array of NIHfact sheets on nutrients and supplements. If I have recommended cinnamon at the request of this patient to assist them in control of their blood sugar, triglyceride and or weight issues.   I discussed that thepatient's clinical use of cinnamon bark, calcium, magnesium, Vitamin D and pharmaceutical grade CVS #321965 fish oil or triple-strength fish oil, and B-75 two phase time-released B complex by Daren Santana will be for atime-limited trial to determine their individual effectiveness and safety in this patient. I also referred the patient to the NMCD: Nutrition, Metabolism, and Cardiovascular Diseases (journal) and concerns about long-termuse and hepatotoxicity of cinnamon and other nutrients and suggest they frequently search nih.gov for the latest non-proprietary information on nutriceuticals as well as consider a subscription to [employer] fordetails on reviewed supplements, or at the least review the nutrient files at [address] at [facility], [employer], an insulin mimetic, reduces some High Carbohydrate Dietary Impacts. Methylhydroxychalcone polymers insulin-enhancing properties in fat cells are responsible for enhanced glucose uptake, inhibiting hepatic HMG-CoA reductase and lowers lipids. www.jacn. org/content/20/4/327.full     But cinnamon with additivessuch as Cinnamon Extract are not effective as insulin mimetics.  :eStoreDirectory.at     Nutrients for Start up from [employer] or [employer] for ease to get started now ;  Sourav Contreras has some useable products;  - Triple Strength Fish Oil, enteric coated  - Vit D 3 5000 IU gel caps  - Iron ferrous sulfat 325 mg tabs  - Centrum Silver look-a-like for most patients, or  - Centrum plain look-a-like if need iron    Localpharmacies or chains such as [employer], [employer], [employer], have;  - Triple Strength Fish Oil (enteric coated ifavailable) or    If not enteric coated, can take from freezer for less burps  - B-50 or B-100 released balanced B complex tabs  - Cinnamon bark 500 mg (without Chromium or extracts)   some brands list 1000 mg / serving of 2 capsules,    some brands have 1000 mg caps with the undesireable chromium / extract  - Calcium carbonate/citrate, magnesium oxide/citrate, Vit D 3  as 3-4 tabs/caps/serving     Some Local Brands may contain Zincwhich is acceptable for the first bottle or two  - Magnesium oxide 250 mg tabs for those having < 2 bowel movements daily  - Magnesium citrate 200 mg if having > 2bowel movement/day  - Centrum Silver or look-a-like for most patients, Centrum plain or look-a-like with iron  - Vitamin D-3 comes as 1,000 IU or 2,000 IU or 5,000 IU gel caps or Liquid drops      Some brands containing or derived from soy oil or corn oil are OK if not allergic to soy  - Elemental Iron 65 mg tabsat bedtime is available over the counter if need more iron     Usually turns bowel movements grey, green or black but not a concern  - Apricot Kernel Oil (by Now) for dry skin sensitive perineal or perianal area skin    Nutrients for ongoing use by Mail order for less expense from www. [employer] ;  - Strength Fish Oil , 240 Softgels Item #656974  -B-100 time released balanced B complex Item #768863  - Cinnamon bark 500 mg without Chromium or extract Item #941332  - Calcium carbonate 1000 mg, Magnesium oxide 500 mg, Vit D 3  400 IU Item #720995  - Magnesium oxide 500 mg tabs Item #541839 if less than 2 bowel movements daily  - [employer] Seniors Item #121892 for mostpatients, One Daily Item #381828 with iron  - Vit D 3  1,000 Item #823703      2,000 IU Item #786857  ,000 IU Item #418612     Some brands containing orderived from soy oil or corn oil are OK if not allergic to soy    Nutrients for Special Needs by Maria Del Carmen Agarwal for less expense from www. puritan.com ;  -Elemental Iron 65 mg tabs Item #498098 if need more iron for low iron on labs    Usually turns bowel movements grey, green or black but not a concern  - Time released Niacin 250 mg Item #310390 for cold intolerance, low libido or impotence  - DHEA 50 mg Item #800234 for improving DHEA levels on labs if having Fatigue    If stools too loose substitute for your Magnesium oxide using;   Magnesium citrate 200 mg tabs(NOT liquid) at [employer]   Magnesium gluconate 550 mgby Liban at [employer]. com or amazon. com  Magnesium chloride foot soaks or body sprays  www.Blottr   Magnesium chloride flakes 14.99 Item #: distribution so has less latex content. Out of season, use canned, frozen or dried sinceprocessed ripe and are latex lower!!! Month     [place] [employer]  January, February, March: use canned, frozen or dried fruits since lower in latex  April; asparagus, radishes  May; asparagus, broccoli, green onions, greens, peas, radishes,rhubarb  June; asparagus, beets, beans, broccoli, cabbage, cantaloupe, carrots, green onions, greens, lettuce,onions, parsley, peas, radishes, rhubarb, strawberries, watermelons  July; beans, beets, blueberries,broccoli, cabbage, cantaloupe, carrots, cauliflower, celery, cucumbers, eggplant, grapes, green onions, greens, lettuce, onions, parsley, peas, peaches, bell peppers, potatoes, radishes, summer raspberries, squash, sweetcorn, tomatoes, turnips, watermelons  August; apples, beans, beets, blueberries, cabbage, cantaloupe, carrots,cauliflower, celery, cucumbers, eggplant, grapes, green onions, greens, lettuce, onions, parsley, peas, peaches, pears, bell peppers, potatoes, radishes, squash, sweet corn, tomatoes, turnips, watermelons  September; apples, beans, beets, blueberries, cabbage, cantaloupe, carrots, cauliflower, celery, cucumbers, eggplant, grapes,green onions, greens, lettuce, onions, parsley, peas, peaches, pears, bell peppers, plums, potatoes, pumpkins, radishes, fall red raspberries, squash, sweet corn, tomatoes, turnips, watermelons  October; apples, beets, broccoli, cabbage, carrots, cauliflower, celery, green onions, greens, lettuce, parsley, peas, pears, potatoes,pumpkins, radishes, fall red raspberries, squash, turnips  November; broccoli, cabbage, carrots, parsley,pears, peas  December: use canned, frozen ordried fruits since lower in latex    Upto half of latex-sensitive patients show allergic reactions to fruits (avocados, bananas, kiwifruits, papayas, peaches),   Annals of Allergy, 1994. These plants contain the same proteins that are allergens in latex. People with fruit allergies should warn physicians beforeundergoing procedures which may cause anaphylactic reaction if in contact with latex gloves. Some of the common foods with defined cross-reactivity to latexare avocado, banana, kiwi, chestnut, raw potato, tomato,stone fruits (e.g., peach, cherry), hazelnut, melons, celery, carrot, apple, pear, papaya, and almond. Foods with less well-defined cross-reactivity to latex are peanuts, peppers, citrus fruits, coconut, pineapple, naveed,fig, passion fruit, Ugli fruit, and grape    This fruit/latex cross-reactivity is worsened by ethylene, a gas used to hasten commercial ripening. In nature, plants produce low levels of the hormone ethylene, which regulates germination, flowering, and ripening. Forced ripening by high ethyleneconcentrations, plants produce allergenic wound-repair proteins, which are similar to wound-repair proteins made during the tapping of rubber trees. Sensitive individualswho ingest the fruit get a higher dose and worse reaction. Some people may even first become sensitized to latex through fruit. Can food processing increase theconcentrations of allergenic proteins? Latex-sensitized children (and adults) in [place] often experience allergic reactions after eating bananas ripenedartificially with ethylene. In the [place], food distribution centers treat unripe bananas and other produce with ethylene to ripen; not commonly done in [facility] since fruit is tree-ripened there. Does treatmentof food with ethylene induce banana proteins that cross-react with latex? (Miguel Angel et al.    References:   Latex in Foods Allergy, http://ehp.niehs.nih.gov/members/2003/5811/5811.html    Search web for \" Whats in Season \" for whereyou live or are at the time you food shop  www.nutritioncouncil.org/pdf/healthy/SeasonalProduce. pdf ,   Management of Latex, ://medicalcenter. [facility].edu/  search for latex

## 2020-03-12 ENCOUNTER — OFFICE VISIT (OUTPATIENT)
Dept: FAMILY MEDICINE CLINIC | Age: 70
End: 2020-03-12
Payer: MEDICARE

## 2020-03-12 VITALS
DIASTOLIC BLOOD PRESSURE: 70 MMHG | WEIGHT: 266.5 LBS | HEIGHT: 63 IN | SYSTOLIC BLOOD PRESSURE: 120 MMHG | RESPIRATION RATE: 12 BRPM | HEART RATE: 58 BPM | OXYGEN SATURATION: 98 % | BODY MASS INDEX: 47.22 KG/M2

## 2020-03-12 PROCEDURE — 99214 OFFICE O/P EST MOD 30 MIN: CPT | Performed by: FAMILY MEDICINE

## 2020-03-12 ASSESSMENT — ENCOUNTER SYMPTOMS
NAUSEA: 0
TROUBLE SWALLOWING: 0
VOMITING: 0
DIARRHEA: 0
ABDOMINAL PAIN: 0
CONSTIPATION: 0
SHORTNESS OF BREATH: 0
EYE PAIN: 0
BLOOD IN STOOL: 0
COUGH: 0

## 2020-03-12 NOTE — PROGRESS NOTES
Potassium monitoring  10/07/2020    Creatinine monitoring  10/07/2020    Annual Wellness Visit (AWV)  11/12/2020    A1C test (Diabetic or Prediabetic)  03/02/2021    TSH testing  03/02/2021    Lipid screen  10/07/2024    DTaP/Tdap/Td vaccine (2 - Td) 10/09/2024    Flu vaccine  Completed    Pneumococcal 65+ years Vaccine  Completed    DEXA (modify frequency per FRAX score)  Addressed    Hepatitis C screen  Addressed    Hepatitis A vaccine  Aged Out    Hepatitis B vaccine  Aged Out    Hib vaccine  Aged Out    Meningococcal (ACWY) vaccine  Aged Out       Subjective:      Review of Systems   Constitutional: Negative for chills, fatigue and fever. HENT: Negative for ear pain, postnasal drip and trouble swallowing. Eyes: Negative for pain and visual disturbance. Respiratory: Negative for cough and shortness of breath. Cardiovascular: Positive for palpitations. Negative for chest pain. Gastrointestinal: Negative for abdominal pain, blood in stool, constipation, diarrhea, nausea and vomiting. Genitourinary: Negative for difficulty urinating. Skin: Negative for rash. Neurological: Positive for dizziness and headaches. Psychiatric/Behavioral: Negative for agitation. Objective:     Vitals:    03/12/20 1150   BP: 120/70   Pulse: 58   Resp: 12   SpO2: 98%   Weight: 266 lb 8 oz (120.9 kg)   Height: 5' 2.99\" (1.6 m)       Body mass index is 47.22 kg/m². Wt Readings from Last 3 Encounters:   03/12/20 266 lb 8 oz (120.9 kg)   03/09/20 267 lb 12.8 oz (121.5 kg)   01/23/20 261 lb 14.5 oz (118.8 kg)     BP Readings from Last 3 Encounters:   03/12/20 120/70   03/09/20 138/70   01/23/20 125/60       Physical Exam  Vitals signs and nursing note reviewed. Constitutional:       General: She is not in acute distress. Appearance: She is well-developed. She is not diaphoretic. HENT:      Head: Normocephalic and atraumatic.       Right Ear: External ear normal.      Left Ear: External ear normal.   Eyes:      General: No scleral icterus. Right eye: No discharge. Left eye: No discharge. Conjunctiva/sclera: Conjunctivae normal.   Neck:      Musculoskeletal: Normal range of motion. Cardiovascular:      Rate and Rhythm: Normal rate and regular rhythm. Heart sounds: Normal heart sounds. No murmur. Pulmonary:      Effort: Pulmonary effort is normal.      Breath sounds: Normal breath sounds. Skin:     General: Skin is warm and dry. Findings: No erythema or rash. Neurological:      Mental Status: She is alert and oriented to person, place, and time. Cranial Nerves: No cranial nerve deficit. Psychiatric:         Behavior: Behavior normal.         Thought Content: Thought content normal.         Judgment: Judgment normal.           Lab Results   Component Value Date    WBC 9.6 03/02/2020    HGB 12.8 03/02/2020    HCT 40.6 03/02/2020     03/02/2020    CHOL 130 10/07/2019    TRIG 61 10/07/2019    HDL 55 10/07/2019    LDLDIRECT 80.00 12/12/2013    LDLCALC 63 10/07/2019    AST 24 10/07/2019     10/07/2019    K 4.9 10/07/2019     10/07/2019    CREATININE 1.2 10/07/2019    BUN 18 10/07/2019    CO2 25 10/07/2019    TSH 0.677 03/02/2020    INR 1.10 03/02/2020    LABA1C 6.3 03/02/2020    LABGLOM 45 (A) 10/07/2019    MG 2.1 03/02/2020    CALCIUM 9.6 03/02/2020    VITD25 36 03/02/2020       Imaging Results:    No results found. Assessment:      Diagnosis Orders   1. Paroxysmal atrial fibrillation (HCC)     2. Essential hypertension     3. Gastroesophageal reflux disease without esophagitis     4. Bradycardia     5.  SVT (supraventricular tachycardia) ([address]         Plan:         Can go back on the blood thinner depending on the rhythm     get the event monitor to see what exactly is happening with the rhythm    Will be sure to take the magnesium at a higher dose    The crp and the d dimer is much better    If you get another headache will do the ct scan of the brain    Will see you back in 4 to 6 weeks         No follow-ups on file. Orders Placed:  No orders of the defined types were placed in this encounter. Medications Prescribed:  No orders of the defined types were placed in this encounter. Future Appointments   Date Time Provider Alejandrina Lucita   6/9/2020 11:00 AM MD JOE BlackX  [facility] [place]   11/16/2020  3:30 PM DO NIC Metcalf  [facility] [address]       Patient given educational materials - see patient instructions. Discussed use, benefit, and sideeffects of prescribed medications. All patient questions answered. Pt voiced understanding. Reviewed health maintenance. Instructed to continue current medications, diet and exercise. Patient agreed with treatment plan. Follow up as directed. I was present for the key portions of the exam and history and confirmed all areas of the note with the patient, staff and the student.       Electronically signed by Dilcia Hollingsworth DO on 3/12/2020 at 12:15 PM

## 2020-03-17 ENCOUNTER — HOSPITAL ENCOUNTER (OUTPATIENT)
Dept: NON INVASIVE DIAGNOSTICS | Age: 70
Discharge: HOME | End: 2020-03-17
Payer: MEDICARE

## 2020-03-17 PROCEDURE — 93270 REMOTE 30 DAY ECG REV/REPORT: CPT

## 2020-03-17 NOTE — PROCEDURES
The skin was prepped and a cardiac event monitor was applied. The patient was instructed on the documentation of symptoms and the purpose of the monitor as well as the things to avoid while wearing the monitor. The patient was instructed to remove and return the monitor on 04/17/2020.   The serial number of the monitor that was applied is [id number]

## 2020-04-22 ENCOUNTER — TELEPHONE (OUTPATIENT)
Dept: FAMILY MEDICINE CLINIC | Age: 70
End: 2020-04-22

## 2020-04-22 NOTE — TELEPHONE ENCOUNTER
----- Message from Ophelia Garcia DO sent at 4/22/2020  2:42 PM EDT -----  The heart monitor did show some fast and slow heart rates - we will need you to follow up with your cardiologist in the next week or so. He may have already called?

## 2020-04-30 ENCOUNTER — TELEPHONE (OUTPATIENT)
Dept: FAMILY MEDICINE CLINIC | Age: 70
End: 2020-04-30

## 2020-05-26 ENCOUNTER — TELEPHONE (OUTPATIENT)
Dept: FAMILY MEDICINE CLINIC | Age: 70
End: 2020-05-26

## 2020-06-02 ENCOUNTER — NURSE ONLY (OUTPATIENT)
Dept: LAB | Age: 70
End: 2020-06-02

## 2020-06-02 LAB
AVERAGE GLUCOSE: 138 MG/DL (ref 70–126)
BASOPHILS # BLD: 0.8 %
BASOPHILS ABSOLUTE: 0.1 THOU/MM3 (ref 0–0.1)
CALCIUM SERPL-MCNC: 9.5 MG/DL (ref 8.5–10.5)
CHOLESTEROL, TOTAL: 148 MG/DL (ref 100–199)
D-DIMER QUANTITATIVE: ABNORMAL NG/ML FEU (ref 0–500)
EOSINOPHIL # BLD: 2.7 %
EOSINOPHILS ABSOLUTE: 0.2 THOU/MM3 (ref 0–0.4)
ERYTHROCYTE [DISTWIDTH] IN BLOOD BY AUTOMATED COUNT: 14.6 % (ref 11.5–14.5)
ERYTHROCYTE [DISTWIDTH] IN BLOOD BY AUTOMATED COUNT: 48.8 FL (ref 35–45)
ESTRADIOL LEVEL: 61.6 PG/ML
FOLLICLE STIMULATING HORMONE: 14 MIU/ML (ref 16–160)
HBA1C MFR BLD: 6.6 % (ref 4.4–6.4)
HCT VFR BLD CALC: 41.7 % (ref 37–47)
HDLC SERPL-MCNC: 59 MG/DL
HEMOGLOBIN: 13.3 GM/DL (ref 12–16)
IMMATURE GRANS (ABS): 0.03 THOU/MM3 (ref 0–0.07)
IMMATURE GRANULOCYTES: 0.4 %
LDL CHOLESTEROL CALCULATED: 81 MG/DL
LUTEINIZING HORMONE: 17.5 MIU/ML (ref 3.3–70.6)
LYMPHOCYTES # BLD: 33.8 %
LYMPHOCYTES ABSOLUTE: 2.5 THOU/MM3 (ref 1–4.8)
MAGNESIUM: 2.3 MG/DL (ref 1.6–2.4)
MCH RBC QN AUTO: 29.2 PG (ref 26–33)
MCHC RBC AUTO-ENTMCNC: 31.9 GM/DL (ref 32.2–35.5)
MCV RBC AUTO: 91.6 FL (ref 81–99)
MONOCYTES # BLD: 7.1 %
MONOCYTES ABSOLUTE: 0.5 THOU/MM3 (ref 0.4–1.3)
NUCLEATED RED BLOOD CELLS: 0 /100 WBC
PLATELET # BLD: 267 THOU/MM3 (ref 130–400)
PMV BLD AUTO: 10.3 FL (ref 9.4–12.4)
PROGESTERONE LEVEL: < 0.05 NG/ML
PTH INTACT: 33.5 PG/ML (ref 15–65)
RBC # BLD: 4.55 MILL/MM3 (ref 4.2–5.4)
RHEUMATOID FACTOR: < 10 IU/ML (ref 0–13)
SEDIMENTATION RATE, ERYTHROCYTE: 37 MM/HR (ref 0–20)
SEG NEUTROPHILS: 55.2 %
SEGMENTED NEUTROPHILS ABSOLUTE COUNT: 4.1 THOU/MM3 (ref 1.8–7.7)
T3 TOTAL: 106 NG/DL (ref 72–181)
TRIGL SERPL-MCNC: 41 MG/DL (ref 0–199)
TSH SERPL DL<=0.05 MIU/L-ACNC: 1.2 UIU/ML (ref 0.4–4.2)
VITAMIN D 25-HYDROXY: 42 NG/ML (ref 30–100)
WBC # BLD: 7.5 THOU/MM3 (ref 4.8–10.8)

## 2020-06-03 ENCOUNTER — TELEPHONE (OUTPATIENT)
Dept: PHARMACY | Age: 70
End: 2020-06-03

## 2020-06-03 ENCOUNTER — TELEPHONE (OUTPATIENT)
Dept: FAMILY MEDICINE CLINIC | Age: 70
End: 2020-06-03

## 2020-06-03 LAB — HOMOCYSTEINE: 11.1 UMOL/L

## 2020-06-03 RX ORDER — WARFARIN SODIUM 5 MG/1
TABLET ORAL
Qty: 20 TABLET | Refills: 1 | Status: SHIPPED | OUTPATIENT
Start: 2020-06-03 | End: 2020-08-26 | Stop reason: SDUPTHER

## 2020-06-03 NOTE — TELEPHONE ENCOUNTER
Had an appt for the 9 th, moved her up to tomorrow 6/4/20. Not all labs back. Is a bit worried, thinks she may have had covid. Has spoken to [address] [place] [address], they told her to go back on coumadin and contact her family doctor. Her family doc is Dr. Den Mann whom is out of the office till 6/8/2020. Dr. Howie Armenta is aware of the situation.

## 2020-06-03 NOTE — TELEPHONE ENCOUNTER
Pt called, can see her d dimer in the lab results. D-Dimer, Quant [id number]. 00High   0.00 - 500.0 ng/ml FEU Final 06/02/2020 11:43 AM [facility]   NEGATIVE REFERENCE RANGE:  0-500 ng/mL(FEU)      Please advise

## 2020-06-03 NOTE — TELEPHONE ENCOUNTER
Patient referred to [facility]. [facility]) for Coumadin management by Dr. Dorian Johnson for atrial fibrillation, goal INR 2-3, therapy duration indefinite, initial referral 6/3/20. Spoke with patient. Instructed her to take Coumadin 5 mg x 1 dose today 6/3/20, then resume Coumadin 5 mg MF and 2.5 mg TuWThSaSu (previous home dose from December 2019) with INR check in clinic 6/9/20 @ 9 am. Patient voiced understanding. Patient given instructions utilizing the teach back method.  Sent Coumadin 5 mg tablet #20 with 1 refill to [facility] per patient request.    Ron Sierra, PharmD, BCPS  6/3/2020  3:41 PM

## 2020-06-04 ENCOUNTER — OFFICE VISIT (OUTPATIENT)
Dept: FAMILY MEDICINE CLINIC | Age: 70
End: 2020-06-04
Payer: MEDICARE

## 2020-06-04 VITALS
WEIGHT: 270.2 LBS | OXYGEN SATURATION: 97 % | HEIGHT: 63 IN | DIASTOLIC BLOOD PRESSURE: 70 MMHG | RESPIRATION RATE: 12 BRPM | TEMPERATURE: 98.1 F | BODY MASS INDEX: 47.88 KG/M2 | SYSTOLIC BLOOD PRESSURE: 132 MMHG | HEART RATE: 62 BPM

## 2020-06-04 LAB
C-REACTIVE PROTEIN, HIGH SENSITIVITY: 27.9 MG/L
DHEAS (DHEA SULFATE): 267 UG/DL (ref 13–130)
GLIADIN PEPTIDE IGG: 0.6 U/ML
THYROID PEROXIDASE ANTIBODY: 14.9 IU/ML (ref 0–35)
THYROXINE (T4): 7.9 UG/DL (ref 4.5–12)

## 2020-06-04 PROCEDURE — 99214 OFFICE O/P EST MOD 30 MIN: CPT | Performed by: FAMILY MEDICINE

## 2020-06-04 RX ORDER — LEVOTHYROXINE SODIUM 0.1 MG/1
TABLET ORAL
Qty: 90 TABLET | Refills: 3 | Status: SHIPPED | OUTPATIENT
Start: 2020-06-04 | End: 2021-06-16 | Stop reason: SDUPTHER

## 2020-06-04 RX ORDER — LIOTHYRONINE SODIUM 5 UG/1
TABLET ORAL
Qty: 90 TABLET | Refills: 3 | Status: SHIPPED | OUTPATIENT
Start: 2020-06-04 | End: 2021-06-16 | Stop reason: SDUPTHER

## 2020-06-04 NOTE — PROGRESS NOTES
(supraventricular tachycardia) ([facility]    6. Bradycardia    7. Elevated C-reactive protein (CRP)    8. Pain in both lower extremities    9. Elevated d-dimer    10. HTN, goal below 140/80    11. Chronic atrial fibrillation    12. New onset atrial fibrillation ([facility]    13. BMI 45.0-49.9, adult ([facility]    14. [facility]emia ([facility]    15. Hypothyroidism, unspecified type      Assessment and Plan:  After reviewing the patients chief complaints, reviewing their labfindings in great detail (with the patient and those accompanying them) which correlate to their chief complaints, symptoms, and or medical conditions; suggestions were made relating to changes in diet and or supplementswhich may improve the complaints and which will be reflected in their future lab findings; Chief Complaint   Patient presents with    Discuss Labs     d dimer, now on coumadin   ;    Plans for the next visits:  - Abnormal and non-optimal Labs were ordered today to be repeated in the next 120-365 days to assess changes from adjustments in nutrition and or nutrients. - Patient instructed when having ablood draw to ask the  to divide their lab draws into multiple draws over several days if not feeling good at the time of the lab draw or if either prefers to do several smaller blood draws over several days  -Patient instructed to check with insurer before each lab draw and to to to the lab which the insurer directs them for the most cost effective lab draw with the least patient's cost  - Debbie Arroyo  will be scheduled subsequentto those results. Alvino Ramires will bring in her drink and food log to her next visit    Chronic Problems Addressed on this Visit:                                   1.  Intensity of Service; Uncontrolled items at this visit; Chief Complaint   Patient presents with    Discuss Labs     d dimer, now on coumadin   ; Improved items at this visit; Stable items atthis visit;  2.  Patients food and drinks fruits since lower in latex  April; asparagus, radishes  May; asparagus, broccoli, green onions, greens, peas, radishes,rhubarb  June; asparagus, beets, beans, broccoli, cabbage, cantaloupe, carrots, green onions, greens, lettuce,onions, parsley, peas, radishes, rhubarb, strawberries, watermelons  July; beans, beets, blueberries,broccoli, cabbage, cantaloupe, carrots, cauliflower, celery, cucumbers, eggplant, grapes, green onions, greens, lettuce, onions, parsley, peas, peaches, bell peppers, potatoes, radishes, summer raspberries, squash, sweetcorn, tomatoes, turnips, watermelons  August; apples, beans, beets, blueberries, cabbage, cantaloupe, carrots,cauliflower, celery, cucumbers, eggplant, grapes, green onions, greens, lettuce, onions, parsley, peas, peaches, pears, bell peppers, potatoes, radishes, squash, sweet corn, tomatoes, turnips, watermelons  September; apples, beans, beets, blueberries, cabbage, cantaloupe, carrots, cauliflower, celery, cucumbers, eggplant, grapes,green onions, greens, lettuce, onions, parsley, peas, peaches, pears, bell peppers, plums, potatoes, pumpkins, radishes, fall red raspberries, squash, sweet corn, tomatoes, turnips, watermelons  October; apples, beets, broccoli, cabbage, carrots, cauliflower, celery, green onions, greens, lettuce, parsley, peas, pears, potatoes,pumpkins, radishes, fall red raspberries, squash, turnips  November; broccoli, cabbage, carrots, parsley,pears, peas  December: use canned, frozen ordried fruits since lower in latex    Upto half of latex-sensitive patients show allergic reactions to fruits (avocados, bananas, kiwifruits, papayas, peaches),   Annals of Allergy, 1994. These plants contain the same proteins that are allergens in latex. People with fruit allergies should warn physicians beforeundergoing procedures which may cause anaphylactic reaction if in contact with latex gloves.   Some of the common foods with defined cross-reactivity to latexare avocado, banana, kiwi, chestnut, raw potato, tomato,stone fruits (e.g., peach, cherry), hazelnut, melons, celery, carrot, apple, pear, papaya, and almond. Foods with less well-defined cross-reactivity to latex are peanuts, peppers, citrus fruits, coconut, pineapple, naveed,fig, passion fruit, Ugli fruit, and grape    This fruit/latex cross-reactivity is worsened by ethylene, a gas used to hasten commercial ripening. In nature, plants produce low levels of the hormone ethylene, which regulates germination, flowering, and ripening. Forced ripening by high ethyleneconcentrations, plants produce allergenic wound-repair proteins, which are similar to wound-repair proteins made during the tapping of rubber trees. Sensitive individualswho ingest the fruit get a higher dose and worse reaction. Some people may even first become sensitized to latex through fruit. Can food processing increase theconcentrations of allergenic proteins? Latex-sensitized children (and adults) in [place] often experience allergic reactions after eating bananas ripenedartificially with ethylene. In the [place], food distribution centers treat unripe bananas and other produce with ethylene to ripen; not commonly done in [facility] since fruit is tree-ripened there. Does treatmentof food with ethylene induce banana proteins that cross-react with latex? (Miguel Angel et al.    References:   Latex in Foods Allergy, http://ehp.niehs.nih.gov/members/2003/5811/5811.html    Search web for \" Whats in Season \" for whereyou live or are at the time you food shop  www.nutritioncouncil.org/pdf/healthy/SeasonalProduce. pdf ,   Management of Latex, ://medicalcenter. [employer].edu/  search for latex

## 2020-06-05 LAB — ANA SCREEN: NORMAL

## 2020-06-06 LAB
DHEA UNCONJUGATED: 2.3 NG/ML (ref 0.63–4.7)
TESTOSTERONE, FREE W SHGB, FEMALES/CHILDREN: NORMAL

## 2020-06-09 ENCOUNTER — HOSPITAL ENCOUNTER (OUTPATIENT)
Dept: PHARMACY | Age: 70
Setting detail: THERAPIES SERIES
Discharge: HOME | End: 2020-06-09
Payer: MEDICARE

## 2020-06-09 VITALS — TEMPERATURE: 98 F

## 2020-06-09 LAB — POC INR: 1.2 (ref 0.8–1.2)

## 2020-06-09 PROCEDURE — 85610 PROTHROMBIN TIME: CPT | Performed by: PHARMACIST

## 2020-06-09 PROCEDURE — 99211 OFF/OP EST MAY X REQ PHY/QHP: CPT | Performed by: PHARMACIST

## 2020-06-09 PROCEDURE — 36416 COLLJ CAPILLARY BLOOD SPEC: CPT | Performed by: PHARMACIST

## 2020-06-16 ENCOUNTER — HOSPITAL ENCOUNTER (OUTPATIENT)
Dept: PHARMACY | Age: 70
Setting detail: THERAPIES SERIES
Discharge: HOME | End: 2020-06-16
Payer: MEDICARE

## 2020-06-16 VITALS — TEMPERATURE: 97.4 F

## 2020-06-16 LAB — POC INR: 1.8 (ref 0.8–1.2)

## 2020-06-16 PROCEDURE — 85610 PROTHROMBIN TIME: CPT | Performed by: PHARMACIST

## 2020-06-16 PROCEDURE — 36416 COLLJ CAPILLARY BLOOD SPEC: CPT | Performed by: PHARMACIST

## 2020-06-16 PROCEDURE — 99211 OFF/OP EST MAY X REQ PHY/QHP: CPT | Performed by: PHARMACIST

## 2020-06-16 NOTE — PROGRESS NOTES
Medication Management [address]  [phone] (phone)  [phone] (fax)       Ms. Alfonso Callaway is a 72 y.o.  female with history of Afib who presents today for anticoagulation monitoring and adjustment. Patient verifies current dosing regimen and tablet strength. No missed or extra doses. Patient denies s/s bleeding/bruising/swelling/SOB/chest pain  No blood in urine or stool. No dietary changes. Pt states that she has difficulty being consistent. Educated pt on importance of being consistent. No changes in medication/OTC agents/Herbals. Pt sees Dr. Roge Parrish, takes a lot of supplements, states that she is not consistent with her supplements. No change in alcohol use or tobacco use. No change in activity level. Patient denies headaches/dizziness/lightheadedness/falls. No vomiting/diarrhea or acute illness. No Procedures scheduled in the future at this time. May have a cardiac ablation in the future, but not yet scheduled. Assessment:   Lab Results   Component Value Date    INR 1.20 06/09/2020    INR 1.10 03/02/2020    INR 1.11 01/08/2020     INR subtherapeutic No results for input(s): INR in the last 72 hours. Pt not yet on a set regimen. Pt received 30mg of Coumadin in the past 7 days. Pt was on Coumadin Dec 2019, last known regmen was Coumadin 5 mg MoFr and 2.5 mg SuTuWeThSa. Pt states that she is now eating more Vit K foods and taking more supplements. Plan:  Coumadin 2.5mg MF and 5mg TWThSaS. Recheck INR in 1 weeks. Patient reminded to call the Anticoagulation Clinic with any signs or symptoms of bleeding or with any medication changes. Patient given instructions utilizing the teach back method. Discharged ambulatory in no apparent distress. After visit summary printed and reviewed with patient.       Medications reviewed and updated on home medication list Yes    Influenza vaccine:     [] given    [] declined   [] received

## 2020-06-18 ENCOUNTER — OFFICE VISIT (OUTPATIENT)
Dept: FAMILY MEDICINE CLINIC | Age: 70
End: 2020-06-18
Payer: MEDICARE

## 2020-06-18 VITALS
HEIGHT: 63 IN | TEMPERATURE: 98.1 F | OXYGEN SATURATION: 98 % | SYSTOLIC BLOOD PRESSURE: 126 MMHG | BODY MASS INDEX: 48.09 KG/M2 | HEART RATE: 50 BPM | WEIGHT: 271.4 LBS | DIASTOLIC BLOOD PRESSURE: 74 MMHG

## 2020-06-18 PROBLEM — E11.9 NEW ONSET TYPE 2 DIABETES MELLITUS (HCC): Status: ACTIVE | Noted: 2020-06-18

## 2020-06-18 PROCEDURE — 99214 OFFICE O/P EST MOD 30 MIN: CPT | Performed by: FAMILY MEDICINE

## 2020-06-18 RX ORDER — METFORMIN HYDROCHLORIDE 500 MG/1
500 TABLET, EXTENDED RELEASE ORAL
Qty: 30 TABLET | Refills: 5 | Status: SHIPPED | OUTPATIENT
Start: 2020-06-18 | End: 2021-04-26

## 2020-06-18 RX ORDER — BLOOD-GLUCOSE METER
1 KIT MISCELLANEOUS DAILY
Qty: 1 KIT | Refills: 0 | Status: SHIPPED | OUTPATIENT
Start: 2020-06-18

## 2020-06-18 RX ORDER — LANCETS 30 GAUGE
1 EACH MISCELLANEOUS DAILY
Qty: 300 EACH | Refills: 1 | Status: SHIPPED | OUTPATIENT
Start: 2020-06-18

## 2020-06-18 ASSESSMENT — ENCOUNTER SYMPTOMS
CONSTIPATION: 1
TROUBLE SWALLOWING: 0
VOMITING: 0
EYE PAIN: 0
SHORTNESS OF BREATH: 0
ABDOMINAL PAIN: 0
BLOOD IN STOOL: 0
NAUSEA: 0
COUGH: 0
DIARRHEA: 0

## 2020-06-18 NOTE — PROGRESS NOTES
Health Maintenance Due   Topic Date Due    Diabetic foot exam  1961going to talk to dr Fonseca [facility]abetic retinal [facility]    Diabetic microalbuminuria test  1969ordered    Breast cancer screen  2019 going to schedule    Colon cancer screen colonoscopy  2020going to talk with DR. Hanks Artur is a 72 y.o. female who presents today for:  Chief Complaint   Patient presents with    Atrial Fibrillation    Hypothyroidism       Goals      Reduce sugar intake to X grams per day           HPI:     HPI   doing ok overall     The d dimer has been high    She did go to [place] - she has to lose weight for the procedure, they think the pacemaker would help - and that she needs to be on the blood thinners - they do both agree with the ablation    She did have an afib moment after the last visit - she felt pretty uncomfortable and another moment in April - that time she did see darkness and heard her grandsons voice - then looked around and knew she was in her kitchen - they told her she will pass out when she has that delay in going in and out of afib  May pass out    On a monitor for [facility] for the afib again    If she puts the slo mag in - she is more constipated and does not go for a day or two     Not on the lopressor any more - has tried it but does not stop the atrial fibrillation - and only takes it as needed per my advice    No question data found.     Past Medical History:   Diagnosis Date    A-fib [facility])     Atrial fibrillation (HCC)     Carpal tunnel syndrome     GERD (gastroesophageal reflux disease)     Headache(784.0)     HTN (hypertension)     Hypothyroidism     Osteoarthritis     Sleep apnea 2019    Mild    Tendonitis of both wrists 2017    Toenail fungus       Past Surgical History:   Procedure Laterality Date    CARPAL TUNNEL RELEASE      both hands     SECTION       SECTION       SECTION      FOOT Magnesium    glucose monitoring kit (FREESTYLE) monitoring kit    Lancets MISC    Alcohol prep pad    blood glucose test strips (ASCENSIA AUTODISC VI;ONE TOUCH ULTRA TEST VI) strip   3. Hypothyroidism due to Hashimoto's thyroiditis  POCT microalbumin    Basic Metabolic Panel    Hepatic Function Panel    Magnesium    glucose monitoring kit (FREESTYLE) monitoring kit    Lancets MISC    Alcohol prep pad    blood glucose test strips (ASCENSIA AUTODISC VI;ONE TOUCH ULTRA TEST VI) strip   4. New onset type 2 diabetes mellitus (HCC)  POCT microalbumin    Basic Metabolic Panel    Hepatic Function Panel    Magnesium    glucose monitoring kit (FREESTYLE) monitoring kit    Lancets MISC    Alcohol prep pad    blood glucose test strips (ASCENSIA AUTODISC VI;ONE TOUCH ULTRA TEST VI) strip       Plan: Will follow with [facility] and Dr Severa Brand - discussed the ablation    Will get the bloodwork    Discussed starting metformin - can do a low dose and we can stop in a few months    Will see you back in the next month for the blood sugars    Will watch the diarrhea on the metformin    Will cut the slo mag in half and still take the mag tein    Will order the glucometer and strips and lancets    Will keep working on the coumadin level - her insurance won't pay for the eliquis    Will takethe lopressore off your list         Return in about 4 weeks (around 2020) for myself and Dr Radha Gil on a Monday.     Orders Placed:  Orders Placed This Encounter   Procedures    Alcohol prep pad    Basic Metabolic Panel    Hepatic Function Panel    Magnesium    POCT microalbumin     Medications Prescribed:  Orders Placed This Encounter   Medications    metFORMIN (GLUCOPHAGE-XR) 500 MG extended release tablet     Sig: Take 1 tablet by mouth daily (with breakfast)     Dispense:  30 tablet     Refill:  5    glucose monitoring kit (FREESTYLE) monitoring kit     Si kit by Does not apply route daily     Dispense:  1 kit     Refill:  0    Lancets MISC     Si each by Does not apply route daily     Dispense:  300 each     Refill:  1    blood glucose test strips (ASCENSIA AUTODISC VI;ONE TOUCH ULTRA TEST VI) strip     Si each by In Vitro route daily Dispense any brand - check blood sugar As needed. Dispense:  100 each     Refill:  3       Future Appointments   Date Time Provider Alejandrina Landrum   2020  2:00 PM [place] LETICIA Powers [facility]   2020 12:20 PM [facility] [place] [address]   2020  3:30 PM Luis Domínguez DO [facility]  [facility] [address]       Patient given educational materials - see patient instructions. Discussed use, benefit, and sideeffects of prescribed medications. All patient questions answered. Pt voiced understanding. Reviewed health maintenance. Instructed to continue current medications, diet and exercise. Patient agreed with treatment plan. Follow up as directed. I was present for the key portions of the exam and history and confirmed all areas of the note with the patient, staff and the student.       Electronically signed by Rosanne Barragan DO on 2020 at 11:00 AM

## 2020-06-22 ENCOUNTER — TELEPHONE (OUTPATIENT)
Dept: PHARMACY | Age: 70
End: 2020-06-22

## 2020-06-22 NOTE — TELEPHONE ENCOUNTER
Medication Management [address]  [phone] (phone)  [phone] (fax)      Pharmacy student working under the supervision of a licensed pharmacist during the COVID-19 pandemic assessed the following: The following statement was review with patient regarding their virtual visit:  We want to confirm that, for purposes of billing, this is a virtual visit with your provider for which we will submit a claim for reimbursement with your insurance company. You may be responsible for any copays, coinsurance amounts or other amounts not covered by your insurance company. If you do not accept this, unfortunately we will not be able to schedule a virtual visit with the provider. Do you accept? Yes      Current Outpatient Medications   Medication Sig Dispense Refill    MAGNESIUM PO Take by mouth      metFORMIN (GLUCOPHAGE-XR) 500 MG extended release tablet Take 1 tablet by mouth daily (with breakfast) 30 tablet 5    glucose monitoring kit (FREESTYLE) monitoring kit 1 kit by Does not apply route daily 1 kit 0    Lancets MISC 1 each by Does not apply route daily 300 each 1    blood glucose test strips (ASCENSIA AUTODISC VI;ONE TOUCH ULTRA TEST VI) strip 1 each by In Vitro route daily Dispense any brand - check blood sugar As needed.  100 each 3    NONFORMULARY Take 1 capsule by mouth 4 times daily (before meals and nightly) Magtein      liothyronine (CYTOMEL) 5 MCG tablet take 1 tablet by mouth once daily 90 tablet 3    levothyroxine (SYNTHROID) 100 MCG tablet take 1 tablet by mouth once daily 90 tablet 3    warfarin (COUMADIN) 5 MG tablet As directed by [facility] #20 tabs = 30 day supply 20 tablet 1    loratadine (CLARITIN) 10 MG tablet Take 1 tablet by mouth 2 times daily (before meals) 180 tablet 3    lisinopril (PRINIVIL;ZESTRIL) 10 MG tablet Take 1 tablet by mouth daily (Patient taking differently: Take 5 mg by mouth daily ) 90 tablet 1    Methylsulfonylmethane (MSM PO) Take 1 tablet by mouth daily as needed (pain)      RA MELATONIN 3 MG TABS tablet   0    nitroGLYCERIN (NITROSTAT) 0.4 MG SL tablet Place under the tongue every 5 minutes as needed for Chest pain (If third one does not relieve pain, call 9-1-1.)   0    diclofenac sodium 1 % GEL as needed for Pain       XZODRMYLL-HZDNBCAYE-PDLFQQDXWG PO Place 1 tablet under the tongue 2 times daily Jimbo B12 plus or [place] methylB6, [id number], methylfolate       Magnesium ([employer] TRIPLE MAGNESIUM COMPLEX) 400 MG CAPS Take 1 capsule by mouth 4 times daily (after meals and at bedtime) Indications: Magnesium Deficiency       magnesium cl-calcium carbonate (SLOW-MAG) 71.5-119 MG TBEC tablet Take 1 tablet by mouth daily as needed Take before and when stressed with 2 extra fish oil       Grape Seed  mg by Does not apply route daily Oregan Grape Root      Lysine 500 MG TABS Take 1 tablet by mouth 2 times daily       Omega 3 1000 MG CAPS Take 3 capsules by mouth 4 times daily (before meals and nightly) Indications: Cardiovascular Risk Reduction       DHEA 25 MG CAPS Take 1 capsule by mouth daily 1/4 tab on Sat and Sunday      Cinnamon 500 MG CAPS Take 3 capsules by mouth 4 times daily (before meals and nightly)       Vitamin D (CHOLECALCIFEROL) 1000 UNITS CAPS capsule Take 7,000 Units by mouth daily Indications: Treatment with Vitamin D .      B Complex-Biotin-FA (B-100 TR) TBCR Take 2 tablets by mouth 2 times daily (before meals) Before breakfast and before lunch or supper       No current facility-administered medications for this visit. Standing INR order not found for this patient. In the last month have you been in contact with someone who has been confirmed or suspected to have Coronavirus/COVID-19? No    If yes, does the patient have any of the following symptoms: N/A       If patient has symptoms, please direct to appropriate flu clinic.      Have you traveled internationally in the past month? No  If yes, where? N/A      Randee Marie, PharmD Candidate.

## 2020-06-23 ENCOUNTER — HOSPITAL ENCOUNTER (OUTPATIENT)
Dept: PHARMACY | Age: 70
Setting detail: THERAPIES SERIES
Discharge: HOME | End: 2020-06-23
Payer: MEDICARE

## 2020-06-23 VITALS — TEMPERATURE: 96.5 F

## 2020-06-23 LAB — POC INR: 2.6 (ref 0.8–1.2)

## 2020-06-23 PROCEDURE — 85610 PROTHROMBIN TIME: CPT | Performed by: PHARMACIST

## 2020-06-23 PROCEDURE — 99211 OFF/OP EST MAY X REQ PHY/QHP: CPT | Performed by: PHARMACIST

## 2020-06-23 PROCEDURE — 36416 COLLJ CAPILLARY BLOOD SPEC: CPT | Performed by: PHARMACIST

## 2020-06-23 NOTE — PROGRESS NOTES
Medication Management [address]  [phone] (phone)  [phone] (fax)      Ms. Beti Liu is a 72 y.o.  female with history of Afib. Patient verifies current dosing regimen and tablet strength. Yes  No missed or extra doses. Patient denies s/s bleeding/bruising/swelling/SOB/chest pain  No blood in urine or stool. No dietary changes. No changes in medication/OTC agents/Herbals. Prescribed metformin on the 18th but has not started taking it yet  No change in alcohol use or tobacco use. No change in activity level. Patient denies headaches/dizziness/lightheadedness/falls. No vomiting/diarrhea or acute illness. No Procedures scheduled in the future at this time. Ablation still not scheduled, still using monitor    Assessment:   Lab Results   Component Value Date    INR 2.60 (H) 06/23/2020    INR 1.80 (H) 06/16/2020    INR 1.20 06/09/2020     INR therapeutic   Recent Labs     06/23/20  1420   INR 2.60*       Plan:  Decrease Coumadin 2.5mg MWF, 5mg TThSS. Recheck INR in 1 week. Patient reminded to call the Anticoagulation Clinic with signs or symptoms of bleeding or with any medication changes. Patient given instructions utilizing the teach back method. Discharged ambulatory in no apparent distress. The following statement was review with patient regarding this virtual visit:  We want to confirm that, for purposes of billing, this is a virtual visit with your provider for which we will submit a claim for reimbursement with your insurance company. You may be responsible for any copays, coinsurance amounts or other amounts not covered by your insurance company. If you do not accept this, unfortunately we will not be able to schedule a virtual visit with the provider. Do you accept?   Yes

## 2020-06-30 ENCOUNTER — HOSPITAL ENCOUNTER (OUTPATIENT)
Dept: PHARMACY | Age: 70
Setting detail: THERAPIES SERIES
Discharge: HOME | End: 2020-06-30
Payer: MEDICARE

## 2020-06-30 VITALS — TEMPERATURE: 97.5 F

## 2020-06-30 PROBLEM — I25.10 CORONARY ATHEROSCLEROSIS: Status: ACTIVE | Noted: 2020-06-30

## 2020-06-30 PROBLEM — G47.30 SLEEP APNEA: Status: ACTIVE | Noted: 2020-06-30

## 2020-06-30 LAB — POC INR: 2.7 (ref 0.8–1.2)

## 2020-06-30 PROCEDURE — 36416 COLLJ CAPILLARY BLOOD SPEC: CPT

## 2020-06-30 PROCEDURE — 85610 PROTHROMBIN TIME: CPT

## 2020-06-30 PROCEDURE — 99211 OFF/OP EST MAY X REQ PHY/QHP: CPT

## 2020-06-30 NOTE — PROGRESS NOTES
Medication Management [address] [place] [address]  [phone] (phone)  [phone] (fax)      Ms. Pretty uGardado is a 72 y.o.  female with history of atrial fib. , per Dr. Barry Christianson referral, who presents today for Warfarin monitoring and adjustment (1 week INR after decreasing dose by 2.5 mg/week). Patient verifies current dosing regimen and tablet strength. No missed or extra doses. Patient denies bleeding/bruising/swelling/SOB/chest pain. No blood in urine or stool. No dietary changes. No changes in medication/OTC agents/herbals. No change in alcohol use or tobacco use. No change in activity level. Patient denies headaches/dizziness/lightheadedness/falls. No vomiting/diarrhea or acute illness. No procedures scheduled in the future at this time. Currently wearing heart monitor for atrial fib. (nearly passes out when she goes into it). States may need ablation. Assessment:   Lab Results   Component Value Date    INR 2.70 (H) 06/30/2020    INR 2.60 (H) 06/23/2020    INR 1.80 (H) 06/16/2020     INR therapeutic - goal 2-3. Recent Labs     06/30/20  1410   INR 2.70*       Plan:  POCT INR ordered/performed/result reviewed. Continue PO Coumadin 2.5 mg MWF, 5 mg TThSS. Recheck INR in 9 days. (Report given - orders entered by YOEL Roberto, PharmD.) Patient reminded to call the Anticoagulation Clinic with any signs or symptoms of bleeding or with any medication changes. Patient given instructions utilizing the teach back method. Discharged ambulatory in no apparent distress. After visit summary printed and reviewed with patient.       Medications reviewed and updated on home medication list.    Influenza vaccine:     [] given    [] declined   [] received previously   [] plans to receive at a later time   [] refused    [] documented in EPIC

## 2020-07-09 ENCOUNTER — TELEPHONE (OUTPATIENT)
Dept: FAMILY MEDICINE CLINIC | Age: 70
End: 2020-07-09

## 2020-07-09 ENCOUNTER — HOSPITAL ENCOUNTER (OUTPATIENT)
Dept: PHARMACY | Age: 70
Setting detail: THERAPIES SERIES
Discharge: HOME | End: 2020-07-09
Payer: MEDICARE

## 2020-07-09 ENCOUNTER — TELEPHONE (OUTPATIENT)
Dept: PHARMACY | Age: 70
End: 2020-07-09

## 2020-07-09 VITALS — TEMPERATURE: 98.2 F

## 2020-07-09 LAB — POC INR: 3.7 (ref 0.8–1.2)

## 2020-07-09 PROCEDURE — 36416 COLLJ CAPILLARY BLOOD SPEC: CPT | Performed by: PHARMACIST

## 2020-07-09 PROCEDURE — 99211 OFF/OP EST MAY X REQ PHY/QHP: CPT | Performed by: PHARMACIST

## 2020-07-09 PROCEDURE — 85610 PROTHROMBIN TIME: CPT | Performed by: PHARMACIST

## 2020-07-09 NOTE — PROGRESS NOTES
Medication Management [address]  [phone] (phone)  [phone] (fax)      Ms. Sunil Leonardo is a 72 y.o.  female with history of Afib who presents today for anticoagulation monitoring and adjustment. Patient verifies current dosing regimen and tablet strength. No missed or extra doses. Patient denies s/s bleeding/bruising/swelling/SOB/chest pain  No blood in urine or stool. No dietary changes. Cranberry juice d/t suspected kidney infection at 6pm last night. Advised pt to avoid cranberry juice due to potential INR interaction. Pt reluctant to accept this information. No changes in medication/OTC agents/Herbals. Will be starting to take blood glucose. Filled metformin Rx but has not started taking it yet. Pt has left msg at PCP office requesting abx for UTI. Instructed pt to notify [facility] if abx prescribed, pt voiced understanding. No change in alcohol use or tobacco use. No change in activity level. Patient denies headaches/dizziness/lightheadedness/falls. No vomiting/diarrhea or acute illness. No Procedures scheduled in the future at this time. Discussing ablation and pacemaker tomorrow with cardiologist at the [facility]. Assessment:   Lab Results   Component Value Date    INR 3.70 (H) 07/09/2020    INR 2.70 (H) 06/30/2020    INR 2.60 (H) 06/23/2020     INR supratherapeutic   Recent Labs     07/09/20  1037   INR 3.70*     Patient interview completed and discussed with pharmacist by Iris Roth, PharmD Candidate. Plan:  Hold today, then continue Coumadin 2.5mg MWF and 5mg TThSaS. Recheck INR in 10 days. Patient reminded to call the Anticoagulation Clinic with any signs or symptoms of bleeding or with any medication changes. Patient given instructions utilizing the teach back method. Discharged ambulatory in no apparent distress. After visit summary printed and reviewed with patient.       Medications reviewed and updated on

## 2020-07-09 NOTE — TELEPHONE ENCOUNTER
Patient notified clinic that she will be drinking cranberry juice tonight 7/9/20 due to UTI until she can hear back from physician. She already has instructions to hold Coumadin tonight. Left callback message to have her notify clinic if she is put on an antibiotic, otherwise follow plan from visit today.     Alyssia Mi PharmD, BCPS  7/9/2020  4:37 PM

## 2020-07-10 NOTE — TELEPHONE ENCOUNTER
Can she get the urine I ordered? Can you ask if she is any better today? We could try to do a quick video visit today?

## 2020-07-13 ENCOUNTER — TELEPHONE (OUTPATIENT)
Dept: PHARMACY | Age: 70
End: 2020-07-13

## 2020-07-13 NOTE — TELEPHONE ENCOUNTER
Ordered the test - can she also do the urine?   If she does have a uti that will affect the surgery - if it is normal we can just be reassured    We don't want any extra bacteria in the blood because it can possibly attach to any tissue trying to heal    Thanks!!

## 2020-07-13 NOTE — TELEPHONE ENCOUNTER
----- Message from [employer] sent at 7/13/2020  4:16 PM EDT -----  Contact: [phone]  Please call pt. She is having surgery next week and [facility] on Tuesday. ..and has only been drinking cranberry juice. .. please call her. .does she need  to come in this week?

## 2020-07-13 NOTE — TELEPHONE ENCOUNTER
Spoke with patient. States she is having pacer inserted at [facility] on 7/21. Patient states thought she had UTI and didn't hear back from [facility] office, so she started drinking cranberry juice last week. States she held her own warfarin doses on Th and F due to cranberry juice consumption. Patient states she resumed warfarin on Sat with 5mg x2. Laurienori Wootene for patient to take 2.5mg warfarin tonight (usual dose) and will have INR checked tomorrow in clinic. Reinforced need for patient to get UA done as ordered by Dr Stacy Arnold.

## 2020-07-13 NOTE — TELEPHONE ENCOUNTER
Patient states she does need the medication anymore because the cranberry juice has seemed to help and no longer experiencing pain. Patient canceled her upcoming appointment on 7/20/2020 due to leaving for [place] to have surgery. Patient needs to have a covid test completed in order to have surgery and needs Dr. Emiliana Mabry to order it.

## 2020-07-14 ENCOUNTER — HOSPITAL ENCOUNTER (OUTPATIENT)
Age: 70
Discharge: HOME | End: 2020-07-14
Payer: MEDICARE

## 2020-07-14 ENCOUNTER — HOSPITAL ENCOUNTER (OUTPATIENT)
Dept: PHARMACY | Age: 70
Setting detail: THERAPIES SERIES
Discharge: HOME | End: 2020-07-14
Payer: MEDICARE

## 2020-07-14 VITALS — TEMPERATURE: 98.4 F

## 2020-07-14 LAB
BILIRUBIN URINE: NEGATIVE
BLOOD, URINE: NEGATIVE
CHARACTER, URINE: CLEAR
COLOR: YELLOW
GLUCOSE URINE: NEGATIVE MG/DL
KETONES, URINE: NEGATIVE
LEUKOCYTE ESTERASE, URINE: NEGATIVE
NITRITE, URINE: NEGATIVE
PH UA: 5 (ref 5–9)
POC INR: 2.8 (ref 0.8–1.2)
PROTEIN UA: NEGATIVE
SPECIFIC GRAVITY, URINE: 1 (ref 1–1.03)
UROBILINOGEN, URINE: 0.2 EU/DL (ref 0–1)

## 2020-07-14 PROCEDURE — 99211 OFF/OP EST MAY X REQ PHY/QHP: CPT

## 2020-07-14 PROCEDURE — 36416 COLLJ CAPILLARY BLOOD SPEC: CPT

## 2020-07-14 PROCEDURE — 81003 URINALYSIS AUTO W/O SCOPE: CPT

## 2020-07-14 PROCEDURE — 85610 PROTHROMBIN TIME: CPT

## 2020-07-14 NOTE — PROGRESS NOTES
Medication Management [address]  [phone] (phone)  [phone] (fax)      Ms. Alma Rosa Quevedo is a 72 y.o.  female with history of Afib who presents today for anticoagulation monitoring and adjustment. Patient verifies current dosing regimen and tablet strength. Patient held medication on Friday (in addition to [address] per clinic) d/t concern for high INR  Patient denies s/s bleeding/bruising/swelling/SOB/chest pain  No blood in urine or stool. Reports drinking several 32 oz bottles of cranberry juice 7/7-7/13 for kidney infection pain  No changes in medication/OTC agents/Herbals. No change in alcohol use or tobacco use. No change in activity level. Patient denies headaches/dizziness/lightheadedness/falls. Patient states she has a kidney infection/UTI - planning to get evaluated today  Pacemaker placement scheduled for 7/21; Hold instructions per cardiologist: stop warfarin on Saturday 7/18     Assessment:   Lab Results   Component Value Date    INR 2.80 (H) 07/14/2020    INR 3.70 (H) 07/09/2020    INR 2.70 (H) 06/30/2020     INR therapeutic   Recent Labs     07/14/20  1318   INR 2.80*       Plan:  Continue Coumadin 2.5mg MoWeFri and 5mg SuTuThSa. Hold warfarin 7/18-7/21 prior to pacemaker procedure per cardiologist. Rachel Liu patient to take boosted dose of 5mg on 7/22 or follow discharge instructions for warfarin if given. Recheck INR in 2 weeks (one week after warfarin is resumed). Patient reminded to call the Anticoagulation Clinic with any signs or symptoms of bleeding or with any medication changes (including if any antibiotics are started). Patient given instructions utilizing the teach back method. Discharged ambulatory in no apparent distress. After visit summary printed and reviewed with patient.       Medications reviewed and updated on home medication list Yes    Influenza vaccine:     [] given    [x] declined   [x] received previously   [] plans to receive at a later time   [] refused    [] documented in [address]: [address]: Yes  Total # of Interventions Recommended: 0  - Maintenance Safety Lab Monitoring #: 1  Total Interventions Accepted: 0  Time Spent (min): 100[facility], PharmD  [address]

## 2020-07-18 ENCOUNTER — HOSPITAL ENCOUNTER (OUTPATIENT)
Age: 70
Discharge: HOME | End: 2020-07-18
Payer: MEDICARE

## 2020-07-18 PROCEDURE — U0003 INFECTIOUS AGENT DETECTION BY NUCLEIC ACID (DNA OR RNA); SEVERE ACUTE RESPIRATORY SYNDROME CORONAVIRUS 2 (SARS-COV-2) (CORONAVIRUS DISEASE [COVID-19]), AMPLIFIED PROBE TECHNIQUE, MAKING USE OF HIGH THROUGHPUT TECHNOLOGIES AS DESCRIBED BY CMS-2020-01-R: HCPCS

## 2020-07-20 ENCOUNTER — APPOINTMENT (OUTPATIENT)
Dept: PHARMACY | Age: 70
End: 2020-07-20
Payer: MEDICARE

## 2020-07-21 LAB — SARS-COV-2: NOT DETECTED

## 2020-07-23 ENCOUNTER — TELEPHONE (OUTPATIENT)
Dept: FAMILY MEDICINE CLINIC | Age: 70
End: 2020-07-23

## 2020-07-23 NOTE — TELEPHONE ENCOUNTER
Last visit- 6/18/2020  Next visit- 9/9/2020  Last fill- 1/3/2020    Requested Prescriptions     Pending Prescriptions Disp Refills    lisinopril (PRINIVIL;ZESTRIL) 10 MG tablet 90 tablet 1     Sig: Take 1 tablet by mouth daily

## 2020-07-24 RX ORDER — LISINOPRIL 10 MG/1
5 TABLET ORAL DAILY
Qty: 90 TABLET | Refills: 1 | Status: SHIPPED | OUTPATIENT
Start: 2020-07-24 | End: 2021-08-10 | Stop reason: SDUPTHER

## 2020-07-29 ENCOUNTER — APPOINTMENT (OUTPATIENT)
Dept: PHARMACY | Age: 70
End: 2020-07-29
Payer: MEDICARE

## 2020-07-29 ENCOUNTER — TELEPHONE (OUTPATIENT)
Dept: PHARMACY | Age: 70
End: 2020-07-29

## 2020-07-29 NOTE — TELEPHONE ENCOUNTER
Patient called to inform us that her pacemaker surgery was rescheduled from July 21st to August 6th due to issues with obtaining her COVID testing. Patient was also scheduled to come in to clinic today, but she wanted to cancel her appt because she is currently in afib. Per patient, her cardiologist instructed her to stay in and rest when she goes into afib so that is her plan for today. Patient states she held coumadin the 18th and 19th in preparation for her surgery and then found out on the 20th that it was cancelled. Therefore, she took Coumadin 5 mg on July 20th and 21st and then resumed her previous regimen of Coumadin 2.5 mg MWF, 5 mg TTHSS. Instructed patient to HOLD August 3rd - August 6th, Coumadin 5 mg on August 7th then resume Coumadin 2.5 mg MWF, 5 mg TThSS. Rescheduled appt for August 12th.     Rick Grajeda, PharmD, BCPS  7/29/2020  11:38 AM

## 2020-07-30 ENCOUNTER — APPOINTMENT (OUTPATIENT)
Dept: PHARMACY | Age: 70
End: 2020-07-30
Payer: MEDICARE

## 2020-08-12 ENCOUNTER — HOSPITAL ENCOUNTER (OUTPATIENT)
Dept: PHARMACY | Age: 70
Setting detail: THERAPIES SERIES
Discharge: HOME | End: 2020-08-12
Payer: MEDICARE

## 2020-08-12 VITALS — TEMPERATURE: 98 F

## 2020-08-12 PROBLEM — Z95.0 PACEMAKER: Status: ACTIVE | Noted: 2020-08-12

## 2020-08-12 LAB — POC INR: 3 (ref 0.8–1.2)

## 2020-08-12 PROCEDURE — 36416 COLLJ CAPILLARY BLOOD SPEC: CPT

## 2020-08-12 PROCEDURE — 99211 OFF/OP EST MAY X REQ PHY/QHP: CPT

## 2020-08-12 PROCEDURE — 85610 PROTHROMBIN TIME: CPT

## 2020-08-26 ENCOUNTER — HOSPITAL ENCOUNTER (OUTPATIENT)
Dept: PHARMACY | Age: 70
Setting detail: THERAPIES SERIES
Discharge: HOME | End: 2020-08-26
Payer: MEDICARE

## 2020-08-26 VITALS — TEMPERATURE: 98.3 F

## 2020-08-26 LAB — POC INR: 4.5 (ref 0.8–1.2)

## 2020-08-26 PROCEDURE — 85610 PROTHROMBIN TIME: CPT | Performed by: PHARMACIST

## 2020-08-26 PROCEDURE — 99211 OFF/OP EST MAY X REQ PHY/QHP: CPT | Performed by: PHARMACIST

## 2020-08-26 PROCEDURE — 36416 COLLJ CAPILLARY BLOOD SPEC: CPT | Performed by: PHARMACIST

## 2020-08-26 RX ORDER — METOPROLOL SUCCINATE 25 MG/1
25 TABLET, EXTENDED RELEASE ORAL 2 TIMES DAILY
COMMUNITY

## 2020-08-26 RX ORDER — WARFARIN SODIUM 5 MG/1
TABLET ORAL
Qty: 30 TABLET | Refills: 1 | Status: SHIPPED | OUTPATIENT
Start: 2020-08-26 | End: 2020-12-16 | Stop reason: SDUPTHER

## 2020-08-26 NOTE — PROGRESS NOTES
Medication Management [address]  [phone] (phone)  [phone] (fax)      Ms. Puja Nguyen is a 72 y.o.  female with history of Afib who presents today for anticoagulation monitoring and adjustment. Patient verifies current dosing regimen and tablet strength. No missed or extra doses. Patient denies s/s bleeding/bruising/swelling/SOB/chest pain  No blood in urine or stool. No dietary changes. No changes in medication/OTC agents/Herbals. Verified new medication from last visit was metoprolol succinate 25mg daily (not metoprolol tartrate). Patient started school back up and started using aspercreme on her left shoulder a few days and taking tylenol arthritis at night. She also mentioned she started taking MSM 2-3 tabs every morning. No change in alcohol use or tobacco use. Change in activity level. More active   Patient denies headaches/dizziness/lightheadedness/falls. No vomiting/diarrhea or acute illness. No Procedures scheduled in the future at this time. Assessment:   Lab Results   Component Value Date    INR 4.50 (H) 08/26/2020    INR 3.00 (H) 08/12/2020    INR 2.80 (H) 07/14/2020     INR supratherapeutic   Recent Labs     08/26/20  1114   INR 4.50*       Plan:  Hold today (W 8/26), Coumadin 2.5mg tomorrow (Th 8/27) then continue Coumadin 2.5mg MWF 5mg TuThSS. Recheck INR in 1 week. Instructed patient to stop taking MSM for this week to see if that is having an effect on increased INR. Patient voiced understanding and agreed with the plan. If still high at next appointment may need to decrease maintenance dose regimen. Patient reminded to call the Anticoagulation Clinic with any signs or symptoms of bleeding or with any medication changes. Patient given instructions utilizing the teach back method. Refill called into [facility] in [place]. Discharged ambulatory in no apparent distress. After visit summary printed and reviewed with patient. Medications reviewed and updated on home medication list Yes    Influenza vaccine:     [] given    [x] declined   [] received previously   [] plans to receive at a later time   [x] refused    [] documented in [address]: Yes  Total # of Interventions Recommended: 3  - Decreased Dose #: 1  - Refills Provided #: 1  - Maintenance Safety Lab Monitoring #: 1  Total Interventions Accepted: 3  Time Spent (min): Shanti Norman 0433 Nomi Solomon, PharmD

## 2020-09-02 ENCOUNTER — APPOINTMENT (OUTPATIENT)
Dept: PHARMACY | Age: 70
End: 2020-09-02
Payer: MEDICARE

## 2020-09-03 ENCOUNTER — HOSPITAL ENCOUNTER (OUTPATIENT)
Dept: PHARMACY | Age: 70
Setting detail: THERAPIES SERIES
Discharge: HOME | End: 2020-09-03
Payer: MEDICARE

## 2020-09-03 VITALS — TEMPERATURE: 97.2 F

## 2020-09-03 LAB — POC INR: 4.8 (ref 0.8–1.2)

## 2020-09-03 PROCEDURE — 36416 COLLJ CAPILLARY BLOOD SPEC: CPT

## 2020-09-03 PROCEDURE — 99211 OFF/OP EST MAY X REQ PHY/QHP: CPT

## 2020-09-03 PROCEDURE — 85610 PROTHROMBIN TIME: CPT

## 2020-09-08 ENCOUNTER — NURSE ONLY (OUTPATIENT)
Dept: LAB | Age: 70
End: 2020-09-08

## 2020-09-08 LAB
ALBUMIN SERPL-MCNC: 3.7 G/DL (ref 3.5–5.1)
ALP BLD-CCNC: 66 U/L (ref 38–126)
ALT SERPL-CCNC: 15 U/L (ref 11–66)
AMYLASE: 42 U/L (ref 20–104)
ANION GAP SERPL CALCULATED.3IONS-SCNC: 12 MEQ/L (ref 8–16)
AST SERPL-CCNC: 22 U/L (ref 5–40)
AVERAGE GLUCOSE: 123 MG/DL (ref 70–126)
BASOPHILS # BLD: 0.9 %
BASOPHILS ABSOLUTE: 0.1 THOU/MM3 (ref 0–0.1)
BILIRUB SERPL-MCNC: 0.5 MG/DL (ref 0.3–1.2)
BILIRUBIN DIRECT: < 0.2 MG/DL (ref 0–0.3)
BUN BLDV-MCNC: 14 MG/DL (ref 7–22)
CALCIUM SERPL-MCNC: 9.3 MG/DL (ref 8.5–10.5)
CHLORIDE BLD-SCNC: 107 MEQ/L (ref 98–111)
CHOLESTEROL, TOTAL: 146 MG/DL (ref 100–199)
CO2: 22 MEQ/L (ref 23–33)
CREAT SERPL-MCNC: 0.8 MG/DL (ref 0.4–1.2)
D-DIMER QUANTITATIVE: 735 NG/ML FEU (ref 0–500)
EOSINOPHIL # BLD: 2.5 %
EOSINOPHILS ABSOLUTE: 0.2 THOU/MM3 (ref 0–0.4)
ERYTHROCYTE [DISTWIDTH] IN BLOOD BY AUTOMATED COUNT: 14.8 % (ref 11.5–14.5)
ERYTHROCYTE [DISTWIDTH] IN BLOOD BY AUTOMATED COUNT: 50.9 FL (ref 35–45)
ESTRADIOL LEVEL: 72.6 PG/ML
FOLLICLE STIMULATING HORMONE: 13.7 MIU/ML (ref 16–160)
GFR SERPL CREATININE-BSD FRML MDRD: 71 ML/MIN/1.73M2
GLUCOSE BLD-MCNC: 106 MG/DL (ref 70–108)
HBA1C MFR BLD: 6.1 % (ref 4.4–6.4)
HCT VFR BLD CALC: 42.3 % (ref 37–47)
HDLC SERPL-MCNC: 59 MG/DL
HEMOGLOBIN: 13.3 GM/DL (ref 12–16)
IMMATURE GRANS (ABS): 0.03 THOU/MM3 (ref 0–0.07)
IMMATURE GRANULOCYTES: 0.4 %
LDL CHOLESTEROL CALCULATED: 77 MG/DL
LIPASE: 26.1 U/L (ref 5.6–51.3)
LUTEINIZING HORMONE: 16.8 MIU/ML (ref 3.3–70.6)
LYMPHOCYTES # BLD: 29.9 %
LYMPHOCYTES ABSOLUTE: 2.3 THOU/MM3 (ref 1–4.8)
MAGNESIUM: 2.2 MG/DL (ref 1.6–2.4)
MCH RBC QN AUTO: 29.4 PG (ref 26–33)
MCHC RBC AUTO-ENTMCNC: 31.4 GM/DL (ref 32.2–35.5)
MCV RBC AUTO: 93.6 FL (ref 81–99)
MONOCYTES # BLD: 7.4 %
MONOCYTES ABSOLUTE: 0.6 THOU/MM3 (ref 0.4–1.3)
NUCLEATED RED BLOOD CELLS: 0 /100 WBC
PLATELET # BLD: 284 THOU/MM3 (ref 130–400)
PMV BLD AUTO: 10.2 FL (ref 9.4–12.4)
POTASSIUM SERPL-SCNC: 4.3 MEQ/L (ref 3.5–5.2)
PROGESTERONE LEVEL: 0.07 NG/ML
PTH INTACT: 37.9 PG/ML (ref 15–65)
RBC # BLD: 4.52 MILL/MM3 (ref 4.2–5.4)
RHEUMATOID FACTOR: < 10 IU/ML (ref 0–13)
SEDIMENTATION RATE, ERYTHROCYTE: 41 MM/HR (ref 0–20)
SEG NEUTROPHILS: 58.9 %
SEGMENTED NEUTROPHILS ABSOLUTE COUNT: 4.5 THOU/MM3 (ref 1.8–7.7)
SODIUM BLD-SCNC: 141 MEQ/L (ref 135–145)
T3 TOTAL: 103 NG/DL (ref 72–181)
TOTAL PROTEIN: 7.7 G/DL (ref 6.1–8)
TRIGL SERPL-MCNC: 52 MG/DL (ref 0–199)
TSH SERPL DL<=0.05 MIU/L-ACNC: 0.17 UIU/ML (ref 0.4–4.2)
URIC ACID: 8.3 MG/DL (ref 2.4–5.7)
VITAMIN D 25-HYDROXY: 43 NG/ML (ref 30–100)
WBC # BLD: 7.7 THOU/MM3 (ref 4.8–10.8)

## 2020-09-10 ENCOUNTER — HOSPITAL ENCOUNTER (OUTPATIENT)
Dept: PHARMACY | Age: 70
Setting detail: THERAPIES SERIES
Discharge: HOME | End: 2020-09-10
Payer: MEDICARE

## 2020-09-10 VITALS — TEMPERATURE: 98 F

## 2020-09-10 LAB
C-PEPTIDE: 3.5 NG/ML (ref 1.1–4.4)
C-REACTIVE PROTEIN, HIGH SENSITIVITY: 18 MG/L
DHEAS (DHEA SULFATE): 101 UG/DL (ref 13–130)
GLIADIN PEPTIDE IGG: 0.5 U/ML
HOMOCYSTEINE: 10.4 UMOL/L
POC INR: 2.5 (ref 0.8–1.2)
THYROID PEROXIDASE ANTIBODY: < 10 IU/ML (ref 0–35)
THYROXINE (T4): 7.4 UG/DL (ref 4.5–10.9)

## 2020-09-10 PROCEDURE — 99211 OFF/OP EST MAY X REQ PHY/QHP: CPT

## 2020-09-10 PROCEDURE — 85610 PROTHROMBIN TIME: CPT

## 2020-09-10 PROCEDURE — 36416 COLLJ CAPILLARY BLOOD SPEC: CPT

## 2020-09-10 NOTE — PROGRESS NOTES
Medication Management [address]   [phone] (phone)  [phone] (fax)      Ms. Annalise John is a 72 y.o.  female with history of atrial fib. , per Dr. Isabel Romo referral, who presents today for Warfarin monitoring and adjustment (1 week visit after decreasing dose by 9%). Patient verifies current dosing regimen and tablet strength. No missed (except as ordered last visit) or extra doses. Patient denies  bleeding/bruising/swelling/SOB/chest pain. No blood in urine or stool. No dietary changes. No changes in medication/OTC agents/herbals. No change in alcohol use or tobacco use. No change in activity level. Patient denies headaches/dizziness/lightheadedness/falls. No vomiting/diarrhea or acute illness. No procedures scheduled in the future at this time. Assessment:   Lab Results   Component Value Date    INR 2.50 (H) 09/10/2020    INR 4.80 (H) 09/03/2020    INR 4.50 (H) 08/26/2020     INR therapeutic - goal 2-3. Recent Labs     09/10/20  1532   INR 2.50*       Plan:  POCT INR ordered/performed/result reviewed. Continue PO Coumadin 5 mg MF, 2.5 mg TWThSS. Recheck INR in 11 day(s). (Report given - orders entered by [facility]., PharmD.)  Patient reminded to call the Anticoagulation Clinic with any signs or symptoms of bleeding or with any medication changes. Patient given instructions utilizing the teach back method. Discharged ambulatory in no apparent distress, wearing mask. After visit summary printed and reviewed with patient. Medications reviewed and updated on home medication list.    Influenza vaccine:     [] given    [] declined   [x] received previously - 2 days ago.    [] plans to receive at a later time   [] refused    [x] documented in Epic

## 2020-09-11 LAB — ANA SCREEN: NORMAL

## 2020-09-12 LAB
DHEA UNCONJUGATED: 2.05 NG/ML (ref 0.63–4.7)
TESTOSTERONE, FREE W SHGB, FEMALES/CHILDREN: NORMAL

## 2020-09-16 ENCOUNTER — OFFICE VISIT (OUTPATIENT)
Dept: FAMILY MEDICINE CLINIC | Age: 70
End: 2020-09-16
Payer: MEDICARE

## 2020-09-16 VITALS
BODY MASS INDEX: 48.3 KG/M2 | OXYGEN SATURATION: 98 % | WEIGHT: 272.6 LBS | HEART RATE: 61 BPM | TEMPERATURE: 97 F | DIASTOLIC BLOOD PRESSURE: 64 MMHG | HEIGHT: 63 IN | RESPIRATION RATE: 12 BRPM | SYSTOLIC BLOOD PRESSURE: 122 MMHG

## 2020-09-16 PROCEDURE — 99214 OFFICE O/P EST MOD 30 MIN: CPT | Performed by: FAMILY MEDICINE

## 2020-09-16 NOTE — PATIENT INSTRUCTIONS
Thank you   1. Thank you for trusting us with your healthcare needs. You may receive a survey regarding today's visit. It would help us out if you would take a few moments to provide your feedback. We value your input. 2. Please bring in ALL medications BOTTLES, including inhalers, herbal supplements, over the counter, prescribed & non-prescribed medicine. The office would like actual medication bottles and a list.   3. Please note our OFFICE POLICIES:   a. Prior to getting your labs drawn, please check with your insurance company for benefits and eligibility of lab services. Often, insurance companies cover certain tests for preventative visits only. It is patient's responsibility to see what is covered. b. We are unable to change a diagnosis after the test has been performed. c. Lab orders will not be re-printed. Please hold onto your original lab orders and take them to your lab to be completed. d. If you no show your scheduled appointment three times, you will be dismissed from this practice. e. [facility] must be completed 24 hours prior to your schedule appointment. 4. If the list below has been completed, PLEASE FAX RECORDS TO OUR OFFICE @ [phone].  Once the records have been received we will update your records at our office:  Health Maintenance Due   Topic Date Due    Diabetic foot exam  03/07/1960    Diabetic retinal exam  03/07/1960    Diabetic microalbuminuria test  03/07/1969    Breast cancer screen  08/01/2019    Colon cancer screen colonoscopy  06/01/2020

## 2020-09-16 NOTE — PROGRESS NOTES
Health Maintenance Due   Topic Date Due    Diabetic foot exam  03/07/1960    Diabetic retinal exam  [birth year]/07/1960    Diabetic microalbuminuria test  03/07/1969    Breast cancer screen  08/01/2019- has order needs to get it done    Colon cancer screen colonoscopy  06/01/2020-changed to [employer]- needs to get it scheduled- cancelled due to pacemaker

## 2020-09-16 NOTE — PROGRESS NOTES
[address] [facility]. [address] [address]  Dept: [phone]  Dept Fax: [phone]  Loc: [phone]      Therese Johnson is a 72 y.o. White female. Jose Kruger  presents to the [facility] today for   Chief Complaint   Patient presents with    Discuss Labs    Bradycardia     had pacemaker 2020    Leg Pain     in thigh   ,  and;   1. HTN, goal below 140/80    2. Dysuria    3. New onset atrial fibrillation (HCC)    4. COVID-19 virus vaccine not available    5. Essential hypertension    6. Hypothyroidism due to Hashimoto's thyroiditis    7. Paroxysmal atrial fibrillation (HCC)    8. New onset type 2 diabetes mellitus ([address]    9. Anxiety    10. Gastroesophageal reflux disease without esophagitis    11. SVT (supraventricular tachycardia) ([address]    12. Other intestinal malabsorption      I have reviewed Therese Johnson medical, surgical and other pertinent history in detail, and have updated medication and allergy information in the computerized patientrecord. Clinical Care Team:     -Referring Provider for today's consult: Self Referred  -Primary Care Provider: Jaclyn Young DO    Medical/Surgical History:   She  has a past medical history of A-fib [facility]), Atrial fibrillation ([address] Carpal tunnel syndrome, GERD (gastroesophageal reflux disease), Headache(784.0), HTN (hypertension), Hypothyroidism, Osteoarthritis, Sleep apnea, Tendonitis of both wrists, and Toenail fungus. Her  has a past surgical history that includes Carpal tunnel release; hernia repair;  section;  section;  section; Foot surgery (Left, 2018); joint replacement (Left, 2010); and joint replacement (Right, 2016).     Family/Social History:     Her family history includes Arrhythmia in her mother and sister; Cancer in her brother; Coronary Art Dis in her brother and mother; Diabetes in her father; Early Death in her child; Heart Attack in her brother; Heart Disease in her mother; Other in her brother, mother, and sister; Pacemaker in her sister; Maria Teresa Grumet in her child; Stroke in her mother. She  reports that she has never smoked. She has never used smokeless tobacco. She reports current alcohol use. She reports that she does not use drugs. Medications/Allergies/Immunizations:     Her current medication(s) include   Current Outpatient Medications:     Calcium Carbonate (SM CALCIUM 600 PO), Take 1 tablet by mouth daily, Disp: , Rfl:     vitamin C (ASCORBIC ACID) 500 MG tablet, Take 500 mg by mouth daily, Disp: , Rfl:     metoprolol succinate (TOPROL XL) 25 MG extended release tablet, Take 25 mg by mouth daily, Disp: , Rfl:     warfarin (COUMADIN) 5 MG tablet, As directed by  [facility] #30 tabs = 30 day supply, Disp: 30 tablet, Rfl: 1    lisinopril (PRINIVIL;ZESTRIL) 10 MG tablet, Take 0.5 tablets by mouth daily, Disp: 90 tablet, Rfl: 1    metFORMIN (GLUCOPHAGE-XR) 500 MG extended release tablet, Take 1 tablet by mouth daily (with breakfast), Disp: 30 tablet, Rfl: 5    glucose monitoring kit (FREESTYLE) monitoring kit, 1 kit by Does not apply route daily, Disp: 1 kit, Rfl: 0    Lancets MISC, 1 each by Does not apply route daily, Disp: 300 each, Rfl: 1    blood glucose test strips (ASCENSIA AUTODISC VI;ONE TOUCH ULTRA TEST VI) strip, 1 each by In Vitro route daily Dispense any brand - check blood sugar As needed. , Disp: 100 each, Rfl: 3    NONFORMULARY, Take 1 capsule by mouth 4 times daily (before meals and nightly) Christopher, Disp: , Rfl:     liothyronine (CYTOMEL) 5 MCG tablet, take 1 tablet by mouth once daily, Disp: 90 tablet, Rfl: 3    levothyroxine (SYNTHROID) 100 MCG tablet, take 1 tablet by mouth once daily, Disp: 90 tablet, Rfl: 3    loratadine (CLARITIN) 10 MG tablet, Take 1 tablet by mouth 2 times daily (before meals) (Patient taking differently: Take 10 mg by mouth as needed Data:   Lab results were searched in [facility] and/or those brought by the pateint were reviewed today with Radha Maguire and she has a copy of their most recent labs to take home with them as notedbelow;       Imaging Data:   Imaging Data:       Assessment & Plan:       Impression:  1. HTN, goal below 140/80    2. Dysuria    3. New onset atrial fibrillation (HCC)    4. COVID-19 virus vaccine not available    5. Essential hypertension    6. Hypothyroidism due to Hashimoto's thyroiditis    7. Paroxysmal atrial fibrillation (HCC)    8. New onset type 2 diabetes mellitus ([address]    9. Anxiety    10. Gastroesophageal reflux disease without esophagitis    11. SVT (supraventricular tachycardia) ([address]    12. Other intestinal malabsorption    13. Blood glucose elevated      Assessment and Plan:  After reviewing the patients chief complaints, reviewing their labfindings in great detail (with the patient and those accompanying them) which correlate to their chief complaints, symptoms, and or medical conditions; suggestions were made relating to changes in diet and or supplementswhich may improve the complaints and which will be reflected in their future lab findings; Chief Complaint   Patient presents with    Discuss Labs    Bradycardia     had pacemaker 8/6/2020    Leg Pain     in thigh   ;    Plans for the next visits:  - Abnormal and non-optimal Labs were ordered today to be repeated in the next 120-365 days to assess changes from adjustments in nutrition and or nutrients.    - Patient instructed when having ablood draw to ask the  to divide their lab draws into multiple draws over several days if not feeling good at the time of the lab draw or if either prefers to do several smaller blood draws over several days  -Patient instructed to check with insurer before each lab draw and to to to the lab which the insurer directs them for the most cost effective lab draw with the least patient's cost  - Radha Maguire  will be scheduled subsequentto those results. Mayo Freitas will bring in her drink and food log to her next visit    Chronic Problems Addressed on this Visit:                                   1.  Intensity of Service; Uncontrolled items at this visit; Chief Complaint   Patient presents with    Discuss Labs    Bradycardia     had pacemaker 8/6/2020    Leg Pain     in thigh   ; Improved items at this visit; Stable items atthis visit;  2. Patients food and drinks were reviewed with the patient,       - Jose Kruger will bring food+drink symptom log to next visit for inclusion in their record      - 75 better food list reviewed & given topatient with the omega 6 food list to avoid         - Gluten in corn and oats abstracts sheet reviewed and given to the patient today   3. Greater than 25 minutes were spent face to face on this visit of which >50% was for counseling and coordination of care. Patients food and drinks were reviewed with thepatient,   - they will bring a food drink symptom log to future visits for inclusion in their record    - 75 better food list reviewed & given to patient along with the omega 6 food list to avoid      - Glutenin corn and oats abstracts sheet reviewed and given to the patient today    - 23 Foods containing Latex-like proteins was reviewed and copy to be taken if desired     - Nutrient Supplements list provided and copyto be taken if desired    - Doctor kinetic. [employer] web site offered to patient to review at their convenience by staff with login information    Note:  I have discussed with the patient that with all nutraceuticals, there is often mixed data and emerging research which needs to be monitored; as well as an array of NIHfact sheets on nutrients and supplements. If I have recommended cinnamon at the request of this patient to assist them in control of their blood sugar, triglyceride and or weight issues.   I discussed that thepatient's clinical use of cinnamon bark, calcium, magnesium, Vitamin D and pharmaceutical grade CVS #660337 fish oil or triple-strength fish oil, and B-75 two phase time-released B complex by Fernanda Gonzalez will be for atime-limited trial to determine their individual effectiveness and safety in this patient. I also referred the patient to the NMCD: Nutrition, Metabolism, and Cardiovascular Diseases (journal) and concerns about long-termuse and hepatotoxicity of cinnamon and other nutrients and suggest they frequently search nih.gov for the latest non-proprietary information on nutriceuticals as well as consider a subscription to [employer] fordetails on reviewed supplements, or at the least review the nutrient files at [address] at [facility], [employer], an insulin mimetic, reduces some High Carbohydrate Dietary Impacts. Methylhydroxychalcone polymers insulin-enhancing properties in fat cells are responsible for enhanced glucose uptake, inhibiting hepatic HMG-CoA reductase and lowers lipids. www.jacn. org/content/20/4/327.full     But cinnamon with additivessuch as Cinnamon Extract are not effective as insulin mimetics.  :eStoreDirectory.at     Nutrients for Start up from [employer] or [employer] for ease to get started now ;  Sourav Contreras has some useable products;  - Triple Strength Fish Oil, enteric coated  - Vit D 3 5000 IU gel caps  - Iron ferrous sulfat 325 mg tabs  - Centrum Silver look-a-like for most patients, or  - Centrum plain look-a-like if need iron    Localpharmacies or chains such as [facility], [employer], [employer], have;  - Triple Strength Fish Oil (enteric coated ifavailable) or    If not enteric coated, can take from freezer for less burps  - B-50 or B-100 released balanced B complex tabs  - Cinnamon bark 500 mg (without Chromium or extracts)   some brands list 1000 mg / serving of 2 capsules,    some brands have 1000 mg caps with the undesireable chromium / extract  - Calcium carbonate/citrate, magnesium oxide/citrate, Vit D 3  as 3-4 tabs/caps/serving     Some Local Brands may contain Zincwhich is acceptable for the first bottle or two  - Magnesium oxide 250 mg tabs for those having < 2 bowel movements daily  - Magnesium citrate 200 mg if having > 2bowel movement/day  - Centrum Silver or look-a-like for most patients, Centrum plain or look-a-like with iron  - Vitamin D-3 comes as 1,000 IU or 2,000 IU or 5,000 IU gel caps or Liquid drops      Some brands containing or derived from soy oil or corn oil are OK if not allergic to soy  - Elemental Iron 65 mg tabsat bedtime is available over the counter if need more iron     Usually turns bowel movements grey, green or black but not a concern  - Apricot Kernel Oil (by Now) for dry skin sensitive perineal or perianal area skin    Nutrients for ongoing use by Mail order for less expense from www. MediaPhy ;  - Strength Fish Oil , 240 Softgels Item #091646  -B-100 time released balanced B complex Item #244240  - Cinnamon bark 500 mg without Chromium or extract Item #628872  - Calcium carbonate 1000 mg, Magnesium oxide 500 mg, Vit D 3  400 IU Item #594818  - Magnesium oxide 500 mg tabs Item #021214 if less than 2 bowel movements daily  - ABC Seniors Item #047125 for mostpatients, One Daily Item #327388 with iron  - Vit D 3  1,000 Item #817960      2,000 IU Item #934465  ,000 IU Item #494223     Some brands containing orderived from soy oil or corn oil are OK if not allergic to soy    Nutrients for Special Needs by Gilma Benoit for less expense from www. puritan.com ;  -Elemental Iron 65 mg tabs Item #253473 if need more iron for low iron on labs    Usually turns bowel movements grey, green or black but not a concern  - Time released Niacin 250 mg Item #607729 for cold intolerance, low libido or impotence  - DHEA 50 mg Item #994352 for improving DHEA levels on labs if having Fatigue    If stools too loose substitute for your Magnesium oxide using;   Magnesium citrate 200 mg tabs(NOT liquid) at [employer]   Magnesium gluconate 550 mgby Liban at Ecomsual. com or amazon. com  Magnesium chloride foot soaks or body sprays  www.Clarizen   Magnesium chloride flakes 14.99 Item #: IML701 if Backordered get spray    Food Drink Symptom Log;  I asked this patient to track these items and any other symptoms on their list on a weekly basis to documenttheir progress or lack of same. This can be done on the symptom tracking sheet I gave them at today's visit but looks like this:                                                      Rate on scale of 0-10 with zero = notnoticeable  Symptom:                            Week 1               2                 3                 4               Etc            Hair loss    Foot cramps    Paresthesia    Aches    IBS (irritable bowel)    Constipation    Diarrhea  Nocturia    (up to bathroom at night)    Fatigue/Energy level  Stress      On the other side of the sheet they can track their food, drink, environment, activity, symptoms etc      Avoiding Latex-like proteins inmy foods; Avocados, Bananas, Celery, Figs & Kiwi proteins have latex-like proteins to inflame our immunesystems  How Can I Have A Latex Allergy? Eating foods with latex-like protein exposes us to latex allergies. Our body cannot tell the differencebetween these latex-like proteins and latex from rubber products since many people are allergic to fruit, vegetables and latex. Read labels on pre-packaged foods. This list to avoid is only a guide if you are known allergicto latex or have a latex rash on your chin, cheeks and lines on your neck and chest. The amount of latex is different in each food product or fruit variety. to Avoid out of Season if not grown locally: Melon, Nectarine, Papaya, Cherry, Passion fruit, Plum, Chestnuts, and Tomato.  Avocado, Banana, Celery, Figs, and Kiwi always contain Latex-like protein. Whats in Season? Strawberries taste better in June than December because June is strawberry season so buy locally grown produce \"in season\" for the best flavor, cost and less Latex. Locally grown produce notonly tastes great requires little of no ethylene exposure in food distribution so has less latex content. Out of season, use canned, frozen or dried sinceprocessed ripe and are latex lower!!!   Month     [place] LocallyGrown Produce  January, February, March: use canned, frozen or dried fruits since lower in latex  April; asparagus, radishes  May; asparagus, broccoli, green onions, greens, peas, radishes,rhubarb  June; asparagus, beets, beans, broccoli, cabbage, cantaloupe, carrots, green onions, greens, lettuce,onions, parsley, peas, radishes, rhubarb, strawberries, watermelons  July; beans, beets, blueberries,broccoli, cabbage, cantaloupe, carrots, cauliflower, celery, cucumbers, eggplant, grapes, green onions, greens, lettuce, onions, parsley, peas, peaches, bell peppers, potatoes, radishes, summer raspberries, squash, sweetcorn, tomatoes, turnips, watermelons  August; apples, beans, beets, blueberries, cabbage, cantaloupe, carrots,cauliflower, celery, cucumbers, eggplant, grapes, green onions, greens, lettuce, onions, parsley, peas, peaches, pears, bell peppers, potatoes, radishes, squash, sweet corn, tomatoes, turnips, watermelons  September; apples, beans, beets, blueberries, cabbage, cantaloupe, carrots, cauliflower, celery, cucumbers, eggplant, grapes,green onions, greens, lettuce, onions, parsley, peas, peaches, pears, bell peppers, plums, potatoes, pumpkins, radishes, fall red raspberries, squash, sweet corn, tomatoes, turnips, watermelons  October; apples, beets, broccoli, cabbage, carrots, cauliflower, celery, green onions, greens, lettuce, parsley, peas, pears, potatoes,pumpkins, radishes, fall red raspberries, squash, turnips  November; broccoli, cabbage, carrots, parsley,pears, peas  December: use canned, frozen ordried fruits since lower in latex    Upto half of latex-sensitive patients show allergic reactions to fruits (avocados, bananas, kiwifruits, papayas, peaches),   Annals of Allergy, 1994. These plants contain the same proteins that are allergens in latex. People with fruit allergies should warn physicians beforeundergoing procedures which may cause anaphylactic reaction if in contact with latex gloves. Some of the common foods with defined cross-reactivity to latexare avocado, banana, kiwi, chestnut, raw potato, tomato,stone fruits (e.g., peach, cherry), hazelnut, melons, celery, carrot, apple, pear, papaya, and almond. Foods with less well-defined cross-reactivity to latex are peanuts, peppers, citrus fruits, coconut, pineapple, naveed,fig, passion fruit, Ugli fruit, and grape    This fruit/latex cross-reactivity is worsened by ethylene, a gas used to hasten commercial ripening. In nature, plants produce low levels of the hormone ethylene, which regulates germination, flowering, and ripening. Forced ripening by high ethyleneconcentrations, plants produce allergenic wound-repair proteins, which are similar to wound-repair proteins made during the tapping of rubber trees. Sensitive individualswho ingest the fruit get a higher dose and worse reaction. Some people may even first become sensitized to latex through fruit. Can food processing increase theconcentrations of allergenic proteins? Latex-sensitized children (and adults) in [place] often experience allergic reactions after eating bananas ripenedartificially with ethylene. In the [place], food distribution centers treat unripe bananas and other produce with ethylene to ripen; not commonly done in [facility] since fruit is tree-ripened there. Does treatmentof food with ethylene induce banana proteins that cross-react with latex?  (Miguel Angel et al.    References:   Latex in Foods Allergy, http://ehp.niehs.nih.gov/members/2003/5811/5811.html    Search web for [employer] in Season \" for whereyou live or are at the time you food shop  www.nutritioncouncil.org/pdf/healthy/SeasonalProduce. pdf ,   Management of Latex, ://medicalcenter. [employer].edu/  search for latex

## 2020-09-21 ENCOUNTER — HOSPITAL ENCOUNTER (OUTPATIENT)
Dept: PHARMACY | Age: 70
Setting detail: THERAPIES SERIES
Discharge: HOME | End: 2020-09-21
Payer: MEDICARE

## 2020-09-21 VITALS — TEMPERATURE: 97.3 F

## 2020-09-21 LAB — POC INR: 2.8 (ref 0.8–1.2)

## 2020-09-21 PROCEDURE — 36416 COLLJ CAPILLARY BLOOD SPEC: CPT

## 2020-09-21 PROCEDURE — 85610 PROTHROMBIN TIME: CPT

## 2020-09-21 PROCEDURE — 99211 OFF/OP EST MAY X REQ PHY/QHP: CPT

## 2020-09-21 NOTE — PROGRESS NOTES
Medication Management [address]  [phone] (phone)  [phone] (fax)      Ms. Chandler Rouse is a 72 y.o.  female with history of Afib who presents today for anticoagulation monitoring and adjustment. Patient verifies current dosing regimen and tablet strength. No missed or extra doses. Patient denies s/s bleeding/bruising/swelling/SOB/chest pain  No blood in urine or stool. No dietary changes. No changes in medication/OTC agents/Herbals. No change in alcohol use or tobacco use. No change in activity level. Patient denies headaches/dizziness/lightheadedness/falls. No vomiting/diarrhea or acute illness. No Procedures scheduled in the future at this time. Colonoscopy upcoming - not scheduled    Assessment:   Lab Results   Component Value Date    INR 2.80 (H) 09/21/2020    INR 2.50 (H) 09/10/2020    INR 4.80 (H) 09/03/2020     INR therapeutic   Recent Labs     09/21/20  1349   INR 2.80*     Plan:  Continue Coumadin 5mg MoFr and 2.5mg SuTuWeThSa. Recheck INR in 2 week(s). Patient reminded to call the Anticoagulation Clinic with any signs or symptoms of bleeding or with any medication changes. Patient given instructions utilizing the teach back method. Discharged ambulatory in no apparent distress. After visit summary printed and reviewed with patient.       Medications reviewed and updated on home medication list Yes    Influenza vaccine:     [] given    [x] declined   [x] received previously   [] plans to receive at a later time   [] refused    [x] documented in [address]: [address]: Yes  Total # of Interventions Recommended: 0  - Maintenance Safety Lab Monitoring #: 1  Total Interventions Accepted: 0  Time Spent (min): [facility], PharmD  [address]

## 2020-10-06 ENCOUNTER — HOSPITAL ENCOUNTER (OUTPATIENT)
Dept: PHARMACY | Age: 70
Setting detail: THERAPIES SERIES
Discharge: HOME | End: 2020-10-06
Payer: MEDICARE

## 2020-10-06 VITALS — TEMPERATURE: 98 F

## 2020-10-06 LAB — POC INR: 2.1 (ref 0.8–1.2)

## 2020-10-06 PROCEDURE — 99211 OFF/OP EST MAY X REQ PHY/QHP: CPT | Performed by: PHARMACIST

## 2020-10-06 PROCEDURE — 85610 PROTHROMBIN TIME: CPT | Performed by: PHARMACIST

## 2020-10-06 PROCEDURE — 36416 COLLJ CAPILLARY BLOOD SPEC: CPT | Performed by: PHARMACIST

## 2020-10-06 RX ORDER — ASCORBIC ACID 500 MG
500 TABLET ORAL DAILY
COMMUNITY

## 2020-10-06 NOTE — PROGRESS NOTES
Medication Management [address]  [phone] (phone)  [phone] (fax)      Ms. Gonzalo Watson is a 72 y.o.  female with history of Afib who presents today for anticoagulation monitoring and adjustment. Patient verifies current dosing regimen and tablet strength. No missed or extra doses. Patient denies s/s bleeding/bruising/swelling/SOB/chest pain  No blood in urine or stool. No dietary changes. Changes in medication/OTC agents/Herbals. vitamin C 500mg daily added  No change in alcohol use or tobacco use. No change in activity level. Patient denies headaches/dizziness/lightheadedness/falls. No vomiting/diarrhea or acute illness. No Procedures scheduled in the future at this time. She wants to schedule colonoscopy soon but has not scheduled yet. Wants to get through pace maker evaluation (going to [place] tomorrow for this)     Assessment:   Lab Results   Component Value Date    INR 2.10 (H) 10/06/2020    INR 2.80 (H) 09/21/2020    INR 2.50 (H) 09/10/2020     INR therapeutic   Recent Labs     10/06/20  1340   INR 2.10*     Third consecutive therapeutic INR    Plan:  Continue Coumadin 5mg MF 2.5 mg TuWThSS. Recheck INR in 3 week(s). Patient reminded to call the Anticoagulation Clinic with any signs or symptoms of bleeding or with any medication changes. Patient given instructions utilizing the teach back method. Discharged ambulatory in no apparent distress. After visit summary printed and reviewed with patient.       Medications reviewed and updated on home medication list Yes    Influenza vaccine:     [] given    [x] declined   [x] received previously   [] plans to receive at a later time   [] refused    [x] documented in [address]:  [facility] [address]: Yes  Total # of Interventions Recommended: 0  - Maintenance Safety Lab Monitoring #: 1  Total Interventions Accepted: 0  Time Spent (min): Alia Miller [address], PharmD

## 2020-10-17 ENCOUNTER — HOSPITAL ENCOUNTER (OUTPATIENT)
Age: 70
Discharge: HOME | End: 2020-10-17
Payer: MEDICARE

## 2020-10-17 LAB
EKG ATRIAL RATE: 60 BPM
EKG Q-T INTERVAL: 430 MS
EKG QRS DURATION: 106 MS
EKG QTC CALCULATION (BAZETT): 430 MS
EKG R AXIS: 5 DEGREES
EKG T AXIS: 28 DEGREES
EKG VENTRICULAR RATE: 60 BPM

## 2020-10-17 PROCEDURE — 93005 ELECTROCARDIOGRAM TRACING: CPT | Performed by: NURSE PRACTITIONER

## 2020-10-27 ENCOUNTER — HOSPITAL ENCOUNTER (OUTPATIENT)
Dept: PHARMACY | Age: 70
Setting detail: THERAPIES SERIES
Discharge: HOME | End: 2020-10-27
Payer: MEDICARE

## 2020-10-27 VITALS — TEMPERATURE: 97.2 F

## 2020-10-27 LAB — POC INR: 2.4 (ref 0.8–1.2)

## 2020-10-27 PROCEDURE — 99211 OFF/OP EST MAY X REQ PHY/QHP: CPT | Performed by: PHARMACIST

## 2020-10-27 PROCEDURE — 85610 PROTHROMBIN TIME: CPT | Performed by: PHARMACIST

## 2020-10-27 PROCEDURE — 36416 COLLJ CAPILLARY BLOOD SPEC: CPT | Performed by: PHARMACIST

## 2020-10-27 RX ORDER — FLECAINIDE ACETATE 100 MG/1
100 TABLET ORAL 2 TIMES DAILY
COMMUNITY

## 2020-10-27 NOTE — PROGRESS NOTES
Medication Management [address]   [phone] (phone)  [phone] (fax)      Ms. Michelle Byers is a 72 y.o.  female with history of Afib who presents today for anticoagulation monitoring and adjustment. Patient verifies current dosing regimen and tablet strength. No missed or extra doses. Patient denies s/s bleeding/bruising/swelling/SOB/chest pain  No blood in urine or stool. No dietary changes. No changes in OTC agents/Herbals. Started Flecainide 100mg BID. No change in alcohol use or tobacco use. No change in activity level. Patient denies headaches/dizziness/lightheadedness/falls. No vomiting/diarrhea or acute illness. No Procedures scheduled in the future at this time. Assessment:   Lab Results   Component Value Date    INR 2.40 (H) 10/27/2020    INR 2.10 (H) 10/06/2020    INR 2.80 (H) 09/21/2020     INR therapeutic   Recent Labs     10/27/20  1328   INR 2.40*         Plan:  Continue Coumadin 5mg MF and 2.5mg TWThSaS. Recheck INR in 3 week(s). Patient reminded to call the Anticoagulation Clinic with any signs or symptoms of bleeding or with any medication changes. Patient given instructions utilizing the teach back method. Discharged ambulatory in no apparent distress. After visit summary printed and reviewed with patient.       Medications reviewed and updated on home medication list Yes    Influenza vaccine:     [] given    [] declined   [x] received previously   [] plans to receive at a later time   [] refused    [x] documented in [address] [facility] [address]: [address]: Yes  Total # of Interventions Recommended: 0  - Maintenance Safety Lab Monitoring #: 1  Total Interventions Accepted: 0  Time Spent (min): 20    Jcarlos Ulrich, DelmarD

## 2020-10-30 ENCOUNTER — HOSPITAL ENCOUNTER (OUTPATIENT)
Age: 70
Discharge: HOME | End: 2020-10-30
Payer: MEDICARE

## 2020-10-30 LAB
EKG ATRIAL RATE: 60 BPM
EKG P-R INTERVAL: 290 MS
EKG Q-T INTERVAL: 448 MS
EKG QRS DURATION: 114 MS
EKG QTC CALCULATION (BAZETT): 448 MS
EKG R AXIS: 100 DEGREES
EKG T AXIS: 66 DEGREES
EKG VENTRICULAR RATE: 60 BPM

## 2020-10-30 PROCEDURE — 93005 ELECTROCARDIOGRAM TRACING: CPT | Performed by: INTERNAL MEDICINE

## 2020-11-03 PROBLEM — I48.91 A-FIB (HCC): Status: RESOLVED | Noted: 2019-07-03 | Resolved: 2020-11-03

## 2020-11-05 ENCOUNTER — HOSPITAL ENCOUNTER (OUTPATIENT)
Dept: WOMENS IMAGING | Age: 70
Discharge: HOME | End: 2020-11-05
Payer: MEDICARE

## 2020-11-05 PROCEDURE — 77063 BREAST TOMOSYNTHESIS BI: CPT

## 2020-11-10 ENCOUNTER — HOSPITAL ENCOUNTER (OUTPATIENT)
Dept: WOMENS IMAGING | Age: 70
Discharge: HOME | End: 2020-11-10
Payer: MEDICARE

## 2020-11-10 PROCEDURE — G0279 TOMOSYNTHESIS, MAMMO: HCPCS

## 2020-11-12 ENCOUNTER — TELEPHONE (OUTPATIENT)
Dept: FAMILY MEDICINE CLINIC | Age: 70
End: 2020-11-12

## 2020-11-13 NOTE — RESULT ENCOUNTER NOTE
Please call patient and ask how she is doing. Please schedule her for an OV to see me or Dr. Leonela Prince. Thank you.

## 2020-11-16 ENCOUNTER — TELEPHONE (OUTPATIENT)
Dept: INTERNAL MEDICINE CLINIC | Age: 70
End: 2020-11-16

## 2020-11-17 ENCOUNTER — HOSPITAL ENCOUNTER (OUTPATIENT)
Dept: PHARMACY | Age: 70
Setting detail: THERAPIES SERIES
Discharge: HOME | End: 2020-11-17
Payer: MEDICARE

## 2020-11-17 VITALS — TEMPERATURE: 97.6 F

## 2020-11-17 LAB — POC INR: 2.3 (ref 0.8–1.2)

## 2020-11-17 PROCEDURE — 36416 COLLJ CAPILLARY BLOOD SPEC: CPT | Performed by: PHARMACIST

## 2020-11-17 PROCEDURE — 85610 PROTHROMBIN TIME: CPT | Performed by: PHARMACIST

## 2020-11-17 PROCEDURE — 99211 OFF/OP EST MAY X REQ PHY/QHP: CPT | Performed by: PHARMACIST

## 2020-11-17 NOTE — PROGRESS NOTES
given    [] declined   [x] received previously   [] plans to receive at a later time   [] refused    [x] documented in Epic      Patient interview completed and discussed with pharmacist by Latrell Contreras, DelmarD Candidate    CLINICAL PHARMACY CONSULT: MED RECONCILIATION/REVIEW Roxana  22. Tracking Only    PHSO: Yes  Total # of Interventions Recommended: 1  - Updated Order #: 1 Updated Order Reason(s):  Other  - Maintenance Safety Lab Monitoring #: 1  Total Interventions Accepted: 1  Time Spent (min): 20    Delmar LoweryD

## 2020-11-23 ENCOUNTER — TELEPHONE (OUTPATIENT)
Dept: FAMILY MEDICINE CLINIC | Age: 70
End: 2020-11-23

## 2020-11-23 NOTE — TELEPHONE ENCOUNTER
Che with [facility] called and left voicemail in regards to patient wanting second opinion/aditional imaging. Patient advised this office that our office would be sending over the previous imaging from [facility]. Unable to send actual imaging. We can send the report. Patient will have to go to main radiology and get a disc with imaging.  Will return [facility] phone call tomorrow for US biopsy referral.

## 2020-11-24 NOTE — TELEPHONE ENCOUNTER
Spoke with Ángel Russell, they had received imaging from [facility] in regards to the mammogram. Patient will consult with Dr. Kalee Chapman for second opinion and have biopsy completed if necessary. Faxed biopsy order to [phone] Attn: Ángel Russell.

## 2020-11-25 ENCOUNTER — TELEPHONE (OUTPATIENT)
Dept: PHARMACY | Age: 70
End: 2020-11-25

## 2020-11-25 NOTE — TELEPHONE ENCOUNTER
Received notification from [address] [place] [address] that patient will be having a needle biopsy on 12/4 that will require a 5 day hold. Patient's indication for anticoagulation is a.fib, CHADS-VASC of 5 (HTN, Age, DM, Vascular Disease, Gender). Dr. Piedad English, would you like patient to be bridged during this warfarin hold?     Thank you,     Electronically signed by Nola Garcia, [address] [place] [address] on 11/25/2020 at 5:32 PM

## 2020-11-25 NOTE — LETTER
ATTN: Pharmacy;  Please dispense with Robby Yusuf Lovenox prescription    Medication Management [facility] Instruction Sheet  Patient:   Guy Bertrand                                          YOB: 1950     Procedure:  Breast biopsy                                          Date of Procedure:  12/4/2020     Day Lovenox AM Lovenox PM Coumadin PM      11/29    none    none    none         11/30    120 mg    120 mg    none         12/1       120 mg    120 mg    none      12/2       120 mg    120 mg    none      12/3       120 mg    none    none      12/4       none    none    none      12/5       none    120 mg    5 mg      12/6       120 mg    120 mg    5 mg      12/7       120 mg    120mg    5 mg     12/8     120 mg   120 mg   2.5 mg     12/9     120 mg   ---   ---                                                                         INR to be checked:  12/9 @ 1:40  Traci Velazquez RPH/11/27/20     Clinical Pharmacist/Date     Any questions please call [facility] at [phone]

## 2020-11-25 NOTE — TELEPHONE ENCOUNTER
Denisse Kramer from [facility] called requesting an order for pt to have a \"stereo tactic guided biopsy of left breast\" Pt is scheduled to have procedure done on 12/4/2020 at [facility]. Please fax order to [phone].

## 2020-12-07 ENCOUNTER — NURSE ONLY (OUTPATIENT)
Dept: LAB | Age: 70
End: 2020-12-07

## 2020-12-07 LAB
AVERAGE GLUCOSE: 123 MG/DL (ref 70–126)
BASOPHILS # BLD: 0.6 %
BASOPHILS ABSOLUTE: 0.1 THOU/MM3 (ref 0–0.1)
CALCIUM SERPL-MCNC: 9.5 MG/DL (ref 8.5–10.5)
D-DIMER QUANTITATIVE: 2452 NG/ML FEU (ref 0–500)
EOSINOPHIL # BLD: 1.9 %
EOSINOPHILS ABSOLUTE: 0.2 THOU/MM3 (ref 0–0.4)
ERYTHROCYTE [DISTWIDTH] IN BLOOD BY AUTOMATED COUNT: 15.4 % (ref 11.5–14.5)
ERYTHROCYTE [DISTWIDTH] IN BLOOD BY AUTOMATED COUNT: 53.2 FL (ref 35–45)
HBA1C MFR BLD: 6.1 % (ref 4.4–6.4)
HCT VFR BLD CALC: 33.5 % (ref 37–47)
HEMOGLOBIN: 10.6 GM/DL (ref 12–16)
IMMATURE GRANS (ABS): 0.05 THOU/MM3 (ref 0–0.07)
IMMATURE GRANULOCYTES: 0.5 %
LYMPHOCYTES # BLD: 21.3 %
LYMPHOCYTES ABSOLUTE: 2.2 THOU/MM3 (ref 1–4.8)
MAGNESIUM: 2.3 MG/DL (ref 1.6–2.4)
MCH RBC QN AUTO: 30 PG (ref 26–33)
MCHC RBC AUTO-ENTMCNC: 31.6 GM/DL (ref 32.2–35.5)
MCV RBC AUTO: 94.9 FL (ref 81–99)
MONOCYTES # BLD: 8.1 %
MONOCYTES ABSOLUTE: 0.8 THOU/MM3 (ref 0.4–1.3)
NUCLEATED RED BLOOD CELLS: 0 /100 WBC
PLATELET # BLD: 260 THOU/MM3 (ref 130–400)
PMV BLD AUTO: 10.6 FL (ref 9.4–12.4)
PTH INTACT: 26.8 PG/ML (ref 15–65)
RBC # BLD: 3.53 MILL/MM3 (ref 4.2–5.4)
SEDIMENTATION RATE, ERYTHROCYTE: 73 MM/HR (ref 0–20)
SEG NEUTROPHILS: 67.6 %
SEGMENTED NEUTROPHILS ABSOLUTE COUNT: 6.9 THOU/MM3 (ref 1.8–7.7)
URIC ACID: 9.2 MG/DL (ref 2.4–5.7)
VITAMIN D 25-HYDROXY: 40 NG/ML (ref 30–100)
WBC # BLD: 10.2 THOU/MM3 (ref 4.8–10.8)

## 2020-12-09 ENCOUNTER — HOSPITAL ENCOUNTER (OUTPATIENT)
Dept: PHARMACY | Age: 70
Setting detail: THERAPIES SERIES
Discharge: HOME | End: 2020-12-09
Payer: MEDICARE

## 2020-12-09 VITALS — TEMPERATURE: 97.4 F

## 2020-12-09 LAB
C-REACTIVE PROTEIN, HIGH SENSITIVITY: 92.9 MG/L
POC INR: 1.4 (ref 0.8–1.2)

## 2020-12-09 PROCEDURE — 99211 OFF/OP EST MAY X REQ PHY/QHP: CPT

## 2020-12-09 PROCEDURE — 85610 PROTHROMBIN TIME: CPT

## 2020-12-09 PROCEDURE — 36416 COLLJ CAPILLARY BLOOD SPEC: CPT

## 2020-12-09 NOTE — PROGRESS NOTES
distress. After visit summary printed and reviewed with patient.       Medications reviewed and updated on home medication list Yes    Influenza vaccine:     [] given    [x] declined   [x] received previously   [] plans to receive at a later time   [] refused    [] documented in [address]: [address]: Yes  Total # of Interventions Recommended: 2  - Increased Dose #: 1  - Maintenance Safety Lab Monitoring #: 1  Total Interventions Accepted: 2  Time Spent (min): Paulo Vasquez, PharmD  [address]

## 2020-12-16 ENCOUNTER — OFFICE VISIT (OUTPATIENT)
Dept: FAMILY MEDICINE CLINIC | Age: 70
End: 2020-12-16
Payer: MEDICARE

## 2020-12-16 ENCOUNTER — HOSPITAL ENCOUNTER (OUTPATIENT)
Dept: PHARMACY | Age: 70
Setting detail: THERAPIES SERIES
Discharge: HOME | End: 2020-12-16
Payer: MEDICARE

## 2020-12-16 VITALS
HEART RATE: 62 BPM | SYSTOLIC BLOOD PRESSURE: 138 MMHG | RESPIRATION RATE: 12 BRPM | OXYGEN SATURATION: 97 % | DIASTOLIC BLOOD PRESSURE: 80 MMHG | BODY MASS INDEX: 48.34 KG/M2 | WEIGHT: 272.8 LBS | TEMPERATURE: 96.8 F | HEIGHT: 63 IN

## 2020-12-16 VITALS — TEMPERATURE: 97.6 F

## 2020-12-16 LAB — POC INR: 2 (ref 0.8–1.2)

## 2020-12-16 PROCEDURE — 99214 OFFICE O/P EST MOD 30 MIN: CPT | Performed by: FAMILY MEDICINE

## 2020-12-16 PROCEDURE — 36416 COLLJ CAPILLARY BLOOD SPEC: CPT

## 2020-12-16 PROCEDURE — 99211 OFF/OP EST MAY X REQ PHY/QHP: CPT

## 2020-12-16 PROCEDURE — 85610 PROTHROMBIN TIME: CPT

## 2020-12-16 RX ORDER — WARFARIN SODIUM 5 MG/1
TABLET ORAL
Qty: 90 TABLET | Refills: 4 | Status: SHIPPED | OUTPATIENT
Start: 2020-12-16 | End: 2022-01-17 | Stop reason: SDUPTHER

## 2020-12-16 NOTE — PROGRESS NOTES
[address] [facility]. [address] [place] [address]  Dept: [phone]  Dept Fax: [phone]  Loc: [phone]      Priscilla Perez is a 72 y.o. White female. Fiona Gonzalez  presents to the [facility] clinic today for   Chief Complaint   Patient presents with   Elizabeth Stabs Discuss Labs    Abnormal Mammogram     calcifications, 3 biopsies needed   ,  and;   1. HTN, goal below 140/80    2. New onset atrial fibrillation ([address]    3. Dysuria    4. COVID-19 virus vaccine not available    5. Essential hypertension    6. Paroxysmal atrial fibrillation (HCC)    7. Hypothyroidism due to Hashimoto's thyroiditis    8. New onset type 2 diabetes mellitus ([address]    9. Gastroesophageal reflux disease without esophagitis    10. Other intestinal malabsorption    11. Blood glucose elevated    12. Elevated C-reactive protein (CRP)    13. Pain in both lower extremities    14. Chronic atrial fibrillation (HCC)    15. BMI 45.0-49.9, adult ([address]    16. Hypothyroidism, unspecified type    17. Homocysteinemia ([address]    18. Anticoagulated      I have reviewed Priscilla Perez medical, surgical and other pertinent history in detail, and have updated medication and allergy information in the computerized patientrecord. Clinical Care Team:     -Referring Provider for today's consult: Self Referred  -Primary Care Provider: Hair Weinstein DO    Medical/Surgical History:   She  has a past medical history of A-fib [facility]), Atrial fibrillation ([address] Carpal tunnel syndrome, GERD (gastroesophageal reflux disease), Headache(784.0), HTN (hypertension), Hypothyroidism, Osteoarthritis, Sleep apnea, Tendonitis of both wrists, and Toenail fungus. Her  has a past surgical history that includes Carpal tunnel release; hernia repair;  section;  section;  section; Foot surgery (Left, 2018); joint replacement (Left, 2010); and joint replacement (Right, 2016). Family/Social History:     Her family history includes Arrhythmia in her mother and sister; Cancer in her brother; Coronary Art Dis in her brother and mother; Diabetes in her father; Early Death in her child; Heart Attack in her brother; Heart Disease in her mother; Other in her brother, mother, and sister; Pacemaker in her sister; Greyson Chiles in her child; Stroke in her mother. She  reports that she has never smoked. She has never used smokeless tobacco. She reports current alcohol use. She reports that she does not use drugs.     Medications/Allergies/Immunizations:     Her current medication(s) include   Current Outpatient Medications:     enoxaparin (LOVENOX) 120 MG/0.8ML injection, Inject 0.8 mLs into the skin every 12 hours ** Please dispense with faxed dosing sheet **, Disp: 15 Syringe, Rfl: 0    flecainide (TAMBOCOR) 100 MG tablet, Take 200 mg by mouth 2 times daily , Disp: , Rfl:     vitamin C (ASCORBIC ACID) 500 MG tablet, Take 500 mg by mouth daily, Disp: , Rfl:     Calcium Carbonate (SM CALCIUM 600 PO), Take 1 tablet by mouth daily, Disp: , Rfl:     metoprolol succinate (TOPROL XL) 25 MG extended release tablet, Take 25 mg by mouth 2 times daily , Disp: , Rfl:     warfarin (COUMADIN) 5 MG tablet, As directed by [facility]30 tabs = 30 day supply, Disp: 30 tablet, Rfl: 1    lisinopril (PRINIVIL;ZESTRIL) 10 MG tablet, Take 0.5 tablets by mouth daily, Disp: 90 tablet, Rfl: 1    metFORMIN (GLUCOPHAGE-XR) 500 MG extended release tablet, Take 1 tablet by mouth daily (with breakfast) (Patient not taking: Reported on 10/27/2020), Disp: 30 tablet, Rfl: 5   glucose monitoring kit (FREESTYLE) monitoring kit, 1 kit by Does not apply route daily, Disp: 1 kit, Rfl: 0    Lancets MISC, 1 each by Does not apply route daily, Disp: 300 each, Rfl: 1    blood glucose test strips (ASCENSIA AUTODISC VI;ONE TOUCH ULTRA TEST VI) strip, 1 each by In Vitro route daily Dispense any brand - check blood sugar As needed. , Disp: 100 each, Rfl: 3    NONFORMULARY, Take 1 capsule by mouth 4 times daily (before meals and nightly) Christopher, Disp: , Rfl:     liothyronine (CYTOMEL) 5 MCG tablet, take 1 tablet by mouth once daily, Disp: 90 tablet, Rfl: 3    levothyroxine (SYNTHROID) 100 MCG tablet, take 1 tablet by mouth once daily, Disp: 90 tablet, Rfl: 3    loratadine (CLARITIN) 10 MG tablet, Take 1 tablet by mouth 2 times daily (before meals) (Patient taking differently: Take 10 mg by mouth as needed ), Disp: 180 tablet, Rfl: 3    Methylsulfonylmethane (MSM PO), Take 1 tablet by mouth daily as needed (pain), Disp: , Rfl:     RA MELATONIN 3 MG TABS tablet, 3 mg nightly as needed , Disp: , Rfl: 0    nitroGLYCERIN (NITROSTAT) 0.4 MG SL tablet, Place under the tongue every 5 minutes as needed for Chest pain (If third one does not relieve pain, call 9-1-1.) , Disp: , Rfl: 0    diclofenac sodium 1 % GEL, as needed for Pain , Disp: , Rfl:     HOMSHBCMD-ABRRNHJYV-JTPJUFNQQE PO, Place 1 tablet under the tongue 2 times daily Jimbo B12 plus or [place] methylB6, gyfloaU01, methylfolate , Disp: , Rfl:     Magnesium (CVS TRIPLE MAGNESIUM COMPLEX) 400 MG CAPS, Take 1 capsule by mouth 4 times daily (after meals and at bedtime) Indications: Magnesium Deficiency , Disp: , Rfl:     magnesium cl-calcium carbonate (SLOW-MAG) 71.5-119 MG TBEC tablet, Take 1 tablet by mouth 3 times daily (with meals) Take before and when stressed with 2 extra fish oil , Disp: , Rfl:     Grape Seed OIL, 500 mg by Does not apply route daily Oregan Grape Root, Disp: , Rfl:   Lysine 500 MG TABS, Take 1 tablet by mouth 2 times daily , Disp: , Rfl:     Omega 3 1000 MG CAPS, Take 3 capsules by mouth 4 times daily (before meals and nightly) Indications: Cardiovascular Risk Reduction , Disp: , Rfl:     DHEA 25 MG CAPS, Take 1 capsule by mouth daily 1/4 tab on Sat and Sunday, Disp: , Rfl:     Cinnamon 500 MG CAPS, Take 3 capsules by mouth 4 times daily (before meals and nightly) , Disp: , Rfl:     Vitamin D (CHOLECALCIFEROL) 1000 UNITS CAPS capsule, Take 7,000 Units by mouth daily Indications: Treatment with Vitamin D ., Disp: , Rfl:     B Complex-Biotin-FA (B-100 TR) TBCR, Take 2 tablets by mouth 2 times daily (before meals) Before breakfast and before lunch or supper, Disp: , Rfl:   Allergies: Patient has no known allergies. ,  Immunizations:   Immunization History   Administered Date(s) Administered    Influenza Virus Vaccine 11/03/2011, 10/09/2014, 11/17/2015    Influenza, High Dose (Fluzone 65 yrs and older) 10/11/2018    Influenza, MDCK, Preservative free 10/31/2017    Influenza, Quadv, IM, PF (6 mo and older Fluzone, Flulaval, Fluarix, and 3 yrs and older Afluria) 01/19/2017, 09/08/2020    Influenza, Triv, inactivated, subunit, adjuvanted, IM (Fluad 65 yrs and older) 10/11/2018, 09/17/2019    Pneumococcal Conjugate 13-valent (Hrnyvrm01) 07/11/2017    Pneumococcal Polysaccharide (Uphdymwnx15) 11/05/2018    Tdap (Boostrix, Adacel) 10/09/2014    Zoster Live (Zostavax) 05/05/2014    Zoster Recombinant (Shingrix) 11/13/2019, 04/04/2020        History of PresentIllness:     Leah's had concerns including Discuss Labs and Abnormal Mammogram (calcifications, 3 biopsies needed). Tungderian Tracie  presents to the [address] [place] [address] today for;   Chief Complaint   Patient presents with   Andrew Gupta Discuss Labs    Abnormal Mammogram     calcifications, 3 biopsies needed   , ,  abnormal labs follow up and these conditions as she  Is looking today for: 1. HTN, goal below 140/80    2. New onset atrial fibrillation ([facility]    3. Dysuria    4. COVID-19 virus vaccine not available    5. Essential hypertension    6. Paroxysmal atrial fibrillation (HCC)    7. Hypothyroidism due to Hashimoto's thyroiditis    8. New onset type 2 diabetes mellitus ([facility]    9. Gastroesophageal reflux disease without esophagitis    10. Other intestinal malabsorption    11. Blood glucose elevated    12. Elevated C-reactive protein (CRP)    13. Pain in both lower extremities    14. Chronic atrial fibrillation (HCC)    15. BMI 45.0-49.9, adult ([facility]    16. Hypothyroidism, unspecified type    17. Homocysteinemia ([facility]    18. Anticoagulated      HPI     Subjective:     Review of Systems   Constitutional: Positive for unexpected weight change. Cardiovascular: Positive for palpitations. Genitourinary: Positive for vaginal bleeding. Hematological: Bruises/bleeds easily. All other systems reviewed and are negative. Objective:     /80 (Site: Right Upper Arm, Position: Sitting, Cuff Size: Large Adult)   Pulse 62   Temp 96.8 °F (36 °C) (Temporal)   Resp 12   Ht 5' 2.99\" (1.6 m)   Wt 272 lb 12.8 oz (123.7 kg)   SpO2 97%   BMI 48.34 kg/m²   Physical Exam  Vitals signs and nursing note reviewed. Constitutional:       Appearance: Normal appearance. HENT:      Head: Normocephalic. Pulmonary:      Effort: Pulmonary effort is normal.   Neurological:      Mental Status: She is alert. Psychiatric:         Mood and Affect: Mood normal.         Thought Content: Thought content normal.            Laboratory Data:   Lab results were searched in Care Everywhere and/or those brought by the pateint were reviewed today with Kerline Lorenzo and she has a copy of their most recent labs to take home with them as notedbelow;       Imaging Data:   Imaging Data:       Assessment & Plan:       Impression:  1. HTN, goal below 140/80    2. New onset atrial fibrillation ([facility]    3.  Dysuria 4. COVID-19 virus vaccine not available    5. Essential hypertension    6. Paroxysmal atrial fibrillation (HCC)    7. Hypothyroidism due to Hashimoto's thyroiditis    8. New onset type 2 diabetes mellitus ([facility]    9. Gastroesophageal reflux disease without esophagitis    10. Other intestinal malabsorption    11. Blood glucose elevated    12. Elevated C-reactive protein (CRP)    13. Pain in both lower extremities    14. Chronic atrial fibrillation (HCC)    15. BMI 45.0-49.9, adult ([facility]    16. Hypothyroidism, unspecified type    17. Homocysteinemia ([facility]    18. Anticoagulated      Assessment and Plan:  After reviewing the patients chief complaints, reviewing their labfindings in great detail (with the patient and those accompanying them) which correlate to their chief complaints, symptoms, and or medical conditions; suggestions were made relating to changes in diet and or supplementswhich may improve the complaints and which will be reflected in their future lab findings; Chief Complaint   Patient presents with    Discuss Labs    Abnormal Mammogram     calcifications, 3 biopsies needed   ;    Plans for the next visits:  - Abnormal and non-optimal Labs were ordered today to be repeated in the next 120-365 days to assess changes from adjustments in nutrition and or nutrients. - Patient instructed when having ablood draw to ask the  to divide their lab draws into multiple draws over several days if not feeling good at the time of the lab draw or if either prefers to do several smaller blood draws over several days  -Patient instructed to check with insurer before each lab draw and to to to the lab which the insurer directs them for the most cost effective lab draw with the least patient's cost  - Pheobe Serum  will be scheduled subsequentto those results.   Jerrica Verena will bring in her drink and food log to her next visit    Chronic Problems Addressed on this Visit: 1.  Intensity of Service; Uncontrolled items at this visit; Chief Complaint   Patient presents with    Discuss Labs    Abnormal Mammogram     calcifications, 3 biopsies needed   ; Improved items at this visit; Stable items atthis visit;  2. Patients food and drinks were reviewed with the patient,       - Danette Hansen will bring food+drink symptom log to next visit for inclusion in their record      - 75 better food list reviewed & given topatient with the omega 6 food list to avoid         - Gluten in corn and oats abstracts sheet reviewed and given to the patient today   3. Greater than 25 minutes were spent face to face on this visit of which >50% was for counseling and coordination of care. Patients food and drinks were reviewed with thepatient,   - they will bring a food drink symptom log to future visits for inclusion in their record    - 75 better food list reviewed & given to patient along with the omega 6 food list to avoid      - Glutenin corn and oats abstracts sheet reviewed and given to the patient today    - 23 Foods containing Latex-like proteins was reviewed and copy to be taken if desired     - Nutrient Supplements list provided and copyto be taken if desired    - [employer] web site offered to patient to review at their convenience by staff with login information    Note:  I have discussed with the patient that with all nutraceuticals, there is often mixed data and emerging research which needs to be monitored; as well as an array of NIHfact sheets on nutrients and supplements. If I have recommended cinnamon at the request of this patient to assist them in control of their blood sugar, triglyceride and or weight issues. I discussed that thepatient's clinical use of cinnamon bark, calcium, magnesium, Vitamin D and pharmaceutical grade CVS #698750 fish oil or triple-strength fish oil, and B-75 two phase time-released B complex by Elias Tello will be for atime-limited trial to determine their individual effectiveness and safety in this patient. I also referred the patient to the NMCD: Nutrition, Metabolism, and Cardiovascular Diseases (journal) and concerns about long-termuse and hepatotoxicity of cinnamon and other nutrients and suggest they frequently search nih.gov for the latest non-proprietary information on nutriceuticals as well as consider a subscription to [employer] fordetails on reviewed supplements, or at the least review the nutrient files at [address] at [facility], [employer], an insulin mimetic, reduces some High Carbohydrate Dietary Impacts. Methylhydroxychalcone polymers insulin-enhancing properties in fat cells are responsible for enhanced glucose uptake, inhibiting hepatic HMG-CoA reductase and lowers lipids. www.jacn. org/content/20/4/327.full     But cinnamon with additivessuch as Cinnamon Extract are not effective as insulin mimetics.  :eStoreDirectory.at     Nutrients for Start up from [employer] or [employer] for ease to get started now ;  Sourav Contreras has some useable products;  - Triple Strength Fish Oil, enteric coated  - Vit D 3 5000 IU gel caps  - Iron ferrous sulfat 325 mg tabs  - Centrum Silver look-a-like for most patients, or  - Centrum plain look-a-like if need iron    Localpharmacies or chains such as [employer], [employer], [employer], have;  - Triple Strength Fish Oil (enteric coated ifavailable) or    If not enteric coated, can take from freezer for less burps - B-50 or B-100 released balanced B complex tabs  - Cinnamon bark 500 mg (without Chromium or extracts)   some brands list 1000 mg / serving of 2 capsules,    some brands have 1000 mg caps with the undesireable chromium / extract  - Calcium carbonate/citrate, magnesium oxide/citrate, Vit D 3  as 3-4 tabs/caps/serving     Some Local Brands may contain Zincwhich is acceptable for the first bottle or two  - Magnesium oxide 250 mg tabs for those having < 2 bowel movements daily  - Magnesium citrate 200 mg if having > 2bowel movement/day  - Centrum Silver or look-a-like for most patients, Centrum plain or look-a-like with iron  - Vitamin D-3 comes as 1,000 IU or 2,000 IU or 5,000 IU gel caps or Liquid drops      Some brands containing or derived from soy oil or corn oil are OK if not allergic to soy  - Elemental Iron 65 mg tabsat bedtime is available over the counter if need more iron     Usually turns bowel movements grey, green or black but not a concern  - Apricot Kernel Oil (by Now) for dry skin sensitive perineal or perianal area skin    Nutrients for ongoing use by Mail order for less expense from [employer] ;  - Strength Fish Oil , 240 Softgels Item #286224  -B-100 time released balanced B complex Item #334220  - Cinnamon bark 500 mg without Chromium or extract Item #675782  - Calcium carbonate 1000 mg, Magnesium oxide 500 mg, Vit D 3  400 IU Item #[id number]  - Magnesium oxide 500 mg tabs Item #346953 if less than 2 bowel movements daily  - [employer] Sen[employer] Item #964348 for mostpatients, One Daily Item #770639 with iron  - Vit D 3  1,000 Item #127410      2,000 IU Item #967899  ,000 IU Item #891036     Some brands containing orderived from soy oil or corn oil are OK if not allergic to soy    Nutrients for Special Needs by Dorothy Garrett for less expense from www. puritan.com ;  -Elemental Iron 65 mg tabs Item #257882 if need more iron for low iron on labs Usually turns bowel movements grey, green or black but not a concern  - Time released Niacin 250 mg Item #105240 for cold intolerance, low libido or impotence  - DHEA 50 mg Item #151766 for improving DHEA levels on labs if having Fatigue    If stools too loose substitute for your Magnesium oxide using;   Magnesium citrate 200 mg tabs(NOT liquid) at [employer]   Magnesium gluconate 550 mgby Liban at [employer] or amazon. com  Magnesium chloride foot soaks or body sprays  www.Gr8erMinds   Magnesium chloride flakes 14.99 Item #: [id number] if Backordered get spray    Food Drink Symptom Log;  I asked this patient to track these items and any other symptoms on their list on a weekly basis to documenttheir progress or lack of same. This can be done on the symptom tracking sheet I gave them at today's visit but looks like this:                                                      Rate on scale of 0-10 with zero = notnoticeable  Symptom:                            Week 1               2                 3                 4               Etc            Hair loss    Foot cramps    Paresthesia    Aches    IBS (irritable bowel)    Constipation    Diarrhea  Nocturia    (up to bathroom at night)    Fatigue/Energy level  Stress      On the other side of the sheet they can track their food, drink, environment, activity, symptoms etc      Avoiding Latex-like proteins inmy foods; Avocados, Bananas, Celery, Figs & Kiwi proteins have latex-like proteins to inflame our immunesystems  How Can I Have A Latex Allergy? Eating foods with latex-like protein exposes us to latex allergies. Our body cannot tell the differencebetween these latex-like proteins and latex from rubber products since many people are allergic to fruit, vegetables and latex. Read labels on pre-packaged foods. This list to avoid is only a guide if you are known allergicto latex or have a latex rash on your chin, cheeks and lines on your neck and chest. The amount of latex is different in each food product or fruit variety. to Avoid out of Season if not grown locally: Melon, Nectarine, Papaya, Cherry, Passion fruit, Plum, Chestnuts, and Tomato. Avocado, Banana, Celery, Figs, and Kiwi always contain Latex-like protein. Whats in Season? Strawberries taste better in June than December because June is strawberry season so buy locally grown produce \"in season\" for the best flavor, cost and less Latex. Locally grown produce notonly tastes great requires little of no ethylene exposure in food distribution so has less latex content. Out of season, use canned, frozen or dried sinceprocessed ripe and are latex lower!!!   Month     [place] LocallyGrown Produce  January, February, March: use canned, frozen or dried fruits since lower in latex  April; asparagus, radishes  May; asparagus, broccoli, green onions, greens, peas, radishes,rhubarb  June; asparagus, beets, beans, broccoli, cabbage, cantaloupe, carrots, green onions, greens, lettuce,onions, parsley, peas, radishes, rhubarb, strawberries, watermelons  July; beans, beets, blueberries,broccoli, cabbage, cantaloupe, carrots, cauliflower, celery, cucumbers, eggplant, grapes, green onions, greens, lettuce, onions, parsley, peas, peaches, bell peppers, potatoes, radishes, summer raspberries, squash, sweetcorn, tomatoes, turnips, watermelons August; apples, beans, beets, blueberries, cabbage, cantaloupe, carrots,cauliflower, celery, cucumbers, eggplant, grapes, green onions, greens, lettuce, onions, parsley, peas, peaches, pears, bell peppers, potatoes, radishes, squash, sweet corn, tomatoes, turnips, watermelons  September; apples, beans, beets, blueberries, cabbage, cantaloupe, carrots, cauliflower, celery, cucumbers, eggplant, grapes,green onions, greens, lettuce, onions, parsley, peas, peaches, pears, bell peppers, plums, potatoes, pumpkins, radishes, fall red raspberries, squash, sweet corn, tomatoes, turnips, watermelons  October; apples, beets, broccoli, cabbage, carrots, cauliflower, celery, green onions, greens, lettuce, parsley, peas, pears, potatoes,pumpkins, radishes, fall red raspberries, squash, turnips  November; broccoli, cabbage, carrots, parsley,pears, peas  December: use canned, frozen ordried fruits since lower in latex    Upto half of latex-sensitive patients show allergic reactions to fruits (avocados, bananas, kiwifruits, papayas, peaches),   Annals of Allergy, 1994. These plants contain the same proteins that are allergens in latex. People with fruit allergies should warn physicians beforeundergoing procedures which may cause anaphylactic reaction if in contact with latex gloves. Some of the common foods with defined cross-reactivity to latexare avocado, banana, kiwi, chestnut, raw potato, tomato,stone fruits (e.g., peach, cherry), hazelnut, melons, celery, carrot, apple, pear, papaya, and almond.   Foods with less well-defined cross-reactivity to latex are peanuts, peppers, citrus fruits, coconut, pineapple, naveed,fig, passion fruit, Ugli fruit, and grape This fruit/latex cross-reactivity is worsened by ethylene, a gas used to hasten commercial ripening. In nature, plants produce low levels of the hormone ethylene, which regulates germination, flowering, and ripening. Forced ripening by high ethyleneconcentrations, plants produce allergenic wound-repair proteins, which are similar to wound-repair proteins made during the tapping of rubber trees. Sensitive individualswho ingest the fruit get a higher dose and worse reaction. Some people may even first become sensitized to latex through fruit. Can food processing increase theconcentrations of allergenic proteins? Latex-sensitized children (and adults) in [place] often experience allergic reactions after eating bananas ripenedartificially with ethylene. In the [place] [facility], food distribution centers treat unripe bananas and other produce with ethylene to ripen; not commonly done in [facility] since fruit is tree-ripened there. Does treatmentof food with ethylene induce banana proteins that cross-react with latex? (Miguel Angel et al.    References:   Latex in Foods Allergy, http://ehp.niehs.nih.gov/members/2003/5811/5811.html    Search web for \" Whats in Season \" for whereyou live or are at the time you food shop  www.nutritioncouncil.org/pdf/healthy/SeasonalProduce. pdf ,   Management of Latex, ://[email or web address]. [employer].edu/  search for latex

## 2020-12-16 NOTE — PROGRESS NOTES
Medication Management [address]  [phone] (phone)  [phone] (fax)      Ms. Gila Caban is a 72 y.o.  female with history of Afib who presents today for anticoagulation monitoring and adjustment. Patient verifies current dosing regimen and tablet strength. No missed or extra doses. Patient denies s/s bleeding/swelling/SOB/chest pain. Bruising is still on breast from procedure and stomach from lovenox shots (see last note) but getting better since last visit. No blood in urine or stool. No dietary changes. No changes in medication/OTC agents/Herbals. No change in alcohol use or tobacco use. No change in activity level. Patient denies headaches/dizziness/lightheadedness/falls. No vomiting/diarrhea or acute illness. No Procedures scheduled in the future at this time. Assessment:   Lab Results   Component Value Date    INR 2.00 (H) 12/16/2020    INR 1.40 (H) 12/09/2020    INR 2.30 (H) 11/17/2020     INR therapeutic   Recent Labs     12/16/20  1519   INR 2.00*       Patient interview completed and discussed with pharmacist by Delmar PhoenixD Candidate 2021      Plan:  Continue Coumadin 5 mg MF, 2.5 mg TWThSS. Recheck INR in 3 week(s). Rx sent to [facility]. Patient reminded to call the Anticoagulation Clinic with any signs or symptoms of bleeding or with any medication changes. Patient given instructions utilizing the teach back method. Discharged ambulatory in no apparent distress. After visit summary printed and reviewed with patient.       Medications reviewed and updated on home medication list Yes    Influenza vaccine:     [] given    [x] declined   [x] received previously   [] plans to receive at a later time   [] refused    [] documented in [address]:  [facility] [address]: Yes  Total # of Interventions Recommended: 1 - Maintenance Safety Lab Monitoring #: 1  Total Interventions Accepted: 1  Time Spent (min): [address], PharmD, BCPS  12/16/2020  3:36 PM

## 2021-01-06 ENCOUNTER — HOSPITAL ENCOUNTER (OUTPATIENT)
Dept: PHARMACY | Age: 71
Setting detail: THERAPIES SERIES
Discharge: HOME | End: 2021-01-06
Payer: MEDICARE

## 2021-01-06 VITALS — TEMPERATURE: 97.6 F

## 2021-01-06 LAB — POC INR: 2.7 (ref 0.8–1.2)

## 2021-01-06 PROCEDURE — 36416 COLLJ CAPILLARY BLOOD SPEC: CPT

## 2021-01-06 PROCEDURE — 99211 OFF/OP EST MAY X REQ PHY/QHP: CPT

## 2021-01-06 PROCEDURE — 85610 PROTHROMBIN TIME: CPT

## 2021-01-06 NOTE — PROGRESS NOTES
Medication Management [address]   [phone] (phone)  [phone] (fax)      Ms. Beatriz Mccoy is a 72 y.o.  female with history of Afib who presents today for anticoagulation monitoring and adjustment. Patient verifies current dosing regimen and tablet strength. No missed or extra doses. Patient denies s/s bleeding/bruising/swelling/SOB/chest pain  No blood in urine or stool. No dietary changes. No changes in medication/OTC agents/Herbals. No change in alcohol use or tobacco use. No change in activity level. Patient denies headaches/dizziness/lightheadedness/falls. No vomiting/diarrhea or acute illness. No Procedures scheduled in the future at this time. Assessment:   Lab Results   Component Value Date    INR 2.70 (H) 01/06/2021    INR 2.00 (H) 12/16/2020    INR 1.40 (H) 12/09/2020     INR therapeutic   Recent Labs     01/06/21  1325   INR 2.70*         Plan:  Continue Coumadin 5 mg MF, 2.5 mg TWThSS. Recheck INR in 4 week(s). Patient reminded to call the Anticoagulation Clinic with any signs or symptoms of bleeding or with any medication changes. Patient given instructions utilizing the teach back method. Discharged ambulatory in no apparent distress. After visit summary printed and reviewed with patient.       Medications reviewed and updated on home medication list Yes    Influenza vaccine:     [] given    [] declined   [] received previously   [] plans to receive at a later time   [] refused    [x] documented in [address] [facility] [address]: [address] [facility] [address]: Yes  Total # of Interventions Recommended: 1  - Maintenance Safety Lab Monitoring #: 1  Total Interventions Accepted: 1  Time Spent (min): [address], PharmD, BCPS  1/6/2021  1:25 PM

## 2021-02-04 ENCOUNTER — HOSPITAL ENCOUNTER (OUTPATIENT)
Dept: PHARMACY | Age: 71
Setting detail: THERAPIES SERIES
Discharge: HOME | End: 2021-02-04
Payer: MEDICARE

## 2021-02-04 ENCOUNTER — OFFICE VISIT (OUTPATIENT)
Dept: FAMILY MEDICINE CLINIC | Age: 71
End: 2021-02-04
Payer: MEDICARE

## 2021-02-04 VITALS
HEART RATE: 61 BPM | HEIGHT: 63 IN | BODY MASS INDEX: 48.23 KG/M2 | OXYGEN SATURATION: 98 % | SYSTOLIC BLOOD PRESSURE: 136 MMHG | DIASTOLIC BLOOD PRESSURE: 60 MMHG | TEMPERATURE: 96.8 F | WEIGHT: 272.2 LBS

## 2021-02-04 VITALS — TEMPERATURE: 97.5 F

## 2021-02-04 DIAGNOSIS — M25.569 ARTHRALGIA OF LOWER LEG, UNSPECIFIED LATERALITY: ICD-10-CM

## 2021-02-04 DIAGNOSIS — Z00.00 ROUTINE GENERAL MEDICAL EXAMINATION AT A HEALTH CARE FACILITY: Primary | ICD-10-CM

## 2021-02-04 DIAGNOSIS — M79.652 PAIN IN BOTH THIGHS: ICD-10-CM

## 2021-02-04 DIAGNOSIS — M79.651 PAIN IN BOTH THIGHS: ICD-10-CM

## 2021-02-04 LAB — POC INR: 2.5 (ref 0.8–1.2)

## 2021-02-04 PROCEDURE — 99211 OFF/OP EST MAY X REQ PHY/QHP: CPT

## 2021-02-04 PROCEDURE — 36416 COLLJ CAPILLARY BLOOD SPEC: CPT

## 2021-02-04 PROCEDURE — G0439 PPPS, SUBSEQ VISIT: HCPCS | Performed by: FAMILY MEDICINE

## 2021-02-04 PROCEDURE — 85610 PROTHROMBIN TIME: CPT

## 2021-02-04 ASSESSMENT — PATIENT HEALTH QUESTIONNAIRE - PHQ9
SUM OF ALL RESPONSES TO PHQ QUESTIONS 1-9: 0
1. LITTLE INTEREST OR PLEASURE IN DOING THINGS: 0
SUM OF ALL RESPONSES TO PHQ QUESTIONS 1-9: 0

## 2021-02-04 NOTE — PROGRESS NOTES
Historical Provider, MD   diclofenac sodium 1 % GEL as needed for Pain  Yes Historical Provider, MD   [id number] PO Place 1 tablet under the tongue 2 times daily Jimbo B12 plus or [place] methylB6, [id number], methylfolate  Yes Historical Provider, MD   Magnesium ([employer] TRIPLE MAGNESIUM COMPLEX) 400 MG CAPS Take 3 capsules by mouth 4 times daily (after meals and at bedtime) Indications: Magnesium Deficiency  Yes Historical Provider, MD   magnesium cl-calcium carbonate (SLOW-MAG) 71.5-119 MG TBEC tablet Take 1 tablet by mouth 3 times daily (with meals) Take before and when stressed with 2 extra fish oil PRN Yes Historical Provider, MD   Grape Seed  mg by Does not apply route daily Oregan Grape Root Yes Historical Provider, MD   Lysine 500 MG TABS Take 1 tablet by mouth 2 times daily  Yes Historical Provider, MD   Omega 3 1000 MG CAPS Take 3 capsules by mouth 4 times daily (before meals and nightly) Indications: Cardiovascular Risk Reduction  Yes Historical Provider, MD   DHEA 25 MG CAPS Take 1 capsule by mouth every other day  Yes Historical Provider, MD   Cinnamon 500 MG CAPS Take 3 capsules by mouth 3 times daily  Yes Historical Provider, MD   Vitamin D (CHOLECALCIFEROL) 1000 UNITS CAPS capsule Take 7,000 Units by mouth daily Indications: Treatment with Vitamin D .  Yes Historical Provider, MD   B Complex-Biotin-FA (B-100 TR) TBCR Take 2 tablets by mouth 2 times daily (before meals) Before breakfast and before lunch or supper Yes Historical Provider, MD   metFORMIN (GLUCOPHAGE-XR) 500 MG extended release tablet Take 1 tablet by mouth daily (with breakfast)  Patient not taking: Reported on 2/4/2021  Aime Collins DO   glucose monitoring kit (FREESTYLE) monitoring kit 1 kit by Does not apply route daily  Aime Collins DO   Lancets MISC 1 each by Does not apply route daily  Aime Collins DO   loratadine (CLARITIN) 10 MG tablet Take 1 tablet by mouth 2 times daily (before meals)  Patient taking differently: Take by mouth as needed   Cristina Lawrence MD   Methylsulfonylmethane (MSM PO) Take 1 tablet by mouth daily as needed (pain)  Historical Provider, MD PEREZ MELATONIN 3 MG TABS tablet 3 mg nightly as needed   Historical Provider, MD         Past Medical History:   Diagnosis Date    A-fib ([address]     Atrial fibrillation ([address]     Carpal tunnel syndrome     GERD (gastroesophageal reflux disease)     Headache(784.0)     HTN (hypertension)     Hypothyroidism     Osteoarthritis     Sleep apnea 07/21/2019    Mild    Tendonitis of both wrists 2017    Toenail fungus        Past Surgical History:   Procedure Laterality Date    CARPAL TUNNEL RELEASE      both hands    Leah Sy Left 01/02/2018    OIO Dr. Elizabeth Weber Left 09/2010    Left Total Knee    JOINT REPLACEMENT Right 08/2016    Right Total Knee         Family History   Problem Relation Age of Onset    Stroke Mother     Arrhythmia Mother     Other Mother         Barry's Syndrome    Heart Disease Mother     Coronary Art Dis Mother     Diabetes Father     Other Sister         Barry's Syndrome    Arrhythmia Sister     Cancer Brother         Stomach Cancer    Stillborn Child     Early Death Child     Pacemaker Sister     Heart Attack Brother     Coronary Art Dis Brother     Other Brother         Barry's Syndrome       CareTeam (Including outside providers/suppliers regularly involved in providing care):   Patient Care Team:  Ledell Duane, DO as PCP - General (Family Medicine)  Ledell Duane, DO as PCP - [facility] Empaneled Provider  Cristina Lawrence MD as Consulting Physician (Family Medicine)    Wt Readings from Last 3 Encounters:   02/04/21 272 lb 3.2 oz (123.5 kg)   12/16/20 272 lb 12.8 oz (123.7 kg)   09/16/20 272 lb 9.6 oz (123.7 kg)     Vitals:    02/04/21 1209   BP: 136/60   Pulse: 61   Temp: 96.8 °F (36 °C)   SpO2: are in a car?: Yes     Personalized Preventive Plan   Current Health Maintenance Status  Immunization History   Administered Date(s) Administered    Influenza Virus Vaccine 11/03/2011, 10/09/2014, 11/17/2015    Influenza, High Dose (Fluzone 65 yrs and older) 10/11/2018    Influenza, MDCK, Preservative free 10/31/2017    Influenza, Quadv, IM, PF (6 mo and older Fluzone, Flulaval, Fluarix, and 3 yrs and older Afluria) 01/19/2017, 09/08/2020    Influenza, Triv, inactivated, subunit, adjuvanted, IM (Fluad 65 yrs and older) 10/11/2018, 09/17/2019    Pneumococcal Conjugate 13-valent ([id number]) 07/11/2017    Pneumococcal Polysaccharide ([id number]) 11/05/2018    Tdap (Boostrix, Adacel) 10/09/2014    Zoster Live (Zostavax) 05/05/2014    Zoster Recombinant (Shingrix) 11/13/2019, 04/04/2020        Health Maintenance   Topic Date Due    Diabetic foot exam  03/07/1961    Diabetic retinal exam  03/07/1961    Diabetic microalbuminuria test  03/07/1969    Annual Wellness Visit (AWV)  05/29/2019    Colon cancer screen colonoscopy  06/01/2020    Lipid screen  09/08/2021    TSH testing  09/08/2021    Potassium monitoring  09/08/2021    Creatinine monitoring  09/08/2021    A1C test (Diabetic or Prediabetic)  12/07/2021    Breast cancer screen  12/04/2022    DTaP/Tdap/Td vaccine (2 - Td) 10/09/2024    Flu vaccine  Completed    Shingles Vaccine  Completed    Pneumococcal 65+ years Vaccine  Completed    DEXA (modify frequency per FRAX score)  Addressed    Hepatitis C screen  Addressed    Hepatitis A vaccine  Aged Out    Hib vaccine  Aged Out    Meningococcal (ACWY) vaccine  Aged Out     Recommendations:  Patient will bring in her paperwork from her  on her healthcare power of attourney and living will      Thigh Cramping/Shooting nerve pain down inner thight  -OMT  -Hydration - recc increased hydration  -leg cramping supplement; 2 xs tylenol    Can use quinine sulfate by drinking tonic

## 2021-02-04 NOTE — PROGRESS NOTES
Medication Management [address]   [phone] (phone)  [phone] (fax)      Ms. Hortencia Tobar is a 72 y.o.  female with history of atrial fib. , per Dr. Siri Harris referral, who presents today for Warfarin monitoring and adjustment (4 week visit). Patient verifies current dosing regimen and tablet strength. No missed or extra doses. Patient denies bleeding/bruising/swelling/chest pain. Has usual SOB. No blood in urine or stool. No dietary changes. No changes in medication/OTC agents/herbals. No change in alcohol use or tobacco use. No change in activity level. Patient denies headaches/dizziness/lightheadedness/falls. No vomiting/diarrhea or acute illness. No procedures scheduled in the future at this time. Will be scheduling colonoscopy - reminded to notify this clinic as soon as date known. Assessment:   Lab Results   Component Value Date    INR 2.50 (H) 02/04/2021    INR 2.70 (H) 01/06/2021    INR 2.00 (H) 12/16/2020     INR therapeutic - goal 2-3. Recent Labs     02/04/21  1317   INR 2.50*     Plan:  POCT INR ordered/performed/result reviewed. Continue PO Coumadin 5 mg MF, 2.5 mg TWThSS. Recheck INR in 4 week(s). Patient reminded to call the Anticoagulation Clinic with any signs or symptoms of bleeding or with any medication changes. Patient given instructions utilizing the teach back method. Discharged ambulatory in no apparent distress, wearing mask. After visit summary printed and reviewed with patient.       Medications reviewed and updated on home medication list.    Influenza vaccine:     [] given    [x] declined   [x] received previously   [] plans to receive at a later time   [] refused    [x] documented in Epic

## 2021-03-04 ENCOUNTER — HOSPITAL ENCOUNTER (OUTPATIENT)
Dept: PHARMACY | Age: 71
Setting detail: THERAPIES SERIES
Discharge: HOME | End: 2021-03-04
Payer: MEDICARE

## 2021-03-04 VITALS — TEMPERATURE: 97.1 F

## 2021-03-04 LAB — POC INR: 2.1 (ref 0.8–1.2)

## 2021-03-04 PROCEDURE — 36416 COLLJ CAPILLARY BLOOD SPEC: CPT

## 2021-03-04 PROCEDURE — 99211 OFF/OP EST MAY X REQ PHY/QHP: CPT

## 2021-03-04 PROCEDURE — 85610 PROTHROMBIN TIME: CPT

## 2021-03-04 NOTE — PROGRESS NOTES
Medication Management [address]  [phone] (phone)  [phone] (fax)      Ms. Rodolfo Stratton is a 72 y.o.  female with history of atrial fib. , per Dr. Jonathan Hernandez referral, who presents today for Warfarin  monitoring and adjustment (4 week visit). Patient verifies current dosing regimen and tablet strength. No missed (unsure if took dose 3/1) or extra doses. Patient denies bleeding/bruising/swelling. Has been more SOB since COVID vaccine/seeing stars/dizzy/lightheaded/fatigued. Hasn't used Nitro. for chest discomfort she's had. Feels better when she reclines with feet up. Thinks it put her back in atrial fib. Feels better past few days. Yesterday or day before, got 97/54 with pulse 80s-90s on home machine (normal pulse 60s, she states). Advised to call cardiologist.  No blood in urine or stool. No dietary changes. No changes in medication/OTC agents/herbals. No change in alcohol use or tobacco use. Change in activity level: decreased. Patient denies headaches/falls. No vomiting/diarrhea or acute illness. No procedures scheduled in the future at this time. Had first COVID vaccine 2/24. Card copied - given to pharmacy tech. to scan in. Assessment:   Lab Results   Component Value Date    INR 2.10 (H) 03/04/2021    INR 2.50 (H) 02/04/2021    INR 2.70 (H) 01/06/2021     INR therapeutic - goal 2-3. Recent Labs     03/04/21  1301   INR 2.10*       Plan:  POCT INR ordered/performed/result reviewed. Continue PO Coumadin 5 mg MF, 2.5 mg TWThSS. Recheck INR in 3.5 week(s). Patient reminded to call the Anticoagulation Clinic with any signs or symptoms of bleeding or with any medication changes. Patient given instructions utilizing the teach back method. Discharged ambulatory in no apparent distress, wearing mask. After visit summary printed and reviewed with patient.       Medications reviewed and updated on home medication list.

## 2021-03-29 ENCOUNTER — HOSPITAL ENCOUNTER (OUTPATIENT)
Dept: PHARMACY | Age: 71
Setting detail: THERAPIES SERIES
Discharge: HOME | End: 2021-03-29
Payer: MEDICARE

## 2021-03-29 DIAGNOSIS — Z79.01 ANTICOAGULATED ON COUMADIN: ICD-10-CM

## 2021-03-29 DIAGNOSIS — Z51.81 ENCOUNTER FOR THERAPEUTIC DRUG MONITORING: ICD-10-CM

## 2021-03-29 LAB — POC INR: 2.4 (ref 0.8–1.2)

## 2021-03-29 PROCEDURE — 36416 COLLJ CAPILLARY BLOOD SPEC: CPT

## 2021-03-29 PROCEDURE — 99211 OFF/OP EST MAY X REQ PHY/QHP: CPT

## 2021-03-29 PROCEDURE — 85610 PROTHROMBIN TIME: CPT

## 2021-03-29 NOTE — PROGRESS NOTES
Medication Management [address]  [phone] (phone)  [phone] (fax)      Ms. Hortencia Tobar is a 80 y.o.  female with history of Afib who presents today for anticoagulation monitoring and adjustment. Patient verifies current dosing regimen and tablet strength. No missed or extra doses. Patient denies s/s bleeding/bruising/swelling/SOB/chest pain  No blood in urine or stool. No dietary changes. No changes in medication/OTC agents/Herbals. No change in alcohol use or tobacco use. No change in activity level. Patient denies headaches/dizziness/lightheadedness/falls. No vomiting/diarrhea or acute illness. No Procedures scheduled in the future at this time. Assessment:   Lab Results   Component Value Date    INR 2.40 (H) 03/29/2021    INR 2.10 (H) 03/04/2021    INR 2.50 (H) 02/04/2021     INR therapeutic   Recent Labs     03/29/21  1310   INR 2.40*         Plan:  Continue Coumadin 5 mg MF, 2.5 mg all other days. Recheck INR in 4 week(s). Patient reminded to call the Anticoagulation Clinic with any signs or symptoms of bleeding or with any medication changes. Patient given instructions utilizing the teach back method. Discharged ambulatory in no apparent distress. After visit summary printed and reviewed with patient.       Medications reviewed and updated on home medication list Yes    Influenza vaccine:     [] given    [x] declined   [x] received previously   [] plans to receive at a later time   [] refused    [x] documented in [address] [facility] [address]: [address]: Yes  Total # of Interventions Recommended: 0  - Maintenance Safety Lab Monitoring #: 1  Total Interventions Accepted: 1  Time Spent (min): [address], PharmD, BCPS  3/29/2021  2:38 PM

## 2021-04-26 ENCOUNTER — HOSPITAL ENCOUNTER (OUTPATIENT)
Dept: PHARMACY | Age: 71
Setting detail: THERAPIES SERIES
Discharge: HOME | End: 2021-04-26
Payer: MEDICARE

## 2021-04-26 DIAGNOSIS — Z79.01 ANTICOAGULATED ON COUMADIN: ICD-10-CM

## 2021-04-26 DIAGNOSIS — Z51.81 ENCOUNTER FOR THERAPEUTIC DRUG MONITORING: ICD-10-CM

## 2021-04-26 LAB — POC INR: 2.2 (ref 0.8–1.2)

## 2021-04-26 PROCEDURE — 99211 OFF/OP EST MAY X REQ PHY/QHP: CPT

## 2021-04-26 PROCEDURE — 36416 COLLJ CAPILLARY BLOOD SPEC: CPT

## 2021-04-26 PROCEDURE — 85610 PROTHROMBIN TIME: CPT

## 2021-04-26 NOTE — PROGRESS NOTES
Medication Management [address]  [phone] (phone)  [phone] (fax)      Ms. Rebecca Connor is a 80 y.o.  female with history of Afib who presents today for anticoagulation monitoring and adjustment. Patient verifies current dosing regimen and tablet strength. No missed or extra doses. Patient denies s/s bleeding/bruising/swelling/SOB/chest pain   No blood in urine or stool. No dietary changes. No changes in medication/OTC agents/Herbals. - Removed metformin from medication list as patient is no longer taking  No change in alcohol use or tobacco use. No change in activity level. Patient denies headaches/dizziness/lightheadedness/falls. No vomiting/diarrhea or acute illness. No Procedures scheduled in the future at this time. Assessment:   Lab Results   Component Value Date    INR 2.20 (H) 04/26/2021    INR 2.40 (H) 03/29/2021    INR 2.10 (H) 03/04/2021     INR therapeutic   Recent Labs     04/26/21  1310   INR 2.20*         Plan:  Continue Coumadin 5 mg MF, 2.5 mg all other days. Recheck INR in 5 week(s). Patient reminded to call the Anticoagulation Clinic with any signs or symptoms of bleeding or with any medication changes. Patient given instructions utilizing the teach back method. Discharged ambulatory in no apparent distress. After visit summary printed and reviewed with patient.       Medications reviewed and updated on home medication list Yes    Influenza vaccine:     [] given    [x] declined   [] received previously   [] plans to receive at a later time   [] refused    [x] documented in [address] Time Spent (min): [address], PharmD, BCPS  4/26/2021  1:20 PM

## 2021-05-26 DIAGNOSIS — I48.91 ATRIAL FIBRILLATION, UNSPECIFIED TYPE (HCC): ICD-10-CM

## 2021-05-26 DIAGNOSIS — Z79.01 ANTICOAGULATED ON COUMADIN: Primary | ICD-10-CM

## 2021-06-01 ENCOUNTER — APPOINTMENT (OUTPATIENT)
Dept: PHARMACY | Age: 71
End: 2021-06-01
Payer: MEDICARE

## 2021-06-07 ENCOUNTER — HOSPITAL ENCOUNTER (OUTPATIENT)
Dept: PHARMACY | Age: 71
Setting detail: THERAPIES SERIES
Discharge: HOME | End: 2021-06-07
Payer: MEDICARE

## 2021-06-07 DIAGNOSIS — Z79.01 ANTICOAGULATED ON COUMADIN: Primary | ICD-10-CM

## 2021-06-07 DIAGNOSIS — Z51.81 ENCOUNTER FOR THERAPEUTIC DRUG MONITORING: ICD-10-CM

## 2021-06-07 LAB — POC INR: 2.6 (ref 0.8–1.2)

## 2021-06-07 PROCEDURE — 99211 OFF/OP EST MAY X REQ PHY/QHP: CPT

## 2021-06-07 PROCEDURE — 85610 PROTHROMBIN TIME: CPT

## 2021-06-07 PROCEDURE — 36416 COLLJ CAPILLARY BLOOD SPEC: CPT

## 2021-06-14 ENCOUNTER — NURSE ONLY (OUTPATIENT)
Dept: LAB | Age: 71
End: 2021-06-14

## 2021-06-14 DIAGNOSIS — M79.604 PAIN IN BOTH LOWER EXTREMITIES: ICD-10-CM

## 2021-06-14 DIAGNOSIS — M79.605 PAIN IN BOTH LOWER EXTREMITIES: ICD-10-CM

## 2021-06-14 DIAGNOSIS — I10 ESSENTIAL HYPERTENSION: ICD-10-CM

## 2021-06-14 DIAGNOSIS — E03.8 HYPOTHYROIDISM DUE TO HASHIMOTO'S THYROIDITIS: ICD-10-CM

## 2021-06-14 DIAGNOSIS — I48.91 ATRIAL FIBRILLATION, UNSPECIFIED TYPE (HCC): ICD-10-CM

## 2021-06-14 DIAGNOSIS — I48.91 NEW ONSET ATRIAL FIBRILLATION (HCC): ICD-10-CM

## 2021-06-14 DIAGNOSIS — Z79.01 ANTICOAGULATED ON COUMADIN: ICD-10-CM

## 2021-06-14 DIAGNOSIS — R73.9 BLOOD GLUCOSE ELEVATED: ICD-10-CM

## 2021-06-14 DIAGNOSIS — K90.89 OTHER INTESTINAL MALABSORPTION: ICD-10-CM

## 2021-06-14 DIAGNOSIS — R79.82 ELEVATED C-REACTIVE PROTEIN (CRP): ICD-10-CM

## 2021-06-14 DIAGNOSIS — Z28.83: ICD-10-CM

## 2021-06-14 DIAGNOSIS — I48.20 CHRONIC ATRIAL FIBRILLATION (HCC): ICD-10-CM

## 2021-06-14 DIAGNOSIS — E06.3 HYPOTHYROIDISM DUE TO HASHIMOTO'S THYROIDITIS: ICD-10-CM

## 2021-06-14 DIAGNOSIS — E11.9 NEW ONSET TYPE 2 DIABETES MELLITUS (HCC): ICD-10-CM

## 2021-06-14 DIAGNOSIS — I48.0 PAROXYSMAL ATRIAL FIBRILLATION (HCC): ICD-10-CM

## 2021-06-14 DIAGNOSIS — R79.83 HOMOCYSTEINEMIA: ICD-10-CM

## 2021-06-14 DIAGNOSIS — E03.9 HYPOTHYROIDISM, UNSPECIFIED TYPE: ICD-10-CM

## 2021-06-14 DIAGNOSIS — Z79.01 ANTICOAGULATED: ICD-10-CM

## 2021-06-14 DIAGNOSIS — R30.0 DYSURIA: ICD-10-CM

## 2021-06-14 DIAGNOSIS — K21.9 GASTROESOPHAGEAL REFLUX DISEASE WITHOUT ESOPHAGITIS: ICD-10-CM

## 2021-06-14 DIAGNOSIS — I10 HTN, GOAL BELOW 140/80: ICD-10-CM

## 2021-06-14 LAB
AVERAGE GLUCOSE: 111 MG/DL (ref 70–126)
BASOPHILS # BLD: 0.9 %
BASOPHILS ABSOLUTE: 0.1 THOU/MM3 (ref 0–0.1)
CALCIUM SERPL-MCNC: 9.6 MG/DL (ref 8.5–10.5)
CHOLESTEROL, TOTAL: 142 MG/DL (ref 100–199)
D-DIMER QUANTITATIVE: 666 NG/ML FEU (ref 0–500)
EOSINOPHIL # BLD: 2 %
EOSINOPHILS ABSOLUTE: 0.2 THOU/MM3 (ref 0–0.4)
ERYTHROCYTE [DISTWIDTH] IN BLOOD BY AUTOMATED COUNT: 14.6 % (ref 11.5–14.5)
ERYTHROCYTE [DISTWIDTH] IN BLOOD BY AUTOMATED COUNT: 50.1 FL (ref 35–45)
HBA1C MFR BLD: 5.7 % (ref 4.4–6.4)
HCT VFR BLD CALC: 44.6 % (ref 37–47)
HCT VFR BLD CALC: 44.8 % (ref 37–47)
HDLC SERPL-MCNC: 56 MG/DL
HEMOGLOBIN: 14.1 GM/DL (ref 12–16)
HEMOGLOBIN: 14.2 GM/DL (ref 12–16)
IMMATURE GRANS (ABS): 0.02 THOU/MM3 (ref 0–0.07)
IMMATURE GRANULOCYTES: 0.3 %
LDL CHOLESTEROL CALCULATED: 74 MG/DL
LYMPHOCYTES # BLD: 24.1 %
LYMPHOCYTES ABSOLUTE: 1.9 THOU/MM3 (ref 1–4.8)
MAGNESIUM: 2.1 MG/DL (ref 1.6–2.4)
MCH RBC QN AUTO: 29.4 PG (ref 26–33)
MCHC RBC AUTO-ENTMCNC: 31.6 GM/DL (ref 32.2–35.5)
MCV RBC AUTO: 92.9 FL (ref 81–99)
MONOCYTES # BLD: 7.4 %
MONOCYTES ABSOLUTE: 0.6 THOU/MM3 (ref 0.4–1.3)
NUCLEATED RED BLOOD CELLS: 0 /100 WBC
PLATELET # BLD: 267 THOU/MM3 (ref 130–400)
PMV BLD AUTO: 11 FL (ref 9.4–12.4)
PTH INTACT: 34.5 PG/ML (ref 15–65)
RBC # BLD: 4.8 MILL/MM3 (ref 4.2–5.4)
RHEUMATOID FACTOR: < 10 IU/ML (ref 0–13)
SEDIMENTATION RATE, ERYTHROCYTE: 37 MM/HR (ref 0–20)
SEG NEUTROPHILS: 65.3 %
SEGMENTED NEUTROPHILS ABSOLUTE COUNT: 5.2 THOU/MM3 (ref 1.8–7.7)
T3 TOTAL: 123 NG/DL (ref 72–181)
TRIGL SERPL-MCNC: 59 MG/DL (ref 0–199)
TSH SERPL DL<=0.05 MIU/L-ACNC: 0.48 UIU/ML (ref 0.4–4.2)
URIC ACID: 9.5 MG/DL (ref 2.4–5.7)
VITAMIN D 25-HYDROXY: 38 NG/ML (ref 30–100)
WBC # BLD: 8 THOU/MM3 (ref 4.8–10.8)

## 2021-06-15 DIAGNOSIS — E03.9 HYPOTHYROIDISM, UNSPECIFIED TYPE: ICD-10-CM

## 2021-06-15 LAB
C-REACTIVE PROTEIN, HIGH SENSITIVITY: 15.3 MG/L
HOMOCYSTEINE: 12.4 UMOL/L
THYROXINE (T4): 8.9 UG/DL (ref 4.5–10.9)

## 2021-06-15 NOTE — TELEPHONE ENCOUNTER
Patient's last appointment was : 2/4/2021  Patient's next appointment is : 6/23/2021  Last refilled: 6/4/20

## 2021-06-15 NOTE — TELEPHONE ENCOUNTER
Jeana Gee called requesting a refill on the following medications:  Requested Prescriptions     Pending Prescriptions Disp Refills    levothyroxine (SYNTHROID) 100 MCG tablet 90 tablet 3     Sig: take 1 tablet by mouth once daily    liothyronine (CYTOMEL) 5 MCG tablet 90 tablet 3     Sig: take 1 tablet by mouth once daily     Pharmacy verified:  [facility]      Date of last visit: 2/4/21  Date of next visit (if applicable): 1/01/9504

## 2021-06-16 LAB — DHEAS (DHEA SULFATE): 115 UG/DL (ref 10–90)

## 2021-06-16 RX ORDER — LEVOTHYROXINE SODIUM 0.1 MG/1
TABLET ORAL
Qty: 90 TABLET | Refills: 3 | Status: SHIPPED | OUTPATIENT
Start: 2021-06-16 | End: 2022-06-21 | Stop reason: SDUPTHER

## 2021-06-16 RX ORDER — LIOTHYRONINE SODIUM 5 UG/1
TABLET ORAL
Qty: 90 TABLET | Refills: 3 | Status: SHIPPED | OUTPATIENT
Start: 2021-06-16 | End: 2022-06-21 | Stop reason: SDUPTHER

## 2021-06-17 LAB — ANA SCREEN: NORMAL

## 2021-06-23 ENCOUNTER — OFFICE VISIT (OUTPATIENT)
Dept: FAMILY MEDICINE CLINIC | Age: 71
End: 2021-06-23
Payer: MEDICARE

## 2021-06-23 VITALS
TEMPERATURE: 97.9 F | HEIGHT: 63 IN | HEART RATE: 63 BPM | RESPIRATION RATE: 14 BRPM | BODY MASS INDEX: 48.12 KG/M2 | WEIGHT: 271.6 LBS | SYSTOLIC BLOOD PRESSURE: 124 MMHG | OXYGEN SATURATION: 96 % | DIASTOLIC BLOOD PRESSURE: 68 MMHG

## 2021-06-23 DIAGNOSIS — M79.604 PAIN IN BOTH LOWER EXTREMITIES: ICD-10-CM

## 2021-06-23 DIAGNOSIS — I48.0 PAROXYSMAL ATRIAL FIBRILLATION (HCC): ICD-10-CM

## 2021-06-23 DIAGNOSIS — E03.9 HYPOTHYROIDISM, UNSPECIFIED TYPE: Primary | ICD-10-CM

## 2021-06-23 DIAGNOSIS — R79.83 HOMOCYSTEINEMIA: ICD-10-CM

## 2021-06-23 DIAGNOSIS — E11.9 NEW ONSET TYPE 2 DIABETES MELLITUS (HCC): ICD-10-CM

## 2021-06-23 DIAGNOSIS — M79.605 PAIN IN BOTH LOWER EXTREMITIES: ICD-10-CM

## 2021-06-23 DIAGNOSIS — I10 ESSENTIAL HYPERTENSION: ICD-10-CM

## 2021-06-23 DIAGNOSIS — R79.89 ELEVATED D-DIMER: ICD-10-CM

## 2021-06-23 LAB
DHEA UNCONJUGATED: 2.17 NG/ML (ref 0.63–4.7)
TESTOSTERONE, FREE W SHGB, FEMALES/CHILDREN: NORMAL

## 2021-06-23 PROCEDURE — 99214 OFFICE O/P EST MOD 30 MIN: CPT | Performed by: FAMILY MEDICINE

## 2021-06-23 SDOH — ECONOMIC STABILITY: FOOD INSECURITY: WITHIN THE PAST 12 MONTHS, YOU WORRIED THAT YOUR FOOD WOULD RUN OUT BEFORE YOU GOT MONEY TO BUY MORE.: NEVER TRUE

## 2021-06-23 SDOH — ECONOMIC STABILITY: FOOD INSECURITY: WITHIN THE PAST 12 MONTHS, THE FOOD YOU BOUGHT JUST DIDN'T LAST AND YOU DIDN'T HAVE MONEY TO GET MORE.: NEVER TRUE

## 2021-06-23 ASSESSMENT — SOCIAL DETERMINANTS OF HEALTH (SDOH): HOW HARD IS IT FOR YOU TO PAY FOR THE VERY BASICS LIKE FOOD, HOUSING, MEDICAL CARE, AND HEATING?: NOT HARD AT ALL

## 2021-06-23 NOTE — PATIENT INSTRUCTIONS
Thank you   1. Thank you for trusting us with your healthcare needs. You may receive a survey regarding today's visit. It would help us out if you would take a few moments to provide your feedback. We value your input. 2. Please bring in ALL medications BOTTLES, including inhalers, herbal supplements, over the counter, prescribed & non-prescribed medicine. The office would like actual medication bottles and a list.   3. Please note our OFFICE POLICIES:   a. Prior to getting your labs drawn, please check with your insurance company for benefits and eligibility of lab services. Often, insurance companies cover certain tests for preventative visits only. It is patient's responsibility to see what is covered. b. We are unable to change a diagnosis after the test has been performed. c. Lab orders will not be re-printed. Please hold onto your original lab orders and take them to your lab to be completed. d. If you no show your scheduled appointment three times, you will be dismissed from this practice. e. Ritesh Sepulveda must be completed 24 hours prior to your schedule appointment. 4. If the list below has been completed, PLEASE FAX RECORDS TO OUR OFFICE @ [phone].  Once the records have been received we will update your records at our office:  Health Maintenance Due   Topic Date Due    Diabetic foot exam  Never done    Diabetic retinal exam  Never done    Diabetic microalbuminuria test  Never done    Colon cancer screen colonoscopy  06/01/2020    COVID-19 Vaccine (2 - Moderna 2-dose series) 03/24/2021

## 2021-06-23 NOTE — PROGRESS NOTES
medication(s) include   Current Outpatient Medications:     levothyroxine (SYNTHROID) 100 MCG tablet, take 1 tablet by mouth once daily, Disp: 90 tablet, Rfl: 3    liothyronine (CYTOMEL) 5 MCG tablet, take 1 tablet by mouth once daily, Disp: 90 tablet, Rfl: 3    Acetaminophen (TYLENOL ARTHRITIS PAIN PO), Take by mouth See Admin Instructions Indications: Pain Don't take more then 3,000 mg each day. , Disp: , Rfl:     RESVERATROL PO, Take 2 capsules by mouth 2 times daily (Patient not taking: Reported on 6/7/2021), Disp: , Rfl:     Omega-3 Fatty Acids (FISH OIL PO), Take 2 capsules by mouth daily Indications: Pain, Disp: , Rfl:     warfarin (COUMADIN) 5 MG tablet, As directed by [facility] tabs = 90 day supply, Disp: 90 tablet, Rfl: 4    flecainide (TAMBOCOR) 100 MG tablet, Take 100 mg by mouth 2 times daily , Disp: , Rfl:     vitamin C (ASCORBIC ACID) 500 MG tablet, Take 500 mg by mouth daily, Disp: , Rfl:     Calcium Carbonate (SM CALCIUM 600 PO), Take 1 tablet by mouth daily, Disp: , Rfl:     metoprolol succinate (TOPROL XL) 25 MG extended release tablet, Take 25 mg by mouth 2 times daily , Disp: , Rfl:     lisinopril (PRINIVIL;ZESTRIL) 10 MG tablet, Take 0.5 tablets by mouth daily, Disp: 90 tablet, Rfl: 1    glucose monitoring kit (FREESTYLE) monitoring kit, 1 kit by Does not apply route daily, Disp: 1 kit, Rfl: 0    Lancets MISC, 1 each by Does not apply route daily, Disp: 300 each, Rfl: 1    blood glucose test strips (ASCENSIA AUTODISC VI;ONE TOUCH ULTRA TEST VI) strip, 1 each by In Vitro route daily Dispense any brand - check blood sugar As needed. , Disp: 100 each, Rfl: 3    NONFORMULARY, Take 1 capsule by mouth 4 times daily (before meals and nightly) Christopher, Disp: , Rfl:     loratadine (CLARITIN) 10 MG tablet, Take 1 tablet by mouth 2 times daily (before meals) (Patient taking differently: Take by mouth as needed ), Disp: 180 tablet, Rfl: 3    Methylsulfonylmethane (MSM PO), Take 1 tablet by mouth daily as needed (pain), Disp: , Rfl:     nitroGLYCERIN (NITROSTAT) 0.4 MG SL tablet, Place under the tongue every 5 minutes as needed for Chest pain (If third one does not relieve pain, call 9-1-1.)  (Patient not taking: Reported on 6/7/2021), Disp: , Rfl: 0    diclofenac sodium 1 % GEL, as needed for Pain , Disp: , Rfl:     JACRULQRV-CYYTBXPAQ-BHLJCAHRQJ PO, Place 1 tablet under the tongue 2 times daily Jimbo B12 plus or [place] methylB6, phjudlI76, methylfolate , Disp: , Rfl:     Magnesium (CVS TRIPLE MAGNESIUM COMPLEX) 400 MG CAPS, Take 3 capsules by mouth 4 times daily (after meals and at bedtime) Indications: Magnesium Deficiency , Disp: , Rfl:     magnesium cl-calcium carbonate (SLOW-MAG) 71.5-119 MG TBEC tablet, Take 1 tablet by mouth 3 times daily (with meals) Take before and when stressed with 2 extra fish oil PRN, Disp: , Rfl:     Grape Seed OIL, 500 mg by Does not apply route daily Oregan Grape Root, Disp: , Rfl:     Lysine 500 MG TABS, Take 1 tablet by mouth 2 times daily , Disp: , Rfl:     DHEA 25 MG CAPS, Take 1 capsule by mouth every other day , Disp: , Rfl:     Cinnamon 500 MG CAPS, Take 3 capsules by mouth 4 times daily (before meals and nightly) , Disp: , Rfl:     vitamin D (CHOLECALCIFEROL) 50 MCG (2000 UT) TABS tablet, Take 2 capsules by mouth daily Indications: Treatment with Vitamin D .use only the 2000 IU or 1000 IU caps from shipbeat, 5000IU does not work, Disp: , Rfl:     B Complex-Biotin-FA (B-100 TR) TBCR, Take 2 tablets by mouth 2 times daily (before meals) Before breakfast and before lunch or supper, Disp: , Rfl:   Allergies: Enoxaparin  Immunizations:   Immunization History   Administered Date(s) Administered    COVID-19, Moderna, PF, 100mcg/0.5mL 02/24/2021, 03/24/2021    Influenza Virus Vaccine 11/03/2011, 10/09/2014, 11/17/2015    Influenza, High Dose (Fluzone 65 yrs and older) 10/11/2018    Influenza, MDCK, Preservative free 10/31/2017    Influenza, Quadv, IM, PF (6 mo and older Fluzone, Flulaval, Fluarix, and 3 yrs and older Afluria) 01/19/2017, 09/08/2020    Influenza, Triv, inactivated, subunit, adjuvanted, IM (Fluad 65 yrs and older) 10/11/2018, 09/17/2019    Pneumococcal Conjugate 13-valent (Fexlujh27) 07/11/2017    Pneumococcal Polysaccharide (Xoydyvkac45) 11/05/2018    Tdap (Boostrix, Adacel) 10/09/2014    Zoster Live (Zostavax) 05/05/2014    Zoster Recombinant (Shingrix) 11/13/2019, 04/04/2020        History of Present Illness:     Leah's had concerns including 6 Month Follow-Up and Leg Pain (if sits too long, left leg lightning- front of thigh. sciattica?; Dr Mar Chen, laser helped). Meg Mosqueda  presents to the [address] [place] [address] today for;   Chief Complaint   Patient presents with    6 Month Follow-Up    Leg Pain     if sits too long, left leg lightning- front of thigh. sciattica?; Dr Mar Chen, laser helped   , abnormal labs follow up and these conditions as she  Is looking today for:     No diagnosis found. HPI    Subjective:     Review of Systems   All other systems reviewed and are negative. Objective:     /68 (Site: Left Upper Arm, Position: Sitting, Cuff Size: Large Adult)   Pulse 63   Temp 97.9 °F (36.6 °C) (Oral)   Resp 14   Ht 5' 2.8\" (1.595 m)   Wt 271 lb 9.6 oz (123.2 kg)   SpO2 96%   BMI 48.43 kg/m²   Physical Exam  Vitals and nursing note reviewed. Constitutional:       Appearance: Normal appearance. HENT:      Head: Normocephalic. Pulmonary:      Effort: Pulmonary effort is normal.   Neurological:      Mental Status: She is alert. Psychiatric:         Mood and Affect: Mood normal.         Thought Content:  Thought content normal.            Laboratory Data:   Lab results were searched in Care Everywhere and/or those brought by the pateint were reviewed today with Monique Morse and she has a copy of their most recent labs to take home with them as noted below;       Imaging Data:   Imaging Data:       Assessment & Plan:       Impression:  No diagnosis found. Assessment and Plan:  After reviewing the patients chief complaints, reviewing their labfindings in great detail (with the patient and those accompanying them) which correlate to their chief complaints, symptoms, and or medical conditions; suggestions were made relating to changes in diet and or supplements which may improve the complaints and which will be reflected in their future lab findings; Chief Complaint   Patient presents with    6 Month Follow-Up    Leg Pain     if sits too long, left leg lightning- front of thigh. sciattica?; zoe Childers helped   ;    Plans for the next visits:  - Abnormal and non-optimal Labs were ordered today to be repeated in the next 120-365 days to assess changes from adjustments in nutrition and or nutrients. - Patient instructed when having a blood draw to ask the  to divide their lab draws into multiple draws over several days if not feeling good at the time of the lab draw or if either prefers to do several smaller blood draws over several days  -Patient instructed to check with insurer before each lab draw and to go to the lab which the insurer directs them for the most cost effective lab draw with the least patient's cost  - Mai Haq  will be scheduled subsequent to those results. Nubia Shipley will bring in her drink, food, supplement log to her next visit    Chronic Problems Addressed on this Visit:                                   1.  Intensity of Service; Uncontrolled items at this visit; Chief Complaint   Patient presents with    6 Month Follow-Up    Leg Pain     if sits too long, left leg lightning- front of thigh. sciattica?; zoe Childers helped   ; Improved items at this visit and Stable items were discussed at this visit;  2.  Patients food, drinks, supplements and symptoms were reviewed with the patient,       - Mai Haq will bring food, drink, supplements and symptoms log to next visit for inclusion in their record      - 75 better food list reviewed & given to patient with the omega 6 food list to avoid      - The 52 Latex foods list was reviewed and given to the patients with the information on carrageenan         - Gluten in corn and oats abstracts sheet reviewed and given to the patient today   3. Greater than 30 minutes time was spent with the patient face to face on this visit; of which >50% was for counseling and coordination of care, as well as the time spent before and after the visit reviewing the chart, documenting the encounter, reviewing labs,reports, NIH listed studies, making phone calls, etc.      Patients food and drinks were reviewed with the patient,   - They will bring a food drink symptom log to future visits for inclusion in their record    - 75 better food list reviewed & given to patient along with the omega 6 food list to avoid      - Gluten in corn and oats abstracts sheet reviewed and given to the patient today    - 23 Foods containing Latex-like proteins was reviewed and copy to be taken if desired     - Nutrient Supplements list provided and copyto be taken if desired    - [employer]. [employer] web site offered to patient to review at their convenience by staff with login information    Note:  I have discussed with the patient that with all nutraceuticals, there is often mixed data and emerging research which needs to be monitored; as well as an array of NIH fact sheets on nutrients and supplements, available at www.nih,issue plus [employer]DMGAME. [employer] plus www.lpi,org. If I have recommended cinnamon at the request of this patient to assist them in control of their blood sugar, triglyceride, and/or weight issues.   I discussed that the patient's clinical use of cinnamon bark, calcium, magnesium, Vitamin D, and pharmaceutical grade CVS omega 3 oil or triple-strength fish oil, and B-50/B-100 time-released B-complex by [id number] [facility] will be for a time-limited trial to determine their individual effectiveness and safety in this patient. I also referred the patient to the NMCD: Nutrition, Metabolism, and Cardiovascular Diseases (SecuritiesCard.pl) and concerns about long-term use and hepatotoxicity of cinnamon and other nutrients. I suggested they frequently search nih.gov for the latest non-proprietary information on nutriceuticals as well as consider a subscription to Quality Practice for details on reviewed supplements, or at the least review the nutrient files at [facility] at [facility], [address] bark, an insulin mimetic, reduces some High Carbohydrate Dietary Impacts. Methylhydroxychalcone polymers insulin-enhancing properties in fat cells are responsible for enhanced glucose uptake, inhibiting hepatic HMG-CoA reductase and lowers lipids. www.jacn. org/content/20/4/327.full     But cinnamon with additives such as Cinnamon Extract are not effective as insulin mimetics.  :eStoreDirectory.at     Nutrients for Start up from [facility] or [employer] for ease to get started now;  Sourav Contreras has some useable products;  - Triple Strength Fish Oil, enteric coated  - Vit D-3 5000 IU gel caps  - Iron ferrous sulfate 325 mg tabs  - Centrum Silver look-a-like for most patients, or  - Centrum plain look-a-like if need iron    Local pharmacies or chains such as [employer], [employer], have:  - [employer] pharmaceutical grade omega 3 is 90% EPA/DHA whereas most Triple strength fish oil are 75% EPA/DHA  - Triple Strength Fish Oil (enteric coated if available) or if not enteric coated, can take from freezer for less burps  - B-50 or B-100 released balanced B complex tabs by [address] [facility] bark 500 mg (without Chromium or extracts)   some brands list 1000 mg / serving of 2 capsules,    some brands have 1000 mg caps with the undesireable chromium extract  - Calcium carbonate/citrate, magnesium oxide/citrate, Vit D-3 as 3-4 tabs/caps/serving     Some Local Brands may contain Zinc which is acceptable for the first bottle or two  - Magnesium oxide 250 mg tabs for those having < 2 bowel movements daily  - Magnesium citrate 200 mg if having > 2 bowel movements/day  - Centrum Silver or look-a-like for most patients, Centrum plain or look-a-like with iron  - Vitamin D-3 comes as 1,000 IU or 2,000 IU or 5,000 IU gel caps or Liquid drops but keep Vitamin D levels <50 but >40     Some brands containing or derived from soy oil or corn oil are OK if not allergic to soy  - Elemental Iron 65 mg tabs at bedtime is available over the counter if need more iron     Usually turns bowel movements grey, green, or black but not a concern  - Apricot Kernel Oil (by Now) for dry skin sensitive perineal or perianal area skin    Nutrients for ongoing use by Mail order for less expense from [employer] ;  - Strength Fish Oil , 240 Softgels Item #077985  -B-100 time released balanced B complex Item #651711  - Cinnamon bark 500 mg without Chromium or extract Item #143048  - Calcium carbonate 1000 mg, Magnesium oxide 500 mg, Vit D-3 400 IU Item #951968  - Magnesium oxide 500 mg tabs Item #279778 if less than 2 bowel movements daily  - [employer] Sen[employer] Item #855883 for most patients, One Daily Item #428389 with iron  - Vit D 3  1,000 Item #621227      2,000 IU Item #118509   Item #911914     Some brands containing orderived from soy oil or corn oil are OK if not allergic to soy    Nutrients for Special Needs by Mail order for less expense from www. puritan.com;  -Elemental Iron 65 mg tabs Item #534416 if need more iron for low iron on labs    Usually turns bowel movements grey, green or black but not a concern  - Time released Niacin 250 mg Item #068083 for cold intolerance, low libido or impotence  - DHEA 50 mg Item #156161 for improving DHEA levels on labs if having Fatigue    If stools too loose substitute pumpkins, radishes, fall red raspberries, squash, turnips  November: broccoli, cabbage, carrots, parsley, pears, peas  December: use canned, frozen or dried fruits since lower in latex    Upto half of latex-sensitive patients show allergic reactions to fruits (avocados, bananas, kiwifruits, papayas, peaches),   Annals of Allergy, 1994. These plants contain the same proteins that are allergens in latex. People with fruit allergies should warn physicians before undergoing procedures which may cause anaphylactic reaction if in contact with latex gloves. Some of the common foods with defined cross-reactivity to latex are avocado, banana, kiwi, chestnut, raw potato, tomato, stone fruits (e.g., peach, cherry), hazelnut, melons, celery, carrot, apple, pear, papaya, and almond. Foods with less well-defined cross-reactivity to latex are peanuts, peppers, citrus fruits, coconut, pineapple, naveed, fig, passion fruit, Ugli fruit, and grape. This fruit/latex cross-reactivity is worsened by ethylene, a gas used to hasten commercial ripening. In nature, plants produce low levels of the hormone ethylene, which regulates germination, flowering, and ripening. Forced ripening by high ethylene concentrations, plants produce allergenic wound-repair proteins, which are similar to wound-repair proteins made during the tapping of rubber trees. Sensitive individuals who ingest the fruit get a higher dose and worse reaction. Some people may even first become sensitized to latex through fruit. Can food processing increase the concentrations of allergenic proteins? Latex-sensitized children (and adults) in [place] often experience allergic reactions after eating bananas ripened artificially with ethylene. In the [place], food distribution centers treat unripe bananas and other produce with ethylene to ripen; not commonly done in [facility] since fruit is tree-ripened there.  Does treatment of food with ethylene induce banana proteins that cross-react with latex? (Miguel Angel et al.)    References:   Latex in Foods Allergy, http://ehp.niehs.nih.gov/members/2003/5811/5811.html    Search web for [employer] in Season \" for where you live or are at the time you food shop   Management of Latex, ://medicalcenter. [facility].edu/  search for nih, latex-like proteins in foods

## 2021-06-23 NOTE — LETTER
[address] [place] [address],[address] [facility] [address]  Phone: [phone]  Fax: [phone]    Gume Gallagher MD        June 23, 2021    [address]   [address]  [address] [facility] [address]      Dear Denny Peterson:    Meralgia paresthetica is a disorder characterized by tingling, numbness, and burning pain in the outer side of the thigh. The disorder is caused by compression of the lateral femoral cutaneous nerve, a sensory nerve to the skin, as it exits the pelvis. For most people, the symptoms of meralgia paresthetica ease in a few months. Treatment focuses on relieving nerve compression. ... Medications  Corticosteroid injections. ... Tricyclic antidepressants. ... Gabapentin (Gralise, Neurontin), phenytoin (Dilantin) or pregabalin (Lyrica). If you have any questions or concerns, please don't hesitate to call. Can sitting cause Meralgia Paresthetica? Dysesthesias are distorted perceptions of ordinary tactile or painful stimuli (eg, burning, tingling, itchiness). Changes in posture or prolonged sitting or standing may cause a fluctuation of symptoms. The discomfort may resolve spontaneously and reappear. Jan 26, 2021    Exercising for 30 minutes a day at least three or four times a week should help ease meralgia paresthetica pain. Some exercises to try include: brisk walking. Dec 22, 2020    Sincerely,        Gume Gallagher MD

## 2021-07-14 ENCOUNTER — TELEPHONE (OUTPATIENT)
Dept: FAMILY MEDICINE CLINIC | Age: 71
End: 2021-07-14

## 2021-07-14 NOTE — TELEPHONE ENCOUNTER
Patient: Chio Nguyen     Provider: Arnulfo Primrose       pt needs to be seen for a follow up that was back in [place] she is having a problem with her eye and hip. Patient can walk do to pain- screened green  There are no available appointments at this time until September and patient wants to be seen as soon as possible.

## 2021-07-15 ENCOUNTER — HOSPITAL ENCOUNTER (OUTPATIENT)
Age: 71
Discharge: HOME | End: 2021-07-15
Payer: MEDICARE

## 2021-07-15 ENCOUNTER — HOSPITAL ENCOUNTER (OUTPATIENT)
Dept: GENERAL RADIOLOGY | Age: 71
Discharge: HOME | End: 2021-07-15
Payer: MEDICARE

## 2021-07-15 ENCOUNTER — OFFICE VISIT (OUTPATIENT)
Dept: FAMILY MEDICINE CLINIC | Age: 71
End: 2021-07-15
Payer: MEDICARE

## 2021-07-15 VITALS
OXYGEN SATURATION: 97 % | BODY MASS INDEX: 46.78 KG/M2 | SYSTOLIC BLOOD PRESSURE: 120 MMHG | HEART RATE: 66 BPM | TEMPERATURE: 97.7 F | DIASTOLIC BLOOD PRESSURE: 82 MMHG | HEIGHT: 64 IN | WEIGHT: 274 LBS | RESPIRATION RATE: 16 BRPM

## 2021-07-15 DIAGNOSIS — Z00.00 ROUTINE GENERAL MEDICAL EXAMINATION AT A HEALTH CARE FACILITY: ICD-10-CM

## 2021-07-15 DIAGNOSIS — M25.562 ARTHRALGIA OF LEFT LOWER LEG: Primary | ICD-10-CM

## 2021-07-15 DIAGNOSIS — M79.651 PAIN IN BOTH THIGHS: ICD-10-CM

## 2021-07-15 DIAGNOSIS — M79.652 PAIN IN BOTH THIGHS: ICD-10-CM

## 2021-07-15 DIAGNOSIS — M25.569 ARTHRALGIA OF LOWER LEG, UNSPECIFIED LATERALITY: ICD-10-CM

## 2021-07-15 DIAGNOSIS — M16.12 ARTHRITIS OF LEFT HIP: ICD-10-CM

## 2021-07-15 PROCEDURE — 99214 OFFICE O/P EST MOD 30 MIN: CPT | Performed by: STUDENT IN AN ORGANIZED HEALTH CARE EDUCATION/TRAINING PROGRAM

## 2021-07-15 PROCEDURE — 72100 X-RAY EXAM L-S SPINE 2/3 VWS: CPT

## 2021-07-15 RX ORDER — TIZANIDINE 2 MG/1
2 TABLET ORAL NIGHTLY PRN
Qty: 30 TABLET | Refills: 0 | Status: SHIPPED | OUTPATIENT
Start: 2021-07-15 | End: 2021-08-19 | Stop reason: SDUPTHER

## 2021-07-15 ASSESSMENT — ENCOUNTER SYMPTOMS
COLOR CHANGE: 0
BACK PAIN: 1

## 2021-07-15 NOTE — PROGRESS NOTES
S: 80 y.o. female with   Chief Complaint   Patient presents with    Pain     Left upper thigh pain review x-ray lumbar spine completed today     Did get the xray    2 different places in the hip - cna't do the nsaids due to coumadin    Could not even walk due to the pain  Also inner groin pain and muscle tension and that will stiffen up and cause pain    BP Readings from Last 3 Encounters:   07/15/21 120/82   06/23/21 124/68   02/04/21 136/60     Wt Readings from Last 3 Encounters:   07/15/21 274 lb (124.3 kg)   06/23/21 271 lb 9.6 oz (123.2 kg)   02/04/21 272 lb 3.2 oz (123.5 kg)           O: VS:   Vitals:    07/15/21 1331 07/15/21 1340   BP: (!) 119/90 120/82   Site: Left Upper Arm Left Lower Arm   Position: Sitting Sitting   Cuff Size: Large Adult Large Adult   Pulse: 66    Resp: 16    Temp: 97.7 °F (36.5 °C)    TempSrc: Oral    SpO2: 97%    Weight: 274 lb (124.3 kg)    Height: 5' 3.78\" (1.62 m)      Body mass index is 47.36 kg/m².     AAO/NAD, appropriate affect for mood  Normocephalic, atraumatic, eyes  conjunctiva and sclera normal,   skin no rashes on exposed areas   Insight, judgement normal and in no acute distress      Lab Results   Component Value Date    WBC 8.0 06/14/2021    HGB 14.1 06/14/2021    HGB 14.2 06/14/2021    HCT 44.6 06/14/2021    HCT 44.8 06/14/2021     06/14/2021    CHOL 142 06/14/2021    TRIG 59 06/14/2021    HDL 56 06/14/2021    LDLDIRECT 80.00 12/12/2013    LDLCALC 74 06/14/2021    AST 22 09/08/2020     09/08/2020    K 4.3 09/08/2020     09/08/2020    CREATININE 0.8 09/08/2020    BUN 14 09/08/2020    CO2 22 (L) 09/08/2020    TSH 0.476 06/14/2021    INR 2.60 (H) 06/07/2021    LABA1C 5.7 06/14/2021    LABGLOM 71 (A) 09/08/2020    MG 2.1 06/14/2021    CALCIUM 9.6 06/14/2021    VITD25 38 06/14/2021       XR LUMBAR SPINE (2-3 VIEWS)    Result Date: 7/15/2021  LUMBAR SPINE 2 VIEWS: CLINICAL INFORMATION: Routine general medical examination at a health care facility, Arthralgia of lower leg, unspecified laterality, Pain in both thighs, Pain in both thighs COMPARISON: No prior study. TECHNIQUE: Standard AP and lateral views of lumbar spine were obtained. 1. Mild thoracolumbar dextro scoliosis. Cannot exclude muscle spasm left side. 2. Moderate degenerative facet arthropathy L4-5 and L5/S1. No acute fracture. Mild vertebral body spondylosis. Mild disc space narrowing L3-4 and L5-S1.   3. Sacroiliac chest unremarkable. Moderate degenerative changes left hip. **This report has been created using voice recognition software. It may contain minor errors which are inherent in voice recognition technology. ** Final report electronically signed by Dr. Oliver Or on 7/15/2021 11:30 AM           Diagnosis Orders   1. Arthralgia of left lower leg  tiZANidine (ZANAFLEX) 2 MG tablet   2. Arthritis of left hip  diclofenac sodium (VOLTAREN) 1 % GEL    [facility]  Will refer to PT    Will try voltaren gel    Will add some muscle spasms    Will do the antiinflammatory diet    Will see if the PT helps - can get an mri - can think about pain     Return in about 4 weeks (around 8/12/2021) for arthritis pain.     Orders Placed:  Orders Placed This Encounter   Procedures    [facility]     Medications Prescribed:  Orders Placed This Encounter   Medications    diclofenac sodium (VOLTAREN) 1 % GEL     Sig: Apply topically 4 times daily as needed for Pain     Dispense:  150 g     Refill:  1    tiZANidine (ZANAFLEX) 2 MG tablet     Sig: Take 1 tablet by mouth nightly as needed (muscle spasms)     Dispense:  30 tablet     Refill:  0       Future Appointments   Date Time Provider Alejandrina Landrum   7/19/2021  2:20 PM Sasha Rodrigues RPH [facility] MED [facility] [place]   10/19/2021  1:00 PM Liss Lynch MD [facility] - Rylan Lazar Maintenance Due   Topic Date Due    Diabetic foot exam  Never done    Diabetic retinal exam  Never done  Diabetic microalbuminuria test  Never done    Colon cancer screen colonoscopy  06/01/2020         Attending Physician Statement  I have discussed the case, including pertinent history and exam findings with the resident. I also have seen the patient and performed key portions of the examination. I agree with the documented assessment and plan as documented by the resident.   GE modifier added to this encounter      Alice Cobos DO 7/15/2021 2:03 PM

## 2021-07-15 NOTE — PROGRESS NOTES
[address] [facility] [address] [facility] [address] [address]  Dept: [phone]  Dept Fax: [phone]  Loc: [phone]    Luisito Padilla is a 80 y.o. female who presents today for:  Chief Complaint   Patient presents with    Pain     Left upper thigh pain review x-ray lumbar spine completed today       HPI:   Patient here to discuss her left thigh pain. Left thigh and hip pain: Has been ongoing since 2021. Painful to walk on it. Has 2 sites of pain - nerve pain left hip and left inner groin area. Initially, it felt like leg cramps - used Asper Cream to help her sleep at night with the pain. Not really having back pain until the left hip acts up; using a cane or walker to get around. No numbness or tingling. She is also following with a Chiropractor now for the pain. X-ray lumbar spine done earlier today - results reviewed.      Past Medical History:   Diagnosis Date    A-fib [facility])     Atrial fibrillation (HCC)     Carpal tunnel syndrome     GERD (gastroesophageal reflux disease)     Headache(784.0)     HTN (hypertension)     Hypothyroidism     Osteoarthritis     Sleep apnea 2019    Mild    Tendonitis of both wrists 2017    Toenail fungus       Past Surgical History:   Procedure Laterality Date    CARPAL TUNNEL RELEASE      both hands     SECTION       SECTION       SECTION      FOOT SURGERY Left 2018    [employer] Left 2010    Left Total Knee    JOINT REPLACEMENT Right 2016    Right Total Knee     Family History   Problem Relation Age of Onset    Stroke Mother     Arrhythmia Mother     Other Mother         Barry's Syndrome    Heart Disease Mother     Coronary Art Dis Mother     Diabetes Father     Other Sister         Barry's Syndrome    Arrhythmia Sister     Cancer Brother         Stomach Cancer    Stillborn Child     Early Death Child     Pacemaker Sister     Heart Attack Brother     Coronary Art Dis Brother     Other Brother         Barry's Syndrome     Social History     Tobacco Use    Smoking status: Never Smoker    Smokeless tobacco: Never Used   Substance Use Topics    Alcohol use: Yes     Comment: rarely      Current Outpatient Medications   Medication Sig Dispense Refill    diclofenac sodium (VOLTAREN) 1 % GEL Apply topically 4 times daily as needed for Pain 150 g 1    tiZANidine (ZANAFLEX) 2 MG tablet Take 1 tablet by mouth nightly as needed (muscle spasms) 30 tablet 0    levothyroxine (SYNTHROID) 100 MCG tablet take 1 tablet by mouth once daily 90 tablet 3    liothyronine (CYTOMEL) 5 MCG tablet take 1 tablet by mouth once daily 90 tablet 3    Acetaminophen (TYLENOL ARTHRITIS PAIN PO) Take by mouth See Admin Instructions Indications: Pain Don't take more then 3,000 mg each day.  RESVERATROL PO Take 2 capsules by mouth 2 times daily       Omega-3 Fatty Acids (FISH OIL PO) Take 2 capsules by mouth daily Indications: Pain      warfarin (COUMADIN) 5 MG tablet As directed by [facility] tabs = 90 day supply 90 tablet 4    flecainide (TAMBOCOR) 100 MG tablet Take 100 mg by mouth 2 times daily       vitamin C (ASCORBIC ACID) 500 MG tablet Take 500 mg by mouth daily      Calcium Carbonate (SM CALCIUM 600 PO) Take 1 tablet by mouth daily      metoprolol succinate (TOPROL XL) 25 MG extended release tablet Take 25 mg by mouth 2 times daily       lisinopril (PRINIVIL;ZESTRIL) 10 MG tablet Take 0.5 tablets by mouth daily 90 tablet 1    glucose monitoring kit (FREESTYLE) monitoring kit 1 kit by Does not apply route daily 1 kit 0    Lancets MISC 1 each by Does not apply route daily 300 each 1    blood glucose test strips (ASCENSIA AUTODISC VI;ONE TOUCH ULTRA TEST VI) strip 1 each by In Vitro route daily Dispense any brand - check blood sugar As needed.  100 each 3    Diabetic foot exam  Never done    Diabetic retinal exam  Never done    Diabetic microalbuminuria test  Never done    Colon cancer screen colonoscopy  06/01/2020    Flu vaccine (1) 09/01/2021    Potassium monitoring  09/08/2021    Creatinine monitoring  09/08/2021    Annual Wellness Visit (AWV)  02/05/2022    A1C test (Diabetic or Prediabetic)  06/14/2022    Lipid screen  06/14/2022    TSH testing  06/14/2022    Breast cancer screen  12/04/2022    DTaP/Tdap/Td vaccine (2 - Td or Tdap) 10/09/2024    Shingles Vaccine  Completed    Pneumococcal 65+ years Vaccine  Completed    COVID-19 Vaccine  Completed    DEXA (modify frequency per FRAX score)  Addressed    Hepatitis C screen  Addressed    Hepatitis A vaccine  Aged Out    Hib vaccine  Aged Out    Meningococcal (ACWY) vaccine  Aged Out       Subjective:      Review of Systems   Constitutional: Positive for activity change. Negative for chills and fever. Musculoskeletal: Positive for arthralgias, back pain and gait problem. Skin: Negative for color change and rash. Psychiatric/Behavioral: Negative for dysphoric mood. The patient is not nervous/anxious. Objective:     Vitals:    07/15/21 1331 07/15/21 1340   BP: (!) 119/90 120/82   Site: Left Upper Arm Left Lower Arm   Position: Sitting Sitting   Cuff Size: Large Adult Large Adult   Pulse: 66    Resp: 16    Temp: 97.7 °F (36.5 °C)    TempSrc: Oral    SpO2: 97%    Weight: 274 lb (124.3 kg)    Height: 5' 3.78\" (1.62 m)        Body mass index is 47.36 kg/m². Wt Readings from Last 3 Encounters:   07/15/21 274 lb (124.3 kg)   06/23/21 271 lb 9.6 oz (123.2 kg)   02/04/21 272 lb 3.2 oz (123.5 kg)     BP Readings from Last 3 Encounters:   07/15/21 120/82   06/23/21 124/68   02/04/21 136/60       Physical Exam  Vitals reviewed. Constitutional:       Appearance: She is obese. HENT:      Head: Normocephalic and atraumatic.       Right Ear: External ear normal.      Left Ear: External ear normal.      Nose: Nose normal.   Eyes:      Conjunctiva/sclera: Conjunctivae normal.   Cardiovascular:      Rate and Rhythm: Normal rate. Pulses: Normal pulses. Musculoskeletal:         General: Tenderness present. Skin:     General: Skin is warm and dry. Neurological:      Mental Status: She is oriented to person, place, and time. Gait: Gait abnormal (secondary to hip pain; using cane). Psychiatric:         Mood and Affect: Mood normal.         Behavior: Behavior normal.         Lab Results   Component Value Date    WBC 8.0 06/14/2021    HGB 14.1 06/14/2021    HGB 14.2 06/14/2021    HCT 44.6 06/14/2021    HCT 44.8 06/14/2021     06/14/2021    CHOL 142 06/14/2021    TRIG 59 06/14/2021    HDL 56 06/14/2021    LDLDIRECT 80.00 12/12/2013    LDLCALC 74 06/14/2021    AST 22 09/08/2020     09/08/2020    K 4.3 09/08/2020     09/08/2020    CREATININE 0.8 09/08/2020    BUN 14 09/08/2020    CO2 22 (L) 09/08/2020    TSH 0.476 06/14/2021    INR 2.60 (H) 06/07/2021    LABA1C 5.7 06/14/2021    LABGLOM 71 (A) 09/08/2020    MG 2.1 06/14/2021    CALCIUM 9.6 06/14/2021    VITD25 38 06/14/2021       Imaging Results:    XR LUMBAR SPINE (2-3 VIEWS)    Result Date: 7/15/2021  LUMBAR SPINE 2 VIEWS: CLINICAL INFORMATION: Routine general medical examination at a health care facility, Arthralgia of lower leg, unspecified laterality, Pain in both thighs, Pain in both thighs COMPARISON: No prior study. TECHNIQUE: Standard AP and lateral views of lumbar spine were obtained. 1. Mild thoracolumbar dextro scoliosis. Cannot exclude muscle spasm left side. 2. Moderate degenerative facet arthropathy L4-5 and L5/S1. No acute fracture. Mild vertebral body spondylosis. Mild disc space narrowing L3-4 and L5-S1. 3. Sacroiliac chest unremarkable. Moderate degenerative changes left hip. **This report has been created using voice recognition software.   It may contain minor errors which are inherent in voice recognition technology. ** Final report electronically signed by Dr. Reji Munson on 7/15/2021 11:30 AM        Assessment / Plan:     Shon Mota was seen today for pain. Diagnoses and all orders for this visit:    Arthralgia of left lower leg:  - Likely muscle spasm of inner groin area. No loss of sensation noted. Will trial Zanaflex nightly and encourage home exercises. -     tiZANidine (ZANAFLEX) 2 MG tablet; Take 1 tablet by mouth nightly as needed (muscle spasms)    Arthritis of left hip:  - Continue with Tylenol to help with pain. Will also prescribe Voltaren gel. Not able to do Ibuprofen with Coumadin  - Recommend PT to help with exercises  - patient agreeable. -     diclofenac sodium (VOLTAREN) 1 % GEL; Apply topically 4 times daily as needed for Pain  -     [facility] - Maria Esther Brain       Return in about 4 weeks (around 8/12/2021) for arthritis pain. Medications Prescribed:  Orders Placed This Encounter   Medications    diclofenac sodium (VOLTAREN) 1 % GEL     Sig: Apply topically 4 times daily as needed for Pain     Dispense:  150 g     Refill:  1    tiZANidine (ZANAFLEX) 2 MG tablet     Sig: Take 1 tablet by mouth nightly as needed (muscle spasms)     Dispense:  30 tablet     Refill:  0       Future Appointments   Date Time Provider Alejandrina Landrum   7/19/2021  2:20 PM [facility] [place]   8/12/2021  1:00 PM Consuelo Partida [facility] [place]   10/19/2021  1:00 PM [facility] [place]       Patient given educational materials - see patient instructions. Discussed use, benefit, and sideeffects of prescribed medications. All patient questions answered. Pt voiced understanding. Reviewed health maintenance. Instructed to continue current medications, diet and exercise. Patient agreed with treatment plan. Follow up as directed.      Electronically signed by Consuelo Partida MD on 7/15/2021 at 5:23 PM

## 2021-07-15 NOTE — PROGRESS NOTES
Health Maintenance Due   Topic Date Due    Diabetic foot exam  Never done    Diabetic retinal exam  Never done    Diabetic microalbuminuria test  Never done    Colon cancer screen colonoscopy  06/01/2020

## 2021-07-19 ENCOUNTER — HOSPITAL ENCOUNTER (OUTPATIENT)
Dept: PHARMACY | Age: 71
Setting detail: THERAPIES SERIES
Discharge: HOME | End: 2021-07-19
Payer: MEDICARE

## 2021-07-19 DIAGNOSIS — Z51.81 ENCOUNTER FOR THERAPEUTIC DRUG MONITORING: ICD-10-CM

## 2021-07-19 DIAGNOSIS — Z79.01 ANTICOAGULATED ON COUMADIN: Primary | ICD-10-CM

## 2021-07-19 LAB — POC INR: 3.4 (ref 0.8–1.2)

## 2021-07-19 PROCEDURE — 36416 COLLJ CAPILLARY BLOOD SPEC: CPT

## 2021-07-19 PROCEDURE — 99211 OFF/OP EST MAY X REQ PHY/QHP: CPT

## 2021-07-19 PROCEDURE — 85610 PROTHROMBIN TIME: CPT

## 2021-07-19 NOTE — PROGRESS NOTES
Medication Management [address]  [phone] (phone)  [phone] (fax)    Ms. Tj Dinh is a 80 y.o.  female with history of Afib who presents today for anticoagulation monitoring and adjustment. Patient verifies current dosing regimen and tablet strength. No missed or extra doses. Patient denies s/s bleeding/bruising/swelling/SOB/chest pain  No blood in urine or stool. No dietary changes. No changes in Herbals. Patient started Zanaflex 2 mg nightly PRN. Patient has been taking Relief factor that contains cucurmin, but she may stop this. No change in alcohol use or tobacco use. No change in activity level. Patient denies headaches/dizziness/lightheadedness/falls. No vomiting/diarrhea or acute illness. No Procedures scheduled in the future at this time. Assessment:   Lab Results   Component Value Date    INR 3.40 (H) 07/19/2021    INR 2.60 (H) 06/07/2021    INR 2.20 (H) 04/26/2021     INR supratherapeutic   Recent Labs     07/19/21  1429   INR 3.40*     Patient is supratherapeutic today, which may be due to interaction with the cucurmin in her Relief Factor supplement. However, she may stop this as she does not know if it is helping. Previously, patient has been stable on current regimen since January 2021. Plan:  HOLD Coumadin x 1 dose today 7/19/21 then continue Coumadin 5 mg MF and 2.5 mg [id number]. Recheck INR in 3 week(s). Patient reminded to call the Anticoagulation Clinic with any signs or symptoms of bleeding or with any medication changes. Patient given instructions utilizing the teach back method. After visit summary printed and reviewed with patient. Discharged ambulatory in no apparent distress.     For Pharmacy Admin Tracking Only     Intervention Detail: Dose Adjustment: 1, reason: Therapy Optimization   Total # of Interventions Recommended: 1   Total # of Interventions Accepted: 1   Time Spent (min): 1300 [facility] Maria Alejandra Siddiqui PharmD, BCPS  7/19/2021  3:08 PM

## 2021-07-26 ENCOUNTER — HOSPITAL ENCOUNTER (OUTPATIENT)
Dept: PHYSICAL THERAPY | Age: 71
Setting detail: THERAPIES SERIES
Discharge: HOME | End: 2021-07-26
Payer: MEDICARE

## 2021-07-26 PROCEDURE — 97110 THERAPEUTIC EXERCISES: CPT

## 2021-07-26 PROCEDURE — 97161 PT EVAL LOW COMPLEX 20 MIN: CPT

## 2021-07-26 NOTE — PROGRESS NOTES
** PLEASE SIGN, DATE AND TIME CERTIFICATION BELOW AND RETURN TO [facility] OUTPATIENT REHABILITATION (FAX #: [phone]). ATTEST/CO-SIGN IF ACCESSING VIA [employer]. THANK YOU.**    I certify that I have examined the patient below and determined that Physical Medicine and Rehabilitation service is necessary and that I approve the established plan of care for up to 90 days or as specifically noted. Attestation, signature or co-signature of physician indicates approval of certification requirements.    ________________________ ____________ __________  Physician Signature   Date   Time  [address] [facility]  PHYSICAL THERAPY  [x] EVALUATION  [] DAILY NOTE (LAND) [] DAILY NOTE (AQUATIC ) [] PROGRESS NOTE [] DISCHARGE NOTE    [] [address] [facility]   [] [facility] 90    [x] [facility]   [] [facility]    Date: 2021  Patient Name:  Home Nevarez  : 1950  MRN: [id number]  CSN: [id number]    Referring Practitioner Rashad Charles MD   Diagnosis Unilateral primary osteoarthritis, left hip [M16.12]    Treatment Diagnosis Left hip/thigh pain, Difficulty with ambulation, Balance deficits   Date of Evaluation 21    Additional Pertinent History High BP, Irregular HB, Pacemaker, Obesity, Arthritis      Functional Outcome Measure Used Tinetti   Functional Outcome Score 1528 (21)       Insurance: Primary: Payor: Maria De Jesus Monge /  /  / ,   Secondary:    Authorization Information: No visit limit. Aquatics and modalities except Ionto covered. Visit # 1, 1/10 for progress note   Visits Allowed: Unlimited   Recertification Date:    Physician Follow-Up: 21   Physician Orders:    History of Present Illness:      SUBJECTIVE: Patient reports ha shad both knees replaced and problems with her right hip in the past. States she started having trouble with her left hip during this past school year and it has just been getting worse.  States had x-rays done and just showed that she has moderate arthritis in her spine and hip. States was having sharp shooting pains down her left lateral thigh and had laser therapy done at the chiropractor and is helped with her pain and she has veery little of that pain anymore. Reports is walking much better since her pain is improved. Reports still pain in left hi and now has been getting pain in medial left thigh. Reports doctor wants her to have therapy to learn what can do at home to help keep her arthritis under control. States has started a muscle relaxer which helps her sleep at night but does not help much during the day. States if things keep getting worse they are discussing a CT scan. Social/Functional History and Current Status:  Medications and Allergies have been reviewed and are listed on Medical History Questionnaire. Aleksandra Kapoor lives with spouse in a single story home with a basement with a level surface to enter. 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        OBJECTIVE:    Pain: 4/10 left hip/medial thigh   Palpation Moderate tenderness medial left thigh down to medial knee   Observation Iliac crests appear level in standing   Posture Fair       Range of Motion WFL left hip flexion but limited with IR/ER   Strength Bilateral legs 4-5/5 with left slightly weaker but no pain with testing   Coordination    Sensation [facility]   Bed Mobility    Transfers    Ambulation Decreased speed, still mild limp on left causing even steps   Stairs Tries to avoid steps but does have a basement would like to be able to use   Balance Unsteady but no falls - See left hip and medial thigh pain for no limp with gait  2) Patient to ambulate with normalized pattern and little to no limp to do all walking at school  3) Patient to demonstrate 4+-5/5 strength in left leg for ease with lifting leg in and out of the car  4) Patient to demonstrate improved Tinetti score to 20/28 for safety with community ambulation    Long Term Goals:  Time Frame: 12 weeks  1) Patient to be independent with home program to perform all daily activity with minimal to no discomfort or difficulty. Patient Education:   [x]  HEP/Education Completed: Plan of Care, Goals, HEP above  [address] 16 [id number]  []  No new Education completed  []  Reviewed Prior HEP      [x]  Patient verbalized and/or demonstrated understanding of education provided. []  Patient unable to verbalize and/or demonstrate understanding of education provided. Will continue education. []  Barriers to learning: None    PLAN:  Treatment Recommendations: Strengthening, Range of Motion, Balance Training, Functional Mobility Training, Endurance Training, Gait Training, Stair Training, Neuromuscular Re-education, Manual Therapy - Soft Tissue Mobilization, Pain Management, Home Exercise Program, Patient Education and Modalities    [x]  Plan of care initiated. Plan to see patient 1 times per week for 12 weeks to address the treatment planned outlined above.   []  Continue with current plan of care  []  Modify plan of care as follows:    []  Hold pending physician visit  []  Discharge    Time In 1135   Time Out 1235   Timed Code Minutes: 15 min   Total Treatment Time: 60 min       Electronically Signed by: [place] [id number]

## 2021-07-27 ENCOUNTER — TELEPHONE (OUTPATIENT)
Dept: FAMILY MEDICINE CLINIC | Age: 71
End: 2021-07-27

## 2021-07-27 ENCOUNTER — TELEPHONE (OUTPATIENT)
Dept: ADMINISTRATIVE | Age: 71
End: 2021-07-27

## 2021-07-27 NOTE — TELEPHONE ENCOUNTER
Dr. Sidra Christianson ,   Please sign. [id number] Form in your mailbox.  Patient has an appointment tomorrow with [facility].

## 2021-07-27 NOTE — TELEPHONE ENCOUNTER
[facility] received a call from:    Name of Caller: Rickey Santos    Relationship to patient: n/a    Organization name: [facility] contact number: [phone]    Reason for call: Calling to confirm who's signature is on the fax for the breast exam fax

## 2021-08-09 DIAGNOSIS — I10 HTN, GOAL BELOW 140/80: ICD-10-CM

## 2021-08-09 NOTE — TELEPHONE ENCOUNTER
Floridalma Mar called requesting a refill on the following medications:  Requested Prescriptions     Pending Prescriptions Disp Refills    lisinopril (PRINIVIL;ZESTRIL) 10 MG tablet 90 tablet 1     Sig: Take 0.5 tablets by mouth daily     Pharmacy verified:  [facility]    Date of last visit: 7/15/21  Date of next visit (if applicable): 3/72/3794

## 2021-08-09 NOTE — TELEPHONE ENCOUNTER
Patient's last appointment was : 7/15/2021  Patient's next appointment is : 8/19/2021  Last refilled:7/24/2020

## 2021-08-10 RX ORDER — LISINOPRIL 10 MG/1
5 TABLET ORAL DAILY
Qty: 90 TABLET | Refills: 1 | Status: SHIPPED | OUTPATIENT
Start: 2021-08-10 | End: 2022-02-07

## 2021-08-12 ENCOUNTER — APPOINTMENT (OUTPATIENT)
Dept: PHARMACY | Age: 71
End: 2021-08-12
Payer: MEDICARE

## 2021-08-16 ENCOUNTER — HOSPITAL ENCOUNTER (OUTPATIENT)
Dept: PHYSICAL THERAPY | Age: 71
Setting detail: THERAPIES SERIES
Discharge: HOME | End: 2021-08-16
Payer: MEDICARE

## 2021-08-17 ENCOUNTER — HOSPITAL ENCOUNTER (OUTPATIENT)
Dept: PHARMACY | Age: 71
Setting detail: THERAPIES SERIES
Discharge: HOME | End: 2021-08-17
Payer: MEDICARE

## 2021-08-17 DIAGNOSIS — Z51.81 ENCOUNTER FOR THERAPEUTIC DRUG MONITORING: ICD-10-CM

## 2021-08-17 DIAGNOSIS — Z79.01 ANTICOAGULATED ON COUMADIN: Primary | ICD-10-CM

## 2021-08-17 LAB — POC INR: 2.3 (ref 0.8–1.2)

## 2021-08-17 PROCEDURE — 99211 OFF/OP EST MAY X REQ PHY/QHP: CPT | Performed by: PHARMACIST

## 2021-08-17 PROCEDURE — 85610 PROTHROMBIN TIME: CPT | Performed by: PHARMACIST

## 2021-08-17 PROCEDURE — 36416 COLLJ CAPILLARY BLOOD SPEC: CPT | Performed by: PHARMACIST

## 2021-08-17 NOTE — PROGRESS NOTES
Medication Management [address]   [phone] (phone)  [phone] (fax)    Ms. Archana Perry is a 80 y.o.  female with history of Afib who presents today for anticoagulation monitoring and adjustment. Patient verifies current dosing regimen and tablet strength. No missed or extra doses. Patient denies s/s bleeding/bruising/swelling/SOB/chest pain  No blood in urine or stool. No dietary changes. No changes in medication/OTC agents/Herbals. Occasionally takes supplement called Relief Factor that contains cucurmin; used to take routinely, is now only using dailyprn pain  No change in alcohol use or tobacco use. No change in activity level. Patient denies headaches/dizziness/lightheadedness/falls. No vomiting/diarrhea or acute illness. No Procedures scheduled in the future at this time. Assessment:   Lab Results   Component Value Date    INR 2.30 (H) 08/17/2021    INR 3.40 (H) 07/19/2021    INR 2.60 (H) 06/07/2021     INR therapeutic   Recent Labs     08/17/21  1355   INR 2.30*       Plan:  Continue Coumadin 5mg MF and 2.5mg TWThSaS. Recheck INR in 4 week(s). Patient reminded to call the Anticoagulation Clinic with any signs or symptoms of bleeding or with any medication changes. Patient given instructions utilizing the teach back method. After visit summary printed and reviewed with patient. Discharged ambulatory in no apparent distress.     For Pharmacy Admin Tracking Only     Time Spent (min): 20

## 2021-08-19 ENCOUNTER — TELEPHONE (OUTPATIENT)
Dept: FAMILY MEDICINE CLINIC | Age: 71
End: 2021-08-19

## 2021-08-19 ENCOUNTER — OFFICE VISIT (OUTPATIENT)
Dept: FAMILY MEDICINE CLINIC | Age: 71
End: 2021-08-19
Payer: MEDICARE

## 2021-08-19 VITALS
RESPIRATION RATE: 18 BRPM | HEART RATE: 64 BPM | TEMPERATURE: 98.4 F | SYSTOLIC BLOOD PRESSURE: 122 MMHG | BODY MASS INDEX: 48.69 KG/M2 | HEIGHT: 63 IN | WEIGHT: 274.8 LBS | OXYGEN SATURATION: 98 % | DIASTOLIC BLOOD PRESSURE: 64 MMHG

## 2021-08-19 DIAGNOSIS — G47.30 SLEEP APNEA, UNSPECIFIED TYPE: ICD-10-CM

## 2021-08-19 DIAGNOSIS — M19.90 GENERALIZED ARTHRITIS: ICD-10-CM

## 2021-08-19 DIAGNOSIS — M16.12 ARTHRITIS OF LEFT HIP: ICD-10-CM

## 2021-08-19 DIAGNOSIS — M62.838 MUSCLE SPASM OF LEFT LOWER EXTREMITY: Primary | ICD-10-CM

## 2021-08-19 PROCEDURE — 99213 OFFICE O/P EST LOW 20 MIN: CPT | Performed by: STUDENT IN AN ORGANIZED HEALTH CARE EDUCATION/TRAINING PROGRAM

## 2021-08-19 RX ORDER — TIZANIDINE 2 MG/1
2 TABLET ORAL NIGHTLY PRN
Qty: 30 TABLET | Refills: 1 | Status: SHIPPED | OUTPATIENT
Start: 2021-08-19

## 2021-08-19 ASSESSMENT — ENCOUNTER SYMPTOMS
COLOR CHANGE: 0
VOMITING: 0
NAUSEA: 0
SHORTNESS OF BREATH: 0
COUGH: 0

## 2021-08-19 NOTE — Clinical Note
Patient follows with [facility] PT - can we please follow-up and see if she was scheduled for further sessions and cost? Thank you.

## 2021-08-19 NOTE — PROGRESS NOTES
[address] [facility] [address] [address]  Dept: [phone]  Dept Fax: [phone]  Loc: [phone]    Aleksandra Kapoor is a 80 y.o. female who presents today for:  Chief Complaint   Patient presents with    1 Month Follow-Up     arthritis pain    Medication Refill       HPI:   1 month follow-up appointment:    Arthritis pain and muscle spasm: She did go to PT but for only 1 session. Patient is dealing with insurance co-pay issue with [facility] and she can't keep going. She felt like the 1 session did help her, though; especially with the muscle spasm in left thigh area. Patient reports arthritic pain everywhere. She has been using the capsacin and voltaren gel - helps with the hands and knees. Patient is trying to keep up with exercise and weight-loss, which has been difficult. RUBEN: No longer using CPAP. Does not want to see Dr. Rory Tesfaye. Will need new equipment, willing to see another Pulmonologist in the office.       Past Medical History:   Diagnosis Date    A-fib [facility])     Atrial fibrillation (HCC)     Carpal tunnel syndrome     GERD (gastroesophageal reflux disease)     Headache(784.0)     HTN (hypertension)     Hypothyroidism     Osteoarthritis     Sleep apnea 2019    Mild    Tendonitis of both wrists 2017    Toenail fungus       Past Surgical History:   Procedure Laterality Date    CARPAL TUNNEL RELEASE      both hands     SECTION       SECTION       SECTION      FOOT SURGERY Left 2018    AMBER Owens File Left 2010    Left Total Knee    JOINT REPLACEMENT Right 2016    Right Total Knee     Family History   Problem Relation Age of Onset    Stroke Mother     Arrhythmia Mother     Other Mother         Barry's Syndrome    Heart Disease Mother     Coronary Art Dis Mother     Diabetes Father     Other Sister Barry's Syndrome    Arrhythmia Sister     Cancer Brother         Stomach Cancer    Stillborn Child     Early Death Child    [facility] Pacemaker Sister     Heart Attack Brother     Coronary Art Dis Brother     Other Brother         Barry's Syndrome     Social History     Tobacco Use    Smoking status: Never Smoker    Smokeless tobacco: Never Used   Substance Use Topics    Alcohol use: Yes     Comment: rarely      Current Outpatient Medications   Medication Sig Dispense Refill    tiZANidine (ZANAFLEX) 2 MG tablet Take 1 tablet by mouth nightly as needed (muscle spasms) 30 tablet 1    Handicap Placard MISC by Other route For 5 years 1 each 0    NONFORMULARY daily as needed (pain) Relief Factor (contains cucurmin)      lisinopril (PRINIVIL;ZESTRIL) 10 MG tablet Take 0.5 tablets by mouth daily 90 tablet 1    diclofenac sodium (VOLTAREN) 1 % GEL Apply topically 4 times daily as needed for Pain 150 g 1    levothyroxine (SYNTHROID) 100 MCG tablet take 1 tablet by mouth once daily 90 tablet 3    liothyronine (CYTOMEL) 5 MCG tablet take 1 tablet by mouth once daily 90 tablet 3    Acetaminophen (TYLENOL ARTHRITIS PAIN PO) Take by mouth See Admin Instructions Indications: Pain Don't take more then 3,000 mg each day.        RESVERATROL PO Take 2 capsules by mouth 2 times daily       Omega-3 Fatty Acids (FISH OIL PO) Take 2 capsules by mouth daily Indications: Pain      warfarin (COUMADIN) 5 MG tablet As directed by [facility] #90 tabs = 90 day supply 90 tablet 4    flecainide (TAMBOCOR) 100 MG tablet Take 100 mg by mouth 2 times daily       vitamin C (ASCORBIC ACID) 500 MG tablet Take 500 mg by mouth daily      Calcium Carbonate (SM CALCIUM 600 PO) Take 1 tablet by mouth daily      metoprolol succinate (TOPROL XL) 25 MG extended release tablet Take 25 mg by mouth 2 times daily       glucose monitoring kit (FREESTYLE) monitoring kit 1 kit by Does not apply route daily 1 kit 0    Lancets MISC 1 each by Does not apply route daily 300 each 1    blood glucose test strips (ASCENSIA AUTODISC VI;ONE TOUCH ULTRA TEST VI) strip 1 each by In Vitro route daily Dispense any brand - check blood sugar As needed. 100 each 3    NONFORMULARY Take 1 capsule by mouth 4 times daily (before meals and nightly) Magtein      loratadine (CLARITIN) 10 MG tablet Take 1 tablet by mouth 2 times daily (before meals) (Patient taking differently: Take by mouth as needed ) 180 tablet 3    Methylsulfonylmethane (MSM PO) Take 1 tablet by mouth daily as needed (pain)      nitroGLYCERIN (NITROSTAT) 0.4 MG SL tablet Place under the tongue every 5 minutes as needed for Chest pain (If third one does not relieve pain, call 9-1-1.)   0    ARUODQMHB-OEIEUPLDE-XJLNKTYKIV PO Place 1 tablet under the tongue 2 times daily Jimbo B12 plus or [place] methylB6, [id number], methylfolate       Magnesium ([employer]S TRIPLE MAGNESIUM COMPLEX) 400 MG CAPS Take 3 capsules by mouth 4 times daily (after meals and at bedtime) Indications: Magnesium Deficiency       magnesium cl-calcium carbonate (SLOW-MAG) 71.5-119 MG TBEC tablet Take 1 tablet by mouth 3 times daily (with meals) Take before and when stressed with 2 extra fish oil PRN      Grape Seed  mg by Does not apply route daily Oregan Grape Root      Lysine 500 MG TABS Take 1 tablet by mouth 2 times daily       DHEA 25 MG CAPS Take 1 capsule by mouth every other day       Cinnamon 500 MG CAPS Take 3 capsules by mouth 4 times daily (before meals and nightly)       vitamin D (CHOLECALCIFEROL) 50 MCG (2000 UT) TABS tablet Take 2 capsules by mouth daily Indications: Treatment with Vitamin D .use only the 2000 IU or 1000 IU caps from [employer], 5000IU does not work      B Complex-Biotin-FA (B-100 TR) TBCR Take 2 tablets by mouth 2 times daily (before meals) Before breakfast and before lunch or supper       No current facility-administered medications for this visit.      Allergies   Allergen Reactions    Enoxaparin      Other reaction(s): Other: See Comments  Significant bleeding and breast hematoma when bridging from warfarin       Health Maintenance   Topic Date Due    Diabetic foot exam  Never done    Diabetic retinal exam  Never done    Diabetic microalbuminuria test  Never done    Colon cancer screen colonoscopy  06/01/2020    Flu vaccine (1) 09/01/2021    Potassium monitoring  09/08/2021    Creatinine monitoring  09/08/2021    Annual Wellness Visit (AWV)  02/05/2022    A1C test (Diabetic or Prediabetic)  06/14/2022    Lipid screen  06/14/2022    TSH testing  06/14/2022    Breast cancer screen  07/27/2023    DTaP/Tdap/Td vaccine (2 - Td or Tdap) 10/09/2024    Shingles Vaccine  Completed    Pneumococcal 65+ years Vaccine  Completed    COVID-19 Vaccine  Completed    DEXA (modify frequency per FRAX score)  Addressed    Hepatitis C screen  Addressed    Hepatitis A vaccine  Aged Out    Hib vaccine  Aged Out    Meningococcal (ACWY) vaccine  Aged Out       Subjective:      Review of Systems   Constitutional: Negative for chills and fever. Respiratory: Negative for cough and shortness of breath. Cardiovascular: Negative for chest pain. Gastrointestinal: Negative for nausea and vomiting. Musculoskeletal: Positive for arthralgias and joint swelling. Skin: Negative for color change and rash. Psychiatric/Behavioral: Positive for sleep disturbance. Negative for decreased concentration and dysphoric mood. Objective:     Vitals:    08/19/21 1109   BP: 122/64   Site: Left Lower Arm   Position: Sitting   Cuff Size: Medium Adult   Pulse: 64   Resp: 18   Temp: 98.4 °F (36.9 °C)   TempSrc: Oral   SpO2: 98%   Weight: 274 lb 12.8 oz (124.6 kg)   Height: 5' 3\" (1.6 m)       Body mass index is 48.68 kg/m².     Wt Readings from Last 3 Encounters:   08/19/21 274 lb 12.8 oz (124.6 kg)   07/15/21 274 lb (124.3 kg)   06/23/21 271 lb 9.6 oz (123.2 kg)     BP Readings from Last 3 Encounters:   21 122/64   07/15/21 120/82   21 124/68       Physical Exam  Vitals reviewed. Constitutional:       Appearance: She is obese. HENT:      Head: Normocephalic and atraumatic. Eyes:      Conjunctiva/sclera: Conjunctivae normal.   Cardiovascular:      Rate and Rhythm: Normal rate. Pulses: Normal pulses. Pulmonary:      Effort: Pulmonary effort is normal.   Musculoskeletal:         General: Swelling and tenderness present. Skin:     General: Skin is warm and dry. Neurological:      Mental Status: She is oriented to person, place, and time. Psychiatric:         Mood and Affect: Mood normal.         Behavior: Behavior normal.         Lab Results   Component Value Date    WBC 8.0 2021    HGB 14.1 2021    HGB 14.2 2021    HCT 44.6 2021    HCT 44.8 2021     2021    CHOL 142 2021    TRIG 59 2021    HDL 56 2021    LDLDIRECT 80.00 2013    LDLCALC 74 2021    AST 22 2020     2020    K 4.3 2020     2020    CREATININE 0.8 2020    BUN 14 2020    CO2 22 (L) 2020    TSH 0.476 2021    INR 2.30 (H) 2021    LABA1C 5.7 2021    LABGLOM 71 (A) 2020    MG 2.1 2021    CALCIUM 9.6 2021    VITD25 38 2021       Imaging Results:    RADHA Digital Diagnostic Unilateral    Result Date: 2021  Ordering Provider: [address] [place] [address]    Radiology Department  Patient:  [facility]. :  1950   Sex:   Egkomi, [address] [facility]  Location:  [address] [place] [address]   Unit #:   [id number]    Acct #:  [de-identified]    Ordering Phys: Ajay Pitts MD      Exam Date: 21  Accession #:  [id number]  Exam:  Suareztowrobert   RADHA Unilat Diagnostic w/Elie  Result: See Report      MAMMOGRAPHY - UNILATERAL DIAGNOSTIC:  LEFT BREAST    REASON FOR EXAM:   Female, 80years old.  6 MON F.Y LT BREAST POST BX    PERTINENT HISTORY:  Non-contributory. TECHNIQUE: Digital examination. Mediolateral oblique (MLO) and  craniocaudad (CC) views of the breast were obtained. CAD:  CAD was not  performed on this study. COMPARISON:   11/15/2020  ___________________________________    FINDINGS:  Breast Composition:  There are scattered areas of fibroglandular density. The patient is status post interval stereotactic breast biopsy of  calcifications in the upper outer quadrant of the left breast in 2 areas  with placement of biopsy clips. Some remaining punctate calcifications  are noted. No other significant abnormalities are identified. ___________________________________    IMPRESSION:  Stable unilateral diagnostic mammogram.    ___________________________________    ASSESSMENT CATEGORY:  BIRADS Category 2:  Benign. A letter regarding these results will be sent  to the patient by the facility within 30 days. FOLLOW-UP RECOMMENDATION:  Yearly follow-up mammogram recommended. (A)    Approximately 10% of breast cancers are not detected by mammography. A  normal mammogram should not delay biopsy of a clinically suspicious  abnormality. Electronically Signed:  Rohith Monge MD  2021/07/28 at 14:21 EDT  Tel [phone], Service support  [phone], Fax [phone]          cc: Rocky Dobson MD      Dictated by:  Erlin Barrios MD on 07/28/21 1421  Technologist:   (ATIYA)Tahmina Hale (M)  Transcribed by:  Erlin Barrios MD on 07/28/21 1421    Report Signed by:  Flo Vaz MD,Tu MORGAN on 07/28/21 1421        Assessment / Plan:     Aleta Elena was seen today for 1 month follow-up and medication refill. Diagnoses and all orders for this visit:    Muscle spasm of left lower extremity:  - Improved with PT exercises. Encouraged patient to continue with exercises at home if she is unable to go to PT at [facility] for Zanaflex  -     tiZANidine (ZANAFLEX) 2 MG tablet;  Take 1 tablet by mouth nightly as needed (muscle spasms)    Arthritis of left hip  Generalized arthritis:  - Discussed lifestyle interventions with weight loss and consistent exercise  - NSAIDS  - Topical NSAIDs  -     Handicap Placard MISC; by Other route For 5 years    Sleep apnea, unspecified type:  - Non-compliant with CPAP. Discussed compliance and follow-up with [facility]    Body mass index (BMI) 45.0-49.9, adult:  - Discussed lifestyle interventions with diet and exercise  -     Handicap Placard MISC; by Other route For 5 years       Return in about 3 months (around 11/19/2021) for chronic conditions. Medications Prescribed:  Orders Placed This Encounter   Medications    tiZANidine (ZANAFLEX) 2 MG tablet     Sig: Take 1 tablet by mouth nightly as needed (muscle spasms)     Dispense:  30 tablet     Refill:  1    Handicap Placard MISC     Sig: by Other route For 5 years     Dispense:  1 each     Refill:  0       Future Appointments   Date Time Provider Alejandrina Alonsoi   9/14/2021  1:20 PM Pati Vaz RN [facility]   10/19/2021  1:00 PM [facility]   11/4/2021 11:20 AM Teresa Humphries MD [facility] DICKVICENTE  PARVIN MUNSON.TELLY       Patient given educational materials - see patient instructions. Discussed use, benefit, and sideeffects of prescribed medications. All patient questions answered. Pt voiced understanding. Reviewed health maintenance. Instructed to continue current medications, diet and exercise. Patient agreed with treatment plan. Follow up as directed.      Electronically signed by Teresa Humphries MD on 8/19/2021 at 2:39 PM

## 2021-08-19 NOTE — Clinical Note
Patient follows with Dr. Oleg Ramesh office - she wants to see Dr. Catherine Blanchard instead for sleep medicine - can we get her scheduled with Dr. Catherine Blanchard for follow-up for sleep apnea, please?

## 2021-08-19 NOTE — PROGRESS NOTES
After pharmacist chart review, the following recommendations are made:  - Due to ASCVD risk score of 18.5% and history of diabetes, may consider adding a moderate-intensity statin, such as atorvastatin 20 mg daily.  - Patient is also due for diabetic foot, retinal, and microalbuminuria exams.     For Pharmacy Admin Tracking Only     Intervention Detail: New Rx: 1, reason: Needs Additional Therapy   Time Spent (min): Baldomero Dunn, PharmD   8/19/2021, 10:18 AM

## 2021-08-19 NOTE — TELEPHONE ENCOUNTER
---- Message from Gregor Ojeda MD sent at 8/19/2021 11:49 AM EDT -----  Patient follows with [facility] PT - can we please follow-up and see if she was scheduled for further sessions and cost? Thank you.

## 2021-08-19 NOTE — PROGRESS NOTES
Health Maintenance Due   Topic Date Due    Diabetic foot exam  Never done    Diabetic retinal exam  Never done    Diabetic microalbuminuria test  Never done    Colon cancer screen colonoscopy  06/01/2020     Pt aware

## 2021-08-19 NOTE — TELEPHONE ENCOUNTER
----- Message from Ernie Landaverde MD sent at 8/19/2021 12:03 PM EDT -----  Patient follows with Dr. Mortimer Needle office - she wants to see Dr. Richar Gallardo instead for sleep medicine - can we get her scheduled with Dr. Richar Gallardo for follow-up for sleep apnea, please?

## 2021-08-19 NOTE — TELEPHONE ENCOUNTER
----- Message from Karthik Meza MD sent at 8/19/2021 11:49 AM EDT -----  Patient follows with [address] [place] [address] PT - can we please follow-up and see if she was scheduled for further sessions and cost? Thank you.

## 2021-08-19 NOTE — PATIENT INSTRUCTIONS
Patient Education        Hand Arthritis: Exercises  Introduction  Here are some examples of exercises for you to try. The exercises may be suggested for a condition or for rehabilitation. Start each exercise slowly. Ease off the exercises if you start to have pain. You will be told when to start these exercises and which ones will work best for you. How to do the exercises  Tendon glides   1. In this exercise, the steps follow one another to a make a continuous movement. 2. With your affected hand, point your fingers and thumb straight up. Your wrist should be relaxed, following the line of your fingers and thumb. 3. Curl your fingers so that the top two joints in them are bent, and your fingers wrap down. Your fingertips should touch or be near the base of your fingers. Your fingers will look like a hook. 4. Make a fist by bending your knuckles. Your thumb can gently rest against your index (pointing) finger. 5. Unwind your fingers slightly so that your fingertips can touch the base of your palm. Your thumb can rest against your index finger. 6. Move back to your starting position, with your fingers and thumb pointing up. 7. Repeat the series of motions 8 to 12 times. 8. Switch hands and repeat steps 1 through 6, even if only one hand is sore. Intrinsic flexion   1. Rest your affected hand on a table and bend the large joints where your fingers connect to your hand. Keep your thumb and the other joints in your fingers straight. 2. Slowly straighten your fingers. Your wrist should be relaxed, following the line of your fingers and thumb. 3. Move back to your starting position, with your hand bent. 4. Repeat 8 to 12 times. 5. Switch hands and repeat steps 1 through 4, even if only one hand is sore. Finger extension   1. Place your affected hand flat on a table. 2. Lift and then lower one finger at a time off the table. 3. Repeat 8 to 12 times.   4. Switch hands and repeat steps 1 through 3, even if only one hand is sore. MP extension   1. Place your good hand on a table, palm up. Put your affected hand on top of your good hand with your fingers wrapped around the thumb of your good hand like you are making a fist.  2. Slowly uncurl the joints of your affected hand where your fingers connect to your hand so that only the top two joints of your fingers are bent. Your fingers will look like a hook. 3. Move back to your starting position, with your fingers wrapped around your good thumb. 4. Repeat 8 to 12 times. 5. Switch hands and repeat steps 1 through 4, even if only one hand is sore. PIP extension (with MP extension)   1. Place your good hand on a table, palm up. Put your affected hand on top of your good hand, palm up. 2. Use the thumb and fingers of your good hand to grasp below the middle joint of one finger of your affected hand. 3. Straighten the last two joints of that finger. 4. Repeat 8 to 12 times. 5. Repeat steps 1 through 4 with each finger. 6. Switch hands and repeat steps 1 through 5, even if only one hand is sore. DIP flexion   1. With your good hand, grasp one finger of your affected hand. Your thumb will be on the top side of your finger just below the joint that is closest to your fingernail. 2. Slowly bend your affected finger only at the joint closest to your fingernail. 3. Repeat 8 to 12 times. 4. Repeat steps 1 through 3 with each finger. 5. Switch hands and repeat steps 1 through 4, even if only one hand is sore. Follow-up care is a key part of your treatment and safety. Be sure to make and go to all appointments, and call your doctor if you are having problems. It's also a good idea to know your test results and keep a list of the medicines you take. Where can you learn more? Go to https://Imprivatapealekeb.AutoeBid. org and sign in to your [employer] account. Enter N183 in the [employer] box to learn more about \"Hand Arthritis: Exercises. \"     If you do not have an account, please click on the \"Sign Up Now\" link. Current as of: November 16, 2020               Content Version: 12.9  © 2006-2021 Healthwise, Incorporated. Care instructions adapted under license by [facility] ([facility]). If you have questions about a medical condition or this instruction, always ask your healthcare professional. [address] any warranty or liability for your use of this information.

## 2021-08-19 NOTE — PROGRESS NOTES
S: 80 y.o. female with   Chief Complaint   Patient presents with    1 Month Follow-Up     arthritis pain    Medication Refill       HPI: please see resident note for HPI and ROS. BP Readings from Last 3 Encounters:   08/19/21 122/64   07/15/21 120/82   06/23/21 124/68     Wt Readings from Last 3 Encounters:   08/19/21 274 lb 12.8 oz (124.6 kg)   07/15/21 274 lb (124.3 kg)   06/23/21 271 lb 9.6 oz (123.2 kg)       O: VS:  height is 5' 3\" (1.6 m) and weight is 274 lb 12.8 oz (124.6 kg). Her oral temperature is 98.4 °F (36.9 °C). Her blood pressure is 122/64 and her pulse is 64. Her respiration is 18 and oxygen saturation is 98%. Diagnosis Orders   1. Arthralgia of left lower leg  tiZANidine (ZANAFLEX) 2 MG tablet   2. Arthritis of left hip  Handicap Placard MISC   3. Pain in both lower extremities     4. Sleep apnea, unspecified type     5. Body mass index (BMI) 45.0-49.9, adult  Handicap Placard MISC       Plan:  Continue home exercises. Encouraged to go to PT. Cont current meds. Health Maintenance Due   Topic Date Due    Diabetic foot exam  Never done    Diabetic retinal exam  Never done    Diabetic microalbuminuria test  Never done    Colon cancer screen colonoscopy  06/01/2020       Attending Physician Statement  I have discussed the case, including pertinent history and exam findings with the resident. I agree with the documented assessment and plan as documented by the resident.         Preethi Partida DO 8/19/2021 12:08 PM

## 2021-08-20 NOTE — TELEPHONE ENCOUNTER
Called pulm office and they can get her in on 9/1/21 at [phone] with Vahe Pang. Andres sched. She needs to download her chip to the cloud from her Cpap machine with whoever the machine is from. (pulm office said she has done this before, she may need to take it to the place she got the machine from). Called PT and she does not have any other apps scheduled. They told me that the last time she went there (for her initial eval) she hadn't hit her deductible of $150 as she was at $119. Her yearly out-of-pocket is $1500 which she was at $1305 at the time of her visit so she should have hit this at her andres. Her insurance pays 96% of Pt and pt's responsibility is 4% which is estimated to be about $3.37 a visit.  Also if she pays within 1 week of her andres there is an additional 20% discount on the $3.37

## 2021-09-09 NOTE — DISCHARGE SUMMARY
Memo Schumacher NOTE  OUTPATIENT  [facility]    Patient Name: Madonna Thompson        CSN: [id number]   YOB: 1950  Gender: female  Oc Flor MD,    No admission diagnoses are documented for this encounter. ,      Patient is discharged from Physical Therapy services at this time. See last note for details related to results of therapy and goal achievement. Reason for discharge: Patient came for her eval but then did not want to schedule any more sessions due to did not understand why her insurance was not covering her therapy at 100% and she was not going to pay anything for therapy. Patient was going to contact her insurance and get back with therapist. Patient never called back and so after 2-3 weeks PT called patient and she reported had not really spoken to anyone but her doctors and so PT explained her insurance situation to her again and then had clerical call her to further discuss the issues. Clerical called and left several messages for patient and patient never called back. PT can no longer keep patient on hold so is being discharged. Patient discharged as of 9-9-21[place] [id number]: 9/9/2021

## 2021-09-13 ENCOUNTER — TELEPHONE (OUTPATIENT)
Dept: PHARMACY | Age: 71
End: 2021-09-13

## 2021-09-13 NOTE — TELEPHONE ENCOUNTER
Noted patient is on schedule for 10/8 colonoscopy with Dr. Shivani Bernard. Called patient. States she is also having esophagus stretched. Was told to be off Coumadin 5 days before. Also thinks Dr. Home Barrett was contacted. States she had Lovenox in past for breast biopsy - \"almost bled to death. \"  Won't take it again. (12/9/20 clinic note only mentions significant breast bruising.)  Currently on clinic schedule for tomorrow. Left message regarding this information on voicemail at Dr. Rinku Granger office.   Message forwarded to clinic pharmacist.

## 2021-09-13 NOTE — TELEPHONE ENCOUNTER
Per Dr. Oren Carbone office - patient does not need to be bridged. --Informed [facility]'s staff that we bridged in the past, but patient will most likely refuse this time. Nurse stated Dr. Irene Cormier did not order for lovenox bridge this time.

## 2021-09-14 ENCOUNTER — HOSPITAL ENCOUNTER (OUTPATIENT)
Dept: PHARMACY | Age: 71
Setting detail: THERAPIES SERIES
Discharge: HOME | End: 2021-09-14
Payer: MEDICARE

## 2021-09-14 DIAGNOSIS — Z51.81 ENCOUNTER FOR THERAPEUTIC DRUG MONITORING: ICD-10-CM

## 2021-09-14 DIAGNOSIS — Z79.01 ANTICOAGULATED ON COUMADIN: Primary | ICD-10-CM

## 2021-09-14 LAB — POC INR: 3.3 (ref 0.8–1.2)

## 2021-09-14 PROCEDURE — 36416 COLLJ CAPILLARY BLOOD SPEC: CPT

## 2021-09-14 PROCEDURE — 99211 OFF/OP EST MAY X REQ PHY/QHP: CPT

## 2021-09-14 PROCEDURE — 85610 PROTHROMBIN TIME: CPT

## 2021-09-14 NOTE — PROGRESS NOTES
Medication Management [address]  [phone] (phone)  [phone] (fax)    Ms. Lori Rojas is a 80 y.o.  female with history of Afib who presents today for anticoagulation monitoring and adjustment. Patient verifies current dosing regimen and tablet strength. No missed or extra doses. Patient denies s/s bleeding/bruising/swelling/SOB/chest pain  No blood in urine or stool. Patient reports drinking cranberry juice two weeks ago when she thought she had a urinary tract infection. No changes in medication/OTC agents/Herbals. No change in alcohol use or tobacco use. No change in activity level. Patient denies headaches/dizziness/lightheadedness/falls. No vomiting/diarrhea or acute illness. Patient has colonoscopy/esophagus stretch on 10/8 with Dr. Shirin Klye. Per TC on 9/13, patient is to hold Coumadin x 5 days before procedure. No bridging with Lovenox per Dr. Holder Agent office due to bleeding/bruising with Lovenox in the past.     Assessment:   Lab Results   Component Value Date    INR 3.30 (H) 09/14/2021    INR 2.30 (H) 08/17/2021    INR 3.40 (H) 07/19/2021     INR supratherapeutic   Recent Labs     09/14/21  1342   INR 3.30*         Plan:  Hold Coumadin today 9/14, then continue Coumadin 5 mg MF and 2.5 mg all other days. Hold Coumadin 10/3-10/7 for procedure (will NOT bridge with Lovenox), then take Coumadin 7.5 mg on 10/8, Coumadin 5 mg on 10/9, then continue Coumadin 5 mg MF and 2.5 mg all other days. Recheck INR in 4 week(s) on 10/14. Discussed plan with Donnie Cadena Pharmacist.  Patient reminded to call the Anticoagulation Clinic with any signs or symptoms of bleeding or with any medication changes. Patient given instructions utilizing the teach back method. After visit summary printed and reviewed with patient. Discharged ambulatory in no apparent distress.     For Pharmacy Admin Tracking Only     Intervention Detail: Dose Adjustment: 1, reason: Therapy Optimization   Total # of Interventions Recommended: 1   Total # of Interventions Accepted: 1   Time Spent (min): 5620 Lizet Teixeira PharmD   9/14/2021, 1:57 PM

## 2021-09-29 NOTE — PATIENT INSTRUCTIONS
Thank you   1. Thank you for trusting us with your healthcare needs. You may receive a survey regarding today's visit. It would help us out if you would take a few moments to provide your feedback. We value your input. 2. Please bring in ALL medications BOTTLES, including inhalers, herbal supplements, over the counter, prescribed & non-prescribed medicine. The office would like actual medication bottles and a list.   3. Please note our OFFICE POLICIES:   a. Prior to getting your labs drawn, please check with your insurance company for benefits and eligibility of lab services. Often, insurance companies cover certain tests for preventative visits only. It is patient's responsibility to see what is covered. b. We are unable to change a diagnosis after the test has been performed. c. Lab orders will not be re-printed. Please hold onto your original lab orders and take them to your lab to be completed. d. If you no show your scheduled appointment three times, you will be dismissed from this practice. e. Angela Flatten must be completed 24 hours prior to your schedule appointment. 4. If the list below has been completed, PLEASE FAX RECORDS TO OUR OFFICE @ [phone]. Once the records have been received we will update your records at our office:  Health Maintenance Due   Topic Date Due    Diabetic foot exam  Never done    Diabetic retinal exam  Never done    Diabetic microalbuminuria test  Never done    Colon cancer screen colonoscopy  06/01/2020           Patient Education        Hip Arthritis: Care Instructions  Your Care Instructions     Arthritis, also called osteoarthritis, is a breakdown of the tissue (cartilage) that cushions your joints. Many people have some arthritis as they age. When the cartilage in your hip joints wears down, your hip bone rubs against the hip socket. This causes pain and stiffness. Work with your doctor to find the right mix of treatments for your arthritis.  There are things you can do at home to protect your hip joints, ease your pain, and help you stay active. But if your arthritis becomes so bad that you cannot walk, you may need surgery to replace the hip joint. Follow-up care is a key part of your treatment and safety. Be sure to make and go to all appointments, and call your doctor if you are having problems. It's also a good idea to know your test results and keep a list of the medicines you take. How can you care for yourself at home? · Stay at a healthy weight. Being overweight puts extra strain on your hip joints. · Talk to your doctor or physical therapist about exercises that will help ease hip pain. These tips may help. ? Stretch to help prevent stiffness and to prevent injury before you exercise. You may enjoy gentle forms of yoga to help keep your joints and muscles flexible. ? Walk instead of jog. Other types of exercise that are less stressful on the joints include riding a bike, swimming, and doing water exercise. ? Lift weights. Strong muscles help reduce stress on your joints. Stronger thigh muscles, for example, take some of the stress off of the knees and hips. Learn the right way to lift weights so you do not make joint pain worse. · Take pain medicines exactly as directed. ? If the doctor gave you a prescription medicine for pain, take it as prescribed. ? If you are not taking a prescription pain medicine, ask your doctor if you can take an over-the-counter medicine. · Use a cane, crutch, walker, or another device if you need help to get around. These can help rest your hips. You also can use other things to make life easier, such as a higher toilet seat. · Do not sit in low chairs, which can make it painful to get up. · Put heat or cold on your sore hips as needed. Use whichever helps you most. You also can go back and forth between hot and cold packs. ?  Apply heat 2 or 3 times a day for 20 to 30 minutesusing a heating pad, hot shower, or hot packto relieve pain and stiffness. ? Put ice or a cold pack on your sore hips for 10 to 20 minutes at a time to numb the area. Put a thin cloth between the ice and your skin. · Think about talking to your doctor about using capsaicin, a cream you apply to the skin for pain relief. When should you call for help? Call your doctor now or seek immediate medical care if:    · You have sudden swelling, warmth, or pain in any joint.     · You have joint pain and a fever or rash.     · You have such bad pain that you cannot use the joint. Watch closely for changes in your health, and be sure to contact your doctor if:    · You have mild joint symptoms that continue even with more than 6 weeks of care at home.     · You do not get better as expected.     · You have stomach pain or other problems with your medicine. Where can you learn more? Go to [email or web address] and sign in to your [facility] account. Enter U255 in the [employer] box to learn more about \"Hip Arthritis: Care Instructions. \"     If you do not have an account, please click on the \"Sign Up Now\" link. Current as of: August 5, 2020               Content Version: 12.9  © 2006-2021 Healthwise, [facility]. Care instructions adapted under license by [facility] ([facility]). If you have questions about a medical condition or this instruction, always ask your healthcare professional. [place] [address] any warranty or liability for your use of this information. No

## 2021-10-07 NOTE — PROCEDURES
[facility]    EGD and Colonoscopy Report    Patient: Madison Becerra  : 1950  Acct#: [de-identified]     BRIEF HISTORY AND INDICATIONS:    The patient is a 80 y.o.,  female with significant past medical history of dysphagia. She is also here for colorectal cancer screening. Has a Pacemaker, AFib and has held Coumadin , sees Dr. Aida Lainez locally. PREMEDICATION: TIVA anesthesia was used, see Anesthesia note for details. INSTRUMENT:  Olympus GIF H-180 gastroscope and olympus colonoscope    EGD  The risks and benefits of upper endoscopy with biopsy and dilation were  described to the patient, including but not limited to bleeding, infection, poking a hole someplace requiring surgery to fix it, having reaction to medication, and death. The patient understood these risks and provided informed consent. The patient was placed in the left lateral decubitus position. Conscious sedation was administered . The patient was continuously monitored to ensure adequate sedation and patient safety. A forward-viewing Olympus endoscope was lubricated and inserted through the mouth into the oropharynx. Under direct visualization, the upper esophagus was intubated. The scope was advanced to the esophagus and stomach to second portion of duodenum. Scope was slowly withdrawn with good views of mucosal surfaces. The scope was retroflexed in the fundus. Findings and maneuvers are listed in impression below. The patient tolerated the procedure well. The scope was removed. The patient was removed to the recovery area. COLONOSCOPY  DESCRIPTION OF PROCEDURE: The risks of bleeding, trauma, infection, perforation, and medication-related cardiac and respiratory complications were discussed and written informed consent was obtained. After obtaining informed consent, a rectal exam was done.     The patient was continuously monitored to ensure adequate sedation and patient safety. Subsequently, the colonoscope was placed in the rectum and advanced to the  ileocecal valve and cecum. The scope was  removed slowly while carefully examining color, mucosa, texture and anatomy of the colon were carefully examined with the scope. Retroflexion was performed in the rectum to evaluate for hemorrhoids and anorectal pathology. Findings and maneuvers are listed in impression below. The scope was removed. The patient was moved to the recovery area. Difficulty of procedure: None    Quality of colon prep: Adequate    EBL: NONE    IMPRESSION:     EGD:      1.  Schatzki's Ring with over the wire esophageal dilation 51 [facility]  . 2. Antral Gastritis, may be the beginning of GAVE, cold biopsies taken of the antrum and fundus (r/o H. Pylori). 3.  Otherwise, normal to the second portion of the duodenum . COLONOSCOPY:        1. Diverticulosis in the sigmoid colon. 2. No polyps or masses  To the cecum. RECOMMENDATIONS:    1. No more screening Colonoscopies, age 80.   2. Resume all meds. 3. Follow post esoph dilation precautions. 4. Follow up in office in 6 months.        Specimens: were obtained        (The following sections must be completed)  Post-Sedation Vital Signs: Vital signs were reviewed and were stable after the procedure (see flow sheet for vitals)            Post-Sedation Exam: Lungs: clear to auscultation bilaterally and Cardiovascular: regular rate and rhythm           Complications: none

## 2021-10-07 NOTE — H&P
[address] [address]  Sedation/Analgesia History & Physical    Patient: Vanessa Real: 1950  Med Rec#: [id number] Acc#: [id number]   Provider Performing Procedure: Karen Tanner MD  Primary Care Physician: Macie Marmolejo DO    PRE-PROCEDURE   Brief History/Pre-Procedure Diagnosis:The patient is a 80 y.o.,  female with significant past medical history of dysphagia. She is also here for colorectal cancer screening. MEDICAL HISTORY  []CAD/Valve  []Liver Disease  []Lung Disease []Diabetes  []Hypertension []Renal Disease  [x]Additional information:       has a past medical history of A-fib ([facility] [address] Atrial fibrillation ([facility] [address] Carpal tunnel syndrome, Diabetes mellitus ([facility] [address] GERD (gastroesophageal reflux disease), Headache(784.0), HTN (hypertension), Hypothyroidism, Osteoarthritis, Pacemaker, Sleep apnea, Tendonitis of both wrists, and Toenail fungus. SURGICAL HISTORY   has a past surgical history that includes Carpal tunnel release; hernia repair;  section;  section;  section; Foot surgery (Left, 2018); joint replacement (Left, 2010); and joint replacement (Right, 2016). Additional information:       ALLERGIES   Allergies as of 2021 - Fully Reviewed 2021   Allergen Reaction Noted    Enoxaparin  2021     Additional information:       MEDICATIONS       Current Facility-Administered Medications:     0.45 % sodium chloride infusion, , IntraVENous, Continuous, Avis Sepulveda MD  Prior to Admission medications    Medication Sig Start Date End Date Taking?  Authorizing Provider   lisinopril (PRINIVIL;ZESTRIL) 10 MG tablet Take 0.5 tablets by mouth daily 8/10/21  Yes Ryanne Shape, APRN - CNP   diclofenac sodium (VOLTAREN) 1 % GEL Apply topically 4 times daily as needed for Pain 7/15/21  Yes Cornell Mathew MD   levothyroxine (SYNTHROID) 100 MCG tablet take 1 tablet by mouth once daily 21  Yes Herman Vinson DO   liothyronine (CYTOMEL) 5 MCG tablet take 1 tablet by mouth once daily 6/16/21  Yes Shu Mariano, DO   Acetaminophen (TYLENOL ARTHRITIS PAIN PO) Take by mouth See Admin Instructions Indications: Pain Don't take more then 3,000 mg each day.     Yes Historical Provider, MD   Omega-3 Fatty Acids (FISH OIL PO) Take 2 capsules by mouth daily Indications: Pain   Yes Historical Provider, MD   flecainide (TAMBOCOR) 100 MG tablet Take 100 mg by mouth 2 times daily    Yes Historical Provider, MD   vitamin C (ASCORBIC ACID) 500 MG tablet Take 500 mg by mouth daily   Yes Historical Provider, MD   Calcium Carbonate (SM CALCIUM 600 PO) Take 1 tablet by mouth daily   Yes Historical Provider, MD   metoprolol succinate (TOPROL XL) 25 MG extended release tablet Take 25 mg by mouth 2 times daily    Yes Historical Provider, MD   glucose monitoring kit (FREESTYLE) monitoring kit 1 kit by Does not apply route daily 6/18/20  Yes Shu Mariano, DO   XDZLCNMOK-BZAYVWPMR-EOLBAGPMLS PO Place 1 tablet under the tongue 2 times daily Jimbo B12 plus or [place] methylB6, etriclD41, methylfolate    Yes Historical Provider, MD   Magnesium (CVS TRIPLE MAGNESIUM COMPLEX) 400 MG CAPS Take 3 capsules by mouth 4 times daily (after meals and at bedtime) Indications: Magnesium Deficiency    Yes Historical Provider, MD   magnesium cl-calcium carbonate (SLOW-MAG) 71.5-119 MG TBEC tablet Take 1 tablet by mouth 3 times daily (with meals) Take before and when stressed with 2 extra fish oil PRN   Yes Historical Provider, MD   Lysine 500 MG TABS Take 1 tablet by mouth 2 times daily    Yes Historical Provider, MD   DHEA 25 MG CAPS Take 1 capsule by mouth every other day    Yes Historical Provider, MD   Cinnamon 500 MG CAPS Take 3 capsules by mouth 4 times daily (before meals and nightly)    Yes Historical Provider, MD   vitamin D (CHOLECALCIFEROL) 50 MCG (2000 UT) TABS tablet Take 2 capsules by mouth daily Indications: Treatment with Vitamin D .use only the 2000 IU or 1000 IU caps from [employer], 5000IU does not work   Yes Historical Provider, MD   B Complex-Biotin-FA (B-100 TR) TBCR Take 2 tablets by mouth 2 times daily (before meals) Before breakfast and before lunch or supper   Yes Historical Provider, MD   tiZANidine (ZANAFLEX) 2 MG tablet Take 1 tablet by mouth nightly as needed (muscle spasms) 8/19/21   Tremayne Glover MD   Handicap Placard MISC by Other route For 5 years 8/19/21   Tremayne Glover MD   NONFORMULARY daily as needed (pain) Relief Factor (contains cucurmin)    Historical Provider, MD   RESVERATROL PO Take 2 capsules by mouth 2 times daily     Historical Provider, MD   warfarin (COUMADIN) 5 MG tablet As directed by [facility] tabs = 90 day supply 12/16/20   Fariha Wylie MD   Lancets MISC 1 each by Does not apply route daily 6/18/20   Brittney Black, DO   blood glucose test strips (ASCENSIA AUTODISC VI;ONE TOUCH ULTRA TEST VI) strip 1 each by In Vitro route daily Dispense any brand - check blood sugar As needed. 6/18/20   Brittney Black DO   NONFORMULARY Take 1 capsule by mouth 4 times daily (before meals and nightly) Christopher    Historical Provider, MD   loratadine (CLARITIN) 10 MG tablet Take 1 tablet by mouth 2 times daily (before meals)  Patient taking differently: Take by mouth as needed  3/9/20   Dayo Malone MD   Methylsulfonylmethane (MSM PO) Take 1 tablet by mouth daily as needed (pain)    Historical Provider, MD   nitroGLYCERIN (NITROSTAT) 0.4 MG SL tablet Place under the tongue every 5 minutes as needed for Chest pain (If third one does not relieve pain, call 9-1-1.)  7/31/19   Historical Provider, MD   Grape Seed  mg by Does not apply route daily Oregan Grape Root    Historical Provider, MD     Additional information:       PHYSICAL:    height is 5' 4\" (1.626 m) and weight is 274 lb (124.3 kg). Her temperature is 97.9 °F (36.6 °C). Her blood pressure is 146/79 (abnormal) and her pulse is 76. Her respiration is 16 and oxygen saturation is 95%. Heart: [x]Regular rate and rhythm  []Other:    Lungs:  [x]Clear    []Other:    Abdomen: [x]Soft    []Other:    Mental Status: [x]Alert & Oriented  []Other:      VITAL SIGNS   Patient Vitals for the past 24 hrs:   BP Temp Pulse Resp SpO2 Height Weight   10/08/21 0840 (!) 146/79 97.9 °F (36.6 °C) 76 16 95 % 5' 4\" (1.626 m) 274 lb (124.3 kg)       PLANNED PROCEDURE      [x]EGD  [x]Colonoscopy []Flex Sigmoid  []ERCP []EUS   []Cystoscopy  [] CATH [] BRONCH     Consent: I have discussed with the patient and/or the patient representative the indication, alternatives, and the possible risks and/or complications of the planned procedure and the anesthesia methods. The patient and/or patient representative appear to understand and agree to proceed. SEDATION ( see Anesthesia note)    ASA Classification: Class 3 - A patient with severe systemic disease that limits activity but is not incapacitating    Airway Assessment: normal    Monitoring and Safety: The patient will be placed on a cardiac monitor and vital signs, pulse oximetry and level of consciousness will be continuously evaluated throughout the procedure. The patient will be closely monitored until recovery from the medications is complete and the patient has returned to baseline status. Respiratory therapy will be on standby during the procedure. [x]Pre-procedure diagnostic studies complete and results available. Comment:    [x]Previous sedation/anesthesia experiences assessed. Comment:    [x]The patient is an appropriate candidate to undergo the planned procedure sedation and anesthesia. (Refer to nursing sedation/analgesia documentation record)  [x]Formulation and discussion of sedation/procedure plan, risks, and expectations with patient and/or responsible adult completed. [x]Patient examined immediately prior to the procedure.  (Refer to nursing sedation/analgesia documentation record)    Shayne Lynch MD   Electronically signed 10/8/2021 at 9:00 AM

## 2021-10-08 ENCOUNTER — HOSPITAL ENCOUNTER (OUTPATIENT)
Age: 71
Setting detail: OUTPATIENT SURGERY
Discharge: HOME | End: 2021-10-08
Attending: INTERNAL MEDICINE | Admitting: INTERNAL MEDICINE
Payer: MEDICARE

## 2021-10-08 ENCOUNTER — ANESTHESIA EVENT (OUTPATIENT)
Dept: ENDOSCOPY | Age: 71
End: 2021-10-08
Payer: MEDICARE

## 2021-10-08 ENCOUNTER — ANESTHESIA (OUTPATIENT)
Dept: ENDOSCOPY | Age: 71
End: 2021-10-08
Payer: MEDICARE

## 2021-10-08 VITALS
BODY MASS INDEX: 46.78 KG/M2 | HEART RATE: 77 BPM | SYSTOLIC BLOOD PRESSURE: 109 MMHG | WEIGHT: 274 LBS | DIASTOLIC BLOOD PRESSURE: 57 MMHG | HEIGHT: 64 IN | TEMPERATURE: 97.6 F | RESPIRATION RATE: 18 BRPM | OXYGEN SATURATION: 96 %

## 2021-10-08 VITALS
OXYGEN SATURATION: 100 % | DIASTOLIC BLOOD PRESSURE: 51 MMHG | SYSTOLIC BLOOD PRESSURE: 95 MMHG | RESPIRATION RATE: 14 BRPM

## 2021-10-08 PROCEDURE — 2580000003 HC RX 258: Performed by: INTERNAL MEDICINE

## 2021-10-08 PROCEDURE — 7100000010 HC PHASE II RECOVERY - FIRST 15 MIN: Performed by: INTERNAL MEDICINE

## 2021-10-08 PROCEDURE — 6360000002 HC RX W HCPCS: Performed by: NURSE ANESTHETIST, CERTIFIED REGISTERED

## 2021-10-08 PROCEDURE — 7100000011 HC PHASE II RECOVERY - ADDTL 15 MIN: Performed by: INTERNAL MEDICINE

## 2021-10-08 PROCEDURE — 2709999900 HC NON-CHARGEABLE SUPPLY: Performed by: INTERNAL MEDICINE

## 2021-10-08 PROCEDURE — 3609012700 HC EGD DILATION SAVORY: Performed by: INTERNAL MEDICINE

## 2021-10-08 PROCEDURE — 3609027000 HC COLONOSCOPY: Performed by: INTERNAL MEDICINE

## 2021-10-08 PROCEDURE — 2720000010 HC SURG SUPPLY STERILE: Performed by: INTERNAL MEDICINE

## 2021-10-08 PROCEDURE — 3700000000 HC ANESTHESIA ATTENDED CARE: Performed by: INTERNAL MEDICINE

## 2021-10-08 PROCEDURE — 2500000003 HC RX 250 WO HCPCS: Performed by: NURSE ANESTHETIST, CERTIFIED REGISTERED

## 2021-10-08 PROCEDURE — 88305 TISSUE EXAM BY PATHOLOGIST: CPT

## 2021-10-08 PROCEDURE — 3700000001 HC ADD 15 MINUTES (ANESTHESIA): Performed by: INTERNAL MEDICINE

## 2021-10-08 PROCEDURE — 3609012400 HC EGD TRANSORAL BIOPSY SINGLE/MULTIPLE: Performed by: INTERNAL MEDICINE

## 2021-10-08 RX ORDER — LIDOCAINE HYDROCHLORIDE 20 MG/ML
INJECTION, SOLUTION EPIDURAL; INFILTRATION; INTRACAUDAL; PERINEURAL PRN
Status: DISCONTINUED | OUTPATIENT
Start: 2021-10-08 | End: 2021-10-08 | Stop reason: SDUPTHER

## 2021-10-08 RX ORDER — SODIUM CHLORIDE 450 MG/100ML
INJECTION, SOLUTION INTRAVENOUS CONTINUOUS
Status: DISCONTINUED | OUTPATIENT
Start: 2021-10-08 | End: 2021-10-08 | Stop reason: HOSPADM

## 2021-10-08 RX ORDER — SODIUM CHLORIDE, SODIUM LACTATE, POTASSIUM CHLORIDE, CALCIUM CHLORIDE 600; 310; 30; 20 MG/100ML; MG/100ML; MG/100ML; MG/100ML
INJECTION, SOLUTION INTRAVENOUS CONTINUOUS
Status: DISCONTINUED | OUTPATIENT
Start: 2021-10-08 | End: 2021-10-08 | Stop reason: HOSPADM

## 2021-10-08 RX ORDER — PROPOFOL 10 MG/ML
INJECTION, EMULSION INTRAVENOUS PRN
Status: DISCONTINUED | OUTPATIENT
Start: 2021-10-08 | End: 2021-10-08 | Stop reason: SDUPTHER

## 2021-10-08 RX ADMIN — SODIUM CHLORIDE, POTASSIUM CHLORIDE, SODIUM LACTATE AND CALCIUM CHLORIDE: 600; 310; 30; 20 INJECTION, SOLUTION INTRAVENOUS at 09:00

## 2021-10-08 RX ADMIN — PROPOFOL 320 MG: 10 INJECTION, EMULSION INTRAVENOUS at 09:13

## 2021-10-08 RX ADMIN — LIDOCAINE HYDROCHLORIDE 80 MG: 20 INJECTION, SOLUTION EPIDURAL; INFILTRATION; INTRACAUDAL; PERINEURAL at 09:13

## 2021-10-08 ASSESSMENT — PAIN - FUNCTIONAL ASSESSMENT: PAIN_FUNCTIONAL_ASSESSMENT: 0-10

## 2021-10-08 ASSESSMENT — PAIN SCALES - GENERAL
PAINLEVEL_OUTOF10: 0
PAINLEVEL_OUTOF10: 0

## 2021-10-08 NOTE — ANESTHESIA PRE PROCEDURE
Department of Anesthesiology  Preprocedure Note       Name:  Gallo Cisneros   Age:  80 y.o.  :  1950                                          MRN:  [id number]         Date:  10/8/2021      Surgeon: Desirae Baldwin):  Cj Traore MD    Procedure: Procedure(s):  COLONOSCOPY  EGD    Medications prior to admission:   Prior to Admission medications    Medication Sig Start Date End Date Taking? Authorizing Provider   lisinopril (PRINIVIL;ZESTRIL) 10 MG tablet Take 0.5 tablets by mouth daily 8/10/21  Yes JANIA Romo - CNP   diclofenac sodium (VOLTAREN) 1 % GEL Apply topically 4 times daily as needed for Pain 7/15/21  Yes Patric Cage MD   levothyroxine (SYNTHROID) 100 MCG tablet take 1 tablet by mouth once daily 21  Yes Ruthy Cervantes DO   liothyronine (CYTOMEL) 5 MCG tablet take 1 tablet by mouth once daily 21  Yes Ruthy Cervantes DO   Acetaminophen (TYLENOL ARTHRITIS PAIN PO) Take by mouth See Admin Instructions Indications: Pain Don't take more then 3,000 mg each day.     Yes Historical Provider, MD   Omega-3 Fatty Acids (FISH OIL PO) Take 2 capsules by mouth daily Indications: Pain   Yes Historical Provider, MD   flecainide (TAMBOCOR) 100 MG tablet Take 100 mg by mouth 2 times daily    Yes Historical Provider, MD   vitamin C (ASCORBIC ACID) 500 MG tablet Take 500 mg by mouth daily   Yes Historical Provider, MD   Calcium Carbonate (SM CALCIUM 600 PO) Take 1 tablet by mouth daily   Yes Historical Provider, MD   metoprolol succinate (TOPROL XL) 25 MG extended release tablet Take 25 mg by mouth 2 times daily    Yes Historical Provider, MD   glucose monitoring kit (FREESTYLE) monitoring kit 1 kit by Does not apply route daily 20  Yes Ruthy Cervantes DO   NEJGSJOIE-VUWIXQFEY-NGESPQWVHS PO Place 1 tablet under the tongue 2 times daily Jimbo B12 plus or [place] methylB6, harnsmF98, methylfolate    Yes Historical Provider, MD   Magnesium (CVS TRIPLE MAGNESIUM COMPLEX) 400 MG CAPS Take 3 capsules by mouth 4 times daily (after meals and at bedtime) Indications: Magnesium Deficiency    Yes Historical Provider, MD   magnesium cl-calcium carbonate (SLOW-MAG) 71.5-119 MG TBEC tablet Take 1 tablet by mouth 3 times daily (with meals) Take before and when stressed with 2 extra fish oil PRN   Yes Historical Provider, MD   Lysine 500 MG TABS Take 1 tablet by mouth 2 times daily    Yes Historical Provider, MD   DHEA 25 MG CAPS Take 1 capsule by mouth every other day    Yes Historical Provider, MD   Cinnamon 500 MG CAPS Take 3 capsules by mouth 4 times daily (before meals and nightly)    Yes Historical Provider, MD   vitamin D (CHOLECALCIFEROL) 50 MCG (2000 UT) TABS tablet Take 2 capsules by mouth daily Indications: Treatment with Vitamin D .use only the 2000 IU or 1000 IU caps from [facility], 5000IU does not work   Yes Historical Provider, MD CHRISTINA Complex-Biotin-FA (B-100 TR) TBCR Take 2 tablets by mouth 2 times daily (before meals) Before breakfast and before lunch or supper   Yes Historical Provider, MD   tiZANidine (ZANAFLEX) 2 MG tablet Take 1 tablet by mouth nightly as needed (muscle spasms) 8/19/21   Geeta Lan MD   Handicap Placard MISC by Other route For 5 years 8/19/21   Geeta Lan MD   NONFORMULARY daily as needed (pain) Relief Factor (contains cucurmin)    Historical Provider, MD   RESVERATROL PO Take 2 capsules by mouth 2 times daily     Historical Provider, MD   warfarin (COUMADIN) 5 MG tablet As directed by [facility] tabs = 90 day supply 12/16/20   Edita Garcia MD   Lancets MISC 1 each by Does not apply route daily 6/18/20   Berhane Walton, DO   blood glucose test strips (ASCENSIA AUTODISC VI;ONE TOUCH ULTRA TEST VI) strip 1 each by In Vitro route daily Dispense any brand - check blood sugar As needed.  6/18/20   Berhane Walton, DO   NONFORMULARY Take 1 capsule by mouth 4 times daily (before meals and nightly) Christopher    Historical Provider, MD   loratadine (CLARITIN) 10 MG tablet Take 1 Diagnosis Date    A-fib [facility])     Atrial fibrillation (HCC)     Carpal tunnel syndrome     Diabetes mellitus ([address]     GERD (gastroesophageal reflux disease)     Headache(784.0)     HTN (hypertension)     Hypothyroidism     Osteoarthritis     Sleep apnea 2019    Mild    Tendonitis of both wrists 2017    Toenail fungus        Past Surgical History:        Procedure Laterality Date    CARPAL TUNNEL RELEASE      both hands     SECTION       SECTION       SECTION      FOOT SURGERY Left 2018    [facility] Sherif Tadeo Left 2010    Left Total Knee    JOINT REPLACEMENT Right 2016    Right Total Knee       Social History:    Social History     Tobacco Use    Smoking status: Never Smoker    Smokeless tobacco: Never Used   Substance Use Topics    Alcohol use: Yes     Comment: rarely                                Counseling given: Not Answered      Vital Signs (Current):   Vitals:    10/08/21 0840   BP: (!) 146/79   Pulse: 76   Resp: 16   Temp: 36.6 °C (97.9 °F)   SpO2: 95%   Weight: 274 lb (124.3 kg)   Height: 5' 4\" (1.626 m)                                              BP Readings from Last 3 Encounters:   10/08/21 (!) 146/79   21 122/64   07/15/21 120/82       NPO Status:                                                                                 BMI:   Wt Readings from Last 3 Encounters:   10/08/21 274 lb (124.3 kg)   21 274 lb 12.8 oz (124.6 kg)   07/15/21 274 lb (124.3 kg)     Body mass index is 47.03 kg/m².     CBC:   Lab Results   Component Value Date    WBC 8.0 2021    RBC 4.80 2021    HGB 14.1 2021    HGB 14.2 2021    HCT 44.6 2021    HCT 44.8 2021    MCV 92.9 2021    RDW 15.2 2018     2021       CMP:   Lab Results   Component Value Date     2020    K 4.3 2020    K 4.9 10/07/2019     2020    CO2 22 2020 BUN 14 09/08/2020    CREATININE 0.8 09/08/2020    LABGLOM 71 09/08/2020    GLUCOSE 106 09/08/2020    PROT 7.7 09/08/2020    CALCIUM 9.6 06/14/2021    BILITOT 0.5 09/08/2020    ALKPHOS 66 09/08/2020    AST 22 09/08/2020    ALT 15 09/08/2020       POC Tests: No results for input(s): POCGLU, POCNA, POCK, POCCL, POCBUN, POCHEMO, POCHCT in the last 72 hours. Coags:   Lab Results   Component Value Date    INR 3.30 09/14/2021    INR 1.10 03/02/2020    APTT 32.2 01/08/2020       HCG (If Applicable): No results found for: PREGTESTUR, PREGSERUM, HCG, HCGQUANT     ABGs: No results found for: PHART, PO2ART, TVX2BHS, RYX4WYH, BEART, W9ZYCMDW     Type & Screen (If Applicable):  Lab Results   Component Value Date    LABRH POS 10/07/2019       Drug/Infectious Status (If Applicable):  No results found for: HIV, HEPCAB    COVID-19 Screening (If Applicable):   Lab Results   Component Value Date    COVID19 Not Detected 07/18/2020           Anesthesia Evaluation    Airway: Mallampati: II  TM distance: >3 FB   Neck ROM: full  Mouth opening: > = 3 FB Dental:    (+) caps  Comment: Intact     Pulmonary: breath sounds clear to auscultation  (+) sleep apnea:                            ROS comment: Pt. Waiting for cpap machune   Cardiovascular:    (+) hypertension: no interval change, pacemaker: pacemaker, CAD: no interval change,       ECG reviewed  Rhythm: regular  Rate: normal           Beta Blocker:  Dose within 24 Hrs      ROS comment: Atrial paced pacemaker     Neuro/Psych:   (+) neuromuscular disease:, headaches:,             GI/Hepatic/Renal:   (+) GERD: well controlled, bowel prep, morbid obesity          Endo/Other:    (+) DiabetesType II DM, well controlled, , hypothyroidism::., .          Pt had no PAT visit        ROS comment: Diet controlled Abdominal:   (+) obese,           Vascular: Other Findings:             Anesthesia Plan      MAC     ASA 4       Induction: intravenous.       Anesthetic plan and risks discussed with patient and child/children. Plan discussed with attending.                   JANIA Shin - SHERIF   10/8/2021

## 2021-10-08 NOTE — PROGRESS NOTES
EGD complete, tolerated well. Biopsies taken, 1 jar labeled and sent to lab. Dilation complete with 51 [employer] dilator. Photos taken.  Scope# [id number]

## 2021-10-08 NOTE — ANESTHESIA POSTPROCEDURE EVALUATION
Department of Anesthesiology  Postprocedure Note    Patient: Gallo Cisneros  MRN: [id number]  YOB: 1950  Date of evaluation: 10/8/2021  Time:  9:44 AM     Procedure Summary     Date: 10/08/21 Room / Location: [address] [place] [address] / [address] [place] [address]    Anesthesia Start: 5105 Anesthesia Stop: 4072    Procedures:       COLONOSCOPY (N/A )      EGD BIOPSY (N/A )      EGD DILATION SAVORY Diagnosis: (Corie Salgado)    Surgeons: Cj Traore MD Responsible Provider: Shobha Lord MD    Anesthesia Type: MAC ASA Status: 4          Anesthesia Type: MAC    Carolyn Phase I:      Carolyn Phase II:      Last vitals: Reviewed and per EMR flowsheets.        Anesthesia Post Evaluation    Patient location during evaluation: bedside  Patient participation: complete - patient participated  Level of consciousness: awake and alert  Pain score: 0  Airway patency: patent  Nausea & Vomiting: no vomiting and no nausea  Complications: no  Cardiovascular status: hemodynamically stable  Respiratory status: room air and spontaneous ventilation  Hydration status: stable

## 2021-10-14 ENCOUNTER — HOSPITAL ENCOUNTER (OUTPATIENT)
Dept: PHARMACY | Age: 71
Setting detail: THERAPIES SERIES
Discharge: HOME | End: 2021-10-14
Payer: MEDICARE

## 2021-10-14 DIAGNOSIS — Z51.81 ENCOUNTER FOR THERAPEUTIC DRUG MONITORING: ICD-10-CM

## 2021-10-14 DIAGNOSIS — Z79.01 ANTICOAGULATED ON COUMADIN: Primary | ICD-10-CM

## 2021-10-14 LAB — POC INR: 2.2 (ref 0.8–1.2)

## 2021-10-14 PROCEDURE — 85610 PROTHROMBIN TIME: CPT

## 2021-10-14 PROCEDURE — 99211 OFF/OP EST MAY X REQ PHY/QHP: CPT

## 2021-10-14 PROCEDURE — 36416 COLLJ CAPILLARY BLOOD SPEC: CPT

## 2021-10-14 NOTE — PROGRESS NOTES
Medication Management [address]  [phone] (phone)  [phone] (fax)    Ms. Rocael Fernandez is a 80 y.o.  female with history of Afib who presents today for anticoagulation monitoring and adjustment. Had EGD 10/8/21, held 5 days prior as directed  No missed or extra doses. Patient denies s/s bleeding/swelling/SOB/chest pain  Bruising on right arm from IV from EGD last week. States it is healing  No blood in urine or stool. No dietary changes. No changes in medication/OTC agents/Herbals. No change in alcohol use or tobacco use. No change in activity level. Patient denies headaches/lightheadedness/falls. Had dizziness for 2 days back mid-Sept. She's not sure why, states it went away. Feels better now. States this happened to her about 1.5 years ago as well. Encouraged to discuss with provider. No vomiting/diarrhea or acute illness. No Procedures scheduled in the future at this time. Assessment:   Lab Results   Component Value Date    INR 2.20 (H) 10/14/2021    INR 3.30 (H) 09/14/2021    INR 2.30 (H) 08/17/2021     INR therapeutic   Recent Labs     10/14/21  1315   INR 2.20*     Plan:  Continue Coumadin 5 mg MoFr and 2.5 mg SuTuWeThSa. Recheck INR in 3 week(s). Patient reminded to call the Anticoagulation Clinic with any signs or symptoms of bleeding or with any medication changes. Patient given instructions utilizing the teach back method. After visit summary printed and reviewed with patient. Discharged ambulatory in no apparent distress.     For Pharmacy Admin Tracking Only     Time Spent (min): 20

## 2021-11-04 ENCOUNTER — HOSPITAL ENCOUNTER (OUTPATIENT)
Dept: PHARMACY | Age: 71
Setting detail: THERAPIES SERIES
Discharge: HOME | End: 2021-11-04
Payer: MEDICARE

## 2021-11-04 ENCOUNTER — OFFICE VISIT (OUTPATIENT)
Dept: FAMILY MEDICINE CLINIC | Age: 71
End: 2021-11-04
Payer: MEDICARE

## 2021-11-04 VITALS
DIASTOLIC BLOOD PRESSURE: 86 MMHG | TEMPERATURE: 96.8 F | HEART RATE: 80 BPM | SYSTOLIC BLOOD PRESSURE: 134 MMHG | WEIGHT: 274 LBS | BODY MASS INDEX: 46.78 KG/M2 | OXYGEN SATURATION: 98 % | RESPIRATION RATE: 20 BRPM | HEIGHT: 64 IN

## 2021-11-04 DIAGNOSIS — M16.12 ARTHRITIS OF LEFT HIP: Primary | ICD-10-CM

## 2021-11-04 DIAGNOSIS — E11.9 NEW ONSET TYPE 2 DIABETES MELLITUS (HCC): ICD-10-CM

## 2021-11-04 DIAGNOSIS — Z79.01 ANTICOAGULATED ON COUMADIN: Primary | ICD-10-CM

## 2021-11-04 DIAGNOSIS — Z51.81 ENCOUNTER FOR THERAPEUTIC DRUG MONITORING: ICD-10-CM

## 2021-11-04 DIAGNOSIS — H81.13 BENIGN PAROXYSMAL POSITIONAL VERTIGO DUE TO BILATERAL VESTIBULAR DISORDER: ICD-10-CM

## 2021-11-04 DIAGNOSIS — M19.90 GENERALIZED ARTHRITIS: ICD-10-CM

## 2021-11-04 DIAGNOSIS — M62.838 MUSCLE SPASM OF LEFT LOWER EXTREMITY: ICD-10-CM

## 2021-11-04 LAB — POC INR: 3.1 (ref 0.8–1.2)

## 2021-11-04 PROCEDURE — 85610 PROTHROMBIN TIME: CPT

## 2021-11-04 PROCEDURE — 99211 OFF/OP EST MAY X REQ PHY/QHP: CPT

## 2021-11-04 PROCEDURE — 99213 OFFICE O/P EST LOW 20 MIN: CPT | Performed by: STUDENT IN AN ORGANIZED HEALTH CARE EDUCATION/TRAINING PROGRAM

## 2021-11-04 PROCEDURE — 36416 COLLJ CAPILLARY BLOOD SPEC: CPT

## 2021-11-04 RX ORDER — DIPHENHYDRAMINE HCL 25 MG
CAPSULE ORAL SEE ADMIN INSTRUCTIONS
COMMUNITY
End: 2022-02-28

## 2021-11-04 ASSESSMENT — ENCOUNTER SYMPTOMS
COUGH: 0
SHORTNESS OF BREATH: 0
BACK PAIN: 1
NAUSEA: 0
VOMITING: 0
COLOR CHANGE: 0

## 2021-11-04 NOTE — PROGRESS NOTES
After pharmacist chart review, the following recommendations are made:  - Due to ASCVD risk score of 15.5% and history of diabetes, may consider adding a moderate-intensity statin, such as atorvastatin 20 mg daily.     For Pharmacy Admin Tracking Only     Recommendation Provided To: Provider: 1 via Note to Provider   Intervention Detail: New Rx: 1, reason: Needs Additional Therapy   Time Spent (min): Baldomero Dunn PharmD   11/4/2021, 9:37 AM

## 2021-11-04 NOTE — PROGRESS NOTES
S: 80 y.o. female with   Chief Complaint   Patient presents with    3 Month Follow-Up     Arthritis bilateral hip pain and Left is worse       Tylenol arthritis not helping much    Wonders if there is anything else - did see voltaren on tv    Was not able to go to PT    Episodes of vertigo - a few months ago - usually with head positions    Has had higher sugars    BP Readings from Last 3 Encounters:   11/04/21 134/86   10/08/21 (!) 109/57   10/08/21 (!) 95/51     Wt Readings from Last 3 Encounters:   11/04/21 274 lb (124.3 kg)   10/08/21 274 lb (124.3 kg)   08/19/21 274 lb 12.8 oz (124.6 kg)           O: VS:   Vitals:    11/04/21 1123   BP: 134/86   Site: Right Lower Arm   Position: Sitting   Cuff Size: Medium Adult   Pulse: 80   Resp: 20   Temp: 96.8 °F (36 °C)   TempSrc: Temporal   SpO2: 98%   Weight: 274 lb (124.3 kg)   Height: 5' 4\" (1.626 m)     Body mass index is 47.03 kg/m². AAO/NAD, appropriate affect for mood  Normocephalic, atraumatic, eyes  conjunctiva and sclera normal,   skin no rashes on exposed areas   Insight, judgement normal and in no acute distress      Lab Results   Component Value Date    WBC 8.0 06/14/2021    HGB 14.1 06/14/2021    HGB 14.2 06/14/2021    HCT 44.6 06/14/2021    HCT 44.8 06/14/2021     06/14/2021    CHOL 142 06/14/2021    TRIG 59 06/14/2021    HDL 56 06/14/2021    LDLDIRECT 80.00 12/12/2013    LDLCALC 74 06/14/2021    AST 22 09/08/2020     09/08/2020    K 4.3 09/08/2020     09/08/2020    CREATININE 0.8 09/08/2020    BUN 14 09/08/2020    CO2 22 (L) 09/08/2020    TSH 0.476 06/14/2021    INR 2.20 (H) 10/14/2021    LABA1C 5.7 06/14/2021    LABGLOM 71 (A) 09/08/2020    MG 2.1 06/14/2021    CALCIUM 9.6 06/14/2021    VITD25 38 06/14/2021       No results found. Diagnosis Orders   1. Arthritis of left hip  diclofenac (VOLTAREN) 50 MG EC tablet    CLIFTON Mcdonald MD, Orthopedic Surgery, [address]   2.  Generalized arthritis  diclofenac (VOLTAREN) 50 MG EC tablet    CLIFTON - Saritha Cummings MD, [facility], [facility]   3. Muscle spasm of left lower extremity     4. New onset type 2 diabetes mellitus (HCC)  Comprehensive Metabolic Panel    Hemoglobin A1C    Microalbumin / Creatinine Urine Ratio   5. Benign paroxysmal positional vertigo due to bilateral vestibular disorder         Plan  Will refer to [facility]    Can start voltaren - discussed the increased bleeding risk    Will get more labs    will watch the vertigo - will give exercises and have you increase water intake and do the exercises - may need therapy OT if no improvement       Return in about 4 weeks (around 12/2/2021) for f/u labs. Orders Placed:  Orders Placed This Encounter   Procedures    Comprehensive Metabolic Panel    Hemoglobin A1C    Microalbumin / Creatinine Urine Ratio    CLIFTON Cummings MD, Orthopedic Surgery, [facility]     Medications Prescribed:  Orders Placed This Encounter   Medications    diclofenac (VOLTAREN) 50 MG EC tablet     Sig: Take 1 tablet by mouth 2 times daily     Dispense:  60 tablet     Refill:  2       Future Appointments   Date Time Provider Alejandrina Alonsoi   11/4/2021  1:00 PM Funmilayo Kerns RN [facility] Maintenance Due   Topic Date Due    Diabetic foot exam  Never done    Diabetic retinal exam  Never done    Diabetic microalbuminuria test  Never done    Potassium monitoring  09/08/2021    Creatinine monitoring  09/08/2021         Attending Physician Statement  I have discussed the case, including pertinent history and exam findings with the resident. I also have seen the patient and performed key portions of the examination. I agree with the documented assessment and plan as documented by the resident.   GE modifier added to this encounter      Gerardo Pritchett DO 11/4/2021 11:58 AM

## 2021-11-04 NOTE — PATIENT INSTRUCTIONS
Thank you   1. Thank you for trusting us with your healthcare needs. You may receive a survey regarding today's visit. It would help us out if you would take a few moments to provide your feedback. We value your input. 2. Please bring in ALL medications BOTTLES, including inhalers, herbal supplements, over the counter, prescribed & non-prescribed medicine. The office would like actual medication bottles and a list.   3. Please note our OFFICE POLICIES:   a. Prior to getting your labs drawn, please check with your insurance company for benefits and eligibility of lab services. Often, insurance companies cover certain tests for preventative visits only. It is patient's responsibility to see what is covered. b. We are unable to change a diagnosis after the test has been performed. c. Lab orders will not be re-printed. Please hold onto your original lab orders and take them to your lab to be completed. d. If you no show your scheduled appointment three times, you will be dismissed from this practice. e. Woody Edgar must be completed 24 hours prior to your schedule appointment. 4. If the list below has been completed, PLEASE FAX RECORDS TO OUR OFFICE @ [phone]. Once the records have been received we will update your records at our office:  Health Maintenance Due   Topic Date Due    Diabetic foot exam  Never done    Diabetic retinal exam  Never done    Diabetic microalbuminuria test  Never done    Potassium monitoring  09/08/2021    Creatinine monitoring  09/08/2021           Patient Education        Knee Arthritis: Exercises  Introduction  Here are some examples of exercises for you to try. The exercises may be suggested for a condition or for rehabilitation. Start each exercise slowly. Ease off the exercises if you start to have pain. You will be told when to start these exercises and which ones will work best for you. How to do the exercises  Knee flexion with heel slide    1.  Lie on your back with your knees bent. 2. Slide your heel back by bending your affected knee as far as you can. Then hook your other foot around your ankle to help pull your heel even farther back. 3. Hold for about 6 seconds, then rest for up to 10 seconds. 4. Repeat 8 to 12 times. 5. Switch legs and repeat steps 1 through 4, even if only one knee is sore. Quad sets    1. Sit with your affected leg straight and supported on the floor or a firm bed. Place a small, rolled-up towel under your knee. Your other leg should be bent, with that foot flat on the floor. 2. Tighten the thigh muscles of your affected leg by pressing the back of your knee down into the towel. 3. Hold for about 6 seconds, then rest for up to 10 seconds. 4. Repeat 8 to 12 times. 5. Switch legs and repeat steps 1 through 4, even if only one knee is sore. Straight-leg raises to the front    1. Lie on your back with your good knee bent so that your foot rests flat on the floor. Your affected leg should be straight. Make sure that your low back has a normal curve. You should be able to slip your hand in between the floor and the small of your back, with your palm touching the floor and your back touching the back of your hand. 2. Tighten the thigh muscles in your affected leg by pressing the back of your knee flat down to the floor. Hold your knee straight. 3. Keeping the thigh muscles tight and your leg straight, lift your affected leg up so that your heel is about 12 inches off the floor. Hold for about 6 seconds, then lower slowly. 4. Relax for up to 10 seconds between repetitions. 5. Repeat 8 to 12 times. 6. Switch legs and repeat steps 1 through 5, even if only one knee is sore. Active knee flexion    1. Lie on your stomach with your knees straight. If your kneecap is uncomfortable, roll up a washcloth and put it under your leg just above your kneecap.   2. Lift the foot of your affected leg by bending the knee so that you bring the foot up toward your buttock. If this motion hurts, try it without bending your knee quite as far. This may help you avoid any painful motion. 3. Slowly move your leg up and down. 4. Repeat 8 to 12 times. 5. Switch legs and repeat steps 1 through 4, even if only one knee is sore. Quadriceps stretch (facedown)    1. Lie flat on your stomach, and rest your face on the floor. 2. Wrap a towel or belt strap around the lower part of your affected leg. Then use the towel or belt strap to slowly pull your heel toward your buttock until you feel a stretch. 3. Hold for about 15 to 30 seconds, then relax your leg against the towel or belt strap. 4. Repeat 2 to 4 times. 5. Switch legs and repeat steps 1 through 4, even if only one knee is sore. Stationary exercise bike    1. If you do not have a stationary exercise bike at home, you can find one to ride at your local health club or community center. 2. Adjust the height of the bike seat so that your knee is slightly bent when your leg is extended downward. If your knee hurts when the pedal reaches the top, you can raise the seat so that your knee does not bend as much. 3. Start slowly. At first, try to do 5 to 10 minutes of cycling with little to no resistance. Then increase your time and the resistance bit by bit until you can do 20 to 30 minutes without pain. 4. If you start to have pain, rest your knee until your pain gets back to the level that is normal for you. Or cycle for less time or with less effort. Follow-up care is a key part of your treatment and safety. Be sure to make and go to all appointments, and call your doctor if you are having problems. It's also a good idea to know your test results and keep a list of the medicines you take. Where can you learn more? Go to https://Shomptonpedro.[email or web address] org and sign in to your [employer] account. Enter C159 in the Vaxxas box to learn more about \"Knee Arthritis: Exercises. \"     If you do not have an account, please click on the \"Sign Up Now\" link. Current as of: July 1, 2021               Content Version: 13.0  © 2006-2021 [employer]. Care instructions adapted under license by [facility] ([facility]). If you have questions about a medical condition or this instruction, always ask your healthcare professional. [facility] any warranty or liability for your use of this information. Patient Education        Benign Paroxysmal Positional Vertigo (BPPV): Care Instructions  Your Care Instructions     Benign paroxysmal positional vertigo, also called BPPV, is an inner ear problem. It causes a spinning or whirling sensation when you move your head. This sensation is called vertigo. The vertigo usually lasts for less than a minute. People often have vertigo spells for a few days or weeks. Then the vertigo goes away. But it may come back again. The vertigo may be mild, or it may be bad enough to cause unsteadiness, nausea, and vomiting. When you move, your inner ear sends messages to the brain. This helps you keep your balance. Vertigo can happen when debris builds up in the inner ear. The buildup can cause the inner ear to send the wrong message to the brain. Your doctor may move you in different positions to help your vertigo get better faster. This is called the Epley maneuver. Your doctor may also prescribe medicines or exercises to help with your symptoms. Follow-up care is a key part of your treatment and safety. Be sure to make and go to all appointments, and call your doctor if you are having problems. It's also a good idea to know your test results and keep a list of the medicines you take. How can you care for yourself at home? · If your doctor suggests that you do Phelps-Daroff exercises:  ? Sit on the edge of a bed or sofa. Quickly lie down on the side that causes the worst vertigo. Lie on your side with your ear down.   ? Stay in this position for at least 30 seconds or until the vertigo goes away. ? Sit up. If this causes vertigo, wait for it to stop. ? Repeat the procedure on the other side. ? Repeat this 10 times. Do these exercises 2 times a day until the vertigo is gone. When should you call for help? Call 911 anytime you think you may need emergency care. For example, call if:    · You have symptoms of a stroke. These may include:  ? Sudden numbness, tingling, weakness, or loss of movement in your face, arm, or leg, especially on only one side of your body. ? Sudden vision changes. ? Sudden trouble speaking. ? Sudden confusion or trouble understanding simple statements. ? Sudden problems with walking or balance. ? A sudden, severe headache that is different from past headaches. Call your doctor now or seek immediate medical care if:    · You have new or worse nausea and vomiting.     · You have new symptoms such as hearing loss or roaring in your ears. Watch closely for changes in your health, and be sure to contact your doctor if:    · You are not getting better as expected.     · Your vertigo gets worse. Where can you learn more? Go to https://AnkepeBioSiltaeb.[email or web address] org and sign in to your [employer] account. Enter  in the Done. box to learn more about \"Benign Paroxysmal Positional Vertigo (BPPV): Care Instructions. \"     If you do not have an account, please click on the \"Sign Up Now\" link. Current as of: December 2, 2020               Content Version: 13.0  © 2006-2021 [employer]. Care instructions adapted under license by [facility] ([facility]). If you have questions about a medical condition or this instruction, always ask your healthcare professional. [place] [address] any warranty or liability for your use of this information.

## 2021-11-04 NOTE — PROGRESS NOTES
[address] [address] [facility] [address]  Dept: [phone]  Dept Fax: [phone]  Loc: [phone]    Luis Angel Conde is a 80 y.o. female who presents today for:  Chief Complaint   Patient presents with    3 Month Follow-Up     Arthritis bilateral hip pain and Left is worse       HPI:   Arthritis: Still a problem. Taking Tylenol Arthritis in the morning before work. Pain is worse at work. Aspirin has worked better for in the pain. Muscle aches are better. Left leg muscle ache is somewhat improved. PT sessions were not     Vertigo: Episode a few months ago when she was getting out of bed. Had it for 2-3 days and also had a dull headache. Prior to that, it happened a year ago and she had some nausea/vomiting again.      Past Medical History:   Diagnosis Date    A-fib [facility])     Atrial fibrillation (HCC)     Carpal tunnel syndrome     Diabetes mellitus ([address]     GERD (gastroesophageal reflux disease)     Headache(784.0)     HTN (hypertension)     Hypothyroidism     Osteoarthritis     Pacemaker     Sleep apnea 2019    Mild    Tendonitis of both wrists 2017    Toenail fungus       Past Surgical History:   Procedure Laterality Date    CARPAL TUNNEL RELEASE      both hands     SECTION       SECTION       SECTION      COLONOSCOPY N/A 10/8/2021    COLONOSCOPY performed by [facility]ia [facility]anches [facility] at [facility] Left 2018    [facility] Left 2010    Left Total Knee    JOINT REPLACEMENT Right 2016    Right Total Knee    UPPER GASTROINTESTINAL ENDOSCOPY N/A 10/8/2021    EGD BIOPSY [facility]    UPPER GASTROINTESTINAL ENDOSCOPY  10/8/2021    EGD DILATION SAVORY performed by [facility] at [facility]     Family History   Problem Relation Age of Onset    Stroke Mother  Arrhythmia Mother     Other Mother         Barry's Syndrome    Heart Disease Mother     Coronary Art Dis Mother     Diabetes Father     Other Sister         Barry's Syndrome    Arrhythmia Sister     Cancer Brother         Stomach Cancer    Stillborn Child     Early Death Child     Pacemaker Sister     Heart Attack Brother     Coronary Art Dis Brother     Other Brother         Barry's Syndrome     Social History     Tobacco Use    Smoking status: Never Smoker    Smokeless tobacco: Never Used   Substance Use Topics    Alcohol use: Yes     Comment: rarely      Current Outpatient Medications   Medication Sig Dispense Refill    diclofenac (VOLTAREN) 50 MG EC tablet Take 1 tablet by mouth 2 times daily (Patient not taking: Reported on 11/4/2021) 60 tablet 2    tiZANidine (ZANAFLEX) 2 MG tablet Take 1 tablet by mouth nightly as needed (muscle spasms) 30 tablet 1    Handicap Placard MISC by Other route For 5 years 1 each 0    NONFORMULARY daily as needed (pain) Relief Factor (contains cucurmin)      lisinopril (PRINIVIL;ZESTRIL) 10 MG tablet Take 0.5 tablets by mouth daily 90 tablet 1    diclofenac sodium (VOLTAREN) 1 % GEL Apply topically 4 times daily as needed for Pain 150 g 1    levothyroxine (SYNTHROID) 100 MCG tablet take 1 tablet by mouth once daily 90 tablet 3    liothyronine (CYTOMEL) 5 MCG tablet take 1 tablet by mouth once daily 90 tablet 3    Acetaminophen (TYLENOL ARTHRITIS PAIN PO) Take by mouth See Admin Instructions Indications: Pain Don't take more then 3,000 mg each day, including what's in cold products.       RESVERATROL PO Take 2 capsules by mouth 2 times daily       Omega-3 Fatty Acids (FISH OIL PO) Take 2 capsules by mouth daily Indications: Pain      warfarin (COUMADIN) 5 MG tablet As directed by [facility] #90 tabs = 90 day supply 90 tablet 4    flecainide (TAMBOCOR) 100 MG tablet Take 100 mg by mouth 2 times daily       vitamin C (ASCORBIC ACID) 500 MG tablet Take 500 mg by mouth daily      Calcium Carbonate (SM CALCIUM 600 PO) Take 1 tablet by mouth daily      metoprolol succinate (TOPROL XL) 25 MG extended release tablet Take 25 mg by mouth 2 times daily       glucose monitoring kit (FREESTYLE) monitoring kit 1 kit by Does not apply route daily 1 kit 0    Lancets MISC 1 each by Does not apply route daily 300 each 1    blood glucose test strips (ASCENSIA AUTODISC VI;ONE TOUCH ULTRA TEST VI) strip 1 each by In Vitro route daily Dispense any brand - check blood sugar As needed.  100 each 3    NONFORMULARY Take 1 capsule by mouth 4 times daily (before meals and nightly) Magtein      loratadine (CLARITIN) 10 MG tablet Take 1 tablet by mouth 2 times daily (before meals) (Patient taking differently: Take by mouth as needed ) 180 tablet 3    Methylsulfonylmethane (MSM PO) Take 1 tablet by mouth daily as needed (pain)      nitroGLYCERIN (NITROSTAT) 0.4 MG SL tablet Place under the tongue every 5 minutes as needed for Chest pain (If third one does not relieve pain, call 9-1-1.)  (Patient not taking: Reported on 11/4/2021)  0    UEOUOUOJT-VCTFKZFCJ-KHJIHLUVSQ PO Place 1 tablet under the tongue 2 times daily Jimbo B12 plus or [place] methylB6, [id number], methylfolate       Magnesium (CVS TRIPLE MAGNESIUM COMPLEX) 400 MG CAPS Take 3 capsules by mouth 4 times daily (after meals and at bedtime) Indications: Magnesium Deficiency       magnesium cl-calcium carbonate (SLOW-MAG) 71.5-119 MG TBEC tablet Take 1 tablet by mouth 3 times daily (with meals) Take before and when stressed with 2 extra fish oil PRN      Grape Seed  mg by Does not apply route daily Oregan Grape Root      Lysine 500 MG TABS Take 1 tablet by mouth 2 times daily       DHEA 25 MG CAPS Take 1 capsule by mouth every other day       Cinnamon 500 MG CAPS Take 3 capsules by mouth 4 times daily (before meals and nightly)       vitamin D (CHOLECALCIFEROL) 50 MCG (2000 UT) TABS tablet Take 2 capsules by mouth daily Indications: Treatment with Vitamin D .use only the 2000 IU or 1000 IU caps from InSupply, 5000IU does not work      B Complex-Biotin-FA (B-100 TR) TBCR Take 2 tablets by mouth 2 times daily (before meals) Before breakfast and before lunch or supper      guaiFENesin (MUCINEX PO) Take by mouth See Admin Instructions Indications: Cold Symptoms       DM-Doxylamine-Acetaminophen (VICKS NYQUIL COLD & FLU) 15-6. MG CAPS Take by mouth See Admin Instructions Indications: Cold Symptoms       No current facility-administered medications for this visit. Allergies   Allergen Reactions    Enoxaparin      Other reaction(s): Other: See Comments  Significant bleeding and breast hematoma when bridging from warfarin       Health Maintenance   Topic Date Due    Diabetic foot exam  Never done    Diabetic retinal exam  Never done    Diabetic microalbuminuria test  Never done    Potassium monitoring  09/08/2021    Creatinine monitoring  09/08/2021    Annual Wellness Visit (AWV)  02/05/2022    A1C test (Diabetic or Prediabetic)  06/14/2022    Lipid screen  06/14/2022    TSH testing  06/14/2022    Breast cancer screen  07/27/2023    DTaP/Tdap/Td vaccine (2 - Td or Tdap) 10/09/2024    Colon cancer screen colonoscopy  10/08/2031    Flu vaccine  Completed    Shingles Vaccine  Completed    Pneumococcal 65+ years Vaccine  Completed    COVID-19 Vaccine  Completed    DEXA (modify frequency per FRAX score)  Addressed    Hepatitis C screen  Addressed    Hepatitis A vaccine  Aged Out    Hib vaccine  Aged Out    Meningococcal (ACWY) vaccine  Aged Out       Subjective:      Review of Systems   Constitutional: Negative for chills and fever. Respiratory: Negative for cough and shortness of breath. Cardiovascular: Negative for chest pain and palpitations. Gastrointestinal: Negative for nausea and vomiting. Musculoskeletal: Positive for arthralgias, back pain and gait problem. Skin: Negative for color change and rash. Neurological: Positive for dizziness and headaches. Psychiatric/Behavioral: Negative for decreased concentration and suicidal ideas. Objective:     Vitals:    11/04/21 1123   BP: 134/86   Site: Right Lower Arm   Position: Sitting   Cuff Size: Medium Adult   Pulse: 80   Resp: 20   Temp: 96.8 °F (36 °C)   TempSrc: Temporal   SpO2: 98%   Weight: 274 lb (124.3 kg)   Height: 5' 4\" (1.626 m)       Body mass index is 47.03 kg/m². Wt Readings from Last 3 Encounters:   11/04/21 274 lb (124.3 kg)   10/08/21 274 lb (124.3 kg)   08/19/21 274 lb 12.8 oz (124.6 kg)     BP Readings from Last 3 Encounters:   11/04/21 134/86   10/08/21 (!) 109/57   10/08/21 (!) 95/51       Physical Exam  Vitals reviewed. Constitutional:       Appearance: She is obese. HENT:      Head: Normocephalic and atraumatic. Right Ear: External ear normal.      Left Ear: External ear normal.   Eyes:      Conjunctiva/sclera: Conjunctivae normal.   Cardiovascular:      Rate and Rhythm: Normal rate and regular rhythm. Pulses: Normal pulses. Heart sounds: Normal heart sounds. Pulmonary:      Effort: Pulmonary effort is normal.      Breath sounds: Normal breath sounds. Musculoskeletal:         General: Tenderness present. Right lower leg: No edema. Left lower leg: No edema. Skin:     General: Skin is dry. Neurological:      Mental Status: She is oriented to person, place, and time.    Psychiatric:         Mood and Affect: Mood normal.         Behavior: Behavior normal.         Lab Results   Component Value Date    WBC 8.0 06/14/2021    HGB 14.1 06/14/2021    HGB 14.2 06/14/2021    HCT 44.6 06/14/2021    HCT 44.8 06/14/2021     06/14/2021    CHOL 142 06/14/2021    TRIG 59 06/14/2021    HDL 56 06/14/2021    LDLDIRECT 80.00 12/12/2013    LDLCALC 74 06/14/2021    AST 22 09/08/2020     09/08/2020    K 4.3 09/08/2020     09/08/2020    CREATININE 0.8 09/08/2020 BUN 14 09/08/2020    CO2 22 (L) 09/08/2020    TSH 0.476 06/14/2021    INR 3.10 (H) 11/04/2021    LABA1C 5.7 06/14/2021    LABGLOM 71 (A) 09/08/2020    MG 2.1 06/14/2021    CALCIUM 9.6 06/14/2021    VITD25 38 06/14/2021       Imaging Results:    No results found. Assessment / Plan:     Tatiana Ford was seen today for 3 month follow-up. Diagnoses and all orders for this visit:    Arthritis of left hip  Generalized arthritis:  - Not much improvement noted. Will trial Voltaren to help with inflammation. Discussed risk of increased GI bleed with Warfarin. Patient understands. - Will refer to [facility] for possible steroid injections  - Recommend follow-up with PT  -     diclofenac (VOLTAREN) 50 MG EC tablet; Take 1 tablet by mouth 2 times daily (Patient not taking: Reported on 11/4/2021)  -     Alexandro Mc MD, Orthopedic Surgery, [facility]    Muscle spasm of left lower extremity:  - Improved with Zanaflex    New onset type 2 diabetes mellitus ([facility] [address]  -     Comprehensive Metabolic Panel; Future  -     Hemoglobin A1C; Future  -     Microalbumin / Creatinine Urine Ratio; Future    Benign paroxysmal positional vertigo due to bilateral vestibular disorder  - Recommend exercises at home. Increase hydration. Consider referral to vestibular therapy. Return in about 4 weeks (around 12/2/2021) for f/u labs. Medications Prescribed:  Orders Placed This Encounter   Medications    diclofenac (VOLTAREN) 50 MG EC tablet     Sig: Take 1 tablet by mouth 2 times daily     Dispense:  60 tablet     Refill:  2       Future Appointments   Date Time Provider Alejandrina Landrum   11/18/2021  1:00 PM LETICIA Lay TERESA [facility]   11/22/2021  9:30 AM Carmina Eugene MD [facility]   12/2/2021  1:20 PM Mily Anderson  [address]       Patient given educational materials - see patient instructions. Discussed use, benefit, and sideeffects of prescribed medications. All patient questions answered.   Pt voiced understanding. Reviewed health maintenance. Instructed to continue current medications, diet and exercise. Patient agreed with treatment plan. Follow up as directed.      Electronically signed by Geeta Lan MD on 11/4/2021 at 2:36 PM

## 2021-11-04 NOTE — PROGRESS NOTES
Medication Management [address] [place] [address]  [phone] (phone)  [phone] (fax)    Ms. Nava Siegel is a 80 y.o.  female with history of atrial fib. , per Dr. Trinity Mariee referral, who presents today for Warfarin monitoring and adjustment (3 week visit). Patient verifies current dosing regimen and tablet strength. No missed or extra doses. Patient denies bleeding/bruising/swelling/SOB/chest pain. No blood in urine or stool. No dietary changes. Changes in medication/OTC agents/herbals: today, PCP ordered PO Voltaren - she discussed bleeding risk (just came from visit). Encouraged patient to take with food. Just resumed MSM, after being without nearly a week. Had taken some Mucinex for a cold - still using Nyquil capsules at bedtime. No change in alcohol use or tobacco use. No change in activity level. Patient denies headaches/dizziness/lightheadedness/falls. No vomiting/diarrhea or acute illness. No procedures scheduled in the future at this time. May be having hip steroid injection at [facility]. Using Tylenol for pain, but doesn't help much. Assessment:   Lab Results   Component Value Date    INR 3.10 (H) 11/04/2021    INR 2.20 (H) 10/14/2021    INR 3.30 (H) 09/14/2021     INR supratherapeutic - goal 2-3. Recent Labs     11/04/21  1314   INR 3.10*       Plan:  POCT INR ordered/performed/result reviewed. Continue PO Coumadin 5 mg MF, 2.5 mg TWThSS; today is Th.  Recheck INR in 2 week(s). (Report given - orders entered by YOEL Aguirre, PharmD.)  Patient reminded to call the Anticoagulation Clinic with any signs or symptoms of bleeding or with any medication changes. Patient given instructions utilizing the teach back method. Advised extra caution. After visit summary printed and reviewed with patient. Discharged ambulatory in no apparent distress, wearing mask.     For Pharmacy Admin Tracking Only        Time Spent (min): 5

## 2021-11-18 ENCOUNTER — HOSPITAL ENCOUNTER (OUTPATIENT)
Dept: PHARMACY | Age: 71
Setting detail: THERAPIES SERIES
Discharge: HOME | End: 2021-11-18
Payer: MEDICARE

## 2021-11-18 DIAGNOSIS — Z79.01 ANTICOAGULATED ON COUMADIN: Primary | ICD-10-CM

## 2021-11-18 DIAGNOSIS — Z51.81 ENCOUNTER FOR THERAPEUTIC DRUG MONITORING: ICD-10-CM

## 2021-11-18 LAB — POC INR: 1.9 (ref 0.8–1.2)

## 2021-11-18 PROCEDURE — 99211 OFF/OP EST MAY X REQ PHY/QHP: CPT

## 2021-11-18 PROCEDURE — 36416 COLLJ CAPILLARY BLOOD SPEC: CPT

## 2021-11-18 PROCEDURE — 85610 PROTHROMBIN TIME: CPT

## 2021-11-18 NOTE — PROGRESS NOTES
Medication Management [address]  [phone] (phone)  [phone] (fax)    Ms. George Scruggs is a 80 y.o.  female with history of Afib who presents today for anticoagulation monitoring and adjustment. Patient verifies current dosing regimen and tablet strength. No missed or extra doses. Patient denies s/s bleeding/bruising/swelling/SOB/chest pain  No blood in urine or stool. No dietary changes. No changes in medication/OTC agents/Herbals. No change in alcohol use or tobacco use. No change in activity level. Patient denies headaches/dizziness/lightheadedness/falls. No vomiting/diarrhea or acute illness. No Procedures scheduled in the future at this time. Assessment:   Lab Results   Component Value Date    INR 1.90 (H) 11/18/2021    INR 3.10 (H) 11/04/2021    INR 2.20 (H) 10/14/2021     INR subtherapeutic   Recent Labs     11/18/21  1303   INR 1.90*         Plan:  Coumadin 5 mg x 1 then continue Coumadin 5 mg MF, 2.5 mg TWThSS. Recheck INR in 2.5 week(s). Patient reminded to call the Anticoagulation Clinic with any signs or symptoms of bleeding or with any medication changes. Patient given instructions utilizing the teach back method. After visit summary printed and reviewed with patient. Discharged ambulatory in no apparent distress.         For Pharmacy Admin Tracking Only     Time Spent (min): 0895 Yuriy French PharmD, BCPS  11/18/2021  1:10 PM

## 2021-11-22 ENCOUNTER — NURSE ONLY (OUTPATIENT)
Dept: LAB | Age: 71
End: 2021-11-22

## 2021-11-22 ENCOUNTER — OFFICE VISIT (OUTPATIENT)
Dept: FAMILY MEDICINE CLINIC | Age: 71
End: 2021-11-22
Payer: MEDICARE

## 2021-11-22 VITALS
DIASTOLIC BLOOD PRESSURE: 80 MMHG | BODY MASS INDEX: 46.74 KG/M2 | SYSTOLIC BLOOD PRESSURE: 130 MMHG | WEIGHT: 273.8 LBS | RESPIRATION RATE: 12 BRPM | HEIGHT: 64 IN | HEART RATE: 77 BPM | OXYGEN SATURATION: 98 % | TEMPERATURE: 96.3 F

## 2021-11-22 DIAGNOSIS — Z86.16 HISTORY OF COVID-19: Primary | ICD-10-CM

## 2021-11-22 DIAGNOSIS — H81.13 BENIGN PAROXYSMAL POSITIONAL VERTIGO DUE TO BILATERAL VESTIBULAR DISORDER: ICD-10-CM

## 2021-11-22 DIAGNOSIS — K90.89 OTHER SPECIFIED INTESTINAL MALABSORPTION: ICD-10-CM

## 2021-11-22 DIAGNOSIS — E03.9 HYPOTHYROIDISM, UNSPECIFIED TYPE: ICD-10-CM

## 2021-11-22 DIAGNOSIS — I48.0 PAROXYSMAL ATRIAL FIBRILLATION (HCC): ICD-10-CM

## 2021-11-22 DIAGNOSIS — M25.569 ARTHRALGIA OF LOWER LEG, UNSPECIFIED LATERALITY: ICD-10-CM

## 2021-11-22 DIAGNOSIS — M62.838 MUSCLE SPASM OF LEFT LOWER EXTREMITY: ICD-10-CM

## 2021-11-22 DIAGNOSIS — I10 HTN, GOAL BELOW 140/80: ICD-10-CM

## 2021-11-22 DIAGNOSIS — M16.12 ARTHRITIS OF LEFT HIP: ICD-10-CM

## 2021-11-22 DIAGNOSIS — I10 ESSENTIAL HYPERTENSION: ICD-10-CM

## 2021-11-22 DIAGNOSIS — E11.9 NEW ONSET TYPE 2 DIABETES MELLITUS (HCC): ICD-10-CM

## 2021-11-22 DIAGNOSIS — I48.91 ATRIAL FIBRILLATION, UNSPECIFIED TYPE (HCC): ICD-10-CM

## 2021-11-22 DIAGNOSIS — G47.30 SLEEP APNEA, UNSPECIFIED TYPE: ICD-10-CM

## 2021-11-22 DIAGNOSIS — M19.90 GENERALIZED ARTHRITIS: ICD-10-CM

## 2021-11-22 DIAGNOSIS — Z79.01 ANTICOAGULATED ON COUMADIN: ICD-10-CM

## 2021-11-22 DIAGNOSIS — Z86.16 HISTORY OF COVID-19: ICD-10-CM

## 2021-11-22 LAB
ALBUMIN SERPL-MCNC: 4 G/DL (ref 3.5–5.1)
ALP BLD-CCNC: 67 U/L (ref 38–126)
ALT SERPL-CCNC: 25 U/L (ref 11–66)
ANION GAP SERPL CALCULATED.3IONS-SCNC: 13 MEQ/L (ref 8–16)
AST SERPL-CCNC: 89 U/L (ref 5–40)
AVERAGE GLUCOSE: 120 MG/DL (ref 70–126)
BILIRUB SERPL-MCNC: 0.5 MG/DL (ref 0.3–1.2)
BUN BLDV-MCNC: 29 MG/DL (ref 7–22)
C-REACTIVE PROTEIN, HIGH SENSITIVITY: 14.3 MG/L
CALCIUM SERPL-MCNC: 9.2 MG/DL (ref 8.5–10.5)
CHLORIDE BLD-SCNC: 101 MEQ/L (ref 98–111)
CO2: 22 MEQ/L (ref 23–33)
CREAT SERPL-MCNC: 1.5 MG/DL (ref 0.4–1.2)
CREATININE, URINE: 269.5 MG/DL
GFR SERPL CREATININE-BSD FRML MDRD: 34 ML/MIN/1.73M2
GLUCOSE BLD-MCNC: 115 MG/DL (ref 70–108)
HBA1C MFR BLD: 6 % (ref 4.4–6.4)
MAGNESIUM: 2.4 MG/DL (ref 1.6–2.4)
MICROALBUMIN UR-MCNC: 2.32 MG/DL
MICROALBUMIN/CREAT UR-RTO: 9 MG/G (ref 0–30)
POTASSIUM SERPL-SCNC: 5.8 MEQ/L (ref 3.5–5.2)
PTH INTACT: 40.4 PG/ML (ref 15–65)
REASON FOR REJECTION: NORMAL
REJECTED TEST: NORMAL
SARS-COV-2 ANTIBODY, TOTAL: POSITIVE
SODIUM BLD-SCNC: 136 MEQ/L (ref 135–145)
TOTAL PROTEIN: 8 G/DL (ref 6.1–8)
VITAMIN D 25-HYDROXY: 42 NG/ML (ref 30–100)

## 2021-11-22 PROCEDURE — 99214 OFFICE O/P EST MOD 30 MIN: CPT | Performed by: FAMILY MEDICINE

## 2021-11-22 ASSESSMENT — ENCOUNTER SYMPTOMS
SHORTNESS OF BREATH: 1
ABDOMINAL PAIN: 1
COUGH: 1
ABDOMINAL DISTENTION: 1

## 2021-11-22 NOTE — PATIENT INSTRUCTIONS
Thank you   1. Thank you for trusting us with your healthcare needs. You may receive a survey regarding today's visit. It would help us out if you would take a few moments to provide your feedback. We value your input. 2. Please bring in ALL medications BOTTLES, including inhalers, herbal supplements, over the counter, prescribed & non-prescribed medicine. The office would like actual medication bottles and a list.   3. Please note our OFFICE POLICIES:   a. Prior to getting your labs drawn, please check with your insurance company for benefits and eligibility of lab services. Often, insurance companies cover certain tests for preventative visits only. It is patient's responsibility to see what is covered. b. We are unable to change a diagnosis after the test has been performed. c. Lab orders will not be re-printed. Please hold onto your original lab orders and take them to your lab to be completed. d. If you no show your scheduled appointment three times, you will be dismissed from this practice. e. Ronnald Anon must be completed 24 hours prior to your schedule appointment. 4. If the list below has been completed, PLEASE FAX RECORDS TO OUR OFFICE @ [phone].  Once the records have been received we will update your records at our office:  Health Maintenance Due   Topic Date Due    Diabetic foot exam  Never done    Diabetic retinal exam  Never done    Diabetic microalbuminuria test  Never done    Potassium monitoring  09/08/2021    Creatinine monitoring  09/08/2021    COVID-19 Vaccine (3 - Booster for Milo Rickie series) 09/24/2021

## 2021-11-22 NOTE — PROGRESS NOTES
[address] [facility] Olaf SAMANIEGO [address] [address]  Dept: [phone]  Dept Fax: [phone]  Loc: [facility] Arina Dowell is a 80 y.o. White female. Wali Fernandes  presents to the [place] [address] clinic today for   Chief Complaint   Patient presents with    Follow-up    Hip Pain     RIGHT     Joint Pain    Cough    Fatigue     SINCE covid end of    , and;   1. History of COVID-19    2. Arthritis of left hip    3. Generalized arthritis    4. Muscle spasm of left lower extremity    5. New onset type 2 diabetes mellitus ([address]    6. Benign paroxysmal positional vertigo due to bilateral vestibular disorder    7. Sleep apnea, unspecified type    8. HTN, goal below 140/80    9. Hypothyroidism, unspecified type    10. Paroxysmal atrial fibrillation (HCC)    11. Essential hypertension    12. Arthralgia of lower leg, unspecified laterality    13. Other specified intestinal malabsorption          I have reviewed [facility] medical, surgical and other pertinent history in detail, and have updated medication and allergy information in the computerized patient record. Clinical Care Team:     -Referring Provider for today's consult: self  -Primary Care Provider: Mely Menjivar DO    Medical/Surgical History:   She  has a past medical history of A-fib [facility]), Atrial fibrillation ([address] Carpal tunnel syndrome, Diabetes mellitus ([address] GERD (gastroesophageal reflux disease), Headache(784.0), HTN (hypertension), Hypothyroidism, Osteoarthritis, Pacemaker, Sleep apnea, Tendonitis of both wrists, and Toenail fungus. Her  has a past surgical history that includes Carpal tunnel release; hernia repair;  section;  section;  section; Foot surgery (Left, 2018); joint replacement (Left, 2010); joint replacement (Right, 2016); Colonoscopy (N/A, 10/8/2021);  Upper gastrointestinal endoscopy (N/A, 10/8/2021); and Upper gastrointestinal endoscopy (10/8/2021). Family/Social History:     Her family history includes Arrhythmia in her mother and sister; Cancer in her brother; Coronary Art Dis in her brother and mother; Diabetes in her father; Early Death in her child; Heart Attack in her brother; Heart Disease in her mother; Other in her brother, mother, and sister; Pacemaker in her sister; Mahala Reamer in her child; Stroke in her mother. She  reports that she has never smoked. She has never used smokeless tobacco. She reports current alcohol use. She reports that she does not use drugs.     Medications/Allergies/Immunizations:     Her current medication(s) include   Current Outpatient Medications:     diclofenac (VOLTAREN) 50 MG EC tablet, Take 1 tablet by mouth 2 times daily (Patient not taking: Reported on 11/4/2021), Disp: 60 tablet, Rfl: 2    guaiFENesin (MUCINEX PO), Take by mouth See Admin Instructions Indications: Cold Symptoms , Disp: , Rfl:     DM-Doxylamine-Acetaminophen (VICKS NYQUIL COLD & FLU) 15-6. MG CAPS, Take by mouth See Admin Instructions Indications: Cold Symptoms, Disp: , Rfl:     tiZANidine (ZANAFLEX) 2 MG tablet, Take 1 tablet by mouth nightly as needed (muscle spasms), Disp: 30 tablet, Rfl: 1    Handicap Placard MISC, by Other route For 5 years, Disp: 1 each, Rfl: 0    NONFORMULARY, daily as needed (pain) Relief Factor (contains cucurmin), Disp: , Rfl:     lisinopril (PRINIVIL;ZESTRIL) 10 MG tablet, Take 0.5 tablets by mouth daily, Disp: 90 tablet, Rfl: 1    diclofenac sodium (VOLTAREN) 1 % GEL, Apply topically 4 times daily as needed for Pain, Disp: 150 g, Rfl: 1    levothyroxine (SYNTHROID) 100 MCG tablet, take 1 tablet by mouth once daily, Disp: 90 tablet, Rfl: 3    liothyronine (CYTOMEL) 5 MCG tablet, take 1 tablet by mouth once daily, Disp: 90 tablet, Rfl: 3    Acetaminophen (TYLENOL ARTHRITIS PAIN PO), Take by mouth See Admin Instructions Indications: Pain Don't take more then 3,000 mg each day, including what's in cold products. , Disp: , Rfl:     RESVERATROL PO, Take 2 capsules by mouth 2 times daily , Disp: , Rfl:     Omega-3 Fatty Acids (FISH OIL PO), Take 2 capsules by mouth daily Indications: Pain, Disp: , Rfl:     warfarin (COUMADIN) 5 MG tablet, As directed by [facility]90 tabs = 90 day supply, Disp: 90 tablet, Rfl: 4    flecainide (TAMBOCOR) 100 MG tablet, Take 100 mg by mouth 2 times daily , Disp: , Rfl:     vitamin C (ASCORBIC ACID) 500 MG tablet, Take 500 mg by mouth daily, Disp: , Rfl:     Calcium Carbonate (SM CALCIUM 600 PO), Take 1 tablet by mouth daily, Disp: , Rfl:     metoprolol succinate (TOPROL XL) 25 MG extended release tablet, Take 25 mg by mouth 2 times daily , Disp: , Rfl:     glucose monitoring kit (FREESTYLE) monitoring kit, 1 kit by Does not apply route daily, Disp: 1 kit, Rfl: 0    Lancets MISC, 1 each by Does not apply route daily, Disp: 300 each, Rfl: 1    blood glucose test strips (ASCENSIA AUTODISC VI;ONE TOUCH ULTRA TEST VI) strip, 1 each by In Vitro route daily Dispense any brand - check blood sugar As needed. , Disp: 100 each, Rfl: 3    NONFORMULARY, Take 1 capsule by mouth 4 times daily (before meals and nightly) Christopher, Disp: , Rfl:     loratadine (CLARITIN) 10 MG tablet, Take 1 tablet by mouth 2 times daily (before meals) (Patient taking differently: Take by mouth as needed ), Disp: 180 tablet, Rfl: 3    Methylsulfonylmethane (MSM PO), Take 1 tablet by mouth daily as needed (pain), Disp: , Rfl:     nitroGLYCERIN (NITROSTAT) 0.4 MG SL tablet, Place under the tongue every 5 minutes as needed for Chest pain (If third one does not relieve pain, call 9-1-1.)  (Patient not taking: Reported on 11/4/2021), Disp: , Rfl: 0    ETWINLGHZ-HEHENIEDZ-IZSCISBVFA PO, Place 1 tablet under the tongue 2 times daily Jimbo B12 plus or [place] methylB6, [id number], methylfolate , Disp: , Rfl:     Magnesium (CVS TRIPLE MAGNESIUM COMPLEX) 400 MG CAPS, Take 3 capsules by mouth 4 times daily (after meals and at bedtime) Indications: Magnesium Deficiency , Disp: , Rfl:     magnesium cl-calcium carbonate (SLOW-MAG) 71.5-119 MG TBEC tablet, Take 1 tablet by mouth 3 times daily (with meals) Take before and when stressed with 2 extra fish oil PRN, Disp: , Rfl:     Grape Seed OIL, 500 mg by Does not apply route daily Oregan Grape Root, Disp: , Rfl:     Lysine 500 MG TABS, Take 1 tablet by mouth 2 times daily , Disp: , Rfl:     DHEA 25 MG CAPS, Take 1 capsule by mouth every other day , Disp: , Rfl:     Cinnamon 500 MG CAPS, Take 3 capsules by mouth 4 times daily (before meals and nightly) , Disp: , Rfl:     vitamin D (CHOLECALCIFEROL) 50 MCG (2000 UT) TABS tablet, Take 2 capsules by mouth daily Indications: Treatment with Vitamin D .use only the 2000 IU or 1000 IU caps from [employer], 5000IU does not work, Disp: , Rfl:     B Complex-Biotin-FA (B-100 TR) TBCR, Take 2 tablets by mouth 2 times daily (before meals) Before breakfast and before lunch or supper, Disp: , Rfl:   Allergies: Enoxaparin  Immunizations:   Immunization History   Administered Date(s) Administered    COVID-19, Moderna, Primary or Immunocompromised, PF, 100mcg/0.5mL 02/24/2021, 03/24/2021    Influenza Virus Vaccine 11/03/2011, 10/09/2014, 11/17/2015    Influenza, High Dose (Fluzone 65 yrs and older) 10/11/2018    Influenza, MDCK, Preservative free 10/31/2017    Influenza, Quadv, IM, PF (6 mo and older Fluzone, Flulaval, Fluarix, and 3 yrs and older Afluria) 01/19/2017, 09/08/2020    Influenza, Quadv, adjuvanted, 65 yrs +, IM, PF (Fluad) 10/04/2021    Influenza, Triv, inactivated, subunit, adjuvanted, IM (Fluad 65 yrs and older) 10/11/2018, 09/17/2019    Pneumococcal Conjugate 13-valent ([id number]) 07/11/2017    Pneumococcal Polysaccharide ([id number]) 11/05/2018    Tdap (Boostrix, Adacel) 10/09/2014    Zoster Live (Zostavax) 05/05/2014    Zoster Recombinant (Shingrix) 11/13/2019, 04/04/2020        History of Present Illness:     Leah's had concerns including Follow-up, Hip Pain (RIGHT ), Joint Pain, Cough, and Fatigue (SINCE covid end of October 21). Charleen Barrett  presents to the [address] [place] [address] today for;   Chief Complaint   Patient presents with    Follow-up    Hip Pain     RIGHT     Joint Pain    Cough    Fatigue     SINCE covid end of October 21   , abnormal labs follow up and these conditions as she  Is looking today for:     1. History of COVID-19    2. Arthritis of left hip    3. Generalized arthritis    4. Muscle spasm of left lower extremity    5. New onset type 2 diabetes mellitus ([address]    6. Benign paroxysmal positional vertigo due to bilateral vestibular disorder    7. Sleep apnea, unspecified type    8. HTN, goal below 140/80    9. Hypothyroidism, unspecified type    10. Paroxysmal atrial fibrillation (HCC)    11. Essential hypertension    12. Arthralgia of lower leg, unspecified laterality    13. Other specified intestinal malabsorption      HPI    Subjective:     Review of Systems   Constitutional: Positive for fatigue and unexpected weight change. Respiratory: Positive for cough and shortness of breath. Gastrointestinal: Positive for abdominal distention and abdominal pain. Musculoskeletal: Positive for arthralgias, gait problem and myalgias. Hematological: Does not bruise/bleed easily. Psychiatric/Behavioral: Positive for sleep disturbance. All other systems reviewed and are negative. Objective:     /80 (Site: Left Upper Arm, Position: Sitting, Cuff Size: Large Adult)   Pulse 77   Temp 96.3 °F (35.7 °C) (Temporal)   Resp 12   Ht 5' 4\" (1.626 m)   Wt 273 lb 12.8 oz (124.2 kg)   SpO2 98%   BMI 47.00 kg/m²   Physical Exam  Vitals and nursing note reviewed. Constitutional:       Appearance: Normal appearance. HENT:      Head: Normocephalic.    Pulmonary:      Effort: Pulmonary effort is several smaller blood draws over several days  -Patient instructed to check with insurer before each lab draw and to go to the lab which the insurer directs them for the most cost effective lab draw with the least patient's cost  - [place] Mahesh  will be scheduled subsequent to those results. Inna Preston will bring in her drink, food, supplement log to her next visit    Chronic Problems Addressed on this Visit:                                   1.  Intensity of Service; Uncontrolled items at this visit; Chief Complaint   Patient presents with    Follow-up    Hip Pain     RIGHT     Joint Pain    Cough    Fatigue     SINCE covid end of October 21   ;              Improved items at this visit and Stable items were discussed at this visit;  2. Patients food, drinks, supplements and symptoms were reviewed with the patient,       - Chanel Aragon will bring food, drink, supplements and symptoms log to next visit for inclusion in their record      - 75 better food list reviewed & given to patient with the omega 6 food list to avoid      - The 52 Latex foods list was reviewed and given to the patients with the information on carrageenan         - Gluten in corn and oats abstracts sheet reviewed and given to the patient today   3.    Greater than 30 minutes time was spent with the patient face to face on this visit; of which >50% was for counseling and coordination of care, as well as the time spent before and after the visit reviewing the chart, documenting the encounter, reviewing labs,reports, NIH listed studies, making phone calls, etc.      Patients food and drinks were reviewed with the patient,   - They will bring a food drink symptom log to future visits for inclusion in their record    - 75 better food list reviewed & given to patient along with the omega 6 food list to avoid      - Gluten in corn and oats abstracts sheet reviewed and given to the patient today    - 23 Foods containing Latex-like proteins was reviewed and copy to be taken if desired     - Nutrient Supplements list provided and copyto be taken if desired    - [employer]. [employer] web site offered to patient to review at their convenience by staff with login information    Note:  I have discussed with the patient that with all nutraceuticals, there is often mixed data and emerging research which needs to be monitored; as well as an array of NIH fact sheets on nutrients and supplements, available at www.nih,issue plus [employer]. LibriLoop plus www.DigitalSciroccoi,org. If I have recommended cinnamon at the request of this patient to assist them in control of their blood sugar, triglyceride, and/or weight issues. I discussed that the patient's clinical use of cinnamon bark, calcium, magnesium, Vitamin D, and pharmaceutical grade CVS omega 3 oil or triple-strength fish oil, and B-50/B-100 time-released B-complex by [address] [facility] will be for a time-limited trial to determine their individual effectiveness and safety in this patient. I also referred the patient to the NMCD: Nutrition, Metabolism, and Cardiovascular Diseases (SecuritiesCard.pl) and concerns about long-term use and hepatotoxicity of cinnamon and other nutrients. I suggested they frequently search nih.gov for the latest non-proprietary information on nutriceuticals as well as consider a subscription to [employer] for details on reviewed supplements, or at the least review the nutrient files at [facility], [address] bark, an insulin mimetic, reduces some High Carbohydrate Dietary Impacts. Methylhydroxychalcone polymers insulin-enhancing properties in fat cells are responsible for enhanced glucose uptake, inhibiting hepatic HMG-CoA reductase and lowers lipids. www.jacn. org/content/20/4/327.full     But cinnamon with additives such as Cinnamon Extract are not effective as insulin mimetics.  :eStoreDirectory.at Nutrients for Start up from [employer] or [employer] for ease to get started now;  Sourav Contreras has some useable products;  - Triple Strength Fish Oil, enteric coated  - Vit D-3 5000 IU gel caps  - Iron ferrous sulfate 325 mg tabs  - Centrum Silver look-a-like for most patients, or  - Centrum plain look-a-like if need iron    Local pharmacies or chains such as [employer], [employer], have:  - [place] pharmaceutical grade omega 3 is 90% EPA/DHA whereas most Triple strength fish oil are 75% EPA/DHA  - Triple Strength Fish Oil (enteric coated if available) or if not enteric coated, can take from freezer for less burps  - B-50 or B-100 released balanced B complex tabs by [address] [facility] bark 500 mg (without Chromium or extracts)   some brands list 1000 mg / serving of 2 capsules,    some brands have 1000 mg caps with the undesireable chromium extract  - Calcium carbonate/citrate, magnesium oxide/citrate, Vit D-3 as 3-4 tabs/caps/serving     Some Local Brands may contain Zinc which is acceptable for the first bottle or two  - Magnesium oxide 250 mg tabs for those having < 2 bowel movements daily  - Magnesium citrate 200 mg if having > 2 bowel movements/day  - Centrum Silver or look-a-like for most patients, Centrum plain or look-a-like with iron  - Vitamin D-3 comes as 1,000 IU or 2,000 IU or 5,000 IU gel caps or Liquid drops but keep Vitamin D levels <50 but >40     Some brands containing or derived from soy oil or corn oil are OK if not allergic to soy  - Elemental Iron 65 mg tabs at bedtime is available over the counter if need more iron     Usually turns bowel movements grey, green, or black but not a concern  - Apricot Kernel Oil (by Now) for dry skin sensitive perineal or perianal area skin    Nutrients for ongoing use by Mail order for less expense from www. Evolita ;  - Strength Fish Oil , 240 Softgels Item [id number]  -B-100 time released balanced B complex Item #[id number]  - Cinnamon bark 500 mg without Chromium or extract Item #045709  - Calcium carbonate 1000 mg, Magnesium oxide 500 mg, Vit D-3 400 IU Item #[id number]  - Magnesium oxide 500 mg tabs Item #450494 if less than 2 bowel movements daily  - [employer] Sen[employer] Item #[id number] for most patients, One Daily Item #040139 with iron  - Vit D 3  1,000 Item #[id number]      2,000 IU Item #[id number]   Item #934035     Some brands containing orderived from soy oil or corn oil are OK if not allergic to soy    Nutrients for Special Needs by Mail order for less expense from www. puritan.com;  -Elemental Iron 65 mg tabs Item #100606 if need more iron for low iron on labs    Usually turns bowel movements grey, green or black but not a concern  - Time released Niacin 250 mg Item #540260 for cold intolerance, low libido or impotence  - DHEA 50 mg Item #850555 for improving DHEA levels on labs if having Fatigue    If stools too loose substitute for your Magnesium oxide using;   Magnesium citrate 200 mg tabs (NOT liquid) at [employer]   Magnesium gluconate 550 mg by Ankur Corley at [employer] or Kähu. com  Magnesium chloride foot soaks or body sprays  www.PureLiFi   Magnesium chloride flakes 14.99 Item #: [id number] if back-ordered, get spray  Magnesium threonate, Magtein also helps mental clarity and sleep    Food Drink Symptom Log;  I asked this patient to track these items and any other symptoms on their list on a weekly basis to documenttheir progress or lack of same.  This can be done on the symptom tracking sheet I gave them at today's visit but looks like this:                                                      Rate on scale of 0-10 with zero = not noticeable  Symptom:                            Week 1               2                 3                 4               Etc            Hair loss    Foot cramps    Paresthesia    Aches    IBS (irritable bowel)    Constipation    Diarrhea  Nocturia (up to bathroom at night)    Fatigue/Energy level  Stress      On the other side of the sheet they can track their food, drink, environment, activity, symptoms etc      Avoiding Latex-like proteins in my foods; Avocados, Bananas, Celery, Figs & Kiwi proteins have latex-like proteins to inflame our immune systems, plus 47 more foods  How Can I Have A Latex Allergy? Eating foods with latex-like protein exposes us to latex allergies. Our body cannot tell the differencebetween these latex-like proteins and latex from rubber products since many people are allergic to fruit, vegetables and latex. Read labels on pre-packaged foods. This list to avoid is only a guide if you are known allergicto latex or have a latex rash on your chin, cheeks and lines on your neck and chest. The amount of latex is different in each food product or fruit variety. Avoid out of Season if not grown locally:   Melon, Nectarine, Papaya, Cherry, Passion fruit, Plum, Chestnuts, and Tomato. Avocado, Banana, Celery, Figs, and Kiwi always contain Latex-like protein. Whats in Season? Strawberries taste better in June than December because June is strawberry season so buy locally grown produce \"in season\" for the best flavor, cost, and less Latex. Locally grown produce not only tastes great but also requires little or no ethylene exposure in food distribution so has less latex content. Out of season: use canned, frozen, or dried since those are processed ripe and latex content is lower!!!     Month     [place] Locally Grown Produce  January, February, March: use canned, frozen or dried fruits since lower in latex  April: asparagus, radishes  May: asparagus, broccoli, green onions, greens, peas, radishes, rhubarb  June: asparagus, beets, beans, broccoli, cabbage, cantaloupe, carrots, green onions, greens, lettuce, onions, parsley, peas, radishes, rhubarb, strawberries, watermelons  July: beans, beets, blueberries, broccoli, cabbage, cantaloupe, carrots, cauliflower, celery, cucumbers, eggplant, grapes, green onions, plants produce low levels of the hormone ethylene, which regulates germination, flowering, and ripening. Forced ripening by high ethylene concentrations, plants produce allergenic wound-repair proteins, which are similar to wound-repair proteins made during the tapping of rubber trees. Sensitive individuals who ingest the fruit get a higher dose and worse reaction. Some people may even first become sensitized to latex through fruit. Can food processing increase the concentrations of allergenic proteins? Latex-sensitized children (and adults) in [place] often experience allergic reactions after eating bananas ripened artificially with ethylene. In the [place], food distribution centers treat unripe bananas and other produce with ethylene to ripen; not commonly done in [facility] since fruit is tree-ripened there. Does treatment of food with ethylene induce banana proteins that cross-react with latex? (Miguel Angel et al.)    References:   Latex in Foods Allergy, http://ehp.niehs.nih.gov/members/2003/5811/5811.html    Search web for [employer] in Season \" for where you live or are at the time you food shop   Management of Latex, ://[email or web address]. [email or web address].[email or web address]/  search for nih, latex-like proteins in foods

## 2021-11-23 ENCOUNTER — TELEPHONE (OUTPATIENT)
Dept: FAMILY MEDICINE CLINIC | Age: 71
End: 2021-11-23

## 2021-11-23 DIAGNOSIS — E87.5 HYPERKALEMIA: Primary | ICD-10-CM

## 2021-11-23 NOTE — TELEPHONE ENCOUNTER
Patient is calling regarding the labs that she had done yesterday. She seems a bit overwhelmed by the results. She isnt sure if she needs to make an appt to discuss adjustments she needs to make. She said she is fine with making an appt. She has various questions regarding her labs. She also noted that one of the labs showed that she did have covid. shes not sure if she is doing something wrong and needs to make adjustments or if its because she had covid.    Please advise  [phone]

## 2021-11-24 NOTE — TELEPHONE ENCOUNTER
Spoke with pt, informed pt of message per Dr Connor Habermann. Pt states she has an appointment with Dr Connor Habermann on 12/2 to discuss lab results and repeating labs.

## 2021-11-24 NOTE — TELEPHONE ENCOUNTER
Pt is worried about labs. Said she sees so many of her labs are abnormal.    Told her to keep her appointment with Dr. Misha Santiago on 12/2/2021. She is worried since she had covid, or tested positive for the antibodies, that it may have affected her lab numbers, or maybe she is taking too much tylenol for her hip pain. Said she has never had the labs be so abnormal before. Told her she should drink more water to flush out her system. The CRP is up but she has to remember she has inflammation due to her hip pain.      I will send this to Dr. Toni Christian and Dr. Misha Santiago so they may collaborate for decision on changes she may need to make in her diet as well as supplements/ pills etc.

## 2021-12-02 ENCOUNTER — OFFICE VISIT (OUTPATIENT)
Dept: FAMILY MEDICINE CLINIC | Age: 71
End: 2021-12-02
Payer: MEDICARE

## 2021-12-02 VITALS
OXYGEN SATURATION: 98 % | SYSTOLIC BLOOD PRESSURE: 134 MMHG | RESPIRATION RATE: 18 BRPM | TEMPERATURE: 97.6 F | WEIGHT: 278.2 LBS | HEART RATE: 66 BPM | DIASTOLIC BLOOD PRESSURE: 62 MMHG | HEIGHT: 64 IN | BODY MASS INDEX: 47.5 KG/M2

## 2021-12-02 DIAGNOSIS — R73.03 PREDIABETES: Primary | ICD-10-CM

## 2021-12-02 PROBLEM — E11.9 NEW ONSET TYPE 2 DIABETES MELLITUS (HCC): Status: RESOLVED | Noted: 2020-06-18 | Resolved: 2021-12-02

## 2021-12-02 PROCEDURE — 99214 OFFICE O/P EST MOD 30 MIN: CPT | Performed by: STUDENT IN AN ORGANIZED HEALTH CARE EDUCATION/TRAINING PROGRAM

## 2021-12-02 NOTE — PATIENT INSTRUCTIONS
Patient Education       COVID-19 Antibody Test: About This Test  What is it? An antibody test looks for antibodies in the blood. These are proteins that your immune system makes, usually after you're exposed to germs like viruses or bacteria or after you get a vaccine. Antibodies work to fight illness. A COVID-19 antibody test looks for antibodies to SARS-CoV-2, the virus that causes COVID-19. If you test positive for these antibodies, it could mean that you already had COVID-19 or that you've been vaccinated for COVID-19. Why is it done? This test can be used to diagnose a past infection with the virus that causes COVID-19. Many people who get COVID-19 never have symptoms or have only mild ones. Without antibody testing, these people might never know that they already had the virus. Even if the test shows that you may have had COVID-19, you need to keep taking steps to protect yourself and others from the virus. Having COVID-19 in the past may not prevent you from getting it again. Antibody testing is important because:  · It could show who has already had COVID-19. · It could show who hasn't had the infection. · It helps experts who are tracking COVID-19 learn more about the virus and how it spreads. Talk to your doctor about what the test results mean for you. How do you prepare for the test?  You don't need to do anything to prepare for this test. But be sure to follow any instructions your health care provider gives you. How is it done? This is a blood test. A health professional may prick your finger or use a needle to take a sample of blood from your arm. What do your results mean? The result is either positive or negative. A positive result means antibodies to SARS-CoV-2 were found. You probably already had COVID-19 or had a COVID-19 vaccine. But:  · You could get a \"false-positive\" result. The test might show that you have COVID-19 antibodies when you don't.  The test may find antibodies that formed in response to another type of coronavirus. · It's not certain that having these antibodies will protect you from getting COVID-19 again. And if it does, it's not clear how long the protection lasts. A negative result means that these antibodies were not found. · You could get a \"false-negative\" result. It takes a while after you're infected or vaccinated for your immune system to make antibodies. · You could have a negative result but be infected now. You'd need a different test (viral test) to know if you have COVID-19 now. Where can you learn more? Go to https://[email or web address].basico.com. org and sign in to your BL[employer] account. Enter A128 in the [employer] box to learn more about \"COVID-19 Antibody Test: About This Test.\"     If you do not have an account, please click on the \"Sign Up Now\" link. Current as of: March 26, 2021               Content Version: 13.0  © 2006-2021 Healthwise, Incorporated. Care instructions adapted under license by [facility] ([facility]). If you have questions about a medical condition or this instruction, always ask your healthcare professional. [address] any warranty or liability for your use of this information.

## 2021-12-02 NOTE — PROGRESS NOTES
S: 80 y.o. female with   Chief Complaint   Patient presents with    1 Month Follow-Up     labs       Here to recheck labs    The potassium and bun and cr was high on the blood work    Had positive antibodies for covid - is vaccinated - worried about the labs and if they have to do with the covid    No chest pain or sob or issues with urination    BP Readings from Last 3 Encounters:   12/02/21 134/62   11/22/21 130/80   11/04/21 134/86     Wt Readings from Last 3 Encounters:   12/02/21 278 lb 3.2 oz (126.2 kg)   11/22/21 273 lb 12.8 oz (124.2 kg)   11/04/21 274 lb (124.3 kg)           O: VS:   Vitals:    12/02/21 1331   BP: 134/62   Site: Left Lower Arm   Position: Sitting   Cuff Size: Medium Adult   Pulse: 66   Resp: 18   Temp: 97.6 °F (36.4 °C)   TempSrc: Oral   SpO2: 98%   Weight: 278 lb 3.2 oz (126.2 kg)   Height: 5' 4\" (1.626 m)     Body mass index is 47.75 kg/m². Lab Results   Component Value Date    WBC 8.0 06/14/2021    HGB 14.1 06/14/2021    HGB 14.2 06/14/2021    HCT 44.6 06/14/2021    HCT 44.8 06/14/2021     06/14/2021    CHOL 142 06/14/2021    TRIG 59 06/14/2021    HDL 56 06/14/2021    LDLDIRECT 80.00 12/12/2013    LDLCALC 74 06/14/2021    AST 89 (H) 11/22/2021     11/22/2021    K 5.8 (H) 11/22/2021     11/22/2021    CREATININE 1.5 (H) 11/22/2021    BUN 29 (H) 11/22/2021    CO2 22 (L) 11/22/2021    TSH 0.476 06/14/2021    INR 1.90 (H) 11/18/2021    LABA1C 6.0 11/22/2021    LABMICR 2.32 11/22/2021    LABGLOM 34 (A) 11/22/2021    MG 2.4 11/22/2021    CALCIUM 9.2 11/22/2021    VITD25 42 11/22/2021       No results found. Diagnosis Orders   1. Prediabetes     2. BMI 45.0-49.9, adult [facility])         Plan  Will repeat the labs in a week     Will see Kimi Flores in Dec and us in feb for a wellness       Return in about 2 months (around 2/2/2022) for AWV. Orders Placed:  No orders of the defined types were placed in this encounter.     Medications Prescribed:  No orders of the defined types were placed in this encounter. Future Appointments   Date Time Provider Alejandrina Landrum   12/7/2021  1:00 PM LETICIA Palomino FNTERESA [facility]   2/3/2022  1:00 PM Brooke Levy MD [facility]   5/23/2022  1:00 PM Iman Dorado MD [address] [facility] Maintenance Due   Topic Date Due    COVID-19 Vaccine (3 - Booster for Aldona Raid series) 09/24/2021         Attending Physician Statement  I have discussed the case, including pertinent history and exam findings with the resident. [id number] I agree with the documented assessment and plan as documented by the resident.   GE modifier added to this encounter      Helga Burleson DO 12/2/2021 3:42 PM

## 2021-12-02 NOTE — PROGRESS NOTES
[id number] [facility] [place] [address] [address]  Dept: [phone]  Dept Fax: [phone]  Loc: [phone]    Ryan Ying is a 80 y.o. female who presents today for:  Chief Complaint   Patient presents with    1 Month Follow-Up     labs       HPI:   Here for follow-up on abnormal labs:    BMP showed hyperkalemia and abnormal kidney numbers. INR was also out of range last week. Did not have BMP redone. Think she may have had COVID in October but was never tested when she felt sick. Antibody positive but she has been Vaccinated.    Past Medical History:   Diagnosis Date    A-fib [facility])     Atrial fibrillation (HCC)     Carpal tunnel syndrome     Diabetes mellitus ([address]     GERD (gastroesophageal reflux disease)     Headache(784.0)     HTN (hypertension)     Hypothyroidism     Osteoarthritis     Pacemaker     Sleep apnea 2019    Mild    Tendonitis of both wrists 2017    Toenail fungus       Past Surgical History:   Procedure Laterality Date    CARPAL TUNNEL RELEASE      both hands     SECTION       SECTION       SECTION      COLONOSCOPY N/A 10/8/2021    COLONOSCOPY performed by Johnny Castro MD at [address] [place] [address] Left 2018    [facility] Dr. Marion Raman Left 2010    Left Total Knee    JOINT REPLACEMENT Right 2016    Right Total Knee    UPPER GASTROINTESTINAL ENDOSCOPY N/A 10/8/2021    EGD BIOPSY performed [facility]    UPPER GASTROINTESTINAL ENDOSCOPY  10/8/2021    EGD DILATION SAVORY performed by Johnny Castro MD at [facility]     Family History   Problem Relation Age of Onset    Stroke Mother     Arrhythmia Mother     Other Mother         Barry's Syndrome    Heart Disease Mother     Coronary Art Dis Mother     Diabetes Father     Other Sister         Barry's Syndrome  Arrhythmia Sister     Cancer Brother         Stomach Cancer    Stillborn Child     Early Death Child     Pacemaker Sister     Heart Attack Brother     Coronary Art Dis Brother     Other Brother         Barry's Syndrome     Social History     Tobacco Use    Smoking status: Never Smoker    Smokeless tobacco: Never Used   Substance Use Topics    Alcohol use: Yes     Comment: rarely      Current Outpatient Medications   Medication Sig Dispense Refill    diclofenac (VOLTAREN) 50 MG EC tablet Take 1 tablet by mouth 2 times daily 60 tablet 2    guaiFENesin (MUCINEX PO) Take by mouth See Admin Instructions Indications: Cold Symptoms       DM-Doxylamine-Acetaminophen (VICKS NYQUIL COLD & FLU) 15-6. MG CAPS Take by mouth See Admin Instructions Indications: Cold Symptoms      tiZANidine (ZANAFLEX) 2 MG tablet Take 1 tablet by mouth nightly as needed (muscle spasms) 30 tablet 1    Handicap Placard MISC by Other route For 5 years 1 each 0    NONFORMULARY daily as needed (pain) Relief Factor (contains cucurmin)      lisinopril (PRINIVIL;ZESTRIL) 10 MG tablet Take 0.5 tablets by mouth daily 90 tablet 1    diclofenac sodium (VOLTAREN) 1 % GEL Apply topically 4 times daily as needed for Pain 150 g 1    levothyroxine (SYNTHROID) 100 MCG tablet take 1 tablet by mouth once daily 90 tablet 3    liothyronine (CYTOMEL) 5 MCG tablet take 1 tablet by mouth once daily 90 tablet 3    Acetaminophen (TYLENOL ARTHRITIS PAIN PO) Take by mouth See Admin Instructions Indications: Pain Don't take more then 3,000 mg each day, including what's in cold products.       RESVERATROL PO Take 2 capsules by mouth 2 times daily       Omega-3 Fatty Acids (FISH OIL PO) Take 2 capsules by mouth daily Indications: Pain      warfarin (COUMADIN) 5 MG tablet As directed by [facility] #90 tabs = 90 day supply 90 tablet 4    flecainide (TAMBOCOR) 100 MG tablet Take 100 mg by mouth 2 times daily       vitamin C (ASCORBIC ACID) 500 MG tablet Take 500 mg by mouth daily      Calcium Carbonate (SM CALCIUM 600 PO) Take 1 tablet by mouth daily      metoprolol succinate (TOPROL XL) 25 MG extended release tablet Take 25 mg by mouth 2 times daily       glucose monitoring kit (FREESTYLE) monitoring kit 1 kit by Does not apply route daily 1 kit 0    Lancets MISC 1 each by Does not apply route daily 300 each 1    blood glucose test strips (ASCENSIA AUTODISC VI;ONE TOUCH ULTRA TEST VI) strip 1 each by In Vitro route daily Dispense any brand - check blood sugar As needed.  100 each 3    NONFORMULARY Take 1 capsule by mouth 4 times daily (before meals and nightly) Magtein      loratadine (CLARITIN) 10 MG tablet Take 1 tablet by mouth 2 times daily (before meals) (Patient taking differently: Take by mouth as needed ) 180 tablet 3    Methylsulfonylmethane (MSM PO) Take 1 tablet by mouth daily as needed (pain)      nitroGLYCERIN (NITROSTAT) 0.4 MG SL tablet Place under the tongue every 5 minutes as needed for Chest pain (If third one does not relieve pain, call 9-1-1.)   0    ROOORBBCC-MSPVRPRLB-YAFOTRFKHW PO Place 1 tablet under the tongue 2 times daily Jimbo B12 plus or [place] methylB6, [id number], methylfolate       Magnesium (CVS TRIPLE MAGNESIUM COMPLEX) 400 MG CAPS Take 3 capsules by mouth 4 times daily (after meals and at bedtime) Indications: Magnesium Deficiency       magnesium cl-calcium carbonate (SLOW-MAG) 71.5-119 MG TBEC tablet Take 1 tablet by mouth 3 times daily (with meals) Take before and when stressed with 2 extra fish oil PRN      Grape Seed  mg by Does not apply route daily Oregan Grape Root      Lysine 500 MG TABS Take 1 tablet by mouth 2 times daily       DHEA 25 MG CAPS Take 1 capsule by mouth every other day       Cinnamon 500 MG CAPS Take 3 capsules by mouth 4 times daily (before meals and nightly)       vitamin D (CHOLECALCIFEROL) 50 MCG (2000 UT) TABS tablet Take 2 capsules by mouth daily Indications: Treatment with Vitamin D .use only the 2000 IU or 1000 IU caps from [employer], 5000IU does not work      B Complex-Biotin-FA (B-100 TR) TBCR Take 2 tablets by mouth 2 times daily (before meals) Before breakfast and before lunch or supper       No current facility-administered medications for this visit. Allergies   Allergen Reactions    Enoxaparin      Other reaction(s): Other: See Comments  Significant bleeding and breast hematoma when bridging from warfarin       Health Maintenance   Topic Date Due    COVID-19 Vaccine (3 - Booster for Valentino Flattery series) 09/24/2021    Diabetic foot exam  12/02/2022 (Originally 3/7/1960)    Diabetic retinal exam  12/12/2022 (Originally 3/7/1960)    Annual Wellness Visit (AWV)  02/05/2022    Lipid screen  06/14/2022    TSH testing  06/14/2022    A1C test (Diabetic or Prediabetic)  11/22/2022    Diabetic microalbuminuria test  11/22/2022    Potassium monitoring  11/22/2022    Creatinine monitoring  11/22/2022    Breast cancer screen  07/27/2023    DTaP/Tdap/Td vaccine (2 - Td or Tdap) 10/09/2024    Colon cancer screen colonoscopy  10/08/2031    Flu vaccine  Completed    Shingles Vaccine  Completed    Pneumococcal 65+ years Vaccine  Completed    DEXA (modify frequency per FRAX score)  Addressed    Hepatitis C screen  Addressed    Hepatitis A vaccine  Aged Out    Hib vaccine  Aged Out    Meningococcal (ACWY) vaccine  Aged Out       Subjective:      Review of Systems   Constitutional: Negative for chills and fever. Respiratory: Negative for cough and shortness of breath. Gastrointestinal: Negative for nausea and vomiting. Musculoskeletal: Positive for back pain and gait problem. Skin: Negative for rash. Psychiatric/Behavioral: Negative for decreased concentration and dysphoric mood. The patient is not nervous/anxious.           Objective:     Vitals:    12/02/21 1331   BP: 134/62   Site: Left Lower Arm   Position: Sitting   Cuff Size: Medium Adult   Pulse: 66   Resp: 18   Temp: 97.6 °F (36.4 °C)   TempSrc: Oral   SpO2: 98%   Weight: 278 lb 3.2 oz (126.2 kg)   Height: 5' 4\" (1.626 m)       Body mass index is 47.75 kg/m². Wt Readings from Last 3 Encounters:   12/02/21 278 lb 3.2 oz (126.2 kg)   11/22/21 273 lb 12.8 oz (124.2 kg)   11/04/21 274 lb (124.3 kg)     BP Readings from Last 3 Encounters:   12/02/21 134/62   11/22/21 130/80   11/04/21 134/86       Physical Exam    Lab Results   Component Value Date    WBC 8.0 06/14/2021    HGB 14.1 06/14/2021    HGB 14.2 06/14/2021    HCT 44.6 06/14/2021    HCT 44.8 06/14/2021     06/14/2021    CHOL 142 06/14/2021    TRIG 59 06/14/2021    HDL 56 06/14/2021    LDLDIRECT 80.00 12/12/2013    LDLCALC 74 06/14/2021    AST 89 (H) 11/22/2021     11/22/2021    K 5.8 (H) 11/22/2021     11/22/2021    CREATININE 1.5 (H) 11/22/2021    BUN 29 (H) 11/22/2021    CO2 22 (L) 11/22/2021    TSH 0.476 06/14/2021    INR 1.90 (H) 11/18/2021    LABA1C 6.0 11/22/2021    LABMICR 2.32 11/22/2021    LABGLOM 34 (A) 11/22/2021    MG 2.4 11/22/2021    CALCIUM 9.2 11/22/2021    VITD25 42 11/22/2021       Imaging Results:    No results found. Assessment / Plan:     Kellie Isaacs was seen today for 1 month follow-up. Diagnoses and all orders for this visit:    Prediabetes  BMI 45.0-49.9, adult [facility]):  - Recommend repeating BMP today to follow-up on abnormal K+, likely due to hemolysis  - Discussed continuing with lifestyle modifications with diet and exercise. Return in about 2 months (around 2/2/2022) for AWV. Medications Prescribed:  No orders of the defined types were placed in this encounter.       Future Appointments   Date Time Provider Alejandrina Landrum   12/7/2021  1:00 PM Santosh Carreno, [address]   2/3/2022  1:00 PM Robyn Maguire MD [address]   5/23/2022  1:00 PM Denia Champion MD [address]       Patient given educational materials - see patient instructions. Discussed use, benefit, and sideeffects of prescribed medications. All patient questions answered. Pt voiced understanding. Reviewed health maintenance. Instructed to continue current medications, diet and exercise. Patient agreed with treatment plan. Follow up as directed.      Electronically signed by Mignon García MD on 12/5/2021 at 7:56 PM

## 2021-12-02 NOTE — PROGRESS NOTES
Health Maintenance Due   Topic Date Due    Diabetic foot exam  Never done    Diabetic retinal exam  Never done    COVID-19 Vaccine (3 - Booster for Moderna series) 09/24/2021

## 2021-12-05 ASSESSMENT — ENCOUNTER SYMPTOMS
COUGH: 0
SHORTNESS OF BREATH: 0
VOMITING: 0
BACK PAIN: 1
NAUSEA: 0

## 2021-12-07 ENCOUNTER — HOSPITAL ENCOUNTER (OUTPATIENT)
Dept: PHARMACY | Age: 71
Setting detail: THERAPIES SERIES
Discharge: HOME | End: 2021-12-07
Payer: MEDICARE

## 2021-12-07 DIAGNOSIS — Z79.01 ANTICOAGULATED ON COUMADIN: Primary | ICD-10-CM

## 2021-12-07 DIAGNOSIS — Z51.81 ENCOUNTER FOR THERAPEUTIC DRUG MONITORING: ICD-10-CM

## 2021-12-07 LAB — POC INR: 1.9 (ref 0.8–1.2)

## 2021-12-07 PROCEDURE — 99211 OFF/OP EST MAY X REQ PHY/QHP: CPT

## 2021-12-07 PROCEDURE — 36416 COLLJ CAPILLARY BLOOD SPEC: CPT

## 2021-12-07 PROCEDURE — 85610 PROTHROMBIN TIME: CPT

## 2021-12-07 NOTE — PROGRESS NOTES
Medication Management [address]  [phone] (phone)  [phone] (fax)    Ms. Karlos Cabot is a 80 y.o.  female with history of atrial fib. , per Dr. Rama Mcfarlane referral, who presents today for Warfarin monitoring and adjustment (2.5 week visit). Patient verifies current dosing regimen and tablet strength. No missed or extra doses, except for bolus ordered last visit. Patient denies bleeding/bruising/SOB/chest pain. Did have some swelling of ankles, but gone now. No blood in urine or stool. No dietary changes. Denies having green tea/V8 juice/Boost/Ensure/Glucerna. Changes in medication/OTC agents/herbals: did start PO Voltaren at some point - takes with food. Also started Omeprazole. States has used Capsaicin cream (reminded not to over-apply). Still uses Tylenol Arthritis. No change in alcohol use or tobacco use. No change in activity level. Patient denies headaches/falls. Has some intermittent lightheadedness when she gets up or lies down. No vomiting/diarrhea or acute illness. No procedures scheduled in the future at this time. Recently learned she has COVID antibodies. Assessment:   Lab Results   Component Value Date    INR 1.90 (H) 12/07/2021    INR 1.90 (H) 11/18/2021    INR 3.10 (H) 11/04/2021     INR subtherapeutic - goal 2-3. Recent Labs     12/07/21  1313   INR 1.90*       Plan:  POCT INR ordered/performed/result reviewed. 5 mg today, T, then continue PO Coumadin 5 mg MF, 2.5 mg TWThSS. Recheck INR in 2.5 week(s); wants to come 12/22 - has eye appt. that morning in town. (Report given - orders entered by Jenny Tse., PharmD. )  Prescription expires 12/16, but patient thinks she has enough Coumadin until next visit. Pharmacist considering changing tablet if low INR again. Patient reminded to call the Anticoagulation Clinic with any signs or symptoms of bleeding or with any medication changes.   Patient given instructions utilizing the teach back method. After visit summary printed and reviewed with patient. Discharged ambulatory in no apparent distress, wearing mask.     For Pharmacy Admin Tracking Only     Intervention Detail: Dose Adjustment: 1, reason: Therapy Optimization   Total # of Interventions Recommended: 1   Total # of Interventions Accepted: 1   Time Spent (min): 5

## 2021-12-21 DIAGNOSIS — I48.91 NEW ONSET ATRIAL FIBRILLATION (HCC): ICD-10-CM

## 2021-12-21 DIAGNOSIS — Z79.01 ANTICOAGULATED ON COUMADIN: Primary | ICD-10-CM

## 2021-12-22 ENCOUNTER — NURSE ONLY (OUTPATIENT)
Dept: LAB | Age: 71
End: 2021-12-22

## 2021-12-22 ENCOUNTER — HOSPITAL ENCOUNTER (OUTPATIENT)
Dept: PHARMACY | Age: 71
Setting detail: THERAPIES SERIES
Discharge: HOME | End: 2021-12-22
Payer: MEDICARE

## 2021-12-22 DIAGNOSIS — Z79.01 ANTICOAGULATED ON COUMADIN: Primary | ICD-10-CM

## 2021-12-22 DIAGNOSIS — E87.5 HYPERKALEMIA: ICD-10-CM

## 2021-12-22 DIAGNOSIS — Z51.81 ENCOUNTER FOR THERAPEUTIC DRUG MONITORING: ICD-10-CM

## 2021-12-22 DIAGNOSIS — Z79.01 ANTICOAGULATED ON COUMADIN: ICD-10-CM

## 2021-12-22 DIAGNOSIS — I48.91 NEW ONSET ATRIAL FIBRILLATION (HCC): ICD-10-CM

## 2021-12-22 LAB
ANION GAP SERPL CALCULATED.3IONS-SCNC: 13 MEQ/L (ref 8–16)
BUN BLDV-MCNC: 32 MG/DL (ref 7–22)
CALCIUM SERPL-MCNC: 9.2 MG/DL (ref 8.5–10.5)
CHLORIDE BLD-SCNC: 106 MEQ/L (ref 98–111)
CO2: 22 MEQ/L (ref 23–33)
CREAT SERPL-MCNC: 1.4 MG/DL (ref 0.4–1.2)
GFR SERPL CREATININE-BSD FRML MDRD: 37 ML/MIN/1.73M2
GLUCOSE BLD-MCNC: 122 MG/DL (ref 70–108)
HCT VFR BLD CALC: 41.2 % (ref 37–47)
HEMOGLOBIN: 13 GM/DL (ref 12–16)
POC INR: 2.5 (ref 0.8–1.2)
POTASSIUM SERPL-SCNC: 5.1 MEQ/L (ref 3.5–5.2)
SODIUM BLD-SCNC: 141 MEQ/L (ref 135–145)

## 2021-12-22 PROCEDURE — 85610 PROTHROMBIN TIME: CPT

## 2021-12-22 PROCEDURE — 99211 OFF/OP EST MAY X REQ PHY/QHP: CPT

## 2021-12-22 PROCEDURE — 36416 COLLJ CAPILLARY BLOOD SPEC: CPT

## 2021-12-22 NOTE — PROGRESS NOTES
Medication Management [address]  [phone] (phone)  [phone] (fax)    Ms. Ryan Ying is a 80 y.o.  female with history of Afib who presents today for anticoagulation monitoring and adjustment. Patient verifies current dosing regimen and tablet strength. No missed or extra doses. Patient denies s/s bleeding/bruising/swelling/SOB/chest pain  No blood in urine or stool. Patient reports she drank cranberry juice on Monday because she was worried she was going to have a UTI. No changes in medication/OTC agents. Patient has been taking Balance of Nature (fruit and vegetable supplement) - She takes 3 tablets of fruit and 3 tablets of vegetables most days. Added to medication list.  No change in alcohol use or tobacco use. No change in activity level. Patient denies headaches/falls. Patient reports some dizziness/lightheadedness in the middle of the night, so she is sure to take her time if she gets up to use the restroom. No vomiting/diarrhea or acute illness. No Procedures scheduled in the future at this time. Gave H&H order to patient - Patient states she is going to the lab after this. Assessment:   Lab Results   Component Value Date    INR 2.50 (H) 12/22/2021    INR 1.90 (H) 12/07/2021    INR 1.90 (H) 11/18/2021     INR therapeutic   Recent Labs     12/22/21  1111   INR 2.50*     Patient's INR is now therapeutic, but this may possibly due to the cranberry juice she drank on Monday. Patient plans to stop drinking cranberry juice and continue Balance of Nature supplements, which may slightly decrease her INR due to vegetable contents. Plan:  Continue Coumadin 5 mg MF and 2.5 mg all other days. Recheck INR in 1.5 week(s) on 1/3. Patient reminded to call the Anticoagulation Clinic with any signs or symptoms of bleeding or with any medication changes. Patient given instructions utilizing the teach back method.       After visit summary printed and reviewed with patient. Discharged ambulatory in no apparent distress.       For Pharmacy Admin Tracking Only     Total # of Interventions Recommended: 0   Time Spent (min): 5620 Lizet Teixeira PharmD   12/22/2021, 11:31 AM

## 2022-01-03 ENCOUNTER — HOSPITAL ENCOUNTER (OUTPATIENT)
Dept: PHARMACY | Age: 72
Setting detail: THERAPIES SERIES
Discharge: HOME | End: 2022-01-03
Payer: MEDICARE

## 2022-01-03 DIAGNOSIS — Z51.81 ENCOUNTER FOR THERAPEUTIC DRUG MONITORING: ICD-10-CM

## 2022-01-03 DIAGNOSIS — Z79.01 ANTICOAGULATED ON COUMADIN: Primary | ICD-10-CM

## 2022-01-03 LAB — POC INR: 1.8 (ref 0.8–1.2)

## 2022-01-03 PROCEDURE — 99212 OFFICE O/P EST SF 10 MIN: CPT

## 2022-01-03 PROCEDURE — 36416 COLLJ CAPILLARY BLOOD SPEC: CPT

## 2022-01-03 PROCEDURE — 85610 PROTHROMBIN TIME: CPT

## 2022-01-03 NOTE — PROGRESS NOTES
Medication Management [address]  [phone] (phone)  [phone] (fax)    Ms. Nael Barajas is a 80 y.o.  female with history of Afib who presents today for anticoagulation monitoring and adjustment. Patient verifies current dosing regimen and tablet strength. No missed or extra doses. Patient denies s/s bleeding/bruising/swelling/SOB/chest pain. No blood in urine or stool. No dietary changes. No changes in /Herbals. Diclofenac PO tabs on hold (BUN elevated) - patient wants to discuss lab and medication with Dr. Tanesha Ferguson (PCP). Balance of Lupie Jhony supplements continue (started a few months ago - taking them more consistently now). --contain the following: Broccoli, Spinach, Soybean, Green Cabbage, Wheatgrass, [address], Cauliflower, [place], [address], [place], Garlic, [employer], [employer], Soybean, Carrot, Kale, [address] Rommel Pepper, Shiitake Mushroom, Wheatgrass, Sweet Potato, Carrot, Kale, Green Onion, Soybean, Spinach, Cauliflower, Celery, Zucchini  No change in alcohol use or tobacco use. No change in activity level. Patient denies headaches/dizziness/lightheadedness/falls. No vomiting/diarrhea or acute illness. No Procedures scheduled in the future at this time. Assessment:   Lab Results   Component Value Date    INR 1.80 (H) 01/03/2022    INR 2.50 (H) 12/22/2021    INR 1.90 (H) 12/07/2021     INR subtherapeutic   Recent Labs     01/03/22  1319   INR 1.80*     Subtherapeutic INR x 1 (previously therapeutic with subtherapeutic INRs x 2 prior)      Plan:  Increase Coumadin 5 mg MWF and 2.5 mg TuThSaSu (11.1% increase due to increased Vitamin K in diet and recent history of subtherapeutic INRs). Recheck INR in 2 week(s) to assess new regimen. Patient reminded to call the Anticoagulation Clinic with signs or symptoms of bleeding or with any medication changes. Patient given instructions utilizing the teach back method.       After visit summary printed and reviewed with patient. Discharged ambulatory in no apparent distress.     Brittany Heaton, PharmD  1:45 PM  1/3/2022      For Pharmacy Admin Tracking Only     Intervention Detail: Dose Adjustment: 1, reason: Therapy Optimization   Total # of Interventions Recommended: 1   Total # of Interventions Accepted: 1   Time Spent (min): 20

## 2022-01-17 ENCOUNTER — TELEPHONE (OUTPATIENT)
Dept: PHARMACY | Age: 72
End: 2022-01-17

## 2022-01-17 ENCOUNTER — HOSPITAL ENCOUNTER (OUTPATIENT)
Dept: PHARMACY | Age: 72
Setting detail: THERAPIES SERIES
Discharge: HOME | End: 2022-01-17
Payer: MEDICARE

## 2022-01-17 DIAGNOSIS — Z51.81 ENCOUNTER FOR THERAPEUTIC DRUG MONITORING: ICD-10-CM

## 2022-01-17 DIAGNOSIS — Z79.01 ANTICOAGULATED ON COUMADIN: Primary | ICD-10-CM

## 2022-01-17 LAB — POC INR: 2.3 (ref 0.8–1.2)

## 2022-01-17 PROCEDURE — 99212 OFFICE O/P EST SF 10 MIN: CPT

## 2022-01-17 PROCEDURE — 36416 COLLJ CAPILLARY BLOOD SPEC: CPT

## 2022-01-17 PROCEDURE — 85610 PROTHROMBIN TIME: CPT

## 2022-01-17 RX ORDER — WARFARIN SODIUM 5 MG/1
TABLET ORAL EVERY EVENING
COMMUNITY
End: 2022-08-03

## 2022-01-17 RX ORDER — WARFARIN SODIUM 5 MG/1
TABLET ORAL
Qty: 75 TABLET | Refills: 1 | Status: SHIPPED | OUTPATIENT
Start: 2022-01-17 | End: 2022-10-18 | Stop reason: SDUPTHER

## 2022-01-17 NOTE — TELEPHONE ENCOUNTER
Did routine H&H 12/22:  13/41.2 (14.2/44.8 on 6/14/21). C/o fatigue at today's Coumadin Clinic visit. Tried to call PCP's office - no voicemail.

## 2022-01-17 NOTE — PROGRESS NOTES
Medication Management [address] [place] [address]  [phone] (phone)  [phone] (fax)    Ms. Aj Katz is a 80 y.o.  female with history of atrial fib. , per Dr. Mario Rizo referral, who presents today for Warfarin monitoring and adjustment (2 week visit after increasing dose by 11.1%). Patient verifies current dosing regimen and tablet strength. No missed or extra doses. Patient denies bleeding/bruising/swelling/SOB/chest pain. C/o fatigue. States BP last night was 109/45, this morning 114/62. No blood in urine or stool. No dietary changes. No changes in medication/OTC agents/herbals. No change in alcohol use or tobacco use. No change in activity level. Patient denies headaches/dizziness/lightheadedness/falls. No vomiting/diarrhea or acute illness. No procedures scheduled in the future at this time. Sees Dr. Alisha Hale next week. Assessment:   Lab Results   Component Value Date    INR 2.30 (H) 01/17/2022    INR 1.80 (H) 01/03/2022    INR 2.50 (H) 12/22/2021     INR therapeutic - goal 2-3. Recent Labs     01/17/22  1344   INR 2.30*     Did routine H&H 12/22: 13/41.2 (14.2/44.8 on 6/14/21). Plan:  POCT INR ordered/performed/result reviewed. Continue PO Coumadin 5 mg MWF, 2.5 mg TThSS. Recheck INR in 2 week(s); if that INR in range, can stretch out visits. (Report given - orders entered by FERMIN Tompkins, PharmD., who electronically renewed prescription.)   Will see 2/3 - same day as other visit in town (lives out of town). Patient reminded to call the Anticoagulation Clinic with any signs or symptoms of bleeding or with any medication changes. Patient given instructions utilizing the teach back method. After visit summary printed and reviewed with patient. Discharged ambulatory in no apparent distress, wearing mask.     For Pharmacy Admin Tracking Only   Time Spent (min): 2

## 2022-02-03 ENCOUNTER — APPOINTMENT (OUTPATIENT)
Dept: PHARMACY | Age: 72
End: 2022-02-03
Payer: MEDICARE

## 2022-02-07 ENCOUNTER — HOSPITAL ENCOUNTER (OUTPATIENT)
Dept: PHARMACY | Age: 72
Setting detail: THERAPIES SERIES
Discharge: HOME | End: 2022-02-07
Payer: MEDICARE

## 2022-02-07 DIAGNOSIS — Z51.81 ENCOUNTER FOR THERAPEUTIC DRUG MONITORING: ICD-10-CM

## 2022-02-07 DIAGNOSIS — Z79.01 ANTICOAGULATED ON COUMADIN: Primary | ICD-10-CM

## 2022-02-07 LAB — POC INR: 2.7 (ref 0.8–1.2)

## 2022-02-07 PROCEDURE — 36416 COLLJ CAPILLARY BLOOD SPEC: CPT

## 2022-02-07 PROCEDURE — 85610 PROTHROMBIN TIME: CPT

## 2022-02-07 PROCEDURE — 99211 OFF/OP EST MAY X REQ PHY/QHP: CPT

## 2022-02-07 NOTE — PROGRESS NOTES
Medication Management [address]  [phone] (phone)  [phone] (fax)    Ms. Rikki Bauer is a 80 y.o.  female with history of Afib who presents today for anticoagulation monitoring and adjustment. Patient verifies current dosing regimen and tablet strength. No missed or extra doses. Patient denies s/s bleeding/bruising/swelling/SOB/chest pain  No blood in urine or stool. No dietary changes. No changes in OTC agents/Herbals. Patient states Dr. Wilkinson Learn stopped lisinopril - Removed from list.   No change in alcohol use or tobacco use. No change in activity level. Patient denies headaches/dizziness/lightheadedness/falls. No vomiting/diarrhea or acute illness. No Procedures scheduled in the future at this time. Assessment:   Lab Results   Component Value Date    INR 2.70 (H) 02/07/2022    INR 2.30 (H) 01/17/2022    INR 1.80 (H) 01/03/2022     INR therapeutic   Recent Labs     02/07/22  1332   INR 2.70*         Plan:  Continue Coumadin 5 mg MWF and 2.5 mg TuThSaSu. Recheck INR in 3 week(s) on 2/28. Patient reminded to call the Anticoagulation Clinic with any signs or symptoms of bleeding or with any medication changes. Patient given instructions utilizing the teach back method. After visit summary printed and reviewed with patient. Discharged ambulatory in no apparent distress.     For Pharmacy Admin Tracking Only     Total # of Interventions Recommended: 0   Time Spent (min): [address] Te[address]eira   2/7/2022, 1:41 PM

## 2022-02-14 ENCOUNTER — OFFICE VISIT (OUTPATIENT)
Dept: FAMILY MEDICINE CLINIC | Age: 72
End: 2022-02-14
Payer: MEDICARE

## 2022-02-14 VITALS
HEIGHT: 64 IN | OXYGEN SATURATION: 98 % | TEMPERATURE: 96.9 F | SYSTOLIC BLOOD PRESSURE: 130 MMHG | WEIGHT: 272 LBS | HEART RATE: 64 BPM | DIASTOLIC BLOOD PRESSURE: 84 MMHG | BODY MASS INDEX: 46.44 KG/M2 | RESPIRATION RATE: 20 BRPM

## 2022-02-14 DIAGNOSIS — M16.0 PRIMARY OSTEOARTHRITIS OF BOTH HIPS: ICD-10-CM

## 2022-02-14 DIAGNOSIS — I48.0 PAROXYSMAL ATRIAL FIBRILLATION (HCC): ICD-10-CM

## 2022-02-14 DIAGNOSIS — Z00.00 ROUTINE GENERAL MEDICAL EXAMINATION AT A HEALTH CARE FACILITY: Primary | ICD-10-CM

## 2022-02-14 PROCEDURE — G0439 PPPS, SUBSEQ VISIT: HCPCS | Performed by: STUDENT IN AN ORGANIZED HEALTH CARE EDUCATION/TRAINING PROGRAM

## 2022-02-14 RX ORDER — COVID-19 ANTIGEN TEST
KIT MISCELLANEOUS PRN
COMMUNITY
End: 2022-08-16

## 2022-02-14 ASSESSMENT — PATIENT HEALTH QUESTIONNAIRE - PHQ9
2. FEELING DOWN, DEPRESSED OR HOPELESS: 0
SUM OF ALL RESPONSES TO PHQ QUESTIONS 1-9: 0
1. LITTLE INTEREST OR PLEASURE IN DOING THINGS: 0
SUM OF ALL RESPONSES TO PHQ9 QUESTIONS 1 & 2: 0
SUM OF ALL RESPONSES TO PHQ QUESTIONS 1-9: 0

## 2022-02-14 ASSESSMENT — LIFESTYLE VARIABLES: HOW OFTEN DO YOU HAVE A DRINK CONTAINING ALCOHOL: 0

## 2022-02-14 NOTE — PROGRESS NOTES
Medicare Annual Wellness Visit    Name: Nusrat Greer Date: 2022   MRN: [id number] Sex: Female   Age: 80 y.o. Ethnicity: Non- / Non    : 1950 Race: White (non-)      Kate Walters is here for Medicare AWV    Screenings for behavioral, psychosocial and functional/safety risks, and cognitive dysfunction are all negative except as indicated below. These results, as well as other patient data from the [address] form, are documented in Flowsheets linked to this Encounter. Allergies   Allergen Reactions    Enoxaparin      Other reaction(s): Other: See Comments  Significant bleeding and breast hematoma when bridging from warfarin         Prior to Visit Medications    Medication Sig Taking? Authorizing Provider   Naproxen Sodium (ALEVE) 220 MG CAPS Take by mouth as needed for Pain Yes Historical Provider, MD   warfarin (COUMADIN) 5 MG tablet As directed by [facility]75 tabs = 90 day supply Yes Yvette Cox MD   warfarin (COUMADIN) 5 MG tablet Take by mouth every evening Dosed by [address] [place] [address] Coumadin Clinic.  Yes Historical Provider, MD   NONFORMULARY Balance of Nature - Take 3 fruit tablets and 3 vegetable tablets by mouth daily Yes Historical Provider, MD   OMEPRAZOLE PO Take by mouth nightly Yes Historical Provider, MD   CAPSAICIN EX Apply topically See Admin Instructions Indications: Pain Yes Historical Provider, MD   guaiFENesin (MUCINEX PO) Take by mouth See Admin Instructions Indications: Cold Symptoms  Yes Historical Provider, MD   DM-Doxylamine-Acetaminophen (Mat [place]a COLD & FLU) 40-0. MG CAPS Take by mouth See Admin Instructions Indications: Cold Symptoms  Yes Historical Provider, MD   tiZANidine (ZANAFLEX) 2 MG tablet Take 1 tablet by mouth nightly as needed (muscle spasms) Yes Blanquita Sanford MD   Handicap Placard MISC by Other route For 5 years Yes Blanquita Sanford MD   NONFORMULARY daily as needed (pain) Relief Factor (contains cucurmin) Yes Historical Provider, MD   diclofenac sodium (VOLTAREN) 1 % GEL Apply topically 4 times daily as needed for Pain Yes Ely Howard MD   levothyroxine (SYNTHROID) 100 MCG tablet take 1 tablet by mouth once daily Yes Regla Pat, DO   liothyronine (CYTOMEL) 5 MCG tablet take 1 tablet by mouth once daily Yes Regla Pat, DO   Acetaminophen (TYLENOL ARTHRITIS PAIN PO) Take by mouth See Admin Instructions Indications: Pain Don't take more then 3,000 mg each day, including what's in cold products. Yes Historical Provider, MD   RESVERATROL PO Take 2 capsules by mouth 2 times daily  Yes Historical Provider, MD   Omega-3 Fatty Acids (FISH OIL PO) Take 2 capsules by mouth daily Indications: Pain Yes Historical Provider, MD   flecainide (TAMBOCOR) 100 MG tablet Take 100 mg by mouth 2 times daily  Yes Historical Provider, MD   vitamin C (ASCORBIC ACID) 500 MG tablet Take 500 mg by mouth daily Yes Historical Provider, MD   Calcium Carbonate (SM CALCIUM 600 PO) Take 1 tablet by mouth daily Yes Historical Provider, MD   metoprolol succinate (TOPROL XL) 25 MG extended release tablet Take 25 mg by mouth 2 times daily  Yes Historical Provider, MD   glucose monitoring kit (FREESTYLE) monitoring kit 1 kit by Does not apply route daily Yes Regla Pat, DO   Lancets MISC 1 each by Does not apply route daily Yes Regla Pat, DO   blood glucose test strips (ASCENSIA AUTODISC VI;ONE TOUCH ULTRA TEST VI) strip 1 each by In Vitro route daily Dispense any brand - check blood sugar As needed.  Yes Regla Pat, DO   NONFORMULARY Take 1 capsule by mouth 4 times daily (before meals and nightly) Christopher Yes Historical Provider, MD   loratadine (CLARITIN) 10 MG tablet Take 1 tablet by mouth 2 times daily (before meals) Yes Iris Mckoy MD   Methylsulfonylmethane (MSM PO) Take 1 tablet by mouth daily as needed (pain) Yes Historical Provider, MD   nitroGLYCERIN (NITROSTAT) 0.4 MG SL tablet Place under the tongue every 5 minutes as needed for Chest pain (If third one does not relieve pain, call 9-1-1.)  Yes Historical Provider, MD   QIPPZHHET-RIHXALRNC-IMDBHWSJXS PO Place 1 tablet under the tongue 2 times daily Jimbo B12 plus or [place] methylB6, vivwajJ50, methylfolate  Yes Historical Provider, MD   Magnesium (CVS TRIPLE MAGNESIUM COMPLEX) 400 MG CAPS Take 3 capsules by mouth 4 times daily (after meals and at bedtime) Indications: Magnesium Deficiency  Yes Historical Provider, MD   magnesium cl-calcium carbonate (SLOW-MAG) 71.5-119 MG TBEC tablet Take 1 tablet by mouth 3 times daily (with meals) Take before and when stressed with 2 extra fish oil PRN Yes Historical Provider, MD   Grape Seed  mg by Does not apply route daily Oregan Grape Root Yes Historical Provider, MD   Lysine 500 MG TABS Take 1 tablet by mouth 2 times daily  Yes Historical Provider, MD   DHEA 25 MG CAPS Take 1 capsule by mouth every other day  Yes Historical Provider, MD   Cinnamon 500 MG CAPS Take 3 capsules by mouth 4 times daily (before meals and nightly)  Yes Historical Provider, MD   vitamin D (CHOLECALCIFEROL) 50 MCG (2000 UT) TABS tablet Take 2 capsules by mouth daily Indications: Treatment with Vitamin D .use only the 2000 IU or 1000 IU caps from [employer], 5000IU does not work Yes Historical Provider, MD   B Complex-Biotin-FA (B-100 TR) TBCR Take 2 tablets by mouth 2 times daily (before meals) Before breakfast and before lunch or supper Yes Historical Provider, MD         Past Medical History:   Diagnosis Date    [facility]     Atrial fibrillation [facility]     Carpal tunnel syndrome     Diabetes mellitus ([facility]     GERD (gastroesophageal reflux disease)     Headache(784.0)     HTN (hypertension)     Hypothyroidism     Osteoarthritis     Pacemaker     Sleep apnea 07/21/2019    Mild    Tendonitis of both wrists 2017    Toenail fungus        Past Surgical History:   Procedure Laterality Date    CARPAL TUNNEL RELEASE      both hands     SECTION       SECTION       SECTION      COLONOSCOPY N/A 10/8/2021    COLONOS[facility]    AMBER Perea Left 2010    Left Total Knee    JOINT REPLACEMENT Right 2016    Right Total Knee    UPPER GASTROINTESTINAL ENDOSCOPY N/A 10/8/2021    EGD BIOPSY performed by Gabrielle Hendrickson MD at  [facility]    UPPER GASTROINTESTINAL ENDOSCOPY  10/8/2021    EGD DILATION SAVORY performed by Gabrielle Hendrickson MD at  [facility]         Family History   Problem Relation Age of Onset    Stroke Mother     Arrhythmia Mother     Other Mother         Barry's Syndrome    Heart Disease Mother     Coronary Art Dis Mother     Diabetes Father     Other Sister         Barry's Syndrome    Arrhythmia Sister     Cancer Brother         Stomach Cancer    Stillborn Child     Early Death Child     Pacemaker Sister     Heart Attack Brother     Coronary Art Dis Brother     Other Brother         Barry's Syndrome       CareTeam (Including outside providers/suppliers regularly involved in providing care):   Patient Care Team:  Regla Galarza DO as PCP - General (Family Medicine)  Regla Galarza DO as PCP - [facility] Empaneled Provider  Iris Mckoy MD as Consulting Physician (Family Medicine)    Wt Readings from Last 3 Encounters:   22 272 lb (123.4 kg)   21 278 lb 3.2 oz (126.2 kg)   21 273 lb 12.8 oz (124.2 kg)     Vitals:    22 1304   BP: 130/84   Site: Left Upper Arm   Position: Sitting   Cuff Size: Large Adult   Pulse: 64   Resp: 20   Temp: 96.9 °F (36.1 °C)   TempSrc: Temporal   SpO2: 98%   Weight: 272 lb (123.4 kg)   Height: 5' 4\" (1.626 m)     Body mass index is 46.69 kg/m². Based upon direct observation of the patient, evaluation of cognition reveals recent and remote memory intact.         Patient's complete Health Risk Assessment and screening values have been reviewed and are found in Flowsheets. The following problems were reviewed today and where indicated follow up appointments were made and/or referrals ordered. Positive Risk Factor Screenings with Interventions:              General Health and ACP:  General  In general, how would you say your health is?: Good  In the past 7 days, have you experienced any of the following?  New or Increased Pain, New or Increased Fatigue, Loneliness, Social Isolation, Stress or Anger?: None of These  Do you get the social and emotional support that you need?: Yes  Do you have a Living Will?: (!) No  Advance Directives     Power of  Living Will ACP-Advance Directive ACP-Power of     Not on File Not on File Not on File Not on File      General Health Risk Interventions:  · No Living Will: Patient referred to [place] Habits/Nutrition:  Health Habits/Nutrition  Do you exercise for at least 20 minutes 2-3 times per week?: Yes  Have you lost any weight without trying in the past 3 months?: No  Do you eat only one meal per day?: No  Have you seen the dentist within the past year?: Yes  Body mass index: (!) 46.68  Health Habits/Nutrition Interventions:  · Inadequate physical activity:  patient agrees to join a structured exercise program- Will look into [facility]  · Nutritional issues:  educational materials for healthy, well-balanced diet provided         Personalized Preventive Plan   Current Health Maintenance Status  Immunization History   Administered Date(s) Administered    COVID-Saroj Carranza, Primary or Immunocompromised, PF, 100mcg/0.5mL 02/24/2021, 03/24/2021    Influenza Virus Vaccine 11/03/2011, 10/09/2014, 11/17/2015    Influenza, High Dose (Fluzone 65 yrs and older) 10/11/2018    Influenza, MDCK, Preservative free 10/31/2017    Influenza, Quadv, IM, PF (6 mo and older Fluzone, Flulaval, Fluarix, and 3 yrs and older Afluria) 01/19/2017, 09/08/2020    Influenza, Quadv, adjuvanted, 65 yrs +, IM, PF (Fluad) 10/04/2021    Influenza, Triv, inactivated, subunit, adjuvanted, IM (Fluad 65 yrs and older) 10/11/2018, 09/17/2019    Pneumococcal Conjugate 13-valent ([id number]) 07/11/2017    Pneumococcal Polysaccharide (Itgasjfhk06) 11/05/2018    Tdap (Boostrix, Adacel) 10/09/2014    Zoster Live (Zostavax) 05/05/2014    Zoster Recombinant (Shingrix) 11/13/2019, 04/04/2020        Health Maintenance   Topic Date Due    COVID-19 Vaccine (3 - Booster for Early Greenhouse series) 08/24/2021    Annual Wellness Visit (AWV)  02/05/2022    Diabetic foot exam  12/02/2022 (Originally 3/7/1960)    Diabetic retinal exam  12/12/2022 (Originally 3/7/1968)    TSH testing  06/14/2022    A1C test (Diabetic or Prediabetic)  11/22/2022    Diabetic microalbuminuria test  11/22/2022    Lipid screen  01/31/2023    Potassium monitoring  01/31/2023    Creatinine monitoring  01/31/2023    Depression Screen  02/14/2023    Breast cancer screen  07/27/2023    DTaP/Tdap/Td vaccine (2 - Td or Tdap) 10/09/2024    Colon cancer screen colonoscopy  10/08/2031    Flu vaccine  Completed    Shingles Vaccine  Completed    Pneumococcal 65+ years Vaccine  Completed    DEXA (modify frequency per FRAX score)  Addressed    Hepatitis C screen  Addressed    Hepatitis A vaccine  Aged Out    Hib vaccine  Aged Out    Meningococcal (ACWY) vaccine  Aged Out     Recommendations for Anthillz Due: see orders and patient instructions/AVS.  . Recommended screening schedule for the next 5-10 years is provided to the patient in written form: see Patient Duane Moya was seen today for medicare awv. Diagnoses and all orders for this visit:    Routine general medical examination at a health care facility:  - Patient has healthy care proxy paperwork.  Will refer to ACP at this time for further discussion.  -     [facility] Referral to [facility] ANNUAL    Primary osteoarthritis of both hips:  - Okay to continue with Voltaren gel topically, Tylenol to help with inflammation.   - Hold off on NSAIDs given GFR  - Will refer to Ortho for possible steroid injections  -     AFL - Alexy Clayton MD, Orthopedic Surgery, [facility]    Paroxysmal atrial fibrillation [facility]):  - [facility] at [facility]. S/p PPM  - Continue with Toprol XL and Flecinide.   - Encouraged patient to follow-up with Cardiology - also sees Dr. Mariah Hill reviewed for last office notes, seen at [facility] in Jan 2021    Body mass index (BMI) 45.0-49.9, adult [facility]):  - Limited physical activity due to pain from arthritis. Discussed options such as aquatic therapy, swimming classes.

## 2022-02-14 NOTE — PATIENT INSTRUCTIONS
Personalized Preventive Plan for Kate Walters - 2/14/2022  Medicare offers a range of preventive health benefits. Some of the tests and screenings are paid in full while other may be subject to a deductible, co-insurance, and/or copay. Some of these benefits include a comprehensive review of your medical history including lifestyle, illnesses that may run in your family, and various assessments and screenings as appropriate. After reviewing your medical record and screening and assessments performed today your provider may have ordered immunizations, labs, imaging, and/or referrals for you. A list of these orders (if applicable) as well as your Preventive Care list are included within your After Visit Summary for your review. Other Preventive Recommendations:    · A preventive eye exam performed by an eye specialist is recommended every 1-2 years to screen for glaucoma; cataracts, macular degeneration, and other eye disorders. · A preventive dental visit is recommended every 6 months. · Try to get at least 150 minutes of exercise per week or 10,000 steps per day on a pedometer . · Order or download the FREE \"Exercise & Physical Activity: Your Everyday Guide\" from [employer]. Call 2-962.459.1150 or search [employer] online. · You need 2151-9381 mg of calcium and 4057-0611 IU of vitamin D per day. It is possible to meet your calcium requirement with diet alone, but a vitamin D supplement is usually necessary to meet this goal.  · When exposed to the sun, use a sunscreen that protects against both UVA and UVB radiation with an SPF of 30 or greater. Reapply every 2 to 3 hours or after sweating, drying off with a towel, or swimming. · Always wear a seat belt when traveling in a car. Always wear a helmet when riding a bicycle or motorcycle.

## 2022-02-14 NOTE — PROGRESS NOTES
Health Maintenance Due   Topic Date Due    Depression Screen  Never done    COVID-19 Vaccine (3 - Booster for Moderna series) 08/24/2021    Annual Wellness Visit (AWV)  02/05/2022

## 2022-02-17 ENCOUNTER — CLINICAL DOCUMENTATION (OUTPATIENT)
Dept: SPIRITUAL SERVICES | Facility: CLINIC | Age: 72
End: 2022-02-17

## 2022-02-17 NOTE — ACP (ADVANCE CARE PLANNING)
Advance Care Planning   Ambulatory ACP Specialist Patient Outreach    Date:  2/17/2022  ACP Specialist:  Katiuska Murphy    Outreach call to patient in follow-up to ACP Specialist referral from: Mitchell Silva DO    [x] PCP  [] Provider   [] Ambulatory Care Management [] Other for Reason:    [x] Advance Directive Assistance  [] Code Status Discussion  [] Complete Portable DNR Order  [x] Discuss Goals of Care  [] Complete POST/MOST  [] Early ACP Decision-Making  [] Other    Date Referral Received: 2/14/2022    Today's Outreach:  [x] First   [] Second  [] Third                               Third outreach made by []  phone  [] email []   Research for [employer]     Intervention:  [x] Spoke with Patient's   [] Left VM requesting return call      Outcome:   made an initial attempt to contact Blane De Luna via telephone call to offer support, if desired, for the completion of Advance Directives documents as part of an [address] conversation. *  spoke with her , who stated she was at work today. * He expressed having recently completed documents for both of them through their . *  requested a copy of both of their documents for their file. * He stated that his wife would call back with their plan of action, and to confirm the conversation today. *  will follow-up as needed. Next Step:   [] ACP scheduled conversation  [x] Outreach again in one week               [] Email / Mail ACP Info Sheets  [] Email / Mail Advance Directive            [] Close Referral. Routing closure to referring provider/staff and to ACP Specialist .      Thank you for this referral.

## 2022-02-24 ENCOUNTER — HOSPITAL ENCOUNTER (OUTPATIENT)
Age: 72
Discharge: HOME | End: 2022-02-24
Payer: MEDICARE

## 2022-02-24 ENCOUNTER — HOSPITAL ENCOUNTER (OUTPATIENT)
Dept: GENERAL RADIOLOGY | Age: 72
Discharge: HOME | End: 2022-02-24
Payer: MEDICARE

## 2022-02-24 ENCOUNTER — TELEPHONE (OUTPATIENT)
Dept: PHARMACY | Age: 72
End: 2022-02-24

## 2022-02-24 ENCOUNTER — OFFICE VISIT (OUTPATIENT)
Dept: FAMILY MEDICINE CLINIC | Age: 72
End: 2022-02-24
Payer: MEDICARE

## 2022-02-24 VITALS
BODY MASS INDEX: 45.83 KG/M2 | DIASTOLIC BLOOD PRESSURE: 75 MMHG | SYSTOLIC BLOOD PRESSURE: 132 MMHG | HEART RATE: 78 BPM | WEIGHT: 267 LBS

## 2022-02-24 DIAGNOSIS — Z01.818 PREOP GENERAL PHYSICAL EXAM: ICD-10-CM

## 2022-02-24 DIAGNOSIS — I10 ESSENTIAL HYPERTENSION: ICD-10-CM

## 2022-02-24 DIAGNOSIS — M25.562 ARTHRALGIA OF BOTH LOWER LEGS: ICD-10-CM

## 2022-02-24 DIAGNOSIS — I48.91 NEW ONSET ATRIAL FIBRILLATION (HCC): ICD-10-CM

## 2022-02-24 DIAGNOSIS — Z79.01 ANTICOAGULATED ON COUMADIN: ICD-10-CM

## 2022-02-24 DIAGNOSIS — M25.561 ARTHRALGIA OF BOTH LOWER LEGS: ICD-10-CM

## 2022-02-24 DIAGNOSIS — I48.91 NEW ONSET ATRIAL FIBRILLATION (HCC): Primary | ICD-10-CM

## 2022-02-24 PROCEDURE — 99214 OFFICE O/P EST MOD 30 MIN: CPT | Performed by: STUDENT IN AN ORGANIZED HEALTH CARE EDUCATION/TRAINING PROGRAM

## 2022-02-24 PROCEDURE — 93005 ELECTROCARDIOGRAM TRACING: CPT

## 2022-02-24 PROCEDURE — 71046 X-RAY EXAM CHEST 2 VIEWS: CPT

## 2022-02-24 ASSESSMENT — ENCOUNTER SYMPTOMS
COUGH: 0
CONSTIPATION: 0
DIARRHEA: 0
SHORTNESS OF BREATH: 0
EYE PAIN: 0
ABDOMINAL PAIN: 0

## 2022-02-24 NOTE — PROGRESS NOTES
S: 80 y.o. female with afib, htn    HPI: please see resident note for HPI and ROS. BP Readings from Last 3 Encounters:   02/14/22 130/84   12/02/21 134/62   11/22/21 130/80     Wt Readings from Last 3 Encounters:   02/14/22 272 lb (123.4 kg)   12/02/21 278 lb 3.2 oz (126.2 kg)   11/22/21 273 lb 12.8 oz (124.2 kg)       O: VS:   AAO/NAD, appropriate affect for mood       Diagnosis Orders   1. New onset atrial fibrillation (HCC)  EKG 12 Lead   2. Preop general physical exam  XR CHEST STANDARD (2 VW)    CBC with Auto Differential    Basic Metabolic Panel    EKG 12 Lead   3. Arthralgia of both lower legs  EKG 12 Lead   4. Essential hypertension     5. Anticoagulated on Coumadin         Plan:  Cleared pending results. Labs, ekg, and cxr as ordered. Health Maintenance Due   Topic Date Due    COVID-19 Vaccine (3 - Booster for Graciella Belling series) 08/24/2021       Attending Physician Statement  I have discussed the case, including pertinent history and exam findings with the resident. I also have seen the patient and performed key portions of the examination. I agree with the documented assessment and plan as documented by the resident.         Gilford Pacer, DO 2/24/2022 4:01 PM

## 2022-02-24 NOTE — PROGRESS NOTES
Gael Villafuerte (:  1950) is a 80 y.o. female,Established patient, here for evaluation of the following chief complaint(s):  Pre-op Exam         ASSESSMENT/PLAN:  1. New onset atrial fibrillation ([address]  -     EKG 12 Lead; Future  2. Preop general physical exam  -     XR CHEST STANDARD (2 VW); Future  -     EKG 12 Lead; Future  3. Arthralgia of both lower legs  -     EKG 12 Lead; Future  4. Essential hypertension  5. Anticoagulated on Coumadin        I have discussed my assessment of perioperative cardiac risk versus benefit of planned surgery and plan for optimization, as well as care with the floor nurse, and okay with surgery. Okay to continue warfarin with surgery, but patient to call surgeon if he is still okay with surgery with this. No follow-ups on file. Subjective   SUBJECTIVE/OBJECTIVE:  HPI     Pre-op For Hip Injections    Preoperative Cardiac Risk Stratification:  Sidonie Lipoma of planned surgery is non-ungent. Patients active clinical conditions are afib. Diet controlled DM. No CAD. No CKD. The planned surgery isLOW RISK MINOR SURGERY - PROCEED WITH SURGERY. The patient's MET score is estimated to be 4 METS OR MORE WITHOUT SYMPTOMS - PROCEED WITH SURGERY. Clinical Risk factors include: RUBEN, obesity. Revised Cardiac Risk Index is 2.4% (2 points). The patient is at [address] [place] [address]  for non-cardiac surgery. Reviewed CBC, BMP, CXR, and EKG and no major abnormalities appreciated    The patient  IS MEDICALLY OPTIMIZED FOR SURGERY. Review of Systems   Constitutional: Negative for appetite change and unexpected weight change. HENT: Negative for congestion and hearing loss. Eyes: Negative for pain and visual disturbance. Respiratory: Negative for cough and shortness of breath. Cardiovascular: Negative for chest pain and leg swelling. Gastrointestinal: Negative for abdominal pain, constipation and diarrhea.    Genitourinary: Negative for difficulty

## 2022-02-25 LAB
EKG ATRIAL RATE: 63 BPM
EKG P AXIS: 46 DEGREES
EKG P-R INTERVAL: 320 MS
EKG Q-T INTERVAL: 470 MS
EKG QRS DURATION: 126 MS
EKG QTC CALCULATION (BAZETT): 480 MS
EKG R AXIS: -28 DEGREES
EKG T AXIS: 15 DEGREES
EKG VENTRICULAR RATE: 63 BPM

## 2022-02-25 PROCEDURE — 93010 ELECTROCARDIOGRAM REPORT: CPT | Performed by: INTERNAL MEDICINE

## 2022-02-28 ENCOUNTER — HOSPITAL ENCOUNTER (OUTPATIENT)
Dept: PHARMACY | Age: 72
Setting detail: THERAPIES SERIES
Discharge: HOME | End: 2022-02-28
Payer: MEDICARE

## 2022-02-28 DIAGNOSIS — Z79.01 ANTICOAGULATED ON COUMADIN: Primary | ICD-10-CM

## 2022-02-28 DIAGNOSIS — Z51.81 ENCOUNTER FOR THERAPEUTIC DRUG MONITORING: ICD-10-CM

## 2022-02-28 LAB — POC INR: 3.6 (ref 0.8–1.2)

## 2022-02-28 PROCEDURE — 36416 COLLJ CAPILLARY BLOOD SPEC: CPT

## 2022-02-28 PROCEDURE — 85610 PROTHROMBIN TIME: CPT

## 2022-02-28 PROCEDURE — 99212 OFFICE O/P EST SF 10 MIN: CPT

## 2022-02-28 NOTE — PROGRESS NOTES
Medication Management [address]  [phone] (phone)  [phone] (fax)    Ms. Saul Cornejo is a 80 y.o.  female with history of Afib who presents today for anticoagulation monitoring and adjustment. Patient verifies current dosing regimen and tablet strength. No missed or extra doses. Patient denies s/s bleeding/bruising/swelling/SOB/chest pain. No blood in urine or stool. No dietary changes. No changes in medication. Vicks Nyquil, Mucinex tablets, and Claritin removed from med list - pt states she only takes them for colds/allergies which she has not had since fall. Also removed Resveratrol (included in patient's Relief Factor). No change in alcohol use or tobacco use. No change in activity level. Patient denies headaches/dizziness/lightheadedness/falls. No vomiting/diarrhea or acute illness. 03/01/22- receiving injection in hip for pain - PCP (Dr. Samantha Diez)/[facility] doctor - do not hold coumadin. Assessment:   Lab Results   Component Value Date    INR 3.60 (H) 02/28/2022    INR 2.70 (H) 02/07/2022    INR 2.30 (H) 01/17/2022     INR supratherapeutic   Recent Labs     02/28/22  1308   INR 3.60*     Supratherapeutic x 1    Plan:  No Coumadin today (02/28)  then continue Coumadin 5 mg MWF and 2.5 mg TuThSaSu. Recheck INR in 2 week(s). Patient reminded to call the Anticoagulation Clinic with signs or symptoms of bleeding or with any medication changes. Patient given instructions utilizing the teach back method. After visit summary printed and reviewed with patient. Discharged ambulatory in no apparent distress. Plan reviewed with Delmar TukrD.     Delmar CatalanD  1:41 PM  2/28/2022      For Pharmacy Admin Tracking Only     Intervention Detail: Dose Adjustment: 1, reason: Therapy De-escalation   Total # of Interventions Recommended: 1   Total # of Interventions Accepted: 1   Time Spent (min): 20

## 2022-03-03 ENCOUNTER — TELEPHONE (OUTPATIENT)
Dept: FAMILY MEDICINE CLINIC | Age: 72
End: 2022-03-03

## 2022-03-04 NOTE — TELEPHONE ENCOUNTER
Pt was admitted to the [address] and was just d/c'd yesterday PM.     Pt states she needs to make an appointment with [facility] clinic cardiology. Pt denies any further symptoms.

## 2022-03-14 ENCOUNTER — HOSPITAL ENCOUNTER (OUTPATIENT)
Dept: PHARMACY | Age: 72
Setting detail: THERAPIES SERIES
Discharge: HOME | End: 2022-03-14
Payer: MEDICARE

## 2022-03-14 DIAGNOSIS — Z79.01 ANTICOAGULATED ON COUMADIN: Primary | ICD-10-CM

## 2022-03-14 DIAGNOSIS — Z51.81 ENCOUNTER FOR THERAPEUTIC DRUG MONITORING: ICD-10-CM

## 2022-03-14 LAB — POC INR: 2 (ref 0.8–1.2)

## 2022-03-14 PROCEDURE — 99211 OFF/OP EST MAY X REQ PHY/QHP: CPT

## 2022-03-14 PROCEDURE — 85610 PROTHROMBIN TIME: CPT

## 2022-03-14 PROCEDURE — 36416 COLLJ CAPILLARY BLOOD SPEC: CPT

## 2022-03-14 NOTE — PROGRESS NOTES
Medication Management [address]   [phone] (phone)  [phone] (fax)    Ms. Flor Cantrell is a 71 y.o.  female with history of Afib who presents today for anticoagulation monitoring and adjustment. Patient was recently admitted to [facility] on 3/1-3/3. She received a hip injection on 3/1 at [facility] (per patient INR was 2.8 on that day). She then developed a large hematoma on her hip and the O doctors sent her to [facility] ER where she was found to be in afib and admitted. Per patient, Dr Amber Hoffmann was following the hematoma while admitted. Per patient, her INR was 1.2 at discharge on 3/3 so she went home and doubled her Coumadin dose x 1 night due to being in afib. Patient states that the bruise from the hematoma extends across her side, her hip and down her leg. She states that Dr Amber Hoffmann told her this may take 4 months to heal.  She believes it is improving but is seeing Dr Amber Hoffmann on 3/16/22. Advised patient to remind Dr Amber Hoffmann again at the appt that she is on Coumadin. She agreed. Patient verifies current dosing regimen and tablet strength. No  extra doses. Patient reports to taken 5 mg on Thursday night instead of 2.5 mg  Patient denies s/s bleeding/swelling/SOB/chest pain   No blood in urine or stool. No dietary changes. No changes in medication/OTC agents/Herbals. No change in alcohol use or tobacco use. No change in activity level. Patient denies headaches/dizziness/lightheadedness/falls. No vomiting/diarrhea or acute illness. No Procedures scheduled in the future at this time. Steroid shots on 3/1 and after shot, patient reports to have severe bleeding and was in hospital for worsening of a fib and bleeding which resolved by Thursday night.       Assessment:     Lab Results   Component Value Date    INR 2.00 (H) 03/14/2022    INR 3.60 (H) 02/28/2022    INR 2.70 (H) 02/07/2022     INR therapeutic   Recent Labs 03/14/22  1328   INR 2.00*     Patient interview completed and discussed with pharmacist by Marcos JacobsenD Candidate       Plan:  Continue Coumadin 5 mg MWF, 2.5 mg TThSS. Recheck INR in 2 week(s). Patient reminded to call the Anticoagulation Clinic with any signs or symptoms of bleeding or with any medication changes. Patient given instructions utilizing the teach back method. After visit summary printed and reviewed with patient. Discharged ambulatory in no apparent distress.     For Pharmacy Admin Tracking Only    Time Spent (min): 2389 Yuriy French PharmD, [facility]  3/14/2022  2:37 PM

## 2022-03-28 ENCOUNTER — HOSPITAL ENCOUNTER (OUTPATIENT)
Dept: PHARMACY | Age: 72
Setting detail: THERAPIES SERIES
Discharge: HOME | End: 2022-03-28
Payer: MEDICARE

## 2022-03-28 DIAGNOSIS — Z51.81 ENCOUNTER FOR THERAPEUTIC DRUG MONITORING: ICD-10-CM

## 2022-03-28 DIAGNOSIS — Z79.01 ANTICOAGULATED ON COUMADIN: Primary | ICD-10-CM

## 2022-03-28 LAB — POC INR: 2.7 (ref 0.8–1.2)

## 2022-03-28 PROCEDURE — 36416 COLLJ CAPILLARY BLOOD SPEC: CPT

## 2022-03-28 PROCEDURE — 99211 OFF/OP EST MAY X REQ PHY/QHP: CPT

## 2022-03-28 PROCEDURE — 85610 PROTHROMBIN TIME: CPT

## 2022-03-28 NOTE — PROGRESS NOTES
Medication Management [address] [place] [address]  [phone] (phone)  [phone] (fax)    Ms. Efren Barrett is a 71 y.o.  female with history of Afib who presents today for anticoagulation monitoring and adjustment. Patient verifies current dosing regimen and tablet strength. No missed or extra doses. Patient denies s/s bleeding/bruising/swelling/SOB/chest pain  No blood in urine or stool. No dietary changes. No changes in medication/OTC agents/Herbals. No change in alcohol use or tobacco use. No change in activity level. Patient denies headaches/dizziness/lightheadedness/falls. No vomiting/diarrhea or acute illness. No Procedures scheduled in the future at this time. Assessment:   Lab Results   Component Value Date    INR 2.70 (H) 03/28/2022    INR 2.00 (H) 03/14/2022    INR 3.60 (H) 02/28/2022     INR therapeutic   Recent Labs     03/28/22  1433   INR 2.70*     Patient has been therapeutic at four of the past five INR checks while on current regimen. Plan:  Continue Coumadin 5 mg MWF and 2.5 mg TuThSaSu. Recheck INR in 3 week(s). Patient reminded to call the Anticoagulation Clinic with any signs or symptoms of bleeding or with any medication changes. Patient given instructions utilizing the teach back method. After visit summary printed and reviewed with patient. Discharged ambulatory in no apparent distress.     For Pharmacy Admin Tracking Only     Total # of Interventions Recommended: 0   Total # of Interventions Accepted: 0   Time Spent (min): 6487  [facility], [place], BCPS  3/28/2022  2:43 PM

## 2022-04-18 ENCOUNTER — HOSPITAL ENCOUNTER (OUTPATIENT)
Dept: PHARMACY | Age: 72
Setting detail: THERAPIES SERIES
Discharge: HOME | End: 2022-04-18
Payer: MEDICARE

## 2022-04-18 DIAGNOSIS — Z79.01 ANTICOAGULATED ON COUMADIN: Primary | ICD-10-CM

## 2022-04-18 DIAGNOSIS — Z51.81 ENCOUNTER FOR THERAPEUTIC DRUG MONITORING: ICD-10-CM

## 2022-04-18 LAB — POC INR: 4.1 (ref 0.8–1.2)

## 2022-04-18 PROCEDURE — 85610 PROTHROMBIN TIME: CPT | Performed by: PHARMACIST

## 2022-04-18 PROCEDURE — 99211 OFF/OP EST MAY X REQ PHY/QHP: CPT | Performed by: PHARMACIST

## 2022-04-18 PROCEDURE — 36416 COLLJ CAPILLARY BLOOD SPEC: CPT | Performed by: PHARMACIST

## 2022-04-18 NOTE — PROGRESS NOTES
Medication Management [address]  [phone] (phone)  [phone] (fax)    Ms. Patrick Calvin is a 71 y.o.  female with history of Afib who presents today for anticoagulation monitoring and adjustment. Patient verifies current dosing regimen and tablet strength. No missed or extra doses. Patient denies s/s bleeding/bruising/swelling/SOB/chest pain  No blood in urine or stool. No dietary changes. No changes in medication/OTC agents/Herbals. No change in tobacco use. Had a glass of wine at dinner on 4/17/22. No change in activity level. Patient denies headaches/dizziness/lightheadedness/falls. No vomiting/diarrhea or acute illness. No Procedures scheduled in the future at this time. Patient states she is having an ultrasound with Dr. Annika Wolfe in [facility] on 4/27/22 to evaluate improvement with her recent hematoma. She states the bruising is a lot better and it feels better. She also states she is changing PCP and going to see Dr. Marjorie Mix tomorrow for her initial visit. Assessment:   Lab Results   Component Value Date    INR 4.10 (H) 04/18/2022    INR 2.70 (H) 03/28/2022    INR 2.00 (H) 03/14/2022     INR supratherapeutic   Recent Labs     04/18/22  1426   INR 4.10*     Gilbert Rahman, PharmD Candidate 2020    Plan:  HOLD x1 today then Continue Coumadin 5mg MWF 2.5mg TuThSS. Recheck INR in 1 week(s). Patient stating how much each appointment is. Gave her [facility] [employer] [facility] number to see if any assistance or explanation can be provided. Patient reminded to call the Anticoagulation Clinic with any signs or symptoms of bleeding or with any medication changes. Patient given instructions utilizing the teach back method. After visit summary printed and reviewed with patient. Discharged ambulatory in no apparent distress.     For Pharmacy Admin Tracking Only     Intervention Detail: Dose Adjustment: 1, reason: Therapy Optimization   Total # of Interventions Recommended: 10   Total # of Interventions Accepted: 1   Time Spent (min): 20     Petar Taylor PharmTERESA 4/18/2022 2:54 PM

## 2022-04-19 ENCOUNTER — OFFICE VISIT (OUTPATIENT)
Dept: FAMILY MEDICINE CLINIC | Age: 72
End: 2022-04-19
Payer: MEDICARE

## 2022-04-19 VITALS
HEIGHT: 64 IN | RESPIRATION RATE: 16 BRPM | BODY MASS INDEX: 47.12 KG/M2 | HEART RATE: 72 BPM | SYSTOLIC BLOOD PRESSURE: 132 MMHG | DIASTOLIC BLOOD PRESSURE: 76 MMHG | WEIGHT: 276 LBS

## 2022-04-19 DIAGNOSIS — Z79.01 ANTICOAGULATED ON COUMADIN: ICD-10-CM

## 2022-04-19 DIAGNOSIS — M16.0 PRIMARY OSTEOARTHRITIS OF BOTH HIPS: ICD-10-CM

## 2022-04-19 DIAGNOSIS — N18.30 STAGE 3 CHRONIC KIDNEY DISEASE, UNSPECIFIED WHETHER STAGE 3A OR 3B CKD (HCC): ICD-10-CM

## 2022-04-19 DIAGNOSIS — E11.22 TYPE 2 DIABETES MELLITUS WITH STAGE 3 CHRONIC KIDNEY DISEASE, WITHOUT LONG-TERM CURRENT USE OF INSULIN, UNSPECIFIED WHETHER STAGE 3A OR 3B CKD (HCC): ICD-10-CM

## 2022-04-19 DIAGNOSIS — I10 ESSENTIAL HYPERTENSION: Primary | ICD-10-CM

## 2022-04-19 DIAGNOSIS — Z82.49 FAMILY HISTORY OF DVT: ICD-10-CM

## 2022-04-19 DIAGNOSIS — Z95.0 PACEMAKER: ICD-10-CM

## 2022-04-19 DIAGNOSIS — E11.9 DIET-CONTROLLED TYPE 2 DIABETES MELLITUS (HCC): ICD-10-CM

## 2022-04-19 DIAGNOSIS — I48.0 PAROXYSMAL ATRIAL FIBRILLATION (HCC): ICD-10-CM

## 2022-04-19 DIAGNOSIS — K21.9 GASTROESOPHAGEAL REFLUX DISEASE, UNSPECIFIED WHETHER ESOPHAGITIS PRESENT: ICD-10-CM

## 2022-04-19 DIAGNOSIS — N18.30 TYPE 2 DIABETES MELLITUS WITH STAGE 3 CHRONIC KIDNEY DISEASE, WITHOUT LONG-TERM CURRENT USE OF INSULIN, UNSPECIFIED WHETHER STAGE 3A OR 3B CKD (HCC): ICD-10-CM

## 2022-04-19 PROCEDURE — 99214 OFFICE O/P EST MOD 30 MIN: CPT | Performed by: FAMILY MEDICINE

## 2022-04-19 NOTE — PROGRESS NOTES
Home Nevarez (:  1950) is a 71 y.o. female,Established patient, here for evaluation of the following chief complaint(s):  Establish Care (previous PCP Dr Maty Martinez), Hip Pain (right, recent procedure with [facility]--large hematoma afterwards), Arthritis, Weight Gain, and Atrial Fibrillation [facility]  and Dr. Chandrakant Hernandez)        Subjective   SUBJECTIVE/OBJECTIVE:  HPI:    Chief Complaint   Patient presents with   [address]     previous PCP Dr Maty Martinez    Hip Pain     right, recent procedure with [facility]--large hematoma afterwards    Arthritis    Weight Gain    Atrial Fibrillation     [facility]  and Dr. Chandrakant Hernandez     NP to establish. Last PCP Dr. Anna Simmons. Hx of atrial fib, sees Cardio in [facility] and Dr. Chandrakant Hernandez. BP very well controlled. BP Readings from Last 3 Encounters:   22 132/76   22 132/75   22 130/84     Pt was recently seen by Dr. Jayson Dawn at [facility] for [facility]. Attempted an injection in March but did not help. Now with large hematoma. Follow up as needed. Hx of bilateral knee replacements in [facility]. Hx of DVT and a-fib, on warfarin long-term. Managed by coumadin clinic. Weight fluctuates. Wt Readings from Last 3 Encounters:   22 276 lb (125.2 kg)   22 267 lb (121.1 kg)   22 272 lb (123.4 kg)     Hx of hypothyroidism, this is managed by Dr. Shey Alexis. She is on multiple supplements. Has chronic arthritic pain, on Tylenol and prn Aleve. Hx of GERD, sees Dr. Tiffani Cobb. DM controlled.   Lab Results   Component Value Date    LABA1C 6.0 2021    LABA1C 5.7 2021    LABA1C 6.1 2020     Lab Results   Component Value Date    LABMICR 2.32 2021    LDLCALC 88 2022    CREATININE 1.0 2022       Patient Active Problem List   Diagnosis    Carpal tunnel syndrome    Headache    Other and unspecified nonspecific immunological findings    Pain in joint, lower leg    Abnormal weight gain    Toenail fungus    Osteoarthritis    Hypothyroidism    Nonspecific immunological findings    Gastroesophageal reflux disease    Positive serological test result    Knee pain    Weight gain    Subacute maxillary sinusitis    Homocysteinemia    Lipids abnormal    Vitamin D deficiency    Abnormal blood level of uric acid    Left wrist pain    Other intestinal malabsorption    BMI 45.0-49.9, adult (HCC)    Pain of right hip joint    Essential hypertension    New onset atrial fibrillation (HCC)    Anticoagulated on Coumadin    Sleep apnea    Coronary atherosclerosis    Pacemaker    Encounter for therapeutic drug monitoring    Type 2 diabetes mellitus    Chronic renal disease, stage III ([address] [718808]    Type 2 diabetes mellitus with chronic kidney disease     Past Surgical History:   Procedure Laterality Date    CARPAL TUNNEL RELEASE      both hands     SECTION       SECTION       SECTION      COLONOSCOPY N/A 10/8/2021    COLONOSCOPY performed by Maia Duarte MD at [facility] Left 2018    [facility] Dr. Agustin [facility] Left 2010    Left Total Knee    JOINT REPLACEMENT Right 2016    Right Total Knee    UPPER GASTROINTESTINAL ENDOSCOPY N/A 10/8/2021    EGD BIOPSY performed by [facility]ia [facility]    UPPER GASTROINTESTINAL ENDOSCOPY  10/8/2021    EGD DILATION SAVORY performed by [facility]ia [facility] at [facility]     Social History     Tobacco Use    Smoking status: Never Smoker    Smokeless tobacco: Never Used   Vaping Use    Vaping Use: Never used   Substance Use Topics    Alcohol use: Yes     Comment: rarely    Drug use: No     Prior to Admission medications    Medication Sig Start Date End Date Taking?  Authorizing Provider   Naproxen Sodium (ALEVE) 220 MG CAPS Take by mouth as needed for Pain    Historical Provider, MD   warfarin (COUMADIN) 5 MG tablet As directed by [facility] tabs = 90 day supply 1/17/22   Coy Prajaptai MD   warfarin (COUMADIN) 5 MG tablet Take by mouth every evening Dosed by [facility]. Historical Provider, MD   NONFORMULARY Balance of Nature - Take 3 fruit tablets and 3 vegetable tablets by mouth daily    Historical Provider, MD   OMEPRAZOLE PO Take by mouth nightly as needed     Historical Provider, MD   CAPSAICIN EX Apply topically See Admin Instructions Indications: Pain    Historical Provider, MD   tiZANidine (ZANAFLEX) 2 MG tablet Take 1 tablet by mouth nightly as needed (muscle spasms) 8/19/21   Beatriz Melo MD   Handicap Placard MISC by Other route For 5 years 8/19/21   Beatriz Melo MD   NONFORMULARY daily as needed (pain) Relief Factor (contains cucurmin)    Historical Provider, MD   diclofenac sodium (VOLTAREN) 1 % GEL Apply topically 4 times daily as needed for Pain 7/15/21   Beatriz Melo MD   levothyroxine (SYNTHROID) 100 MCG tablet take 1 tablet by mouth once daily 6/16/21   Nuha Manuel, DO   liothyronine (CYTOMEL) 5 MCG tablet take 1 tablet by mouth once daily 6/16/21   Nuha Shipleyay, DO   Acetaminophen (TYLENOL ARTHRITIS PAIN PO) Take by mouth See Admin Instructions Indications: Pain Don't take more then 3,000 mg each day, including what's in cold products.     Historical Provider, MD   Omega-3 Fatty Acids (FISH OIL PO) Take 2 capsules by mouth daily Indications: Pain    Historical Provider, MD   flecainide (TAMBOCOR) 100 MG tablet Take 100 mg by mouth 2 times daily     Historical Provider, MD   vitamin C (ASCORBIC ACID) 500 MG tablet Take 500 mg by mouth daily    Historical Provider, MD   Calcium Carbonate (SM CALCIUM 600 PO) Take 1 tablet by mouth daily    Historical Provider, MD   metoprolol succinate (TOPROL XL) 25 MG extended release tablet Take 25 mg by mouth 2 times daily     Historical Provider, MD   glucose monitoring kit (FREESTYLE) monitoring kit 1 kit by Does not apply route daily 6/18/20 Jeanella Filter, DO   Lancets MISC 1 each by Does not apply route daily 6/18/20   Jeanella Filter, DO   blood glucose test strips (ASCENSIA AUTODISC VI;ONE TOUCH ULTRA TEST VI) strip 1 each by In Vitro route daily Dispense any brand - check blood sugar As needed.  6/18/20   Jeanella Filter, DO   NONFORMULARY Take 1 capsule by mouth 4 times daily (before meals and nightly) Christopher    Historical Provider, MD   Methylsulfonylmethane (MSM PO) Take 1 tablet by mouth daily as needed (pain)    Historical Provider, MD   nitroGLYCERIN (NITROSTAT) 0.4 MG SL tablet Place under the tongue every 5 minutes as needed for Chest pain (If third one does not relieve pain, call 9-1-1.)  7/31/19   Historical Provider, MD   AQLMSZUIB-OBBTJTUVH-PMUNJIKNJR PO Place 1 tablet under the tongue 2 times daily Jimbo B12 plus or [place] methylB6, [id number], methylfolate     Historical Provider, MD   Magnesium ([employer] TRIPLE MAGNESIUM COMPLEX) 400 MG CAPS Take 3 capsules by mouth 4 times daily (after meals and at bedtime) Indications: Magnesium Deficiency     Historical Provider, MD   magnesium cl-calcium carbonate (SLOW-MAG) 71.5-119 MG TBEC tablet Take 1 tablet by mouth 3 times daily (with meals) Take before and when stressed with 2 extra fish oil PRN    Historical Provider, MD   Grape Seed  mg by Does not apply route daily Oregan Grape Root    Historical Provider, MD   Lysine 500 MG TABS Take 1 tablet by mouth 2 times daily     Historical Provider, MD   DHEA 25 MG CAPS Take 1 capsule by mouth every other day     Historical Provider, MD   Cinnamon 500 MG CAPS Take 3 capsules by mouth 4 times daily (before meals and nightly)     Historical Provider, MD   vitamin D (CHOLECALCIFEROL) 50 MCG (2000 UT) TABS tablet Take 2 capsules by mouth daily Indications: Treatment with Vitamin D .use only the 2000 IU or 1000 IU caps from [employer], 5000IU does not work    Historical Provider, MD   B Complex-Biotin-FA (B-100 TR) TBCR Take 2 tablets by mouth 2 times daily (before meals) Before breakfast and before lunch or supper    Historical Provider, MD         Review of Systems   Constitutional: Negative. HENT: Negative. Respiratory: Negative. Cardiovascular: Negative. Gastrointestinal: Negative. Musculoskeletal: Positive for arthralgias (bl hip pain) and back pain. All other systems reviewed and are negative. Objective   Physical Exam  Vitals and nursing note reviewed. Constitutional:       General: She is not in acute distress. Appearance: Normal appearance. She is well-developed. HENT:      Head: Normocephalic and atraumatic. Right Ear: Tympanic membrane normal.      Left Ear: Tympanic membrane normal.   Eyes:      Conjunctiva/sclera: Conjunctivae normal.   Cardiovascular:      Rate and Rhythm: Normal rate and regular rhythm. Heart sounds: Normal heart sounds. No murmur heard. Pulmonary:      Effort: Pulmonary effort is normal.      Breath sounds: Normal breath sounds. No wheezing, rhonchi or rales. Abdominal:      General: There is no distension. Musculoskeletal:      Cervical back: Neck supple. Skin:     General: Skin is warm and dry. Findings: No rash (on exposed surfaces). Neurological:      General: No focal deficit present. Mental Status: She is alert. Psychiatric:         Attention and Perception: Attention normal.         Mood and Affect: Mood normal.         Speech: Speech normal.         Behavior: Behavior normal. Behavior is cooperative. Thought Content: Thought content normal.         Judgment: Judgment normal.               ASSESSMENT/PLAN:  1. Essential hypertension  2. Paroxysmal atrial fibrillation (HCC)  3. Stage 3 chronic kidney disease, unspecified whether stage 3a or 3b CKD ([address]  4. Anticoagulated on Coumadin  5. Family history of DVT  6. BMI 45.0-49.9, adult ([address]  7. Primary osteoarthritis of both hips  8.  Gastroesophageal reflux disease, unspecified whether esophagitis present  9. Pacemaker  10. Diet-controlled type 2 diabetes mellitus ([facility]  11. Type 2 diabetes mellitus with stage 3 chronic kidney disease, without long-term current use of insulin, unspecified whether stage 3a or 3b CKD ([facility]    -  PMHx reviewed  -  Chronic medical problems stable  -  Continue current medications  -  Follow up with specialists as scheduled      Return for as needed. An electronic signature was used to authenticate this note.     --Jaime Jurado, DO

## 2022-04-20 PROBLEM — E11.9 TYPE 2 DIABETES MELLITUS (HCC): Status: ACTIVE | Noted: 2022-04-20

## 2022-04-20 PROBLEM — E11.22 TYPE 2 DIABETES MELLITUS WITH CHRONIC KIDNEY DISEASE (HCC): Status: ACTIVE | Noted: 2022-04-20

## 2022-04-20 PROBLEM — N18.30 CHRONIC RENAL DISEASE, STAGE III (HCC): Status: ACTIVE | Noted: 2022-04-20

## 2022-04-20 ASSESSMENT — ENCOUNTER SYMPTOMS
GASTROINTESTINAL NEGATIVE: 1
BACK PAIN: 1
RESPIRATORY NEGATIVE: 1

## 2022-04-25 ENCOUNTER — HOSPITAL ENCOUNTER (OUTPATIENT)
Dept: PHARMACY | Age: 72
Setting detail: THERAPIES SERIES
Discharge: HOME | End: 2022-04-25
Payer: MEDICARE

## 2022-04-25 DIAGNOSIS — Z79.01 ANTICOAGULATED ON COUMADIN: Primary | ICD-10-CM

## 2022-04-25 DIAGNOSIS — Z51.81 ENCOUNTER FOR THERAPEUTIC DRUG MONITORING: ICD-10-CM

## 2022-04-25 LAB — POC INR: 3.1 (ref 0.8–1.2)

## 2022-04-25 PROCEDURE — 36416 COLLJ CAPILLARY BLOOD SPEC: CPT

## 2022-04-25 PROCEDURE — 99211 OFF/OP EST MAY X REQ PHY/QHP: CPT

## 2022-04-25 PROCEDURE — 85610 PROTHROMBIN TIME: CPT

## 2022-04-25 NOTE — PROGRESS NOTES
Medication Management [address]  [phone] (phone)  [phone] (fax)    Ms. Clari Bethea is a 71 y.o.  female with history of Afib who presents today for anticoagulation monitoring and adjustment. Patient verifies current dosing regimen and tablet strength. No missed or extra doses. Patient denies s/s bleeding/bruising/swelling/SOB/chest pain-hematoma is much improved  No blood in urine or stool. No dietary changes. No changes in medication/OTC agents/Herbals. No change in alcohol use or tobacco use. No change in activity level. Patient denies headaches/dizziness/lightheadedness/falls. No vomiting/diarrhea or acute illness. No Procedures scheduled in the future at this time.-Patient has been in a lot of pain due to her hip. She plans to see a doctor about this on May 16th. Assessment:   Lab Results   Component Value Date    INR 3.10 (H) 04/25/2022    INR 4.10 (H) 04/18/2022    INR 2.70 (H) 03/28/2022     INR supratherapeutic   Recent Labs     04/25/22  1448   INR 3.10*         Plan:  Coumadin 2.5 mg x 1 then decrease Coumadin 5 mg MF, 2.5 mg TWTHSS (10% decrease). Recheck INR in 2 week(s). Patient reminded to call the Anticoagulation Clinic with signs or symptoms of bleeding or with any medication changes. Patient given instructions utilizing the teach back method. After visit summary printed and reviewed with patient. Discharged ambulatory in no apparent distress.         For Pharmacy Admin Tracking Only     Intervention Detail: Dose Adjustment: 1, reason: Therapy De-escalation   Total # of Interventions Recommended: 1   Total # of Interventions Accepted: 1   Time Spent (min): 9796 Yuriy French, [place], BCPS  4/25/2022  2:56 PM

## 2022-05-10 ENCOUNTER — HOSPITAL ENCOUNTER (OUTPATIENT)
Dept: PHARMACY | Age: 72
Setting detail: THERAPIES SERIES
Discharge: HOME | End: 2022-05-10
Payer: MEDICARE

## 2022-05-10 DIAGNOSIS — Z79.01 ANTICOAGULATED ON COUMADIN: Primary | ICD-10-CM

## 2022-05-10 DIAGNOSIS — Z51.81 ENCOUNTER FOR THERAPEUTIC DRUG MONITORING: ICD-10-CM

## 2022-05-10 LAB — POC INR: 3.1 (ref 0.8–1.2)

## 2022-05-10 PROCEDURE — 36416 COLLJ CAPILLARY BLOOD SPEC: CPT | Performed by: PHARMACIST

## 2022-05-10 PROCEDURE — 99211 OFF/OP EST MAY X REQ PHY/QHP: CPT | Performed by: PHARMACIST

## 2022-05-10 PROCEDURE — 85610 PROTHROMBIN TIME: CPT | Performed by: PHARMACIST

## 2022-05-10 NOTE — PROGRESS NOTES
Medication Management [address]  [phone] (phone)  [phone] (fax)    Ms. Aleksandra Kapoor is a 71 y.o.  female with history of Afib who presents today for anticoagulation monitoring and adjustment. Patient verifies current dosing regimen and tablet strength. No missed or extra doses. Patient denies s/s bleeding/bruising/swelling/SOB/chest pain  No blood in urine or stool. No dietary changes. No changes in medication/OTC agents/Herbals. Takes many supplements. States not always consistent with taking her Balance of [employer] supplement. Discussed briefly, advised consistency. May be taking more Tylenol that previous. No change in alcohol use or tobacco use. No change in activity level. Patient denies headaches/dizziness/lightheadedness/falls. No vomiting/diarrhea or acute illness. No Procedures scheduled in the future at this time. Interview brief. Pt currently having hip pain (known problem). Assessment:   Lab Results   Component Value Date    INR 3.10 (H) 05/10/2022    INR 3.10 (H) 04/25/2022    INR 4.10 (H) 04/18/2022     INR therapeutic   Recent Labs     05/10/22  1524   INR 3.10*         Plan:  Decrease Coumadin 5mg M and 2.5mg TWThFSaS. Recheck INR in 3 week(s). Patient reminded to call the Anticoagulation Clinic with signs or symptoms of bleeding or with any medication changes. Patient given instructions utilizing the teach back method. After visit summary printed and reviewed with patient. Discharged ambulatory in no apparent distress.       For Pharmacy Admin Tracking Only     Intervention Detail: Dose Adjustment: 1, reason: Therapy De-escalation   Total # of Interventions Recommended: 1   Total # of Interventions Accepted: 1   Time Spent (min): 20

## 2022-05-17 ENCOUNTER — TELEPHONE (OUTPATIENT)
Dept: FAMILY MEDICINE CLINIC | Age: 72
End: 2022-05-17

## 2022-05-17 DIAGNOSIS — R79.83 HOMOCYSTEINEMIA: ICD-10-CM

## 2022-05-17 DIAGNOSIS — E87.5 HYPERKALEMIA: ICD-10-CM

## 2022-05-17 DIAGNOSIS — M16.0 PRIMARY OSTEOARTHRITIS OF BOTH HIPS: ICD-10-CM

## 2022-05-17 DIAGNOSIS — G47.30 SLEEP APNEA, UNSPECIFIED TYPE: ICD-10-CM

## 2022-05-17 DIAGNOSIS — K90.89 OTHER SPECIFIED INTESTINAL MALABSORPTION: ICD-10-CM

## 2022-05-17 DIAGNOSIS — I10 ESSENTIAL HYPERTENSION: Primary | ICD-10-CM

## 2022-05-17 DIAGNOSIS — I48.0 PAROXYSMAL ATRIAL FIBRILLATION (HCC): ICD-10-CM

## 2022-05-17 DIAGNOSIS — K21.9 GASTROESOPHAGEAL REFLUX DISEASE, UNSPECIFIED WHETHER ESOPHAGITIS PRESENT: ICD-10-CM

## 2022-05-17 DIAGNOSIS — Z79.01 ANTICOAGULATED ON COUMADIN: ICD-10-CM

## 2022-05-17 DIAGNOSIS — N18.30 STAGE 3 CHRONIC KIDNEY DISEASE, UNSPECIFIED WHETHER STAGE 3A OR 3B CKD (HCC): ICD-10-CM

## 2022-05-17 DIAGNOSIS — E03.9 HYPOTHYROIDISM, UNSPECIFIED TYPE: ICD-10-CM

## 2022-05-17 DIAGNOSIS — E11.9 DIET-CONTROLLED TYPE 2 DIABETES MELLITUS (HCC): ICD-10-CM

## 2022-05-17 NOTE — TELEPHONE ENCOUNTER
Orders pended, please review, attach diagnosis, approve or deny.     Will call pt tomorrow and cancel the appt, and advise I am mailing lab orders to be done before an appt in end of Sept.

## 2022-05-17 NOTE — TELEPHONE ENCOUNTER
Pt called and has no labs for 5/23/2022. Said she is not having problems except she will be getting bilateral hip replacements. July 26th she is getting the right and 6 weeks later she is getting the left. Told her I have a suggestion. We can cancel the 5/23/2022 appt and reschedule a follow up in the 3rd or 4th week of Sept.  We can do a full lab run. 1.  She'll need to be off supplements for the surgery  2. If we do labs between surgery she;ll still have inflammation which she is complaining about with the leg pain- caused by the hips OIO is telling her. 3.  Can get back on track and keep out of the snow and ice with the new hips. Ok to do?

## 2022-05-31 ENCOUNTER — HOSPITAL ENCOUNTER (OUTPATIENT)
Dept: PHARMACY | Age: 72
Setting detail: THERAPIES SERIES
Discharge: HOME | End: 2022-05-31
Payer: MEDICARE

## 2022-05-31 DIAGNOSIS — Z79.01 ANTICOAGULATED ON COUMADIN: Primary | ICD-10-CM

## 2022-05-31 DIAGNOSIS — Z51.81 ENCOUNTER FOR THERAPEUTIC DRUG MONITORING: ICD-10-CM

## 2022-05-31 LAB — POC INR: 2.2 (ref 0.8–1.2)

## 2022-05-31 PROCEDURE — 85610 PROTHROMBIN TIME: CPT

## 2022-05-31 PROCEDURE — 99211 OFF/OP EST MAY X REQ PHY/QHP: CPT

## 2022-05-31 PROCEDURE — 36416 COLLJ CAPILLARY BLOOD SPEC: CPT

## 2022-06-21 ENCOUNTER — HOSPITAL ENCOUNTER (OUTPATIENT)
Dept: PHARMACY | Age: 72
Setting detail: THERAPIES SERIES
Discharge: HOME | End: 2022-06-21
Payer: MEDICARE

## 2022-06-21 DIAGNOSIS — E03.9 HYPOTHYROIDISM, UNSPECIFIED TYPE: ICD-10-CM

## 2022-06-21 DIAGNOSIS — Z51.81 ENCOUNTER FOR THERAPEUTIC DRUG MONITORING: ICD-10-CM

## 2022-06-21 DIAGNOSIS — Z79.01 ANTICOAGULATED ON COUMADIN: Primary | ICD-10-CM

## 2022-06-21 LAB — POC INR: 2.1 (ref 0.8–1.2)

## 2022-06-21 PROCEDURE — 36416 COLLJ CAPILLARY BLOOD SPEC: CPT

## 2022-06-21 PROCEDURE — 85610 PROTHROMBIN TIME: CPT

## 2022-06-21 PROCEDURE — 99211 OFF/OP EST MAY X REQ PHY/QHP: CPT

## 2022-06-21 RX ORDER — LEVOTHYROXINE SODIUM 0.1 MG/1
TABLET ORAL
Qty: 90 TABLET | Refills: 3 | Status: SHIPPED | OUTPATIENT
Start: 2022-06-21

## 2022-06-21 RX ORDER — LIOTHYRONINE SODIUM 5 UG/1
TABLET ORAL
Qty: 90 TABLET | Refills: 3 | Status: SHIPPED | OUTPATIENT
Start: 2022-06-21

## 2022-06-21 NOTE — PROGRESS NOTES
Medication Management [address]  [phone] (phone)  [phone] (fax)    Ms. Guy Bertrand is a 71 y.o.  female with history of Afib who presents today for anticoagulation monitoring and adjustment. Patient verifies current dosing regimen and tablet strength. No missed or extra doses. Patient denies s/s bleeding/bruising/swelling/SOB/chest pain  No blood in urine or stool. No dietary changes. No changes in medication/OTC agents/Herbals. No change in alcohol use or tobacco use. No change in activity level. Patient denies headaches/dizziness/lightheadedness/falls. No vomiting/diarrhea or acute illness. Patient has right hip replacement scheduled for 7/26/22 and then will need to have left hip replacement done 6 weeks later. Will find out on 7/12/22 if needs to hold Coumadin. Patient is also having stress test on 6/30/22 with Dr. Dev Aranda. Assessment:   Lab Results   Component Value Date    INR 2.10 (H) 06/21/2022    INR 2.20 (H) 05/31/2022    INR 3.10 (H) 05/10/2022     INR therapeutic   Recent Labs     06/21/22  1320   INR 2.10*         Plan:  Continue Coumadin 2.5 mg daily except 5 mg on Mondays. Recheck INR in 3 week(s) on 7/14. Patient reminded to call the Anticoagulation Clinic with any signs or symptoms of bleeding or with any medication changes. Patient given instructions utilizing the teach back method. After visit summary printed and reviewed with patient. Discharged ambulatory in no apparent distress.     For Pharmacy Admin Tracking Only     Total # of Interventions Recommended: 0   Time Spent (min): 5620 Read Maurice Teixeira   6/21/2022, 1:28 PM

## 2022-06-21 NOTE — TELEPHONE ENCOUNTER
Pt called office requesting a refill of her Synthroid 100mcg and Cytomel 5mcg to [facility]. If no call back she will check with the pharmacy after 2pm today. Refill if appropriate. Pt does mention that she is getting thyroid labs through Dr. Asha Junior in 10/2022.

## 2022-06-30 ENCOUNTER — HOSPITAL ENCOUNTER (OUTPATIENT)
Dept: NON INVASIVE DIAGNOSTICS | Age: 72
Discharge: HOME | End: 2022-06-30
Payer: MEDICARE

## 2022-06-30 DIAGNOSIS — R07.9 CHEST PAIN, UNSPECIFIED TYPE: ICD-10-CM

## 2022-06-30 PROCEDURE — 93017 CV STRESS TEST TRACING ONLY: CPT | Performed by: NUCLEAR MEDICINE

## 2022-06-30 PROCEDURE — 93018 CV STRESS TEST I&R ONLY: CPT | Performed by: NUCLEAR MEDICINE

## 2022-06-30 PROCEDURE — 78452 HT MUSCLE IMAGE SPECT MULT: CPT | Performed by: NUCLEAR MEDICINE

## 2022-06-30 PROCEDURE — 78452 HT MUSCLE IMAGE SPECT MULT: CPT

## 2022-06-30 PROCEDURE — 6360000002 HC RX W HCPCS

## 2022-06-30 PROCEDURE — A9500 TC99M SESTAMIBI: HCPCS | Performed by: INTERNAL MEDICINE

## 2022-06-30 PROCEDURE — 93016 CV STRESS TEST SUPVJ ONLY: CPT | Performed by: NUCLEAR MEDICINE

## 2022-06-30 PROCEDURE — 3430000000 HC RX DIAGNOSTIC RADIOPHARMACEUTICAL: Performed by: INTERNAL MEDICINE

## 2022-06-30 RX ADMIN — Medication 8.8 MILLICURIE: at 11:19

## 2022-06-30 RX ADMIN — Medication 28.6 MILLICURIE: at 12:24

## 2022-07-01 ENCOUNTER — TELEPHONE (OUTPATIENT)
Dept: CARDIOLOGY CLINIC | Age: 72
End: 2022-07-01

## 2022-07-05 NOTE — PRE-PROCEDURE INSTRUCTIONS
[place] on 2nd floor [facility]  NPO after midnight  Bring drivers license and insurance information  Wear comfortable clean clothes  Shower morning of and night before with liquid antibacterial soap  Remove jewelry   May have to stay overnight if have PTCA/stent - bring small overnight bag  Bring medications in original bottles - may take am meds with small amount of water. Do not take any diabetic meds day of procedure. Made aware of visitors limit to 2 at a time  Follow all instructions given by your physician  Please notify doctor office if you need to cancel or reschedule your procedure   needed at discharge  Bring and wear your mask.   If you use CPap or BiPap for sleep apnea, please bring machine with you  Leave valuables at home

## 2022-07-05 NOTE — TELEPHONE ENCOUNTER
Dr. Yamileth Tucker, please see Dr. Wanda Alcocer note below regarding patient's recent stress test.

## 2022-07-07 ENCOUNTER — HOSPITAL ENCOUNTER (OUTPATIENT)
Dept: INPATIENT UNIT | Age: 72
Discharge: HOME | End: 2022-07-07
Attending: INTERNAL MEDICINE | Admitting: INTERNAL MEDICINE
Payer: MEDICARE

## 2022-07-07 ENCOUNTER — APPOINTMENT (OUTPATIENT)
Dept: CARDIAC CATH/INVASIVE PROCEDURES | Age: 72
End: 2022-07-07
Payer: MEDICARE

## 2022-07-07 VITALS
RESPIRATION RATE: 12 BRPM | BODY MASS INDEX: 45.75 KG/M2 | WEIGHT: 268 LBS | HEART RATE: 60 BPM | OXYGEN SATURATION: 98 % | DIASTOLIC BLOOD PRESSURE: 64 MMHG | TEMPERATURE: 98 F | SYSTOLIC BLOOD PRESSURE: 138 MMHG | HEIGHT: 64 IN

## 2022-07-07 LAB
ABO: NORMAL
ALBUMIN SERPL-MCNC: 4.1 G/DL (ref 3.5–5.1)
ALP BLD-CCNC: 88 U/L (ref 38–126)
ALT SERPL-CCNC: 11 U/L (ref 11–66)
ANION GAP SERPL CALCULATED.3IONS-SCNC: 16 MEQ/L (ref 8–16)
ANTIBODY SCREEN: NORMAL
AST SERPL-CCNC: 21 U/L (ref 5–40)
BILIRUB SERPL-MCNC: 0.8 MG/DL (ref 0.3–1.2)
BUN BLDV-MCNC: 20 MG/DL (ref 7–22)
CALCIUM SERPL-MCNC: 9.9 MG/DL (ref 8.5–10.5)
CHLORIDE BLD-SCNC: 103 MEQ/L (ref 98–111)
CHOLESTEROL, TOTAL: 147 MG/DL (ref 100–199)
CO2: 22 MEQ/L (ref 23–33)
CREAT SERPL-MCNC: 1 MG/DL (ref 0.4–1.2)
ERYTHROCYTE [DISTWIDTH] IN BLOOD BY AUTOMATED COUNT: 15.1 % (ref 11.5–14.5)
ERYTHROCYTE [DISTWIDTH] IN BLOOD BY AUTOMATED COUNT: 49.1 FL (ref 35–45)
GFR SERPL CREATININE-BSD FRML MDRD: 55 ML/MIN/1.73M2
GLUCOSE BLD-MCNC: 103 MG/DL (ref 70–108)
HCT VFR BLD CALC: 42.3 % (ref 37–47)
HDLC SERPL-MCNC: 63 MG/DL
HEMOGLOBIN: 13.5 GM/DL (ref 12–16)
INR BLD: 1.48 (ref 0.85–1.13)
LDL CHOLESTEROL CALCULATED: 70 MG/DL
MCH RBC QN AUTO: 28.2 PG (ref 26–33)
MCHC RBC AUTO-ENTMCNC: 31.9 GM/DL (ref 32.2–35.5)
MCV RBC AUTO: 88.5 FL (ref 81–99)
PLATELET # BLD: 257 THOU/MM3 (ref 130–400)
PMV BLD AUTO: 10.1 FL (ref 9.4–12.4)
POTASSIUM SERPL-SCNC: 4.1 MEQ/L (ref 3.5–5.2)
RBC # BLD: 4.78 MILL/MM3 (ref 4.2–5.4)
RH FACTOR: NORMAL
SODIUM BLD-SCNC: 141 MEQ/L (ref 135–145)
TOTAL PROTEIN: 7.8 G/DL (ref 6.1–8)
TRIGL SERPL-MCNC: 72 MG/DL (ref 0–199)
WBC # BLD: 6.7 THOU/MM3 (ref 4.8–10.8)

## 2022-07-07 PROCEDURE — 93005 ELECTROCARDIOGRAM TRACING: CPT | Performed by: INTERNAL MEDICINE

## 2022-07-07 PROCEDURE — 85610 PROTHROMBIN TIME: CPT

## 2022-07-07 PROCEDURE — 6360000002 HC RX W HCPCS

## 2022-07-07 PROCEDURE — C1894 INTRO/SHEATH, NON-LASER: HCPCS

## 2022-07-07 PROCEDURE — 85027 COMPLETE CBC AUTOMATED: CPT

## 2022-07-07 PROCEDURE — 36415 COLL VENOUS BLD VENIPUNCTURE: CPT

## 2022-07-07 PROCEDURE — 86900 BLOOD TYPING SEROLOGIC ABO: CPT

## 2022-07-07 PROCEDURE — C1769 GUIDE WIRE: HCPCS

## 2022-07-07 PROCEDURE — 2580000003 HC RX 258: Performed by: INTERNAL MEDICINE

## 2022-07-07 PROCEDURE — 86850 RBC ANTIBODY SCREEN: CPT

## 2022-07-07 PROCEDURE — 6360000004 HC RX CONTRAST MEDICATION: Performed by: INTERNAL MEDICINE

## 2022-07-07 PROCEDURE — 6370000000 HC RX 637 (ALT 250 FOR IP): Performed by: INTERNAL MEDICINE

## 2022-07-07 PROCEDURE — 2500000003 HC RX 250 WO HCPCS

## 2022-07-07 PROCEDURE — 80053 COMPREHEN METABOLIC PANEL: CPT

## 2022-07-07 PROCEDURE — 93458 L HRT ARTERY/VENTRICLE ANGIO: CPT

## 2022-07-07 PROCEDURE — 80061 LIPID PANEL: CPT

## 2022-07-07 PROCEDURE — 86901 BLOOD TYPING SEROLOGIC RH(D): CPT

## 2022-07-07 PROCEDURE — 93452 LEFT HRT CATH W/VENTRCLGRPHY: CPT | Performed by: INTERNAL MEDICINE

## 2022-07-07 RX ORDER — ACETAMINOPHEN 325 MG/1
650 TABLET ORAL EVERY 4 HOURS PRN
Status: DISCONTINUED | OUTPATIENT
Start: 2022-07-07 | End: 2022-07-07 | Stop reason: HOSPADM

## 2022-07-07 RX ORDER — DIPHENHYDRAMINE HCL 25 MG
50 TABLET ORAL ONCE
Status: CANCELLED | OUTPATIENT
Start: 2022-07-07 | End: 2022-07-07

## 2022-07-07 RX ORDER — SODIUM CHLORIDE 0.9 % (FLUSH) 0.9 %
5-40 SYRINGE (ML) INJECTION EVERY 12 HOURS SCHEDULED
Status: DISCONTINUED | OUTPATIENT
Start: 2022-07-07 | End: 2022-07-07 | Stop reason: HOSPADM

## 2022-07-07 RX ORDER — SODIUM CHLORIDE 9 MG/ML
INJECTION, SOLUTION INTRAVENOUS CONTINUOUS
Status: DISCONTINUED | OUTPATIENT
Start: 2022-07-07 | End: 2022-07-07 | Stop reason: HOSPADM

## 2022-07-07 RX ORDER — SODIUM CHLORIDE 9 MG/ML
INJECTION, SOLUTION INTRAVENOUS PRN
Status: DISCONTINUED | OUTPATIENT
Start: 2022-07-07 | End: 2022-07-07 | Stop reason: HOSPADM

## 2022-07-07 RX ORDER — SODIUM CHLORIDE 9 MG/ML
100 INJECTION, SOLUTION INTRAVENOUS CONTINUOUS
Status: DISCONTINUED | OUTPATIENT
Start: 2022-07-07 | End: 2022-07-07 | Stop reason: HOSPADM

## 2022-07-07 RX ORDER — ONDANSETRON 2 MG/ML
4 INJECTION INTRAMUSCULAR; INTRAVENOUS EVERY 6 HOURS PRN
Status: DISCONTINUED | OUTPATIENT
Start: 2022-07-07 | End: 2022-07-07 | Stop reason: HOSPADM

## 2022-07-07 RX ORDER — SODIUM CHLORIDE 0.9 % (FLUSH) 0.9 %
5-40 SYRINGE (ML) INJECTION PRN
Status: DISCONTINUED | OUTPATIENT
Start: 2022-07-07 | End: 2022-07-07 | Stop reason: HOSPADM

## 2022-07-07 RX ORDER — SODIUM CHLORIDE 9 MG/ML
INJECTION, SOLUTION INTRAVENOUS PRN
Status: CANCELLED | OUTPATIENT
Start: 2022-07-07

## 2022-07-07 RX ORDER — ASPIRIN 325 MG
325 TABLET ORAL ONCE
Status: COMPLETED | OUTPATIENT
Start: 2022-07-07 | End: 2022-07-07

## 2022-07-07 RX ORDER — ATROPINE SULFATE 0.4 MG/ML
0.5 AMPUL (ML) INJECTION
Status: DISCONTINUED | OUTPATIENT
Start: 2022-07-07 | End: 2022-07-07 | Stop reason: HOSPADM

## 2022-07-07 RX ADMIN — IOPAMIDOL 80 ML: 755 INJECTION, SOLUTION INTRAVENOUS at 08:17

## 2022-07-07 RX ADMIN — ASPIRIN 325 MG: 325 TABLET ORAL at 07:25

## 2022-07-07 RX ADMIN — SODIUM CHLORIDE: 9 INJECTION, SOLUTION INTRAVENOUS at 07:21

## 2022-07-07 ASSESSMENT — PAIN DESCRIPTION - DESCRIPTORS: DESCRIPTORS: ACHING

## 2022-07-07 ASSESSMENT — PAIN DESCRIPTION - FREQUENCY: FREQUENCY: CONTINUOUS

## 2022-07-07 ASSESSMENT — LIFESTYLE VARIABLES: HOW OFTEN DO YOU HAVE A DRINK CONTAINING ALCOHOL: NEVER

## 2022-07-07 ASSESSMENT — PAIN SCALES - GENERAL: PAINLEVEL_OUTOF10: 3

## 2022-07-07 ASSESSMENT — PAIN DESCRIPTION - LOCATION: LOCATION: HIP

## 2022-07-07 ASSESSMENT — PAIN - FUNCTIONAL ASSESSMENT: PAIN_FUNCTIONAL_ASSESSMENT: PREVENTS OR INTERFERES SOME ACTIVE ACTIVITIES AND ADLS

## 2022-07-07 ASSESSMENT — PAIN DESCRIPTION - ORIENTATION: ORIENTATION: RIGHT

## 2022-07-07 ASSESSMENT — PAIN DESCRIPTION - PAIN TYPE: TYPE: CHRONIC PAIN

## 2022-07-07 ASSESSMENT — PAIN DESCRIPTION - ONSET: ONSET: ON-GOING

## 2022-07-07 NOTE — PROCEDURES
[address] [place] [address]                            CARDIAC CATHETERIZATION    PATIENT NAME: Josette Silva                     :        1950  MED REC NO:   [id number]                           ROOM:       [id number]  ACCOUNT NO:   [de-identified]                           ADMIT DATE: 2022  PROVIDER:     Leandro Meza. Sera Laws M.D.    Eleuterio Huitron:  2022    INDICATION FOR PROCEDURE:  This is a patient who is a 71-year-old lady  with history of pacemaker implantation, history of hypertension,  hypercholesterolemia, diabetes mellitus. The patient had been  complaining of chest pain. She did have a stress Cardiolite test, which  was abnormal.  She was treated medically, continued to be symptomatic. She is to have hip surgery, referred for heart cath, possible  intervention. The patient understands the procedure, the benefits, the  risks, alternative methods of treatment, possible complications, and  wants it to be done. PROCEDURES:  1.  IV conscious sedation:  The patient was given IV conscious sedation  in incremental dosage by circulating cath lab RN, monitored by the cath  lab monitor tech under my supervision. The procedure started at 08:00  and finished at 08:10 a.m. No acute complication from the procedure. Estimated blood loss about 10 mL. 2.  Left heart cath:  Right radial artery was cannulated with a 6-[place]  sheath. Coronary angiogram showed the RCA is a dominant artery and the RCA was  patent. The left main was patent bifurcates to the LAD and circumflex. Circumflex codominant artery. It was tortuous and patent. The LAD was  patent and tortuous. Diagonal artery was patent. The left  ventriculogram showed the preserved systolic function of the left  ventricle, ejection fraction about 50 to 55%.   Left ventricular  end-diastolic pressure goes from 22 to 33 consistent with diastolic  dysfunction of the left ventricle. No mitral regurg, no gradient across  the aortic valve. The patient tolerated the procedure very well. IMPRESSION:  1. Preserved systolic function of the left ventricle, ejection fraction  about 50 to 55%. No gross wall motion abnormality seen. 2.  No mitral regurg. No gradient across the aortic valve. 3.  Diastolic dysfunction of the left ventricle. Left ventricular  end-diastolic pressure goes from 22 to 33.  4.  No mitral regurg. No gradient across the aortic valve  5. Essentially patent coronary arteries. RECOMMENDATIONS:  At this point, we will continue medical treatment,  risk factor modification, periodic followup. The patient will be able  to go for her hip surgery. She is at low risk from the cardiac  standpoint.         Alexia Cole M.D.    D: 07/07/2022 8:29:47       T: 07/07/2022 10:15:23     [email or web address]  Job#: [id number]     Doc#: [id number]    CC:

## 2022-07-07 NOTE — PROGRESS NOTES
Patient given discharge instructions and discussed with patient and spouse at bedside. Pt has no further questions at this time. Belongings gathered. Pt leaving for home with spouse.

## 2022-07-07 NOTE — OP NOTE
Operative Note      Patient: Delphine León 74[address]  Sedation/Analgesia Post Sedation Record      Pt Name: Carlee Crouch  MRN: [id number]  YOB: 1950  Procedure Performed By: Lillian Cheney MD, MD  Primary Care Physician: Deborah Palmer DO    POST-PROCEDURE    Physician: Lillian Cheney MD, MD    Procedure Performed:  Left Heart Cath    Sedation/Anesthesia:  Local Anesthesia and IV Conscious Sedation with continuous O2 monitoring    Estimated Blood Loss:  Minimal    Specimens Removed:  None    Complications:  None     Post Procedure Diagnosis/Findings:  Coronary Artery Disease    Recommendations:  Medical treatment and review films.        Lillian Cheney MD, MD  Electronically signed 7/7/2022 at 8:17 AM

## 2022-07-07 NOTE — PROGRESS NOTES
0615Patient admitted to 2E  Ambulatory for [facility]. Patient NPO. Patient accompanied by spouse. Vital signs obtained. Assessment and data collection intiated. Oriented to room. Policies and procedures for 2E explained. All questions answered with no further questions at this time. Fall prevention and safety precautions discussed with patient.

## 2022-07-07 NOTE — H&P
[facility]   Sedation/Analgesia History and Physical    Pt Name: Carlee Crouch  MRN: [id number]  YOB: 1950  Provider Performing Procedure: Lillian Cheney MD, MD  Primary Care Physician: Deborah Palmer DO      Pre-Procedure: Chest pain, possible coronary artery disease, abnormal stress test    Consent: I have discussed with the patient and/or the patient representative the indication, alternatives, and the possible risks and/or complications of the planned procedure and the anesthesia methods. The patient and/or representative appear to understand and agree to proceed. Medical History:   has a past medical history of A-fib [facility]), Atrial fibrillation ([facility] [address] CAD (coronary artery disease), Carpal tunnel syndrome, Diabetes mellitus ([facility] [address] GERD (gastroesophageal reflux disease), Headache(784.0), HTN (hypertension), Hyperlipidemia, Hypothyroidism, Osteoarthritis, Pacemaker, Sleep apnea, Tendonitis of both wrists, and Toenail fungus. .    Surgical History:     has a past surgical history that includes Carpal tunnel release; hernia repair;  section;  section;  section; Foot surgery (Left, 2018); joint replacement (Left, 2010); joint replacement (Right, 2016); Colonoscopy (N/A, 10/8/2021); Upper gastrointestinal endoscopy (N/A, 10/8/2021); and Upper gastrointestinal endoscopy (10/8/2021). Allergies: Allergies as of 2022 - Fully Reviewed 2022   Allergen Reaction Noted    Enoxaparin  2021       Medications:   Coumadin use last 5 days:  No  Antiplatelet drug therapy use last 5 days:  Yes  Other anticoagulant use last 5 days:  No   sodium chloride flush  5-40 mL IntraVENous 2 times per day    aspirin  325 mg Oral Once     Prior to Admission medications    Medication Sig Start Date End Date Taking?  Authorizing Provider   levothyroxine (SYNTHROID) 100 MCG tablet take 1 tablet by mouth once daily 22   Deborah Palmer DO liothyronine (CYTOMEL) 5 MCG tablet take 1 tablet by mouth once daily 6/21/22   Dina Damian,    Naproxen Sodium (ALEVE) 220 MG CAPS Take by mouth as needed for Pain    Historical Provider, MD   warfarin (COUMADIN) 5 MG tablet As directed by Boni. [facility] tabs = 90 day supply 1/17/22   Jenna Lantigua MD   warfarin (COUMADIN) 5 MG tablet Take by mouth every evening Dosed by [facility]. Historical Provider, MD   NONFORMULARY Balance of Nature - Take 3 fruit tablets and 3 vegetable tablets by mouth daily    Historical Provider, MD   OMEPRAZOLE PO Take 1 tablet by mouth daily     Historical Provider, MD   CAPSAICIN EX Apply topically See Admin Instructions Indications: Pain    Historical Provider, MD   tiZANidine (ZANAFLEX) 2 MG tablet Take 1 tablet by mouth nightly as needed (muscle spasms) 8/19/21   Sherice Johnson MD   Handicap Placard MISC by Other route For 5 years 8/19/21   Sherice Johnson MD   diclofenac sodium (VOLTAREN) 1 % GEL Apply topically 4 times daily as needed for Pain 7/15/21   Sherice Johnson MD   Acetaminophen (TYLENOL ARTHRITIS PAIN PO) Take by mouth See Admin Instructions Indications: Pain Don't take more then 3,000 mg each day, including what's in cold products.     Historical Provider, MD   Omega-3 Fatty Acids (FISH OIL PO) Take 2 capsules by mouth daily Indications: Pain     Historical Provider, MD   flecainide (TAMBOCOR) 100 MG tablet Take 100 mg by mouth 2 times daily     Historical Provider, MD   vitamin C (ASCORBIC ACID) 500 MG tablet Take 500 mg by mouth daily    Historical Provider, MD   Calcium Carbonate (SM CALCIUM 600 PO) Take 1 tablet by mouth daily    Historical Provider, MD   metoprolol succinate (TOPROL XL) 25 MG extended release tablet Take 25 mg by mouth 2 times daily     Historical Provider, MD   glucose monitoring kit (FREESTYLE) monitoring kit 1 kit by Does not apply route daily 6/18/20   Teresa Mccallum,    Lancets MISC 1 each by Does not apply route daily 6/18/20   Arturo Rose, DO   blood glucose test strips (ASCENSIA AUTODISC VI;ONE TOUCH ULTRA TEST VI) strip 1 each by In Vitro route daily Dispense any brand - check blood sugar As needed.  6/18/20   Arturo Rose, DO   NONFORMULARY Take 1 capsule by mouth 4 times daily (before meals and nightly) Christopher    Historical Provider, MD   Methylsulfonylmethane (MSM PO) Take 1 tablet by mouth daily as needed (pain)    Historical Provider, MD   nitroGLYCERIN (NITROSTAT) 0.4 MG SL tablet Place under the tongue every 5 minutes as needed for Chest pain (If third one does not relieve pain, call 9-1-1.)  7/31/19   Historical Provider, MD   THDGKHAQE-ENCTRRZJS-UIYTECZKVS PO Place 1 tablet under the tongue 2 times daily Jimbo B12 plus or [place] methylB6, [id number], methylfolate     Historical Provider, MD   Magnesium ([employer]S TRIPLE MAGNESIUM COMPLEX) 400 MG CAPS Take 3 capsules by mouth 4 times daily (after meals and at bedtime) Indications: Magnesium Deficiency     Historical Provider, MD   magnesium cl-calcium carbonate (SLOW-MAG) 71.5-119 MG TBEC tablet Take 1 tablet by mouth 3 times daily (with meals) Take before and when stressed with 2 extra fish oil PRN    Historical Provider, MD   Grape Seed  mg by Does not apply route daily Oregan Grape Root    Historical Provider, MD   Lysine 500 MG TABS Take 1 tablet by mouth 2 times daily     Historical Provider, MD   DHEA 25 MG CAPS Take 1 capsule by mouth every other day     Historical Provider, MD   Cinnamon 500 MG CAPS Take 3 capsules by mouth 4 times daily (before meals and nightly)     Historical Provider, MD   vitamin D (CHOLECALCIFEROL) 50 MCG (2000 UT) TABS tablet Take 2 capsules by mouth daily Indications: Treatment with Vitamin D .use only the 2000 IU or 1000 IU caps from [employer], 5000IU does not work    Historical Provider, MD   B Complex-Biotin-FA (B-100 TR) TBCR Take 2 tablets by mouth 2 times daily (before meals) Before breakfast and before lunch or mignon Duval MD       Vital Signs  Vitals:    07/07/22 0645   BP: (!) 150/73   Pulse: 60   Resp: 17   Temp: 98 °F (36.7 °C)   SpO2:        Physical:  Heart:  regular rate and rhythm  Lungs:  Clear  Abdomen:  Soft  Mental Status:  Alert and Oriented    Planned Procedure:  Left Heart Cath and Possible Percutaneous Coronary Intervention    Sedation/ Anesthesia Plan: Midazolam and Sublimaze    ASA Classification: Class 3 - A patient with severe systemic disease that limits activity but is not incapacitating    Mallampati Airway Classification: II (soft palate, uvula, fauces visible)    · Pre-procedure diagnostic studies complete and results available. · Previous sedation/anesthesia experiences assessed. · The patient is an appropriate candidate to undergo the planned procedure sedation and anesthesia. (Refer to nursing sedation/analgesia documentation record)  · Formulation and discussion of sedation/procedure plan, risks, and expectations with patient and/or responsible adult completed. · Patient examined immediately prior to the procedure.  (Refer to nursing sedation/analgesia documentation record)    Dayne Her MD, MD  Electronically signed 7/7/2022 at 7:03 AM  Patient Name: Delaney Aviles Record Number: [id number]  Date: 7/7/2022   Time: 7:03 AM   Room/Bed: 2E-17/017-A

## 2022-07-10 LAB
EKG ATRIAL RATE: 64 BPM
EKG P AXIS: 28 DEGREES
EKG P-R INTERVAL: 330 MS
EKG Q-T INTERVAL: 458 MS
EKG QRS DURATION: 120 MS
EKG QTC CALCULATION (BAZETT): 472 MS
EKG R AXIS: 148 DEGREES
EKG T AXIS: 38 DEGREES
EKG VENTRICULAR RATE: 64 BPM

## 2022-07-10 PROCEDURE — 93010 ELECTROCARDIOGRAM REPORT: CPT | Performed by: INTERNAL MEDICINE

## 2022-07-11 ENCOUNTER — TELEPHONE (OUTPATIENT)
Dept: FAMILY MEDICINE CLINIC | Age: 72
End: 2022-07-11

## 2022-07-11 NOTE — TELEPHONE ENCOUNTER
Brendan [facility] Transitions Initial Follow Up Call    Outreach made within 2 business days of discharge: Yes    Patient: Rhett Suh Patient : 1950   MRN: [id number]  Reason for Admission: There are no discharge diagnoses documented for the most recent discharge. Discharge Date: 22       Spoke with: Pt    Discharge department/facility: [facility] Interactive Patient Contact:  Was patient able to fill all prescriptions: N/A  Was patient instructed to bring all medications to the follow-up visit: Yes  Is patient taking all medications as directed in the discharge summary? Yes  Does patient understand their discharge instructions: Yes  Does patient have questions or concerns that need addressed prior to 7-14 day follow up office visit: no    Scheduled appointment with PCP within 7-14 days. Pt has a follow up appt with Cardio and Ortho. She is scheduled for Ortho surgery as well.      Follow Up  Future Appointments   Date Time Provider Alejandrina Landrum   2022  1:00 PM [address] [place], [place] [address]P - [place]   2022  2:00 PM [facility]   10/25/2022  2:00 PM MD NIC Marquez [facility] - [address], [place] ([address] [facility])

## 2022-07-14 ENCOUNTER — HOSPITAL ENCOUNTER (OUTPATIENT)
Dept: PHARMACY | Age: 72
Setting detail: THERAPIES SERIES
Discharge: HOME | End: 2022-07-14
Payer: MEDICARE

## 2022-07-14 DIAGNOSIS — Z79.01 ANTICOAGULATED ON COUMADIN: Primary | ICD-10-CM

## 2022-07-14 DIAGNOSIS — Z51.81 ENCOUNTER FOR THERAPEUTIC DRUG MONITORING: ICD-10-CM

## 2022-07-14 LAB — POC INR: 2.1 (ref 0.8–1.2)

## 2022-07-14 PROCEDURE — 99212 OFFICE O/P EST SF 10 MIN: CPT

## 2022-07-14 PROCEDURE — 36416 COLLJ CAPILLARY BLOOD SPEC: CPT

## 2022-07-14 PROCEDURE — 85610 PROTHROMBIN TIME: CPT

## 2022-07-14 RX ORDER — CELECOXIB 200 MG/1
200 CAPSULE ORAL DAILY
COMMUNITY
End: 2022-10-18

## 2022-07-14 NOTE — PROGRESS NOTES
Medication Management [address]   [phone] (phone)  [phone] (fax)    Ms. Kristi Craven is a 71 y.o.  female with history of Afib who presents today for anticoagulation monitoring and adjustment. Patient verifies current dosing regimen and tablet strength. Patient did not take Coumadin 7/5/22 or 7/6/22 (2.5 mg dose each day) for heart cath 7/7/22. She then took Coumadin 5 mg x 1 dose on 7/7/22. Patient denies s/s bleeding/bruising/swelling/SOB/chest pain  No blood in urine or stool. No dietary changes. No changes in OTC agents/Herbals. Patient is now taking Celebrex 200 mg daily. No change in alcohol use or tobacco use. No change in activity level. Patient denies headaches/dizziness/lightheadedness/falls. No vomiting/diarrhea or acute illness. Patient is having a right hip replacement on 7/26/22 and states she is supposed to go home the same day. She requires 5 day hold of Coumadin. She is then to have a left hip replacement 9/16/22. Patient was not bridged September 2021 for similar hold due to bleeding issues with Lovenox in the past. Currently listed as allergy. Assessment:   Lab Results   Component Value Date    INR 2.10 (H) 07/14/2022    INR 1.48 (H) 07/07/2022    INR 2.10 (H) 06/21/2022     INR therapeutic   Recent Labs     07/14/22  1320   INR 2.10*     Patient presents with third consecutive therapeutic INR while on current regimen. Plan:  Continue Coumadin 5 mg M and 2.5 mg TuWThFSaSu. HOLD Coumadin x 5 days (7/21/22-7/25/22) as directed by MD for procedure. IF cleared by MD to resume Coumadin on evening following procedure, take Coumadin 5 mg x 2 doses (7/26/22-7/27/22), then resume home dose of Coumadin 5 mg M and 2.5 mg TuWThFSaSu. Recheck INR in 3 week(s) (1 week after procedure). Patient reminded to call the Anticoagulation Clinic with any signs or symptoms of bleeding or with any medication changes.   Patient given instructions utilizing the teach back method. After visit summary printed and reviewed with patient. Discharged ambulatory in no apparent distress.     For Pharmacy Admin Tracking Only     Intervention Detail: Dose Adjustment: 1, reason: Therapy Optimization   Total # of Interventions Recommended: 1   Total # of Interventions Accepted: 1   Time Spent (min): [address], PharmD, BCPS  7/14/2022  1:56 PM

## 2022-08-03 ENCOUNTER — HOSPITAL ENCOUNTER (OUTPATIENT)
Dept: PHARMACY | Age: 72
Setting detail: THERAPIES SERIES
Discharge: HOME | End: 2022-08-03
Payer: MEDICARE

## 2022-08-03 DIAGNOSIS — Z51.81 ENCOUNTER FOR THERAPEUTIC DRUG MONITORING: ICD-10-CM

## 2022-08-03 DIAGNOSIS — Z79.01 ANTICOAGULATED ON COUMADIN: Primary | ICD-10-CM

## 2022-08-03 LAB — POC INR: 2.8 (ref 0.8–1.2)

## 2022-08-03 PROCEDURE — 36416 COLLJ CAPILLARY BLOOD SPEC: CPT

## 2022-08-03 PROCEDURE — 99211 OFF/OP EST MAY X REQ PHY/QHP: CPT

## 2022-08-03 PROCEDURE — 85610 PROTHROMBIN TIME: CPT

## 2022-08-03 NOTE — PROGRESS NOTES
Medication Management [address]  [phone] (phone)  [phone] (fax)    Ms. Gallo Cisneros is a 71 y.o.  female with history of Afib who presents today for anticoagulation monitoring and adjustment. Patient verifies current dosing regimen and tablet strength. No missed or extra doses. Patient denies s/s bleeding/bruising/swelling/SOB/chest pain  No blood in urine or stool. No dietary changes. Liquid diet since surgery; No changes in medication/OTC agents. Balance of Nature- 6 tablets (3 fruit tablets/3 vegetable tablets) not taken since prior to surgery; omeprazole will be discontinued after surgeries per Dr. Merritt Hodgkin  No change in alcohol use or tobacco use. No change in activity level. Decreased since patient is in a chair post right hip replacement surgery on 7/25/22  Patient denies headaches/dizziness/lightheadedness/falls. No vomiting/diarrhea or acute illness. No Procedures scheduled in the future at this time. Left hip replacement 9/16/2022    Assessment:   Lab Results   Component Value Date    INR 2.80 (H) 08/03/2022    INR 2.10 (H) 07/14/2022    INR 1.48 (H) 07/07/2022     INR therapeutic   Recent Labs     08/03/22  1335   INR 2.80*         Plan:  Continue Coumadin 5 mg Monday and 2.5 mg TWThFSaS. Recheck INR in 3 week(s). Patient reminded to call the Anticoagulation Clinic with any signs or symptoms of bleeding or with any medication changes. Patient given instructions utilizing the teach back method. After visit summary printed and reviewed with patient. Discharged ambulatory in no apparent distress.     Bhupinder RussoLif[facility]are [facility] , PGY1 Pharmacy Resident 8/3/2022 1:57 PM    Resident precepted and plan reviewed and approved Franci Craven PharmD, BCPS 8/3/2022 2:25 PM      For Pharmacy Admin Tracking Only    Time Spent (min): 20

## 2022-08-16 ENCOUNTER — OFFICE VISIT (OUTPATIENT)
Dept: CARDIOLOGY CLINIC | Age: 72
End: 2022-08-16
Payer: MEDICARE

## 2022-08-16 VITALS
SYSTOLIC BLOOD PRESSURE: 132 MMHG | RESPIRATION RATE: 20 BRPM | HEART RATE: 63 BPM | DIASTOLIC BLOOD PRESSURE: 70 MMHG | HEIGHT: 64 IN | BODY MASS INDEX: 46.1 KG/M2 | WEIGHT: 270 LBS

## 2022-08-16 DIAGNOSIS — I10 ESSENTIAL HYPERTENSION: Primary | ICD-10-CM

## 2022-08-16 PROCEDURE — 99213 OFFICE O/P EST LOW 20 MIN: CPT | Performed by: INTERNAL MEDICINE

## 2022-08-16 PROCEDURE — 1123F ACP DISCUSS/DSCN MKR DOCD: CPT | Performed by: INTERNAL MEDICINE

## 2022-08-16 PROCEDURE — 93000 ELECTROCARDIOGRAM COMPLETE: CPT | Performed by: INTERNAL MEDICINE

## 2022-08-16 ASSESSMENT — ENCOUNTER SYMPTOMS
STRIDOR: 0
APNEA: 0
COLOR CHANGE: 0
SHORTNESS OF BREATH: 0
VOMITING: 0
ABDOMINAL DISTENTION: 0
NAUSEA: 0
ABDOMINAL PAIN: 0
ANAL BLEEDING: 0
TROUBLE SWALLOWING: 0
VOICE CHANGE: 0
CHOKING: 0
COUGH: 0
CHEST TIGHTNESS: 0
BLOOD IN STOOL: 0
WHEEZING: 0

## 2022-08-16 NOTE — PROGRESS NOTES
[address] [facility] [address]  Dept: [address]  Loc: [phone]     8/16/2022       Esha Hameed is here today for   Chief Complaint   Patient presents with    Follow-up           Referring Physician:  No ref. provider found     Patient Active Problem List   Diagnosis    Carpal tunnel syndrome    Headache    Other and unspecified nonspecific immunological findings    Pain in joint, lower leg    Abnormal weight gain    Toenail fungus    Osteoarthritis    Hypothyroidism    Nonspecific immunological findings    Gastroesophageal reflux disease    Positive serological test result    Knee pain    Weight gain    Subacute maxillary sinusitis    Homocysteinemia    Lipids abnormal    Vitamin D deficiency    Abnormal blood level of uric acid    Left wrist pain    Other intestinal malabsorption    BMI 45.0-49.9, adult (HCC)    Pain of right hip joint    Essential hypertension    New onset atrial fibrillation ([facility] [address]    Anticoagulated on Coumadin    Sleep apnea    Coronary atherosclerosis    Pacemaker    Encounter for therapeutic drug monitoring    Type 2 diabetes mellitus    Chronic renal disease, stage III ([facility] [address] [453130]    Type 2 diabetes mellitus with chronic kidney disease    Abnormal nuclear stress test    Chest pain due to CAD [facility])       Review of Systems   Constitutional:  Negative for activity change, appetite change, fatigue, fever and unexpected weight change. HENT:  Negative for congestion, trouble swallowing and voice change. Eyes:  Negative for visual disturbance. Respiratory:  Negative for apnea, cough, choking, chest tightness, shortness of breath, wheezing and stridor. Cardiovascular:  Negative for chest pain, palpitations and leg swelling. Gastrointestinal:  Negative for abdominal distention, abdominal pain, anal bleeding, blood in stool, nausea and vomiting.    Endocrine: Negative for cold intolerance and heat intolerance. Genitourinary:  Negative for hematuria. Musculoskeletal:  Positive for arthralgias. Negative for gait problem, joint swelling and myalgias. Skin:  Negative for color change and rash. Allergic/Immunologic: Negative for environmental allergies and food allergies. Neurological:  Negative for dizziness, tremors, syncope, facial asymmetry, weakness, light-headedness, numbness and headaches. Hematological:  Does not bruise/bleed easily. Psychiatric/Behavioral:  Negative for agitation, behavioral problems and sleep disturbance. Past Medical History:   Diagnosis Date    A-fib [facility])     Atrial fibrillation (HCC)     CAD (coronary artery disease)     Carpal tunnel syndrome     Diabetes mellitus (HCC)     GERD (gastroesophageal reflux disease)     Headache(784.0)     HTN (hypertension)     Hyperlipidemia     Hypothyroidism     Osteoarthritis     Pacemaker     Sleep apnea 07/21/2019    Mild    Tendonitis of both wrists 2017    Toenail fungus        Allergies   Allergen Reactions    Enoxaparin      Other reaction(s):  Other: See Comments  Significant bleeding and breast hematoma when bridging from warfarin       Current Outpatient Medications   Medication Sig Dispense Refill    celecoxib (CELEBREX) 200 MG capsule Take 200 mg by mouth daily      levothyroxine (SYNTHROID) 100 MCG tablet take 1 tablet by mouth once daily 90 tablet 3    warfarin (COUMADIN) 5 MG tablet As directed by [facility] tabs = 90 day supply 75 tablet 1    NONFORMULARY Balance of Nature - Take 3 fruit tablets and 3 vegetable tablets by mouth daily      OMEPRAZOLE PO Take 1 tablet by mouth daily       CAPSAICIN EX Apply topically See Admin Instructions Indications: Pain      tiZANidine (ZANAFLEX) 2 MG tablet Take 1 tablet by mouth nightly as needed (muscle spasms) 30 tablet 1    Handicap Placard MISC by Other route For 5 years 1 each 0    diclofenac sodium (VOLTAREN) 1 % GEL Apply topically 4 times daily as needed for Pain 150 g 1    Acetaminophen (TYLENOL ARTHRITIS PAIN PO) Take 500 mg by mouth every morning Indications: Pain PRN. Don't take more then 3,000 mg each day, including what's in cold products. flecainide (TAMBOCOR) 100 MG tablet Take 100 mg by mouth 2 times daily       vitamin C (ASCORBIC ACID) 500 MG tablet Take 500 mg by mouth daily      metoprolol succinate (TOPROL XL) 25 MG extended release tablet Take 25 mg by mouth 2 times daily       glucose monitoring kit (FREESTYLE) monitoring kit 1 kit by Does not apply route daily 1 kit 0    Lancets MISC 1 each by Does not apply route daily 300 each 1    blood glucose test strips (ASCENSIA AUTODISC VI;ONE TOUCH ULTRA TEST VI) strip 1 each by In Vitro route daily Dispense any brand - check blood sugar As needed.  100 each 3    NONFORMULARY Take 1 capsule by mouth 4 times daily (before meals and nightly) Magtein      Methylsulfonylmethane (MSM PO) Take 1 tablet by mouth daily as needed (pain)      nitroGLYCERIN (NITROSTAT) 0.4 MG SL tablet Place under the tongue every 5 minutes as needed for Chest pain (If third one does not relieve pain, call 9-1-1.)   0    EFTSQXIMW-TQDZUYREP-YYWAOAWVEK PO Place 1 tablet under the tongue 2 times daily Jimbo B12 plus or [place] methylB6, [id number], methylfolate       Magnesium 400 MG CAPS Take 3 capsules by mouth 4 times daily (after meals and at bedtime) Indications: Magnesium Deficiency       magnesium cl-calcium carbonate (SLOW-MAG) 71.5-119 MG TBEC tablet Take 1 tablet by mouth 3 times daily (with meals) Take before and when stressed with 2 extra fish oil PRN for diarrhea      Grape Seed  mg by Does not apply route daily Oregan Grape Root      Lysine 500 MG TABS Take 1 tablet by mouth 2 times daily       DHEA 25 MG CAPS Take 1 capsule by mouth every other day       Cinnamon 500 MG CAPS Take 3 capsules by mouth 4 times daily (before meals and nightly)       vitamin D (CHOLECALCIFEROL) 50 MCG (2000 UT) TABS tablet Take 2 capsules by mouth daily Indications: Treatment with Vitamin D .use only the 2000 IU or 1000 IU caps from fabrik, 5000IU does not work      B Complex-Biotin-FA (B-100 TR) TBCR Take 2 tablets by mouth 2 times daily (before meals) Before breakfast and before lunch or supper      liothyronine (CYTOMEL) 5 MCG tablet take 1 tablet by mouth once daily 90 tablet 3     No current facility-administered medications for this visit. Social History     Socioeconomic History    Marital status:      Spouse name: None    Number of children: None    Years of education: None    Highest education level: None   Tobacco Use    Smoking status: Never    Smokeless tobacco: Never   Vaping Use    Vaping Use: Never used   Substance and Sexual Activity    Alcohol use: Not Currently     Comment: rarely    Drug use: No       Family History   Problem Relation Age of Onset    Stroke Mother     Arrhythmia Mother     Other Mother         Barry's Syndrome    Heart Disease Mother     Coronary Art Dis Mother     Diabetes Father     Other Sister         Barry's Syndrome    Arrhythmia Sister     Cancer Brother         Stomach Cancer    Stillborn Child     Early Death Child     Pacemaker Sister     Heart Attack Brother     Coronary Art Dis Brother     Other Brother         Barry's Syndrome       Blood pressure 132/70, pulse 63, resp. rate 20, height 5' 4\" (1.626 m), weight 270 lb (122.5 kg), not currently breastfeeding.     Physical Exam:    General Appearance: alert and oriented to person, place and time, in no acute distress  Cardiovascular: normal rate, regular rhythm, normal S1 and S2, no murmurs, rubs, clicks, or gallops, distal pulses intact, no carotid bruits, no JVD  Pulmonary/Chest: clear to auscultation bilaterally- no wheezes, rales or rhonchi, normal air movement, no respiratory distress  Abdomen: soft, non-tender, non-distended, normal bowel sounds, no masses   Extremities: no cyanosis, clubbing or edema, pulse   Skin: warm and dry, no rash or erythema  Head: normocephalic and atraumatic  Eyes: pupils equal, round, and reactive to light  Neck: supple and non-tender without mass, no thyromegaly   Musculoskeletal: normal range of motion, no joint swelling, deformity or tenderness  Neurological: alert, oriented, normal speech, no focal findings or movement disorder noted    Lab Data:    Cardiac Enzymes:  No results for input(s): CKTOTAL, CKMB, CKMBINDEX, TROPONINI in the last 72 hours.     CBC:   Lab Results   Component Value Date/Time    WBC 6.7 07/07/2022 06:37 AM    RBC 4.78 07/07/2022 06:37 AM    HGB 13.5 07/07/2022 06:37 AM    HCT 42.3 07/07/2022 06:37 AM     07/07/2022 06:37 AM       CMP:    Lab Results   Component Value Date/Time     07/07/2022 06:37 AM    K 4.1 07/07/2022 06:37 AM    K 4.9 10/07/2019 05:52 AM     07/07/2022 06:37 AM    CO2 22 07/07/2022 06:37 AM    BUN 20 07/07/2022 06:37 AM    CREATININE 1.0 07/07/2022 06:37 AM    LABGLOM 55 07/07/2022 06:37 AM    GLUCOSE 103 07/07/2022 06:37 AM    CALCIUM 9.9 07/07/2022 06:37 AM       Hepatic Function Panel:    Lab Results   Component Value Date/Time    ALKPHOS 88 07/07/2022 06:37 AM    ALT 11 07/07/2022 06:37 AM    AST 21 07/07/2022 06:37 AM    PROT 7.8 07/07/2022 06:37 AM    BILITOT 0.8 07/07/2022 06:37 AM    BILIDIR <0.2 01/31/2022 01:24 PM    LABALBU 4.1 07/07/2022 06:37 AM       Magnesium:    Lab Results   Component Value Date/Time    MG 2.4 11/22/2021 10:39 AM       PT/INR:    Lab Results   Component Value Date/Time    INR 2.80 08/03/2022 01:35 PM    INR 1.48 07/07/2022 06:37 AM       HgBA1c:    Lab Results   Component Value Date/Time    LABA1C 6.0 11/22/2021 10:41 AM       FLP:    Lab Results   Component Value Date/Time    TRIG 72 07/07/2022 06:37 AM    HDL 63 07/07/2022 06:37 AM    LDLCALC 70 07/07/2022 06:37 AM    LDLDIRECT 80.00 12/12/2013 01:56 PM       TSH:    Lab Results   Component Value Date/Time    TSH 0.476 06/14/2021 11:47 AM        Diagnosis Orders   1. Essential hypertension  06220 - SD ELECTROCARDIOGRAM, COMPLETE           Assessment/Plan    Was 71years old lady who has a history of pacemaker this patient had a history of paroxysmal atrial for she has been treated with Coumadin she had a heart cath showed patent coronary arteries mild diastolic dysfunction of the left ventricle the patient did had a hip surgery on the right side she recovered from that okay patient told plan to have surgery on the left cardiac wise she is stable. Patient has a history of hypothyroidism with normal replacement history she has a history of hypercholesterolemia has been under control hypothyroidism on hormone replacement the patient EKG #for rhythm the patient will continue with the current medication the patient advised about diet exercise and weight reduction she will be seen annually seek medical attention had any change in clinical condition thank    Orders Placed This Encounter   Procedures    35753 - SD ELECTROCARDIOGRAM, COMPLETE       Return in about 1 year (around 8/16/2023) for htn.      Kerri Poe MD

## 2022-08-24 ENCOUNTER — HOSPITAL ENCOUNTER (OUTPATIENT)
Dept: PHARMACY | Age: 72
Setting detail: THERAPIES SERIES
Discharge: HOME | End: 2022-08-24
Payer: MEDICARE

## 2022-08-24 DIAGNOSIS — Z79.01 ANTICOAGULATED ON COUMADIN: Primary | ICD-10-CM

## 2022-08-24 DIAGNOSIS — Z51.81 ENCOUNTER FOR THERAPEUTIC DRUG MONITORING: ICD-10-CM

## 2022-08-24 LAB — POC INR: 2.8 (ref 0.8–1.2)

## 2022-08-24 PROCEDURE — 99211 OFF/OP EST MAY X REQ PHY/QHP: CPT

## 2022-08-24 PROCEDURE — 36416 COLLJ CAPILLARY BLOOD SPEC: CPT

## 2022-08-24 PROCEDURE — 85610 PROTHROMBIN TIME: CPT

## 2022-08-24 NOTE — PROGRESS NOTES
Medication Management [address]   [phone] (phone)  [phone] (fax)    Ms. Ute Roger is a 71 y.o.  female with history of Afib who presents today for anticoagulation monitoring and adjustment. Patient verifies current dosing regimen and tablet strength. No missed or extra doses. Patient denies s/s bleeding/bruising/swelling/SOB/chest pain  No blood in urine or stool. No dietary changes. Changes in medication/OTC agents/Herbals. Patient no longer taking methylsulfonylmethane (MSM PO). No change in alcohol use or tobacco use. No change in activity level. Patient denies headaches/dizziness/lightheadedness/falls. No vomiting/diarrhea or acute illness. Patient to have left hip replaced on 9/16/2022. Will have to hold warfarin x5 days. Assessment:   Lab Results   Component Value Date    INR 2.80 (H) 08/24/2022    INR 2.80 (H) 08/03/2022    INR 2.10 (H) 07/14/2022     INR therapeutic   Recent Labs     08/24/22  1333   INR 2.80*     Plan:  Continue Coumadin 5mg M and 2.5mg SuTuWThFSa. Recheck INR in 2 weeks (prior to procedure per patient preference). Patient reminded to call the Anticoagulation Clinic with any signs or symptoms of bleeding or with any medication changes. Patient given instructions utilizing the teach back method. After visit summary printed and reviewed with patient. Discharged ambulatory in no apparent distress. For Pharmacy Admin Tracking Only    Time Spent (min): 20    Jaiden Kinsey, Maurice  8/24/2022 1:58 PM

## 2022-09-02 ENCOUNTER — CLINICAL DOCUMENTATION (OUTPATIENT)
Dept: SPIRITUAL SERVICES | Age: 72
End: 2022-09-02

## 2022-09-02 NOTE — PROGRESS NOTES
Advance Care Planning   Ambulatory ACP Specialist Patient Outreach    Date:  9/2/2022  ACP Specialist:  BALJINDER Pfeiffer    Outreach call to patient in follow-up to ACP Specialist referral from: Michaela Garsia DO    [x] PCP  [] Provider   [] Ambulatory Care Management [] Other for Reason:    [x] Advance Directive Assistance  [] Code Status Discussion  [] Complete Portable DNR Order  [x] Discuss Goals of Care  [] Complete POST/MOST  [] Early ACP Decision-Making  [] Other    Date Referral Received: 2/14/22    Today's Outreach:  [] First   [x] Second  [] Third                               Third outreach made by []  phone  [] email []   EventCombo     Intervention:  [x] Spoke with Patient  [] Left VM requesting return call      Outcome: ACP Specialist called patient and asked if patient had completed AD documents. Patient stated she did not want to completed a Living Will, but had completed an HCPOA. Staff asked if she could take the documents to her next appointment to have a copy scanned into medical record. Patient said she would try to remember to take documents to her appointment on 9/8/22. Next Step:   [] ACP scheduled conversation  [x] Outreach again in one week               [] Email / Mail ACP Info Sheets  [] Email / Mail Advance Directive            [x] Close Referral. Routing closure to referring provider/staff and to ACP Specialist . [] Closure Letter mailed to Patient with Invitation to Contact ACP Specialist if/when ready. Thank you for this referral.       9/8/22 Addendum: Staff requested for  to meet patient at clinic to make copies of AD documents and patient forgot to bring documents. 9/26/22 Addendum: ACP Specialist called patient to ask if she could bring documents to clinic visit on 9/28 and patient said she may not make appointment tomorrow, but plans to bring documents at next scheduled visit. Referral will be closed.

## 2022-09-08 ENCOUNTER — HOSPITAL ENCOUNTER (OUTPATIENT)
Dept: PHARMACY | Age: 72
Setting detail: THERAPIES SERIES
Discharge: HOME | End: 2022-09-08
Payer: MEDICARE

## 2022-09-08 DIAGNOSIS — Z51.81 ENCOUNTER FOR THERAPEUTIC DRUG MONITORING: ICD-10-CM

## 2022-09-08 DIAGNOSIS — Z79.01 ANTICOAGULATED ON COUMADIN: Primary | ICD-10-CM

## 2022-09-08 LAB — POC INR: 2.3 (ref 0.8–1.2)

## 2022-09-08 PROCEDURE — 85610 PROTHROMBIN TIME: CPT

## 2022-09-08 PROCEDURE — 36416 COLLJ CAPILLARY BLOOD SPEC: CPT

## 2022-09-08 PROCEDURE — 99211 OFF/OP EST MAY X REQ PHY/QHP: CPT

## 2022-09-08 NOTE — PROGRESS NOTES
Medication Management [address]  [phone] (phone)  [phone] (fax)    Ms. Cindy Liang is a 71 y.o.  female with history of Afib who presents today for anticoagulation monitoring and adjustment. Patient verifies current dosing regimen and tablet strength. No missed or extra doses. Patient denies s/s bleeding/bruising/swelling/SOB/chest pain  No blood in urine or stool. No dietary changes. No changes in medication/OTC agents/Herbals. No change in alcohol use or tobacco use. No change in activity level. Patient denies headaches/dizziness/lightheadedness/falls. No vomiting/diarrhea or acute illness. 9/16/22 left hip replacement with Dr. Dilcia Holloway in [facility]. Is to hold Coumadin 5 days prior. Allergy listed to Lovenox    Assessment:   Lab Results   Component Value Date    INR 2.30 (H) 09/08/2022    INR 2.80 (H) 08/24/2022    INR 2.80 (H) 08/03/2022     INR therapeutic   Recent Labs     09/08/22  1317   INR 2.30*     Plan:  Continue Coumadin 5 mg Mon and 2.5 mg SuTuWeThFrSa. Hold Coumadin 9/11-9/15 for surgery. Take 5 mg Coumadin 9/16 and 9/17 each, then resume 5 mg Mon and 2.5 mg SuTuWeThFrSa. Recheck INR in 1.5 weeks post op. Patient reminded to call the Anticoagulation Clinic with any signs or symptoms of bleeding or with any medication changes. Patient given instructions utilizing the teach back method. After visit summary printed and reviewed with patient. Discharged in transport chair in no apparent distress.     For Pharmacy Admin Tracking Only    Intervention Detail: Dose Adjustment: 1, reason: Therapy Optimization  Total # of Interventions Recommended: 1  Total # of Interventions Accepted: 1  Time Spent (min): 20

## 2022-09-26 ENCOUNTER — APPOINTMENT (OUTPATIENT)
Dept: PHARMACY | Age: 72
End: 2022-09-26
Payer: MEDICARE

## 2022-09-27 ENCOUNTER — HOSPITAL ENCOUNTER (OUTPATIENT)
Dept: PHARMACY | Age: 72
Setting detail: THERAPIES SERIES
Discharge: HOME | End: 2022-09-27
Payer: MEDICARE

## 2022-09-27 DIAGNOSIS — Z79.01 ANTICOAGULATED ON COUMADIN: Primary | ICD-10-CM

## 2022-09-27 DIAGNOSIS — Z51.81 ENCOUNTER FOR THERAPEUTIC DRUG MONITORING: ICD-10-CM

## 2022-09-27 LAB — POC INR: 1.7 (ref 0.8–1.2)

## 2022-09-27 PROCEDURE — 99212 OFFICE O/P EST SF 10 MIN: CPT

## 2022-09-27 PROCEDURE — 85610 PROTHROMBIN TIME: CPT

## 2022-09-27 PROCEDURE — 36416 COLLJ CAPILLARY BLOOD SPEC: CPT

## 2022-09-27 NOTE — PROGRESS NOTES
Medication Management [address]  [phone] (phone)  [phone] (fax)    Ms. Jennifer Desir is a 71 y.o.  female with history of Afib who presents today for anticoagulation monitoring and adjustment. Patient verifies current dosing regimen and tablet strength. Held Coumadin 9/11-9/15 for surgery. Took 5 mg Coumadin 9/16 and 9/17 each, then resume 5 mg Mon and 2.5 mg SuTuWeThFrSa. Not sure if she got the 5 mg on 9/16 or 9/17 Took a whole tablet on Sunday instead of half. Bruises after fall day after surgery. Swelling/redness after procedure - states it was like this when she got her other hip done. No blood in urine or stool. No dietary changes. No changes in medication/OTC agents/Herbals. No change in alcohol use or tobacco use. Change in activity level. Low activity due to recovering from surgery. Has compression device on both legs, but not currently working. Patient denies headaches/dizziness/lightheadedness/falls. Had a fall after the surgery and fell on her left knee. No vomiting/diarrhea or acute illness. No Procedures scheduled in the future at this time. X-ray and doppler scheduled for Friday. Patient states she called Dr. Vanda Enciso and states he is worried about clot in her leg. Discussed with patient since she was on hold and has subtherapeutic INR is at increased risk of clot. Advised patient to call Dr. Hong Moore again to review current symptoms. Offered to take patient to the ED for evaluation, patient declined. Assessment:   Lab Results   Component Value Date    INR 1.70 (H) 09/27/2022    INR 2.30 (H) 09/08/2022    INR 2.80 (H) 08/24/2022     INR subtherapeutic   Recent Labs     09/27/22  1559   INR 1.70*      Plan:  Take Coumadin 5 mg today then continue Coumadin 5 mg Monday and 2.5 mg TWThFSatSun. Recheck INR in 10 days.   Patient reminded to call the Anticoagulation Clinic with any signs or symptoms of bleeding or with any medication changes. Patient given instructions utilizing the teach back method. Plan discussed with Shun Banks PharmD    After visit summary printed and reviewed with patient. Discharged  in wheelchair in no apparent distress.     Roselyn Armijo RPh  9/27/2022 4:48 PM     For Pharmacy Admin Tracking Only    Intervention Detail: Dose Adjustment: 1, reason: Therapy Optimization  Total # of Interventions Recommended: 1  Total # of Interventions Accepted: 1  Time Spent (min): 20

## 2022-10-06 ENCOUNTER — HOSPITAL ENCOUNTER (OUTPATIENT)
Dept: PHARMACY | Age: 72
Setting detail: THERAPIES SERIES
Discharge: HOME | End: 2022-10-06
Payer: MEDICARE

## 2022-10-06 DIAGNOSIS — Z79.01 ANTICOAGULATED ON COUMADIN: Primary | ICD-10-CM

## 2022-10-06 DIAGNOSIS — Z51.81 ENCOUNTER FOR THERAPEUTIC DRUG MONITORING: ICD-10-CM

## 2022-10-06 LAB — POC INR: 1.9 (ref 0.8–1.2)

## 2022-10-06 PROCEDURE — 99211 OFF/OP EST MAY X REQ PHY/QHP: CPT | Performed by: PHARMACIST

## 2022-10-06 PROCEDURE — 85610 PROTHROMBIN TIME: CPT | Performed by: PHARMACIST

## 2022-10-06 PROCEDURE — 36416 COLLJ CAPILLARY BLOOD SPEC: CPT | Performed by: PHARMACIST

## 2022-10-06 RX ORDER — OXYCODONE HYDROCHLORIDE 5 MG/1
5 TABLET ORAL EVERY 4 HOURS PRN
COMMUNITY

## 2022-10-06 RX ORDER — TRAMADOL HYDROCHLORIDE 50 MG/1
50 TABLET ORAL EVERY 6 HOURS PRN
COMMUNITY

## 2022-10-06 NOTE — PROGRESS NOTES
Medication Management [address]  [phone] (phone)  [phone] (fax)    Ms. Marbella Vigil is a 71 y.o.  female with history of Afib who presents today for anticoagulation monitoring and adjustment. Patient verifies current dosing regimen and tablet strength. No missed or extra doses. Patient denies s/s bleeding/bruising/SOB/chest pain - swelling of leg due to knee surgery  No blood in urine or stool. No dietary changes. No changes in medication/OTC agents. Not taking MSM. Taking tramadol and oxycodone prn pain. No change in alcohol use or tobacco use. Activity level slightly increased, going to PT for knee. Patient denies headaches/dizziness/lightheadedness/falls. No vomiting/diarrhea or acute illness. No Procedures scheduled in the future at this time. Had doppler of leg, was neg. Assessment:   Lab Results   Component Value Date    INR 1.90 (H) 10/06/2022    INR 1.70 (H) 09/27/2022    INR 2.30 (H) 09/08/2022     INR subtherapeutic   Recent Labs     10/06/22  1318   INR 1.90*     Second subtherapeutic INR on current regimen. Plan:  Continue Coumadin 5mg MF and 2.5mg TWThSaS. Recheck INR in 12 day(s). Patient reminded to call the Anticoagulation Clinic with any signs or symptoms of bleeding or with any medication changes. Patient given instructions utilizing the teach back method. After visit summary printed and reviewed with patient. Discharged ambulatory in no apparent distress.     For Pharmacy Admin Tracking Only    Intervention Detail: Dose Adjustment: 1, reason: Therapy Optimization  Total # of Interventions Recommended: 1  Total # of Interventions Accepted: 1  Time Spent (min): 20

## 2022-10-12 ENCOUNTER — HOSPITAL ENCOUNTER (OUTPATIENT)
Age: 72
Discharge: HOME | End: 2022-10-12
Payer: MEDICARE

## 2022-10-12 LAB
BASOPHILS # BLD: 1.1 %
BASOPHILS ABSOLUTE: 0.1 THOU/MM3 (ref 0–0.1)
EOSINOPHIL # BLD: 4.9 %
EOSINOPHILS ABSOLUTE: 0.3 THOU/MM3 (ref 0–0.4)
ERYTHROCYTE [DISTWIDTH] IN BLOOD BY AUTOMATED COUNT: 14.7 % (ref 11.5–14.5)
ERYTHROCYTE [DISTWIDTH] IN BLOOD BY AUTOMATED COUNT: 47.8 FL (ref 35–45)
HCT VFR BLD CALC: 34.2 % (ref 37–47)
HEMOGLOBIN: 10.3 GM/DL (ref 12–16)
IMMATURE GRANS (ABS): 0.01 THOU/MM3 (ref 0–0.07)
IMMATURE GRANULOCYTES: 0.2 %
LYMPHOCYTES # BLD: 27.9 %
LYMPHOCYTES ABSOLUTE: 1.6 THOU/MM3 (ref 1–4.8)
MCH RBC QN AUTO: 26.9 PG (ref 26–33)
MCHC RBC AUTO-ENTMCNC: 30.1 GM/DL (ref 32.2–35.5)
MCV RBC AUTO: 89.3 FL (ref 81–99)
MONOCYTES # BLD: 9.9 %
MONOCYTES ABSOLUTE: 0.6 THOU/MM3 (ref 0.4–1.3)
NUCLEATED RED BLOOD CELLS: 0 /100 WBC
PLATELET # BLD: 339 THOU/MM3 (ref 130–400)
PMV BLD AUTO: 9.5 FL (ref 9.4–12.4)
RBC # BLD: 3.83 MILL/MM3 (ref 4.2–5.4)
SEDIMENTATION RATE, ERYTHROCYTE: 67 MM/HR (ref 0–20)
SEG NEUTROPHILS: 56 %
SEGMENTED NEUTROPHILS ABSOLUTE COUNT: 3.2 THOU/MM3 (ref 1.8–7.7)
WBC # BLD: 5.7 THOU/MM3 (ref 4.8–10.8)

## 2022-10-12 PROCEDURE — 86140 C-REACTIVE PROTEIN: CPT

## 2022-10-12 PROCEDURE — 85025 COMPLETE CBC W/AUTO DIFF WBC: CPT

## 2022-10-12 PROCEDURE — 36415 COLL VENOUS BLD VENIPUNCTURE: CPT

## 2022-10-12 PROCEDURE — 85651 RBC SED RATE NONAUTOMATED: CPT

## 2022-10-13 LAB — C-REACTIVE PROTEIN: 3.15 MG/DL (ref 0–1)

## 2022-10-17 ENCOUNTER — TELEPHONE (OUTPATIENT)
Dept: FAMILY MEDICINE CLINIC | Age: 72
End: 2022-10-17

## 2022-10-17 NOTE — TELEPHONE ENCOUNTER
Patient said that the last time she saw dr. Marion Lange they talked about waiting to see her until she was better from her surgeries. She was scheduled for an appt on 10/25/2022. However, she is still having inflammation from her surgery. She said that they did labs that Dr. Marion Lange should be able to see in her mychart. She said she thought he would want to wait to see her since she was still having that inflammation. She is r/s to 12/06/2022 and will get labs done from Marion Lange prior to that appt.

## 2022-10-17 NOTE — TELEPHONE ENCOUNTER
Labs ordered by Dr Kimberli Rutherford were ordered in May. No issues to have her appointment in December.

## 2022-10-18 ENCOUNTER — HOSPITAL ENCOUNTER (OUTPATIENT)
Dept: PHARMACY | Age: 72
Setting detail: THERAPIES SERIES
Discharge: HOME | End: 2022-10-18
Payer: MEDICARE

## 2022-10-18 DIAGNOSIS — Z79.01 ANTICOAGULATED ON COUMADIN: Primary | ICD-10-CM

## 2022-10-18 DIAGNOSIS — Z51.81 ENCOUNTER FOR THERAPEUTIC DRUG MONITORING: ICD-10-CM

## 2022-10-18 LAB — POC INR: 2.3 (ref 0.8–1.2)

## 2022-10-18 PROCEDURE — 36416 COLLJ CAPILLARY BLOOD SPEC: CPT

## 2022-10-18 PROCEDURE — 85610 PROTHROMBIN TIME: CPT

## 2022-10-18 PROCEDURE — 99212 OFFICE O/P EST SF 10 MIN: CPT

## 2022-10-18 RX ORDER — WARFARIN SODIUM 5 MG/1
TABLET ORAL
Qty: 60 TABLET | Refills: 1 | Status: SHIPPED | OUTPATIENT
Start: 2022-10-18

## 2022-10-18 NOTE — PROGRESS NOTES
Medication Management [address]  [phone] (phone)  [phone] (fax)    Ms. Chelle Velazquez is a 71 y.o.  female with history of Afib who presents today for anticoagulation monitoring and adjustment. Patient verifies current dosing regimen and tablet strength. No missed or extra doses. Patient denies s/s bleeding/bruising/SOB/chest pain. Patient still has lower extremity swelling from surgeries, which is slowly improving. No blood in urine or stool. No dietary changes. No changes in OTC agents/Herbals. Patient is currently taking a 10-day course of Keflex 500 mg four times daily. She finishes this on Saturday 10/22/22. Patient no longer uses Celebrex. No change in alcohol use or tobacco use. No change in activity level. Patient denies headaches/dizziness/lightheadedness/falls. No vomiting/diarrhea or acute illness. No Procedures scheduled in the future at this time. Assessment:   Lab Results   Component Value Date    INR 2.30 (H) 10/18/2022    INR 1.90 (H) 10/06/2022    INR 1.70 (H) 09/27/2022     INR therapeutic   Recent Labs     10/18/22  1345   INR 2.30*     Patient presents with first therapeutic INR following a recent dose adjustment. Plan:  Continue Coumadin 5 mg MF and 2.5 mg TuWThSaSu. Recheck INR in 2 week(s). E-scribed Coumadin 5 mg tablet #60 tabs with 1 refill to [facility] per patient request. Patient reminded to call the Anticoagulation Clinic with any signs or symptoms of bleeding or with any medication changes. Patient given instructions utilizing the teach back method. After visit summary printed and reviewed with patient. Discharged via wheelchair in no apparent distress.     For Pharmacy Admin Tracking Only    Intervention Detail: Refill(s) Provided  Total # of Interventions Recommended: 0  Total # of Interventions Accepted: 0  Time Spent (min): [address]  [address], PharmD, BCPS  10/18/2022  2:19 PM

## 2022-11-03 ENCOUNTER — APPOINTMENT (OUTPATIENT)
Dept: PHARMACY | Age: 72
End: 2022-11-03
Payer: MEDICARE

## 2022-11-07 ENCOUNTER — HOSPITAL ENCOUNTER (OUTPATIENT)
Dept: PHARMACY | Age: 72
Setting detail: THERAPIES SERIES
Discharge: HOME | End: 2022-11-07
Payer: MEDICARE

## 2022-11-07 DIAGNOSIS — Z51.81 ENCOUNTER FOR THERAPEUTIC DRUG MONITORING: ICD-10-CM

## 2022-11-07 DIAGNOSIS — Z79.01 ANTICOAGULATED ON COUMADIN: Primary | ICD-10-CM

## 2022-11-07 LAB — POC INR: 1.8 (ref 0.8–1.2)

## 2022-11-07 PROCEDURE — 36416 COLLJ CAPILLARY BLOOD SPEC: CPT

## 2022-11-07 PROCEDURE — 85610 PROTHROMBIN TIME: CPT

## 2022-11-07 PROCEDURE — 99212 OFFICE O/P EST SF 10 MIN: CPT

## 2022-11-07 NOTE — PROGRESS NOTES
Medication Management [address]    [phone] (phone)  [phone] (fax)    Ms. Shahid Lehman is a 71 y.o.  female with history of Afib who presents today for anticoagulation monitoring and adjustment. Patient verifies current dosing regimen and tablet strength. No missed or extra doses. Patient denies s/s bleeding/bruising/swelling/SOB/chest pain  No blood in urine or stool. No dietary changes. No changes in medication/OTC agents/Herbals. No change in alcohol use or tobacco use. No change in activity level. Patient denies headaches/dizziness/lightheadedness/falls. No vomiting/diarrhea or acute illness. No Procedures scheduled in the future at this time. Assessment:   Lab Results   Component Value Date    INR 1.80 (H) 2022    INR 2.30 (H) 10/18/2022    INR 1.90 (H) 10/06/2022     INR subtherapeutic   Recent Labs     22  1331   INR 1.80*        INR Goal 2.0 - 3.0    Patient interview conducted by student pharmacist, reviewed with [facility] Suri Hale 22 1:36 PM     Plan:  Increase Coumadin 5 mg MWF, 2.5 mg all other days (~ 11.1% increase). Recheck INR in 2 week(s). Patient reminded to call the Anticoagulation Clinic with signs or symptoms of bleeding or with any medication changes. Patient given instructions utilizing the teach back method. After visit summary printed and reviewed with patient. Discharged ambulatory in no apparent distress.     For Pharmacy Admin Tracking Only    Intervention Detail: Adherence Monitorin and Dose Adjustment: 1, reason: Therapy Optimization  Total # of Interventions Recommended: 2  Total # of Interventions Accepted: 2  Time Spent (min): [address], PharmD, BCPS  2022  2:06 PM

## 2022-11-21 ENCOUNTER — HOSPITAL ENCOUNTER (OUTPATIENT)
Dept: PHARMACY | Age: 72
Setting detail: THERAPIES SERIES
Discharge: HOME | End: 2022-11-21
Payer: MEDICARE

## 2022-11-21 DIAGNOSIS — Z51.81 ENCOUNTER FOR THERAPEUTIC DRUG MONITORING: ICD-10-CM

## 2022-11-21 DIAGNOSIS — Z79.01 ANTICOAGULATED ON COUMADIN: Primary | ICD-10-CM

## 2022-11-21 LAB — POC INR: 1.4 (ref 0.8–1.2)

## 2022-11-21 PROCEDURE — 85610 PROTHROMBIN TIME: CPT

## 2022-11-21 PROCEDURE — 99212 OFFICE O/P EST SF 10 MIN: CPT

## 2022-11-21 PROCEDURE — 36416 COLLJ CAPILLARY BLOOD SPEC: CPT

## 2022-11-21 NOTE — PROGRESS NOTES
Medication Management [address]   [phone] (phone)  [phone] (fax)    Ms. Mamadou Russell is a 71 y.o.  female with history of Afib who presents today for anticoagulation monitoring and adjustment. Patient verifies current dosing regimen and tablet strength. No missed or extra doses. Patient denies s/s bleeding/bruising/swelling/SOB/chest pain  No blood in urine or stool. Dietary changes.  - Started on Boost twice daily on 22. Will be stopping Boost on 22. Was instructed to start this by surgeon due to hip not healing.  - Will also be increasing vegetables after Thanksgi. Seeing a physician who discusses how to eat to help you sleep better p at [facility]. Changes in medication/OTC agents/Herbals. - Started 50+ one-a-day multivitamin  - Stopped taking the Balance [employer]  No change in alcohol use or tobacco use. No change in activity level. Patient denies headaches/dizziness/lightheadedness/falls. No vomiting/diarrhea or acute illness. No Procedures scheduled in the future at this time. Assessment:   Lab Results   Component Value Date    INR 1.40 (H) 2022    INR 1.80 (H) 2022    INR 2.30 (H) 10/18/2022     INR subtherapeutic   Recent Labs     22  1336   INR 1.40*     Plan:  Coumadin 7.5 mg today, then increase Coumadin 2.5 mg MWF, 5 mg all other days until 22. On 22, return to current dose of Coumadin 5 mg MWF, 2.5 mg all other days. Recheck INR in 2 week(s). Patient reminded to call the Anticoagulation Clinic with signs or symptoms of bleeding or with any medication changes. Patient given instructions utilizing the teach back method. After visit summary printed and reviewed with patient. Discharged ambulatory in no apparent distress.     For Pharmacy Admin Tracking Only    Intervention Detail: Adherence Monitorin and Dose Adjustment: 1, reason: Therapy Optimization  Total # of Interventions Recommended: 2  Total # of Interventions Accepted: 2  Time Spent (min): [address], PharmD, BCPS  11/21/2022  3:12 PM

## 2022-11-30 ENCOUNTER — NURSE ONLY (OUTPATIENT)
Dept: LAB | Age: 72
End: 2022-11-30

## 2022-11-30 DIAGNOSIS — K90.89 OTHER SPECIFIED INTESTINAL MALABSORPTION: ICD-10-CM

## 2022-11-30 DIAGNOSIS — I48.0 PAROXYSMAL ATRIAL FIBRILLATION (HCC): ICD-10-CM

## 2022-11-30 DIAGNOSIS — E87.5 HYPERKALEMIA: ICD-10-CM

## 2022-11-30 DIAGNOSIS — N18.30 STAGE 3 CHRONIC KIDNEY DISEASE, UNSPECIFIED WHETHER STAGE 3A OR 3B CKD (HCC): ICD-10-CM

## 2022-11-30 DIAGNOSIS — E03.9 HYPOTHYROIDISM, UNSPECIFIED TYPE: ICD-10-CM

## 2022-11-30 DIAGNOSIS — K21.9 GASTROESOPHAGEAL REFLUX DISEASE, UNSPECIFIED WHETHER ESOPHAGITIS PRESENT: ICD-10-CM

## 2022-11-30 DIAGNOSIS — M16.0 PRIMARY OSTEOARTHRITIS OF BOTH HIPS: ICD-10-CM

## 2022-11-30 DIAGNOSIS — G47.30 SLEEP APNEA, UNSPECIFIED TYPE: ICD-10-CM

## 2022-11-30 DIAGNOSIS — R79.83 HOMOCYSTEINEMIA: ICD-10-CM

## 2022-11-30 DIAGNOSIS — E11.9 DIET-CONTROLLED TYPE 2 DIABETES MELLITUS (HCC): ICD-10-CM

## 2022-11-30 DIAGNOSIS — Z79.01 ANTICOAGULATED ON COUMADIN: ICD-10-CM

## 2022-11-30 DIAGNOSIS — I10 ESSENTIAL HYPERTENSION: ICD-10-CM

## 2022-11-30 LAB
ALBUMIN SERPL-MCNC: 3.7 G/DL (ref 3.5–5.1)
ALP BLD-CCNC: 96 U/L (ref 38–126)
ALT SERPL-CCNC: 12 U/L (ref 11–66)
ANION GAP SERPL CALCULATED.3IONS-SCNC: 13 MEQ/L (ref 8–16)
AST SERPL-CCNC: 23 U/L (ref 5–40)
BASOPHILS # BLD: 1.1 %
BASOPHILS ABSOLUTE: 0.1 THOU/MM3 (ref 0–0.1)
BILIRUB SERPL-MCNC: 0.6 MG/DL (ref 0.3–1.2)
BILIRUBIN DIRECT: < 0.2 MG/DL (ref 0–0.3)
BUN BLDV-MCNC: 19 MG/DL (ref 7–22)
CALCIUM SERPL-MCNC: 8.9 MG/DL (ref 8.5–10.5)
CHLORIDE BLD-SCNC: 103 MEQ/L (ref 98–111)
CHOLESTEROL, TOTAL: 127 MG/DL (ref 100–199)
CO2: 25 MEQ/L (ref 23–33)
CREAT SERPL-MCNC: 0.9 MG/DL (ref 0.4–1.2)
EOSINOPHIL # BLD: 3.4 %
EOSINOPHILS ABSOLUTE: 0.2 THOU/MM3 (ref 0–0.4)
ERYTHROCYTE [DISTWIDTH] IN BLOOD BY AUTOMATED COUNT: 16.1 % (ref 11.5–14.5)
ERYTHROCYTE [DISTWIDTH] IN BLOOD BY AUTOMATED COUNT: 48 FL (ref 35–45)
GFR SERPL CREATININE-BSD FRML MDRD: > 60 ML/MIN/1.73M2
GLUCOSE BLD-MCNC: 98 MG/DL (ref 70–108)
HCT VFR BLD CALC: 34.5 % (ref 37–47)
HDLC SERPL-MCNC: 61 MG/DL
HEMOGLOBIN: 10.1 GM/DL (ref 12–16)
IMMATURE GRANS (ABS): 0.03 THOU/MM3 (ref 0–0.07)
IMMATURE GRANULOCYTES: 0.5 %
LDL CHOLESTEROL CALCULATED: 57 MG/DL
LYMPHOCYTES # BLD: 24.1 %
LYMPHOCYTES ABSOLUTE: 1.3 THOU/MM3 (ref 1–4.8)
MAGNESIUM: 2.4 MG/DL (ref 1.6–2.4)
MCH RBC QN AUTO: 23.9 PG (ref 26–33)
MCHC RBC AUTO-ENTMCNC: 29.3 GM/DL (ref 32.2–35.5)
MCV RBC AUTO: 81.8 FL (ref 81–99)
MONOCYTES # BLD: 9.8 %
MONOCYTES ABSOLUTE: 0.5 THOU/MM3 (ref 0.4–1.3)
NUCLEATED RED BLOOD CELLS: 0 /100 WBC
PLATELET # BLD: 365 THOU/MM3 (ref 130–400)
PMV BLD AUTO: 9.6 FL (ref 9.4–12.4)
POTASSIUM SERPL-SCNC: 4.9 MEQ/L (ref 3.5–5.2)
RBC # BLD: 4.22 MILL/MM3 (ref 4.2–5.4)
SEG NEUTROPHILS: 61.1 %
SEGMENTED NEUTROPHILS ABSOLUTE COUNT: 3.4 THOU/MM3 (ref 1.8–7.7)
SODIUM BLD-SCNC: 141 MEQ/L (ref 135–145)
TOTAL PROTEIN: 7.3 G/DL (ref 6.1–8)
TRIGL SERPL-MCNC: 46 MG/DL (ref 0–199)
WBC # BLD: 5.5 THOU/MM3 (ref 4.8–10.8)

## 2022-12-01 LAB
C-REACTIVE PROTEIN, HIGH SENSITIVITY: 34.1 MG/L
FOLLICLE STIMULATING HORMONE: 12.8 MIU/ML (ref 25.8–134.8)
HOMOCYSTEINE: 11.5 UMOL/L
LUTEINIZING HORMONE: 19.9 MIU/ML (ref 7.7–58.5)
PTH INTACT: 38.6 PG/ML (ref 15–65)
RHEUMATOID FACTOR: 11 IU/ML (ref 0–13)
SEDIMENTATION RATE, ERYTHROCYTE: 50 MM/HR (ref 0–20)
T3 TOTAL: 89 NG/DL (ref 60–181)
TSH SERPL DL<=0.05 MIU/L-ACNC: 1.62 UIU/ML (ref 0.4–4.2)
VITAMIN D 25-HYDROXY: 28 NG/ML (ref 30–100)

## 2022-12-02 LAB
DHEAS (DHEA SULFATE): < 15 UG/DL (ref 10–90)
GLIADIN PEPTIDE IGG: < 0.4 U/ML
THYROID PEROXIDASE ANTIBODY: < 4 IU/ML (ref 0–25)
THYROXINE (T4): 7.1 UG/DL (ref 4.5–10.9)
TISSUE TRANSGLUTAMINASE IGA: 1.2 U/ML

## 2022-12-04 LAB — DHEA UNCONJUGATED: 1.6 NG/ML (ref 0.63–4.7)

## 2022-12-08 ENCOUNTER — HOSPITAL ENCOUNTER (OUTPATIENT)
Dept: PHARMACY | Age: 72
Setting detail: THERAPIES SERIES
Discharge: HOME | End: 2022-12-08
Payer: MEDICARE

## 2022-12-08 DIAGNOSIS — Z51.81 ENCOUNTER FOR THERAPEUTIC DRUG MONITORING: ICD-10-CM

## 2022-12-08 DIAGNOSIS — Z79.01 ANTICOAGULATED ON COUMADIN: Primary | ICD-10-CM

## 2022-12-08 LAB — POC INR: 1.9 (ref 0.8–1.2)

## 2022-12-08 PROCEDURE — 36416 COLLJ CAPILLARY BLOOD SPEC: CPT | Performed by: PHARMACIST

## 2022-12-08 PROCEDURE — 99211 OFF/OP EST MAY X REQ PHY/QHP: CPT | Performed by: PHARMACIST

## 2022-12-08 PROCEDURE — 85610 PROTHROMBIN TIME: CPT | Performed by: PHARMACIST

## 2022-12-08 NOTE — PROGRESS NOTES
Medication Management [address]  [phone] (phone)  [phone] (fax)    Ms. Keyonna Martins is a 71 y.o.  female with history of Afib who presents today for anticoagulation monitoring and adjustment. Patient verifies current dosing regimen and tablet strength. No missed or extra doses. Patient denies s/s bleeding/bruising/swelling/SOB/chest pain  No blood in urine or stool. No dietary changes. No changes in medication/OTC agents/Herbals. Taking one a day multivitamin, states has been taking for a while, no longer using Boost.    No change in alcohol use or tobacco use. Slightly more active. Patient denies headaches/dizziness/lightheadedness/falls. No vomiting/diarrhea or acute illness. No Procedures scheduled in the future at this time. Assessment:   Lab Results   Component Value Date    INR 1.90 (H) 12/08/2022    INR 1.40 (H) 11/21/2022    INR 1.80 (H) 11/07/2022     INR subtherapeutic   Recent Labs     12/08/22  1331   INR 1.90*         Plan:  Coumadin 5mg today, then continue Coumadin 5mg MWF and 2.5mg TThSaS. Recheck INR in 2 week(s). Patient reminded to call the Anticoagulation Clinic with any signs or symptoms of bleeding or with any medication changes. Patient given instructions utilizing the teach back method. After visit summary printed and reviewed with patient. Discharged in [facility] in no apparent distress.       For Pharmacy Admin Tracking Only    Intervention Detail: Dose Adjustment: 1, reason: Therapy Optimization  Total # of Interventions Recommended: 1  Total # of Interventions Accepted: 1  Time Spent (min): 20

## 2022-12-14 ENCOUNTER — OFFICE VISIT (OUTPATIENT)
Dept: FAMILY MEDICINE CLINIC | Age: 72
End: 2022-12-14
Payer: MEDICARE

## 2022-12-14 VITALS
HEART RATE: 70 BPM | RESPIRATION RATE: 12 BRPM | SYSTOLIC BLOOD PRESSURE: 142 MMHG | OXYGEN SATURATION: 99 % | HEIGHT: 64 IN | DIASTOLIC BLOOD PRESSURE: 64 MMHG | BODY MASS INDEX: 47.15 KG/M2 | TEMPERATURE: 97.3 F | WEIGHT: 276.2 LBS

## 2022-12-14 DIAGNOSIS — E11.22 TYPE 2 DIABETES MELLITUS WITH CHRONIC KIDNEY DISEASE, WITHOUT LONG-TERM CURRENT USE OF INSULIN, UNSPECIFIED CKD STAGE (HCC): ICD-10-CM

## 2022-12-14 DIAGNOSIS — E11.9 DIET-CONTROLLED TYPE 2 DIABETES MELLITUS (HCC): ICD-10-CM

## 2022-12-14 DIAGNOSIS — I48.0 PAROXYSMAL ATRIAL FIBRILLATION (HCC): ICD-10-CM

## 2022-12-14 DIAGNOSIS — K21.9 GASTROESOPHAGEAL REFLUX DISEASE, UNSPECIFIED WHETHER ESOPHAGITIS PRESENT: ICD-10-CM

## 2022-12-14 DIAGNOSIS — M62.838 MUSCLE SPASM OF LEFT LOWER EXTREMITY: ICD-10-CM

## 2022-12-14 DIAGNOSIS — Z79.01 ANTICOAGULATED ON COUMADIN: ICD-10-CM

## 2022-12-14 DIAGNOSIS — E03.9 HYPOTHYROIDISM, UNSPECIFIED TYPE: Primary | ICD-10-CM

## 2022-12-14 DIAGNOSIS — R79.83 HOMOCYSTEINEMIA: ICD-10-CM

## 2022-12-14 DIAGNOSIS — K90.89 OTHER SPECIFIED INTESTINAL MALABSORPTION: ICD-10-CM

## 2022-12-14 DIAGNOSIS — I10 ESSENTIAL HYPERTENSION: ICD-10-CM

## 2022-12-14 DIAGNOSIS — M25.562 ARTHRALGIA OF LEFT LOWER LEG: ICD-10-CM

## 2022-12-14 PROBLEM — R73.03 PRE-DIABETES: Status: ACTIVE | Noted: 2022-11-17

## 2022-12-14 PROBLEM — Z20.9 CONTACT WITH AND (SUSPECTED) EXPOSURE TO UNSPECIFIED COMMUNICABLE DISEASE: Status: ACTIVE | Noted: 2022-10-14

## 2022-12-14 PROBLEM — M16.11 OSTEOARTHRITIS OF RIGHT HIP: Status: ACTIVE | Noted: 2022-07-26

## 2022-12-14 PROCEDURE — 99214 OFFICE O/P EST MOD 30 MIN: CPT | Performed by: FAMILY MEDICINE

## 2022-12-14 PROCEDURE — 1123F ACP DISCUSS/DSCN MKR DOCD: CPT | Performed by: FAMILY MEDICINE

## 2022-12-14 PROCEDURE — 3078F DIAST BP <80 MM HG: CPT | Performed by: FAMILY MEDICINE

## 2022-12-14 PROCEDURE — 3074F SYST BP LT 130 MM HG: CPT | Performed by: FAMILY MEDICINE

## 2022-12-14 RX ORDER — PHENOL 1.4 %
1 AEROSOL, SPRAY (ML) MUCOUS MEMBRANE 3 TIMES DAILY
COMMUNITY

## 2022-12-14 RX ORDER — TIZANIDINE 2 MG/1
2 TABLET ORAL NIGHTLY PRN
Qty: 30 TABLET | Refills: 1 | Status: SHIPPED | OUTPATIENT
Start: 2022-12-14

## 2022-12-14 SDOH — ECONOMIC STABILITY: FOOD INSECURITY: WITHIN THE PAST 12 MONTHS, YOU WORRIED THAT YOUR FOOD WOULD RUN OUT BEFORE YOU GOT MONEY TO BUY MORE.: NEVER TRUE

## 2022-12-14 SDOH — ECONOMIC STABILITY: FOOD INSECURITY: WITHIN THE PAST 12 MONTHS, THE FOOD YOU BOUGHT JUST DIDN'T LAST AND YOU DIDN'T HAVE MONEY TO GET MORE.: NEVER TRUE

## 2022-12-14 ASSESSMENT — SOCIAL DETERMINANTS OF HEALTH (SDOH): HOW HARD IS IT FOR YOU TO PAY FOR THE VERY BASICS LIKE FOOD, HOUSING, MEDICAL CARE, AND HEATING?: NOT VERY HARD

## 2022-12-14 NOTE — PROGRESS NOTES
[id number] [place] [address]  Dept: [phone]  Dept Fax: [phone]  Loc: [phone]      Laisha Sorenson is a 71 y.o. White female. Eder Chan  presents to the [place] [address] clinic today for   Chief Complaint   Patient presents with    Discuss Labs   , and;   1. Hypothyroidism, unspecified type    2. Arthralgia of left lower leg    3. Essential hypertension    4. Paroxysmal atrial fibrillation (HCC)    5. Anticoagulated on Coumadin    6. Gastroesophageal reflux disease, unspecified whether esophagitis present    7. Homocysteinemia    8. Other specified intestinal malabsorption    9. Muscle spasm of left lower extremity    10. Diet-controlled type 2 diabetes mellitus ([address]    11. Type 2 diabetes mellitus with chronic kidney disease, without long-term current use of insulin, unspecified CKD stage ([address]          I have reviewed Laisha Sorenson medical, surgical and other pertinent history in detail, and have updated medication and allergy information in the computerized patient record. Clinical Care Team:     -Referring Provider for today's consult: self  -Primary Care Provider: Alray Gilford, DO    Medical/Surgical History:   She  has a past medical history of A-fib [facility]), Atrial fibrillation ([address] CAD (coronary artery disease), Carpal tunnel syndrome, Diabetes mellitus ([address] GERD (gastroesophageal reflux disease), Headache(784.0), HTN (hypertension), Hyperlipidemia, Hypothyroidism, Osteoarthritis, Pacemaker, Sleep apnea, Tendonitis of both wrists, and Toenail fungus. Her  has a past surgical history that includes Carpal tunnel release; hernia repair;  section;  section;  section; Foot surgery (Left, 2018); joint replacement (Left, 2010); joint replacement (Right, 2016); Colonoscopy (N/A, 10/8/2021);  Upper gastrointestinal endoscopy (N/A, 10/8/2021); and Upper gastrointestinal endoscopy (10/8/2021). Family/Social History:     Her family history includes Arrhythmia in her mother and sister; Cancer in her brother; Coronary Art Dis in her brother and mother; Diabetes in her father; Early Death in her child; Heart Attack in her brother; Heart Disease in her mother; Other in her brother, mother, and sister; Pacemaker in her sister; Kaitlin Ahr in her child; Stroke in her mother. She  reports that she has never smoked. She has never used smokeless tobacco. She reports that she does not currently use alcohol. She reports that she does not use drugs. Medications/Allergies/Immunizations:     Her current medication(s) include   Current Outpatient Medications:     calcium carbonate 600 MG TABS tablet, Take 1 tablet by mouth three times daily [facility] or [facility], Disp: , Rfl:     tiZANidine (ZANAFLEX) 2 MG tablet, Take 1 tablet by mouth nightly as needed (muscle spasms), Disp: 30 tablet, Rfl: 1    DHEA 10 MG TABS, Take 1 capsule by mouth every other day, Disp: , Rfl:     Cinnamon 500 MG CAPS, Take 2 capsules by mouth 4 times daily (before meals and nightly) With Chromium is OK, Disp: , Rfl:     Ferrous Sulfate (IRON PO), Take by mouth nightly, Disp: , Rfl:     Multiple Vitamins-Minerals (ONE-A-DAY 50 PLUS PO), Take 1 tablet by mouth daily, Disp: , Rfl:     warfarin (COUMADIN) 5 MG tablet, Take as directed by [facility] tabs = 90 day supply, Disp: 60 tablet, Rfl: 1    traMADol (ULTRAM) 50 MG tablet, Take 50 mg by mouth every 6 hours as needed for Pain., Disp: , Rfl:     oxyCODONE (ROXICODONE) 5 MG immediate release tablet, Take 5 mg by mouth every 4 hours as needed for Pain.  (Patient not taking: Reported on 12/8/2022), Disp: , Rfl:     levothyroxine (SYNTHROID) 100 MCG tablet, take 1 tablet by mouth once daily, Disp: 90 tablet, Rfl: 3    liothyronine (CYTOMEL) 5 MCG tablet, take 1 tablet by mouth once daily, Disp: 90 tablet, Rfl: 3    OMEPRAZOLE PO, Take 1 tablet by mouth daily PRN, Disp: , Rfl:     Handicap Placard MISC, by Other route For 5 years, Disp: 1 each, Rfl: 0    diclofenac sodium (VOLTAREN) 1 % GEL, Apply topically 4 times daily as needed for Pain (Patient not taking: Reported on 11/21/2022), Disp: 150 g, Rfl: 1    Acetaminophen (TYLENOL ARTHRITIS PAIN PO), Take 500 mg by mouth every morning Indications: Pain PRN. Don't take more then 3,000 mg each day, including what's in cold products. , Disp: , Rfl:     flecainide (TAMBOCOR) 100 MG tablet, Take 100 mg by mouth 2 times daily , Disp: , Rfl:     vitamin C (ASCORBIC ACID) 500 MG tablet, Take 500 mg by mouth 3 times daily, Disp: , Rfl:     metoprolol succinate (TOPROL XL) 25 MG extended release tablet, Take 25 mg by mouth 2 times daily , Disp: , Rfl:     glucose monitoring kit (FREESTYLE) monitoring kit, 1 kit by Does not apply route daily (Patient not taking: Reported on 11/7/2022), Disp: 1 kit, Rfl: 0    Lancets MISC, 1 each by Does not apply route daily (Patient not taking: Reported on 11/7/2022), Disp: 300 each, Rfl: 1    blood glucose test strips (ASCENSIA AUTODISC VI;ONE TOUCH ULTRA TEST VI) strip, 1 each by In Vitro route daily Dispense any brand - check blood sugar As needed. , Disp: 100 each, Rfl: 3    NONFORMULARY, Take 1 capsule by mouth 4 times daily (before meals and nightly) Christopher, Disp: , Rfl:     nitroGLYCERIN (NITROSTAT) 0.4 MG SL tablet, Place under the tongue every 5 minutes as needed for Chest pain (If third one does not relieve pain, call 9-1-1.)  (Patient not taking: No sig reported), Disp: , Rfl: 0    MOEFGPTLS-ZBSAOBRDG-VKFJCQDXQC PO, Place 1 tablet under the tongue 2 times daily Jimbo B12 plus or [place] methylB6, [id number], methylfolate , Disp: , Rfl:     Magnesium 400 MG CAPS, Take 3 capsules by mouth 4 times daily (after meals and at bedtime) Indications: Magnesium Deficiency , Disp: , Rfl:     Lysine 500 MG TABS, Take 1 tablet by mouth 3 times daily, Disp: , Rfl:     vitamin D (CHOLECALCIFEROL) 50 MCG (2000 UT) TABS tablet, Take 4,000 Units by mouth daily Indications: Treatment with Vitamin D, Disp: , Rfl:     B Complex-Biotin-FA (B-100 TR) TBCR, Take 2 tablets by mouth 2 times daily (before meals) Before breakfast and before lunch or supper, Disp: , Rfl:   Allergies: Enoxaparin and Lovenox [enoxaparin sodium]  Immunizations:   Immunization History   Administered Date(s) Administered    COVID-19, MODERNA BLUE border, Primary or Immunocompromised, (age 12y+), IM, 100 mcg/0.5mL 02/24/2021, 03/24/2021, 12/23/2021    Influenza Virus Vaccine 11/03/2011, 10/09/2014, 11/17/2015, 09/08/2020, 10/04/2021    Influenza, FLUAD, (age 72 y+), Adjuvanted, 0.5mL 10/04/2021    Influenza, FLUARIX, FLULAVAL, FLUZONE (age 10 mo+) AND AFLURIA, (age 1 y+), PF, 0.5mL 01/19/2017, 09/08/2020    Influenza, FLUZONE (age 72 y+), High Dose, 0.7mL 12/09/2022    Influenza, High Dose (Fluzone 65 yrs and older) 10/11/2018    Influenza, MDCK, Preservative free 10/31/2017    Influenza, Triv, inactivated, subunit, adjuvanted, IM (Fluad 65 yrs and older) 10/11/2018, 09/17/2019    Pneumococcal Conjugate 13-valent (Hfcqeqs01) 07/11/2017    Pneumococcal Polysaccharide ([id number]) 11/05/2018    Tdap (Boostrix, Adacel) 10/09/2014    Zoster Live (Zostavax) 05/05/2014    Zoster Recombinant (Shingrix) 11/13/2019, 04/04/2020        History of Present Illness:     Leah's had concerns including Discuss Labs. Era Najera  presents to the [address] [place] [address] today for;   Chief Complaint   Patient presents with    Discuss Labs   , abnormal labs follow up and these conditions as she  Is looking today for:     1. Hypothyroidism, unspecified type    2. Arthralgia of left lower leg    3. Essential hypertension    4. Paroxysmal atrial fibrillation (HCC)    5. Anticoagulated on Coumadin    6. Gastroesophageal reflux disease, unspecified whether esophagitis present    7. Homocysteinemia    8.  Other specified intestinal malabsorption    9. Muscle spasm of left lower extremity    10. Diet-controlled type 2 diabetes mellitus ([address]    11. Type 2 diabetes mellitus with chronic kidney disease, without long-term current use of insulin, unspecified CKD stage (HCC)      HPI    Subjective:     Review of Systems   All other systems reviewed and are negative. Objective:     BP (!) 142/64 (Site: Left Upper Arm, Position: Sitting, Cuff Size: Large Adult)   Pulse 70   Temp 97.3 °F (36.3 °C) (Temporal)   Resp 12   Ht 5' 4\" (1.626 m)   Wt 276 lb 3.2 oz (125.3 kg)   SpO2 99%   BMI 47.41 kg/m²   Physical Exam  Vitals and nursing note reviewed. Constitutional:       Appearance: Normal appearance. HENT:      Head: Normocephalic. Pulmonary:      Effort: Pulmonary effort is normal.   Neurological:      Mental Status: She is alert. Psychiatric:         Mood and Affect: Mood normal.         Thought Content: Thought content normal.          Laboratory Data:   Lab results were searched in Care Everywhere and/or those brought by the pateint were reviewed today with Kacy Gamble and she has a copy of their most recent labs to take home with them as noted below;       Imaging Data:   Imaging Data:       Assessment & Plan:       Impression:  1. Hypothyroidism, unspecified type    2. Arthralgia of left lower leg    3. Essential hypertension    4. Paroxysmal atrial fibrillation (HCC)    5. Anticoagulated on Coumadin    6. Gastroesophageal reflux disease, unspecified whether esophagitis present    7. Homocysteinemia    8. Other specified intestinal malabsorption    9. Muscle spasm of left lower extremity    10. Diet-controlled type 2 diabetes mellitus ([address]    11.  Type 2 diabetes mellitus with chronic kidney disease, without long-term current use of insulin, unspecified CKD stage (HCC)      Assessment and Plan:  After reviewing the patients chief complaints, reviewing their labfindings in great detail (with the patient and those accompanying them) which correlate to their chief complaints, symptoms, and or medical conditions; suggestions were made relating to changes in diet and or supplements which may improve the complaints and which will be reflected in their future lab findings; Chief Complaint   Patient presents with    Discuss Labs   ;    Plans for the next visits:  - Abnormal and non-optimal Labs were ordered today to be repeated in the next 120-365 days to assess changes from adjustments in nutrition and or nutrients. - Patient instructed when having a blood draw to ask the  to divide their lab draws into multiple draws over several days if not feeling good at the time of the lab draw or if either prefers to do several smaller blood draws over several days  -Patient instructed to check with insurer before each lab draw and to go to the lab which the insurer directs them for the most cost effective lab draw with the least patient's cost  - Gema Moy  will be scheduled subsequent to those results. Merry Orellana will bring in her drink, food, supplement log to her next visit    Chronic Problems Addressed on this Visit:                                   1.  Intensity of Service; Uncontrolled items at this visit; Chief Complaint   Patient presents with    Discuss Labs   ; Improved items at this visit and Stable items were discussed at this visit;  2. Patients food, drinks, supplements and symptoms were reviewed with the patient,       - Gema Moy will bring food, drink, supplements and symptoms log to next visit for inclusion in their record      - 75 better food list reviewed & given to patient with the omega 6 food list to avoid      - The 52 Latex foods list was reviewed and given to the patients with the information on carrageenan         - Gluten in corn and oats abstracts sheet reviewed and given to the patient today   3.    Greater than 21 minutes time was spent with the patient face to face on this visit; of which >50% was for counseling and coordination of care, as well as the time spent before and after the visit reviewing the chart, documenting the encounter, reviewing labs,reports, NIH listed studies, making phone calls, etc.      Patients food and drinks were reviewed with the patient,   - They will bring a food drink symptom log to future visits for inclusion in their record    - 75 better food list reviewed & given to patient along with the omega 6 food list to avoid      - Gluten in corn and oats abstracts sheet reviewed and given to the patient today    - 23 Foods containing Latex-like proteins was reviewed and copy to be taken if desired     - Nutrient Supplements list provided and copyto be taken if desired    - [email or web address]. Daleeli web site offered to patient to review at their convenience by staff with login information    Note:  I have discussed with the patient that with all nutraceuticals, there is often mixed data and emerging research which needs to be monitored; as well as an array of NIH fact sheets on nutrients and supplements, available at www.nih,issue plus [employer]. Daleeli plus www.Lijit Networksi,org. If I have recommended cinnamon at the request of this patient to assist them in control of their blood sugar, triglyceride, and/or weight issues. I discussed that the patient's clinical use of cinnamon bark, calcium, magnesium, Vitamin D, and pharmaceutical grade [facility]S omega 3 oil or triple-strength fish oil, and B-50/B-100 time-released B-complex by [address] [facility] will be for a time-limited trial to determine their individual effectiveness and safety in this patient. I also referred the patient to the NMCD: Nutrition, Metabolism, and Cardiovascular Diseases (SecuritiesCard.pl) and concerns about long-term use and hepatotoxicity of cinnamon and other nutrients. I suggested they frequently search nih.gov for the latest non-proprietary information on nutriceuticals as well as consider a subscription to consumerlabs. com for details on reviewed supplements, or at the least review the nutrient files at [facility], [address] bark, an insulin mimetic, reduces some High Carbohydrate Dietary Impacts. Methylhydroxychalcone polymers insulin-enhancing properties in fat cells are responsible for enhanced glucose uptake, inhibiting hepatic HMG-CoA reductase and lowers lipids. www.jacn. org/content/20/4/327.full     But cinnamon with additives such as Cinnamon Extract are not effective as insulin mimetics.  :eStoreDirectory.at     Nutrients for Start up from [employer] or Gl[employer] for ease to get started now;  Sourav Contreras has some useable products;  - Triple Strength Fish Oil, enteric coated  - Vit D-3 5000 IU gel caps  - Iron ferrous sulfate 325 mg tabs  - Centrum Silver look-a-like for most patients, or  - Centrum plain look-a-like if need iron    Local pharmacies or chains such as [employer], [employer], have:  - Maginatics pharmaceutical grade omega 3 is 90% EPA/DHA whereas most Triple strength fish oil are 75% EPA/DHA  - Triple Strength Fish Oil (enteric coated if available) or if not enteric coated, can take from freezer for less burps  - B-50 or B-100 released balanced B complex tabs by [address] at [facility] bark 500 mg (without Chromium or extracts)   some brands list 1000 mg / serving of 2 capsules,    some brands have 1000 mg caps with the undesireable chromium extract  - Calcium carbonate/citrate, magnesium oxide/citrate, Vit D-3 as 3-4 tabs/caps/serving     Some Local Brands may contain Zinc which is acceptable for the first bottle or two  - Magnesium oxide 250 mg tabs for those having < 2 bowel movements daily  - Magnesium citrate 200 mg if having > 2 bowel movements/day  - Centrum Silver or look-a-like for most patients, Centrum plain or look-a-like with iron  - Vitamin D-3 comes as 1,000 IU or 2,000 IU or 5,000 IU gel caps or Liquid drops but keep Vitamin D levels <50 but >40     Some brands containing or derived from soy oil or corn oil are OK if not allergic to soy  - Elemental Iron 65 mg tabs at bedtime is available over the counter if need more iron     Usually turns bowel movements grey, green, or black but not a concern  - Apricot Kernel Oil (by Now) for dry skin sensitive perineal or perianal area skin    Nutrients for ongoing use by Mail order for less expense from wwwAdviceScene Enterprises ;  - Strength Fish Oil , 240 Softgels Item #067754  -B-100 time released balanced B complex Item #587201  - Cinnamon bark 500 mg without Chromium or extract Item #172682  - Calcium carbonate 1000 mg, Magnesium oxide 500 mg, Vit D-3 400 IU Item #[id number]  - Magnesium oxide 500 mg tabs Item #891137 if less than 2 bowel movements daily  - ABC Seniors Item #075999 for most patients, One Daily Item #182583 with iron  - Vit D 3  1,000 Item #622713      2,000 IU Item #[id number]   Item #[id number]     Some brands containing orderived from soy oil or corn oil are OK if not allergic to soy    Nutrients for Special Needs by Mail order for less expense from www. puritan.com;  -Elemental Iron 65 mg tabs Item #360601 if need more iron for low iron on labs    Usually turns bowel movements grey, green or black but not a concern  - Time released Niacin 250 mg Item #061631 for cold intolerance, low libido or impotence  - DHEA 50 mg Item #567019 for improving DHEA levels on labs if having Fatigue    If stools too loose substitute for your Magnesium oxide using;   Magnesium citrate 200 mg tabs (NOT liquid) at VitaminsAttachments.me   Magnesium gluconate 550 mg by Cha Hodgson at [employer] or K[employer]hu. com  Magnesium chloride foot soaks or body sprays  www.Neptune. PathSource   Magnesium chloride flakes 14.99 Item #: [id number] if back-ordered, get spray  Magnesium threonate, Magtein also helps mental clarity and sleep    Food Drink Symptom Log;  I asked this patient to track these items and any other symptoms on their list on a weekly basis to documenttheir progress or lack of same. This can be done on the symptom tracking sheet I gave them at today's visit but looks like this:                                                      Rate on scale of 0-10 with zero = not noticeable  Symptom:                            Week 1               2                 3                 4               Etc            Hair loss    Foot cramps    Paresthesia    Aches    IBS (irritable bowel)    Constipation    Diarrhea  Nocturia (up to bathroom at night)    Fatigue/Energy level  Stress      On the other side of the sheet they can track their food, drink, environment, activity, symptoms etc      Avoiding Latex-like proteins in my foods; Avocados, Bananas, Celery, Figs & Kiwi proteins have latex-like proteins to inflame our immune systems, plus 47 more foods  How Can I Have A Latex Allergy? Eating foods with latex-like protein exposes us to latex allergies. Our body cannot tell the differencebetween these latex-like proteins and latex from rubber products since many people are allergic to fruit, vegetables and latex. Read labels on pre-packaged foods. This list to avoid is only a guide if you are known allergicto latex or have a latex rash on your chin, cheeks and lines on your neck and chest. The amount of latex is different in each food product or fruit variety. Avoid out of Season if not grown locally:   Melon, Nectarine, Papaya, Cherry, Passion fruit, Plum, Chestnuts, and Tomato. Avocado, Banana, Celery, Figs, and Kiwi always contain Latex-like protein. Whats in Season? Strawberries taste better in June than December because June is strawberry season so buy locally grown produce \"in season\" for the best flavor, cost, and less Latex. Locally grown produce not only tastes great but also requires little or no ethylene exposure in food distribution so has less latex content.   Out of season: use canned, frozen, or dried since those are processed ripe and latex content is lower!!! Month     [place] Locally Grown Produce  January, February, March: use canned, frozen or dried fruits since lower in latex  April: asparagus, radishes  May: asparagus, broccoli, green onions, greens, peas, radishes, rhubarb  June: asparagus, beets, beans, broccoli, cabbage, cantaloupe, carrots, green onions, greens, lettuce, onions, parsley, peas, radishes, rhubarb, strawberries, watermelons  July: beans, beets, blueberries, broccoli, cabbage, cantaloupe, carrots, cauliflower, celery, cucumbers, eggplant, grapes, green onions, greens, lettuce, onions, parsley, peas, peaches, bell peppers, potatoes, radishes, summer raspberries, squash, sweetcorn, tomatoes, turnips, watermelons  August: apples, beans, beets, blueberries, cabbage, cantaloupe, carrots, cauliflower, celery, cucumbers, eggplant, grapes, green onions, greens, lettuce, onions, parsley, peas, peaches, pears, bell peppers, potatoes, radishes, squash, sweet corn, tomatoes, turnips, watermelons  September: apples, beans, beets, blueberries, cabbage, cantaloupe, carrots, cauliflower, celery, cucumbers, eggplant, grapes, green onions, greens, lettuce, onions, parsley, peas, peaches, pears, bell peppers, plums, potatoes, pumpkins, radishes, fall red raspberries, squash, sweet corn, tomatoes, turnips, watermelons  October: apples, beets, broccoli, cabbage, carrots, cauliflower, celery, green onions, greens, lettuce, parsley, peas, pears, potatoes, pumpkins, radishes, fall red raspberries, squash, turnips  November: broccoli, cabbage, carrots, parsley, pears, peas  December: use canned, frozen or dried fruits since lower in latex    Upto half of latex-sensitive patients show allergic reactions to fruits (avocados, bananas, kiwifruits, papayas, peaches),   Annals of Allergy, 1994. These plants contain the same proteins that are allergens in latex.  People with fruit allergies should warn physicians before undergoing procedures which may cause anaphylactic reaction if in contact with latex gloves. Some of the common foods with defined cross-reactivity to latex are avocado, banana, kiwi, chestnut, raw potato, tomato, stone fruits (e.g., peach, cherry), hazelnut, melons, celery, carrot, apple, pear, papaya, and almond. Foods with less well-defined cross-reactivity to latex are peanuts, peppers, citrus fruits, coconut, pineapple, naveed, fig, passion fruit, Ugli fruit, and grape. This fruit/latex cross-reactivity is worsened by ethylene, a gas used to hasten commercial ripening. In nature, plants produce low levels of the hormone ethylene, which regulates germination, flowering, and ripening. Forced ripening by high ethylene concentrations, plants produce allergenic wound-repair proteins, which are similar to wound-repair proteins made during the tapping of rubber trees. Sensitive individuals who ingest the fruit get a higher dose and worse reaction. Some people may even first become sensitized to latex through fruit. Can food processing increase the concentrations of allergenic proteins? Latex-sensitized children (and adults) in [place] often experience allergic reactions after eating bananas ripened artificially with ethylene. In the [place], food distribution centers treat unripe bananas and other produce with ethylene to ripen; not commonly done in [facility] since fruit is tree-ripened there. Does treatment of food with ethylene induce banana proteins that cross-react with latex? (Miguel Angel et al.)    References:   Latex in Foods Allergy, http://ehp.niehs.nih.gov/members/2003/5811/5811.html    Search web for [employer] in Season \" for where you live or are at the time you food shop   Management of Latex, ://medicalcenter. [facility].edu/  search for nih, latex-like proteins in foods

## 2022-12-22 ENCOUNTER — HOSPITAL ENCOUNTER (OUTPATIENT)
Dept: PHARMACY | Age: 72
Setting detail: THERAPIES SERIES
Discharge: HOME | End: 2022-12-22
Payer: MEDICARE

## 2022-12-22 DIAGNOSIS — Z51.81 ENCOUNTER FOR THERAPEUTIC DRUG MONITORING: ICD-10-CM

## 2022-12-22 DIAGNOSIS — Z79.01 ANTICOAGULATED ON COUMADIN: Primary | ICD-10-CM

## 2022-12-22 LAB — POC INR: 2.3 (ref 0.8–1.2)

## 2022-12-22 PROCEDURE — 36416 COLLJ CAPILLARY BLOOD SPEC: CPT

## 2022-12-22 PROCEDURE — 85610 PROTHROMBIN TIME: CPT

## 2022-12-22 PROCEDURE — 99212 OFFICE O/P EST SF 10 MIN: CPT

## 2022-12-22 RX ORDER — WARFARIN SODIUM 5 MG/1
TABLET ORAL
Qty: 60 TABLET | Refills: 0 | Status: SHIPPED | OUTPATIENT
Start: 2022-12-22

## 2022-12-22 RX ORDER — WARFARIN SODIUM 5 MG/1
TABLET ORAL NIGHTLY
COMMUNITY

## 2022-12-22 NOTE — PROGRESS NOTES
Medication Management [address]  [phone] (phone)  [phone] (fax)    Ms. Coy Gillette is a 71 y.o.  female with history of atrial fib. , per Dr. Leonidas Vazquez referral, who presents today for Warfarin monitoring and adjustment (2 week visit - late for today's visit). Patient verifies current dosing regimen and tablet strength. No missed or extra doses, except for bolus ordered last visit. Patient denies bleeding/bruising/chest pain. Has usual swelling of legs. No blood in urine or stool. Stool black from iron supplement. No dietary changes. Changes in medication/OTC agents/herbals: started iron supplement. No change in alcohol use or tobacco use. No change in activity level. Still doing PT. Back to work as  1/3. Patient denies dizziness/lightheadedness/falls. Had SOB/nausea/headache with episode of atrial fib. recently. No vomiting/diarrhea or acute illness. No procedures scheduled in the future at this time. Assessment:   Lab Results   Component Value Date    INR 2.30 (H) 12/22/2022    INR 1.90 (H) 12/08/2022    INR 1.40 (H) 11/21/2022     INR therapeutic - goal 2-3. Recent Labs     12/22/22  1003   INR 2.30*        Plan:  POCT INR ordered/performed/result reviewed. Continue PO Coumadin 5 mg MWF, 2.5 mg TThSS. Recheck INR in 3 week(s). Patient reminded to call the Anticoagulation Clinic with any signs or symptoms of bleeding or with any medication changes. Patient given instructions utilizing the teach back method. After visit summary printed and reviewed with patient. Discharged via transport chair in no apparent distress, holding cane, with  and 2 grandsons.   Prescription renewed electronically by clinic pharmacist.

## 2023-01-12 ENCOUNTER — HOSPITAL ENCOUNTER (OUTPATIENT)
Dept: PHARMACY | Age: 73
Setting detail: THERAPIES SERIES
Discharge: HOME | End: 2023-01-12
Payer: MEDICARE

## 2023-01-12 DIAGNOSIS — Z51.81 ENCOUNTER FOR THERAPEUTIC DRUG MONITORING: ICD-10-CM

## 2023-01-12 DIAGNOSIS — Z79.01 ANTICOAGULATED ON COUMADIN: Primary | ICD-10-CM

## 2023-01-12 LAB — POC INR: 1.9 (ref 0.8–1.2)

## 2023-01-12 PROCEDURE — 85610 PROTHROMBIN TIME: CPT | Performed by: PHARMACIST

## 2023-01-12 PROCEDURE — 99211 OFF/OP EST MAY X REQ PHY/QHP: CPT | Performed by: PHARMACIST

## 2023-01-12 PROCEDURE — 36416 COLLJ CAPILLARY BLOOD SPEC: CPT | Performed by: PHARMACIST

## 2023-01-12 NOTE — PROGRESS NOTES
Medication Management [address]  [phone] (phone)  [phone] (fax)    Ms. Coy Gillette is a 71 y.o.  female with history of Afib who presents today for anticoagulation monitoring and adjustment. Patient verifies current dosing regimen and tablet strength. No missed or extra doses. Patient denies s/s bleeding/bruising/SOB/chest pain - swelling in legs since surgery, MDs aware  No blood in urine or stool. No dietary changes. Drinking Boost daily, started this on 1/9  No changes in medication/OTC agents/Herbals. No change in tobacco use. One alcoholic drink over [employer] and also on New Year's  Slightly less active. Patient denies headaches/dizziness/lightheadedness/falls. No vomiting/diarrhea or acute illness. No Procedures scheduled in the future at this time. Assessment:   Lab Results   Component Value Date    INR 1.90 (H) 01/12/2023    INR 2.30 (H) 12/22/2022    INR 1.90 (H) 12/08/2022     INR subtherapeutic   Recent Labs     01/12/23  1329   INR 1.90*     Discussed role of Boost, which contains Vit K, potentially causing subtherapeutic INR. Suggested recheck INR in 1 week, pt absolutely did not want to do this. Patient states she will drink Boost only every other day, and may even stop, wants to come back in 3 weeks, eventually does agree to 2 week recheck. Patient also states that she has an upcoming virtual visit with someone on 1/25 to discuss diet changes, thinks her diet may be changing soon anyway. Plan:  Coumadin 5mg today, continue Coumadin 5mg MWF and 2.5mg TThSaS. Recheck INR in 2 week(s). Patient reminded to call the Anticoagulation Clinic with signs or symptoms of bleeding or with any medication changes. Patient given instructions utilizing the teach back method. After visit summary printed and reviewed with patient. Discharged in [facility] in no apparent distress.     For Pharmacy Admin Tracking Only    Intervention Detail: Dose Adjustment: 1, reason: Therapy Optimization  Total # of Interventions Recommended: 1  Total # of Interventions Accepted: 1  Time Spent (min): 20

## 2023-01-24 ENCOUNTER — HOSPITAL ENCOUNTER (OUTPATIENT)
Dept: PHARMACY | Age: 73
Setting detail: THERAPIES SERIES
Discharge: HOME | End: 2023-01-24
Payer: MEDICARE

## 2023-01-24 DIAGNOSIS — Z79.01 ANTICOAGULATED ON COUMADIN: Primary | ICD-10-CM

## 2023-01-24 DIAGNOSIS — Z51.81 ENCOUNTER FOR THERAPEUTIC DRUG MONITORING: ICD-10-CM

## 2023-01-24 LAB — POC INR: 2.3 (ref 0.8–1.2)

## 2023-01-24 PROCEDURE — 36416 COLLJ CAPILLARY BLOOD SPEC: CPT

## 2023-01-24 PROCEDURE — 99211 OFF/OP EST MAY X REQ PHY/QHP: CPT

## 2023-01-24 PROCEDURE — 85610 PROTHROMBIN TIME: CPT

## 2023-01-24 RX ORDER — CELECOXIB 200 MG/1
200 CAPSULE ORAL DAILY
COMMUNITY
Start: 2023-01-16

## 2023-01-24 RX ORDER — LACTOSE-REDUCED FOOD
1 LIQUID (ML) ORAL DAILY
COMMUNITY
End: 2023-01-24 | Stop reason: ALTCHOICE

## 2023-01-24 NOTE — PROGRESS NOTES
Medication Management [address]   [phone] (phone)  [phone] (fax)    Ms. Gallo Cisneros is a 71 y.o.  female with history of atrial fib. , per Dr. Madan Son referral, who presents today for Warfarin monitoring and adjustment (2 week visit). Patient verifies current dosing regimen and tablet strength. States has good supply. No missed or extra doses, except for bolus ordered last visit. Patient denies bleeding/bruising/SOB/chest pain. Swelling of legs/ankles slowly improving. No blood in urine or stool. Dietary changes: stopped Boost 1/18, then resumed her Balance of Readmill Supplement (fruits/vegetables). States will be starting new diet through [facility]. Changes in medication/OTC agents/herbals: saw orthopedist in [place] for shoulders - started Celebrex (reminded to take with food). Able to get some sleep now. No change in alcohol use or tobacco use. No change in activity level, but will be starting PT. Patient denies headaches/dizziness/lightheadedness/falls. No vomiting/diarrhea or acute illness. No procedures scheduled in the future at this time. Assessment:   Lab Results   Component Value Date    INR 2.30 (H) 01/24/2023    INR 1.90 (H) 01/12/2023    INR 2.30 (H) 12/22/2022     INR therapeutic - goal 2-3. Recent Labs     01/24/23  1125   INR 2.30*        Plan:  POCT INR performed/result reviewed. Continue PO Coumadin 5 mg MWF, 2.5 mg TThSS. Recheck INR in 3 week(s). Patient reminded to call the Anticoagulation Clinic with any signs or symptoms of bleeding or with any medication changes. Patient given instructions utilizing the teach back method. After visit summary printed and reviewed with patient. Discharged via transport chair in no apparent distress, holding cane, with .

## 2023-02-02 ENCOUNTER — TELEPHONE (OUTPATIENT)
Dept: PHARMACY | Age: 73
End: 2023-02-02

## 2023-02-03 ENCOUNTER — TELEPHONE (OUTPATIENT)
Dept: CARDIOLOGY CLINIC | Age: 73
End: 2023-02-03

## 2023-02-03 DIAGNOSIS — I48.0 PAF (PAROXYSMAL ATRIAL FIBRILLATION) (HCC): Primary | ICD-10-CM

## 2023-02-14 ENCOUNTER — HOSPITAL ENCOUNTER (OUTPATIENT)
Dept: PHARMACY | Age: 73
Setting detail: THERAPIES SERIES
Discharge: HOME | End: 2023-02-14
Payer: MEDICARE

## 2023-02-14 DIAGNOSIS — Z51.81 ENCOUNTER FOR THERAPEUTIC DRUG MONITORING: ICD-10-CM

## 2023-02-14 DIAGNOSIS — Z79.01 ANTICOAGULATED ON COUMADIN: Primary | ICD-10-CM

## 2023-02-14 LAB — POC INR: 2.7 (ref 0.8–1.2)

## 2023-02-14 PROCEDURE — 99212 OFFICE O/P EST SF 10 MIN: CPT

## 2023-02-14 PROCEDURE — 85610 PROTHROMBIN TIME: CPT

## 2023-02-14 PROCEDURE — 36416 COLLJ CAPILLARY BLOOD SPEC: CPT

## 2023-02-14 RX ORDER — WARFARIN SODIUM 5 MG/1
TABLET ORAL EVERY EVENING
COMMUNITY

## 2023-02-14 RX ORDER — WARFARIN SODIUM 5 MG/1
TABLET ORAL
Qty: 75 TABLET | Refills: 1 | Status: SHIPPED | OUTPATIENT
Start: 2023-02-14

## 2023-02-14 NOTE — PROGRESS NOTES
Medication Management [address]   [phone] (phone)  [phone] (fax)    Ms. Una Ac is a 71 y.o.  female with history of atrial fib. , per Dr. Leslie Garcia referral,  who presents today for Warfarin monitoring and adjustment (3 week visit - late for visit due to traffic). Patient verifies current dosing regimen and tablet strength. No missed or extra doses. Patient denies bleeding/bruising/chest pain. Has usual SOB/swelling of legs and ankles. No blood in urine or stool. Dietary changes: has started [employer] with provider at [facility] regarding dietary lifestyle changes - eating a little more fruits/vegetables (not dark green). No weight loss. No changes in medication/OTC agents/herbals. States gastroenterologist had wanted her to take Omeprazole regularly, but hasn't. Recommended she do so with Celebrex and blood thinners on her list.  No change in alcohol use or tobacco use. Change in activity level: trying to walk more. Continues with PT for shoulders. Patient denies headaches/dizziness/lightheadedness/falls. No vomiting/diarrhea or acute illness. No procedures scheduled in the future at this time. Assessment:   Lab Results   Component Value Date    INR 2.70 (H) 02/14/2023    INR 2.30 (H) 01/24/2023    INR 1.90 (H) 01/12/2023     INR therapeutic - goal 2-3. Recent Labs     02/14/23  1121   INR 2.70*        Plan:  POCT INR performed/result reviewed. Continue PO Coumadin 5 mg MWF, 2.5 mg TThSS. Recheck INR in 4 week(s). Patient reminded to call the Anticoagulation Clinic with any signs or symptoms of bleeding or with any medication changes. Patient given instructions utilizing the teach back method. After visit summary printed and reviewed with patient. Discharged ambulatory in no apparent distress, with cane and . Prescription sent electronically by clinic pharmacist under new medical director.     For Pharmacy Admin Tracking Only    Intervention Detail: Refill(s) Provided  Total # of Interventions Recommended: 1  Total # of Interventions Accepted: 1  Time Spent (min):  22

## 2023-03-15 ENCOUNTER — APPOINTMENT (OUTPATIENT)
Dept: PHARMACY | Age: 73
End: 2023-03-15
Payer: MEDICARE

## 2023-03-21 ENCOUNTER — HOSPITAL ENCOUNTER (OUTPATIENT)
Dept: PHARMACY | Age: 73
Setting detail: THERAPIES SERIES
Discharge: HOME | End: 2023-03-21
Payer: MEDICARE

## 2023-03-21 DIAGNOSIS — Z79.01 ANTICOAGULATED ON COUMADIN: Primary | ICD-10-CM

## 2023-03-21 DIAGNOSIS — Z51.81 ENCOUNTER FOR THERAPEUTIC DRUG MONITORING: ICD-10-CM

## 2023-03-21 LAB — POC INR: 3 (ref 0.8–1.2)

## 2023-03-21 PROCEDURE — 99211 OFF/OP EST MAY X REQ PHY/QHP: CPT

## 2023-03-21 PROCEDURE — 85610 PROTHROMBIN TIME: CPT

## 2023-03-21 PROCEDURE — 36416 COLLJ CAPILLARY BLOOD SPEC: CPT

## 2023-03-21 NOTE — PROGRESS NOTES
Medication Management [address]  [phone] (phone)  [phone] (fax)    Ms. Annalisa Contreras is a 68 y.o.  female with history of atrial fib. , per Dr. Dorita Hopson referral, who presents today for Warfarin monitoring and adjustment (1 week late for 4 week visit). Patient verifies current dosing regimen and tablet strength. No missed or extra doses. Patient denies bleeding/bruising/chest pain. Has usual SOB/swelling of legs. No blood in urine or stool. Dietary changes: has been \"sipping\" cranberry juice past week for possible UTI; she will stop. States she'll drink more water. Weight up and down - usual.  Changes in medication/OTC agents/herbals: started Super Red Beets supplement within past 2 weeks. Per YOEL Phillips., PharmD. - doesn't have Vitamin K, but should bring to next visit for review (wrote this reminder on patient's instructions). Has also been using small amount of Aspercreme. No change in alcohol use or tobacco use. Change in activity level: slightly increased. Patient denies headaches/dizziness/lightheadedness/falls. No vomiting/diarrhea or acute illness. No procedures scheduled in the future at this time. Assessment:   Lab Results   Component Value Date    INR 3.00 (H) 03/21/2023    INR 2.70 (H) 02/14/2023    INR 2.30 (H) 01/24/2023     INR therapeutic - goal 2-3. Recent Labs     03/21/23  1352   INR 3.00*        Plan:  POCT INR performed/result reviewed. Continue PO Coumadin 5 mg MWF, 2.5 mg TThSS. Recheck INR in 4 week(s). Patient reminded to call the Anticoagulation Clinic with any signs or symptoms of bleeding or with any medication changes. Patient given instructions utilizing the teach back method. After visit summary printed and reviewed with patient. Discharged via transport chair in no apparent distress, holding cane, with .     For Pharmacy Admin Tracking Only    Time Spent (min): 21

## 2023-04-18 ENCOUNTER — HOSPITAL ENCOUNTER (OUTPATIENT)
Dept: PHARMACY | Age: 73
Setting detail: THERAPIES SERIES
Discharge: HOME | End: 2023-04-18
Payer: MEDICARE

## 2023-04-18 DIAGNOSIS — Z51.81 ENCOUNTER FOR THERAPEUTIC DRUG MONITORING: ICD-10-CM

## 2023-04-18 DIAGNOSIS — Z79.01 ANTICOAGULATED ON COUMADIN: Primary | ICD-10-CM

## 2023-04-18 LAB — POC INR: 2.8 (ref 0.8–1.2)

## 2023-04-18 PROCEDURE — 99211 OFF/OP EST MAY X REQ PHY/QHP: CPT | Performed by: PHARMACIST

## 2023-04-18 PROCEDURE — 36416 COLLJ CAPILLARY BLOOD SPEC: CPT | Performed by: PHARMACIST

## 2023-04-18 PROCEDURE — 85610 PROTHROMBIN TIME: CPT | Performed by: PHARMACIST

## 2023-04-18 NOTE — PROGRESS NOTES
Medication Management [address]  [phone] (phone)  [phone] (fax)    Ms. Olimpia Archibald is a 68 y.o.  female with history of Afib who presents today for anticoagulation monitoring and adjustment. Patient verifies current dosing regimen and tablet strength. No missed or extra doses. Patient denies s/s bleeding/bruising/swelling/SOB/chest pain  No blood in urine or stool. Planning to continue to increase her vegetable intake. No changes in medication/OTC agents. Hasn't been taking Mulitivitamin, not taking Super Beets, not taking Calcium, might stop Celebrex, lots of changes with her supplements, she states she is not consistent. Lengthy discussion regarding how these changes may impact her INR. No change in alcohol use or tobacco use. Working on being more active. Patient denies headaches/dizziness/lightheadedness/falls. No vomiting/diarrhea or acute illness. No Procedures scheduled in the future at this time. Assessment:   Lab Results   Component Value Date    INR 2.80 (H) 04/18/2023    INR 3.00 (H) 03/21/2023    INR 2.70 (H) 02/14/2023     INR therapeutic   Recent Labs     04/18/23  1354   INR 2.80*         Plan:  Continue Coumadin 5mg MWF and 2.5mg TThSaS. Recheck INR in 3 week(s). Patient reminded to call the Anticoagulation Clinic with any signs or symptoms of bleeding or with any medication changes. Patient given instructions utilizing the teach back method. After visit summary printed and reviewed with patient. Discharged ambulatory in no apparent distress.       For Pharmacy Admin Tracking Only  Time Spent (min): 20

## 2023-05-09 ENCOUNTER — HOSPITAL ENCOUNTER (OUTPATIENT)
Dept: PHARMACY | Age: 73
Setting detail: THERAPIES SERIES
Discharge: HOME | End: 2023-05-09
Payer: MEDICARE

## 2023-05-09 DIAGNOSIS — Z79.01 ANTICOAGULATED ON COUMADIN: Primary | ICD-10-CM

## 2023-05-09 DIAGNOSIS — Z51.81 ENCOUNTER FOR THERAPEUTIC DRUG MONITORING: ICD-10-CM

## 2023-05-09 LAB — POC INR: 2.1 (ref 0.8–1.2)

## 2023-05-09 PROCEDURE — 99211 OFF/OP EST MAY X REQ PHY/QHP: CPT

## 2023-05-09 PROCEDURE — 85610 PROTHROMBIN TIME: CPT

## 2023-05-09 PROCEDURE — 36416 COLLJ CAPILLARY BLOOD SPEC: CPT

## 2023-05-09 NOTE — PROGRESS NOTES
Medication Management [address]  [phone] (phone)  [phone] (fax)    Ms. Shaggy Last is a 68 y.o.  female with history of atrial fib. , per Dr. Florence Martinez referral, who presents today for Warfarin monitoring and adjustment (3 week visit - late for today's visit). Patient verifies current dosing regimen and tablet strength. States has good supply. No missed or extra doses. Patient denies bleeding/bruising/SOB/chest pain. Swelling of legs/ankles gradually improving. No blood in urine or stool. Dietary changes: about 2 weeks ago, started drinking 1 can of Glucerna/day (reminded to be consistent due to Vitamin K content or, if changes, need to check INR sooner). Changes in medication/OTC agents/herbals: resumed MVI 2 weeks ago - reminded to be consistent due to probable Vitamin K content. No change in tobacco use. Had a little wine this past weekend - first in a long time. No change in activity level. Still doing PT. Patient denies headaches/dizziness/lightheadedness/falls. No vomiting/diarrhea or acute illness. No procedures scheduled in the future at this time. Assessment:   Lab Results   Component Value Date    INR 2.10 (H) 05/09/2023    INR 2.80 (H) 04/18/2023    INR 3.00 (H) 03/21/2023     INR therapeutic - goal 2-3. Recent Labs     05/09/23  1354   INR 2.10*        Plan:  POCT INR performed/result reviewed. Continue PO Coumadin 5 mg MWF, 2.5 mg TThSS. Recheck INR in 3 week(s). Patient reminded to call the Anticoagulation Clinic with any signs or symptoms of bleeding or with any medication changes. Patient given instructions utilizing the teach back method. After visit summary printed and reviewed with patient. Discharged ambulatory in no apparent distress, with cane.     For Pharmacy Admin Tracking Only    Time Spent (min): 20

## 2023-05-30 ENCOUNTER — HOSPITAL ENCOUNTER (OUTPATIENT)
Dept: PHARMACY | Age: 73
Setting detail: THERAPIES SERIES
Discharge: HOME | End: 2023-05-30
Payer: MEDICARE

## 2023-05-30 DIAGNOSIS — Z51.81 ENCOUNTER FOR THERAPEUTIC DRUG MONITORING: ICD-10-CM

## 2023-05-30 DIAGNOSIS — Z79.01 ANTICOAGULATED ON COUMADIN: Primary | ICD-10-CM

## 2023-05-30 LAB — POC INR: 2.3 (ref 0.8–1.2)

## 2023-05-30 PROCEDURE — 99211 OFF/OP EST MAY X REQ PHY/QHP: CPT

## 2023-05-30 PROCEDURE — 36416 COLLJ CAPILLARY BLOOD SPEC: CPT

## 2023-05-30 PROCEDURE — 85610 PROTHROMBIN TIME: CPT

## 2023-05-30 NOTE — PROGRESS NOTES
Medication Management [address]   [phone] (phone)  [phone] (fax)    Ms. María Tovar is a 68 y.o.  female with history of atrial fib. , per Dr. Olimpia White referral, who presents today for Warfarin monitoring and adjustment (3 week visit). Patient verifies current dosing regimen and tablet strength. States has good supply. No missed or extra doses. Patient denies bruising/chest pain. Has usual SOB. Has usual swelling of legs/ankles toward evening. Has usual runny nose from CPAP but, 3 days ago, wiped nose and it was bloody. No blood in urine or stool. Has usual black stool from iron supplement. Dietary changes: eating more vegetables, but not green ones. No changes in medication/OTC agents/herbals. No change in alcohol use or tobacco use. Change in activity level: increased. Patient denies headaches/dizziness/lightheadedness/falls. No vomiting/diarrhea or acute illness. No procedures scheduled in the future at this time. Assessment:     Lab Results   Component Value Date    INR 2.30 (H) 05/30/2023    INR 2.10 (H) 05/09/2023    INR 2.80 (H) 04/18/2023     INR therapeutic - goal 2-3. Recent Labs     05/30/23  1551   INR 2.30*      Plan:  POCT INR performed/result reviewed. Continue PO Coumadin 5 mg MWF, 2.5 mg TThSS. Recheck INR in 4 week(s). Patient reminded to call the Anticoagulation Clinic with any signs or symptoms of bleeding or with any medication changes. Patient given instructions utilizing the teach back method. After visit summary printed and reviewed with patient. Discharged ambulatory in no apparent distress, with cane.     For Pharmacy Admin Tracking Only    Time Spent (min): 20

## 2023-06-05 ENCOUNTER — NURSE ONLY (OUTPATIENT)
Dept: LAB | Age: 73
End: 2023-06-05

## 2023-06-05 DIAGNOSIS — E11.9 DIET-CONTROLLED TYPE 2 DIABETES MELLITUS (HCC): ICD-10-CM

## 2023-06-05 DIAGNOSIS — K21.9 GASTROESOPHAGEAL REFLUX DISEASE, UNSPECIFIED WHETHER ESOPHAGITIS PRESENT: ICD-10-CM

## 2023-06-05 DIAGNOSIS — R79.83 HOMOCYSTEINEMIA: ICD-10-CM

## 2023-06-05 DIAGNOSIS — I48.0 PAROXYSMAL ATRIAL FIBRILLATION (HCC): ICD-10-CM

## 2023-06-05 DIAGNOSIS — K90.89 OTHER SPECIFIED INTESTINAL MALABSORPTION: ICD-10-CM

## 2023-06-05 DIAGNOSIS — Z79.01 ANTICOAGULATED ON COUMADIN: ICD-10-CM

## 2023-06-05 DIAGNOSIS — E03.9 HYPOTHYROIDISM, UNSPECIFIED TYPE: ICD-10-CM

## 2023-06-05 DIAGNOSIS — M62.838 MUSCLE SPASM OF LEFT LOWER EXTREMITY: ICD-10-CM

## 2023-06-05 DIAGNOSIS — I10 ESSENTIAL HYPERTENSION: ICD-10-CM

## 2023-06-05 DIAGNOSIS — M25.562 ARTHRALGIA OF LEFT LOWER LEG: ICD-10-CM

## 2023-06-05 LAB
25(OH)D3 SERPL-MCNC: 41 NG/ML (ref 30–100)
BASOPHILS ABSOLUTE: 0.1 THOU/MM3 (ref 0–0.1)
BASOPHILS NFR BLD AUTO: 0.8 %
CALCIUM SERPL-MCNC: 9.2 MG/DL (ref 8.5–10.5)
CHOLEST SERPL-MCNC: 145 MG/DL (ref 100–199)
D DIMER PPP IA.FEU-MCNC: 787 NG/ML FEU (ref 0–500)
DEPRECATED RDW RBC AUTO: 53.9 FL (ref 35–45)
EOSINOPHIL NFR BLD AUTO: 3.3 %
EOSINOPHILS ABSOLUTE: 0.2 THOU/MM3 (ref 0–0.4)
ERYTHROCYTE [DISTWIDTH] IN BLOOD BY AUTOMATED COUNT: 15.5 % (ref 11.5–14.5)
HCT VFR BLD AUTO: 45.2 % (ref 37–47)
HDLC SERPL-MCNC: 68 MG/DL
HGB BLD-MCNC: 14.1 GM/DL (ref 12–16)
IMM GRANULOCYTES # BLD AUTO: 0.02 THOU/MM3 (ref 0–0.07)
IMM GRANULOCYTES NFR BLD AUTO: 0.3 %
IRON SERPL-MCNC: 87 UG/DL (ref 50–170)
LDLC SERPL CALC-MCNC: 67 MG/DL
LYMPHOCYTES ABSOLUTE: 1.3 THOU/MM3 (ref 1–4.8)
LYMPHOCYTES NFR BLD AUTO: 20.8 %
MAGNESIUM SERPL-MCNC: 2.2 MG/DL (ref 1.6–2.4)
MCH RBC QN AUTO: 29.6 PG (ref 26–33)
MCHC RBC AUTO-ENTMCNC: 31.2 GM/DL (ref 32.2–35.5)
MCV RBC AUTO: 95 FL (ref 81–99)
MONOCYTES ABSOLUTE: 0.5 THOU/MM3 (ref 0.4–1.3)
MONOCYTES NFR BLD AUTO: 7.3 %
NEUTROPHILS NFR BLD AUTO: 67.5 %
NRBC BLD AUTO-RTO: 0 /100 WBC
PLATELET # BLD AUTO: 260 THOU/MM3 (ref 130–400)
PMV BLD AUTO: 10.5 FL (ref 9.4–12.4)
PROGEST SERPL-MCNC: < 0.05 NG/ML
RBC # BLD AUTO: 4.76 MILL/MM3 (ref 4.2–5.4)
RHEUMATOID FACT SERPL-ACNC: 12 IU/ML (ref 0–13)
SEGMENTED NEUTROPHILS ABSOLUTE COUNT: 4.3 THOU/MM3 (ref 1.8–7.7)
T4 FREE SERPL-MCNC: 1.36 NG/DL (ref 0.93–1.76)
TIBC SERPL-MCNC: 261 UG/DL (ref 171–450)
TRIGL SERPL-MCNC: 48 MG/DL (ref 0–199)
TSH SERPL DL<=0.005 MIU/L-ACNC: 0.6 UIU/ML (ref 0.4–4.2)
URATE SERPL-MCNC: 8 MG/DL (ref 2.4–5.7)
WBC # BLD AUTO: 6.4 THOU/MM3 (ref 4.8–10.8)

## 2023-06-06 LAB
C-REACTIVE PROTEIN, HIGH SENSITIVITY: 17.7 MG/L
DEPRECATED MEAN GLUCOSE BLD GHB EST-ACNC: 129 MG/DL (ref 70–126)
ERYTHROCYTE [SEDIMENTATION RATE] IN BLOOD BY WESTERGREN METHOD: 52 MM/HR (ref 0–20)
ESTRADIOL LEVEL: 63.7 PG/ML (ref 5–50)
HBA1C MFR BLD HPLC: 6.3 % (ref 4.4–6.4)
HOMOCYSTEINE: 11.5 UMOL/L
PTH-INTACT SERPL-MCNC: 37.7 PG/ML (ref 15–65)
T3 TOTAL: 91 NG/DL (ref 60–181)
T3FREE SERPL-MCNC: 3 PG/ML (ref 2.02–4.43)

## 2023-06-07 LAB
DHEA-S SERPL-MCNC: 30.6 UG/DL (ref 10–90)
T4 SERPL-MCNC: 7.3 UG/DL (ref 4.5–10.9)
THYROPEROXIDASE AB SERPL IA-ACNC: < 4 IU/ML (ref 0–25)

## 2023-06-08 LAB — NUCLEAR IGG SER QL IA: NORMAL

## 2023-06-09 LAB
DHEA SERPL-MCNC: 1.27 NG/ML (ref 0.63–4.7)
TESTOSTERONE, FREE W SHGB, FEMALES/CHILDREN: NORMAL

## 2023-06-14 PROBLEM — Z79.01 LONG TERM (CURRENT) USE OF ANTICOAGULANTS: Status: ACTIVE | Noted: 2022-09-16

## 2023-06-14 PROBLEM — R22.40 MASS OF LOWER EXTREMITY: Status: ACTIVE | Noted: 2022-09-30

## 2023-06-27 ENCOUNTER — HOSPITAL ENCOUNTER (OUTPATIENT)
Dept: PHARMACY | Age: 73
Setting detail: THERAPIES SERIES
Discharge: HOME | End: 2023-06-27
Payer: MEDICARE

## 2023-06-27 DIAGNOSIS — Z79.01 ANTICOAGULATED ON COUMADIN: Primary | ICD-10-CM

## 2023-06-27 DIAGNOSIS — Z51.81 ENCOUNTER FOR THERAPEUTIC DRUG MONITORING: ICD-10-CM

## 2023-06-27 LAB — POC INR: 3.2 (ref 0.8–1.2)

## 2023-06-27 PROCEDURE — 36416 COLLJ CAPILLARY BLOOD SPEC: CPT | Performed by: PHARMACIST

## 2023-06-27 PROCEDURE — 99211 OFF/OP EST MAY X REQ PHY/QHP: CPT | Performed by: PHARMACIST

## 2023-06-27 PROCEDURE — 85610 PROTHROMBIN TIME: CPT | Performed by: PHARMACIST

## 2023-06-29 DIAGNOSIS — E03.9 HYPOTHYROIDISM, UNSPECIFIED TYPE: ICD-10-CM

## 2023-06-29 RX ORDER — LIOTHYRONINE SODIUM 5 UG/1
TABLET ORAL
Qty: 90 TABLET | Refills: 3 | Status: SHIPPED | OUTPATIENT
Start: 2023-06-29

## 2023-06-29 RX ORDER — LEVOTHYROXINE SODIUM 0.1 MG/1
TABLET ORAL
Qty: 90 TABLET | Refills: 3 | Status: SHIPPED | OUTPATIENT
Start: 2023-06-29

## 2023-07-25 ENCOUNTER — HOSPITAL ENCOUNTER (OUTPATIENT)
Dept: PHARMACY | Age: 73
Setting detail: THERAPIES SERIES
Discharge: HOME | End: 2023-07-25
Payer: MEDICARE

## 2023-07-25 DIAGNOSIS — Z51.81 ENCOUNTER FOR THERAPEUTIC DRUG MONITORING: ICD-10-CM

## 2023-07-25 DIAGNOSIS — Z79.01 ANTICOAGULATED ON COUMADIN: Primary | ICD-10-CM

## 2023-07-25 LAB — POC INR: 3.3 (ref 0.8–1.2)

## 2023-07-25 PROCEDURE — 36416 COLLJ CAPILLARY BLOOD SPEC: CPT

## 2023-07-25 PROCEDURE — 85610 PROTHROMBIN TIME: CPT

## 2023-07-25 PROCEDURE — 99212 OFFICE O/P EST SF 10 MIN: CPT

## 2023-07-25 NOTE — PROGRESS NOTES
Medication Management [address] [place] [address]  [phone] (phone)  [phone] (fax)    Ms. Danyelle Gar is a 68 y.o.  female with history of atrial fib. , per Dr. Kyara Tracy referral, who presents today for Warfarin monitoring and adjustment (4 week visit). Patient verifies current dosing regimen and tablet strength. Recently got last refill. No missed or extra doses. Patient denies bleeding/bruising/swelling/SOB. Dietary changes: probably missed at least one day of Glucerna last week - reminded of need to be consistent. Thought she was getting UTI 7-10 days ago, so drank cranberry juice several days. Reminded it can raise INR. Changes in medication/OTC agents/herbals: took last dose of Celebrex today. No change in alcohol use or tobacco use. No change in activity level. Patient denies falls. No vomiting/diarrhea or acute illness. No procedures scheduled in the future at this time. Assessment:   Lab Results   Component Value Date    INR 3.30 (H) 07/25/2023    INR 3.20 (H) 06/27/2023    INR 2.30 (H) 05/30/2023     INR supratherapeutic - goal 2-3. Recent Labs     07/25/23  1601   INR 3.30*        Plan:  POCT INR performed/result reviewed. Hold today, T, then continue PO Coumadin 5 mg MWF, 2.5 mg TThSS. Recheck INR in 3 week(s). (Report given - orders entered by YOEL Christy, PharmD.)   Patient reminded to call the Anticoagulation Clinic with any signs or symptoms of bleeding or with any medication changes. Patient given instructions utilizing the teach back method. After visit summary printed and reviewed with patient. Advised extra caution. Discharged ambulatory in no apparent distress, with cane.     For Pharmacy Admin Tracking Only    Intervention Detail: Dose Adjustment: 1, reason: Therapy De-escalation  Total # of Interventions Recommended: 1  Total # of Interventions Accepted: 1  Time Spent (min):  23

## 2023-07-27 ENCOUNTER — OFFICE VISIT (OUTPATIENT)
Dept: FAMILY MEDICINE CLINIC | Age: 73
End: 2023-07-27
Payer: MEDICARE

## 2023-07-27 VITALS
SYSTOLIC BLOOD PRESSURE: 136 MMHG | HEART RATE: 92 BPM | DIASTOLIC BLOOD PRESSURE: 70 MMHG | RESPIRATION RATE: 18 BRPM | BODY MASS INDEX: 48.91 KG/M2 | HEIGHT: 64 IN | WEIGHT: 286.5 LBS

## 2023-07-27 DIAGNOSIS — R79.83 HOMOCYSTEINEMIA: ICD-10-CM

## 2023-07-27 DIAGNOSIS — I10 ESSENTIAL HYPERTENSION: ICD-10-CM

## 2023-07-27 DIAGNOSIS — Z00.00 MEDICARE ANNUAL WELLNESS VISIT, SUBSEQUENT: Primary | ICD-10-CM

## 2023-07-27 DIAGNOSIS — N18.30 STAGE 3 CHRONIC KIDNEY DISEASE, UNSPECIFIED WHETHER STAGE 3A OR 3B CKD (HCC): ICD-10-CM

## 2023-07-27 DIAGNOSIS — I48.0 PAROXYSMAL ATRIAL FIBRILLATION (HCC): ICD-10-CM

## 2023-07-27 DIAGNOSIS — E11.22 TYPE 2 DIABETES MELLITUS WITH CHRONIC KIDNEY DISEASE, WITHOUT LONG-TERM CURRENT USE OF INSULIN, UNSPECIFIED CKD STAGE (HCC): ICD-10-CM

## 2023-07-27 DIAGNOSIS — G47.30 SLEEP APNEA, UNSPECIFIED TYPE: ICD-10-CM

## 2023-07-27 DIAGNOSIS — M25.562 ARTHRALGIA OF LEFT LOWER LEG: ICD-10-CM

## 2023-07-27 DIAGNOSIS — M25.512 CHRONIC LEFT SHOULDER PAIN: ICD-10-CM

## 2023-07-27 DIAGNOSIS — G89.29 CHRONIC LEFT SHOULDER PAIN: ICD-10-CM

## 2023-07-27 DIAGNOSIS — E03.9 HYPOTHYROIDISM, UNSPECIFIED TYPE: ICD-10-CM

## 2023-07-27 PROCEDURE — 3075F SYST BP GE 130 - 139MM HG: CPT | Performed by: FAMILY MEDICINE

## 2023-07-27 PROCEDURE — 3078F DIAST BP <80 MM HG: CPT | Performed by: FAMILY MEDICINE

## 2023-07-27 PROCEDURE — 99213 OFFICE O/P EST LOW 20 MIN: CPT | Performed by: FAMILY MEDICINE

## 2023-07-27 PROCEDURE — 3044F HG A1C LEVEL LT 7.0%: CPT | Performed by: FAMILY MEDICINE

## 2023-07-27 PROCEDURE — 20610 DRAIN/INJ JOINT/BURSA W/O US: CPT | Performed by: FAMILY MEDICINE

## 2023-07-27 PROCEDURE — G0439 PPPS, SUBSEQ VISIT: HCPCS | Performed by: FAMILY MEDICINE

## 2023-07-27 PROCEDURE — 1123F ACP DISCUSS/DSCN MKR DOCD: CPT | Performed by: FAMILY MEDICINE

## 2023-07-27 RX ORDER — METHYLPREDNISOLONE ACETATE 80 MG/ML
80 INJECTION, SUSPENSION INTRA-ARTICULAR; INTRALESIONAL; INTRAMUSCULAR; SOFT TISSUE ONCE
Status: COMPLETED | OUTPATIENT
Start: 2023-07-27 | End: 2023-07-27

## 2023-07-27 RX ORDER — TIZANIDINE 2 MG/1
2 TABLET ORAL NIGHTLY PRN
Qty: 30 TABLET | Refills: 1 | Status: SHIPPED | OUTPATIENT
Start: 2023-07-27

## 2023-07-27 RX ADMIN — METHYLPREDNISOLONE ACETATE 80 MG: 80 INJECTION, SUSPENSION INTRA-ARTICULAR; INTRALESIONAL; INTRAMUSCULAR; SOFT TISSUE at 09:38

## 2023-07-27 ASSESSMENT — PATIENT HEALTH QUESTIONNAIRE - PHQ9
1. LITTLE INTEREST OR PLEASURE IN DOING THINGS: 0
SUM OF ALL RESPONSES TO PHQ QUESTIONS 1-9: 0
2. FEELING DOWN, DEPRESSED OR HOPELESS: 0
SUM OF ALL RESPONSES TO PHQ9 QUESTIONS 1 & 2: 0
SUM OF ALL RESPONSES TO PHQ QUESTIONS 1-9: 0

## 2023-07-27 ASSESSMENT — ENCOUNTER SYMPTOMS
RESPIRATORY NEGATIVE: 1
GASTROINTESTINAL NEGATIVE: 1

## 2023-07-27 ASSESSMENT — LIFESTYLE VARIABLES
HOW OFTEN DO YOU HAVE A DRINK CONTAINING ALCOHOL: NEVER
HOW MANY STANDARD DRINKS CONTAINING ALCOHOL DO YOU HAVE ON A TYPICAL DAY: PATIENT DOES NOT DRINK

## 2023-07-27 NOTE — PATIENT INSTRUCTIONS
You may receive a survey about your visit with us today. The feedback from our patients helps us identify what is working well and where the service to all patients can be enhanced. Thank you! Advance Directives: Care Instructions  Overview  An advance directive is a legal way to state your wishes at the end of your life. It tells your family and your doctor what to do if you can't say what you want. There are two main types of advance directives. You can change them any time your wishes change. Living will. This form tells your family and your doctor your wishes about life support and other treatment. The form is also called a declaration. Medical power of . This form lets you name a person to make treatment decisions for you when you can't speak for yourself. This person is called a health care agent (health care proxy, health care surrogate). The form is also called a durable power of  for health care. If you do not have an advance directive, decisions about your medical care may be made by a family member, or by a doctor or a  who doesn't know you. It may help to think of an advance directive as a gift to the people who care for you. If you have one, they won't have to make tough decisions by themselves. For more information, including forms for your state, see the [address] [place] [address] website (www.caringinfo.org/planning/[place]-directives/). Follow-up care is a key part of your treatment and safety. Be sure to make and go to all appointments, and call your doctor if you are having problems. It's also a good idea to know your test results and keep a list of the medicines you take. What should you include in an advance directive? Many states have a unique advance directive form. (It may ask you to address specific issues.) Or you might use a universal form that's approved by many states.   If your form doesn't tell you what to address, it may be hard to know what to include in your

## 2023-07-27 NOTE — PROGRESS NOTES
Administrations This Visit       methylPREDNISolone acetate (DEPO-MEDROL) injection 80 mg       Admin Date  07/27/2023  09:38 Action  Given Dose  80 mg Route  Intra-artICUlar Site  Shoulder Left Administered By  Arash Quijano CMA ([facility])    Ordering Provider: Mert Lebron DO    NDC: [address]-3    Lot#: [id number]    : [employer]    Patient Supplied?: No    Comments: mixed with lidocaine 1%   NDC: 9486-2116-22  [facility]:[id number]  2/1/2025                     This drug was administered by Dr. Ayse Merida, left shoulder injection

## 2023-07-27 NOTE — PROGRESS NOTES
2023    Dexter Mckeon (:  1950) is a 68 y.o. female, here for a preventive medicine evaluation. Chief Complaint   Patient presents with    Medicare AWV    Shoulder Pain     Pain in shoulders-waking her up at night   Left shoulder and then in the upper arm      AWV. Pt c/o bilateral shoulder pain that is chronic in nature. NKI. Pain wakes her at night occasionally. Had seen Dr. Chang Early in the past at [address] [place] [address] who ordered xray and ordered PT/OT. On Celebrex with some relief in the right shoulder but not in the left. BP looks good today. BP Readings from Last 3 Encounters:   23 136/70   23 138/62   22 (!) 142/64     Weight is up over the last several months.     Wt Readings from Last 3 Encounters:   23 286 lb 8 oz (130 kg)   23 284 lb (128.8 kg)   22 276 lb 3.2 oz (125.3 kg)         Patient Active Problem List   Diagnosis    Carpal tunnel syndrome    Headache    Other and unspecified nonspecific immunological findings    Pain in joint, lower leg    Abnormal weight gain    Toenail fungus    Osteoarthritis    Hypothyroidism    Nonspecific immunological findings    Gastroesophageal reflux disease    Positive serological test result    Knee pain    Weight gain    Subacute maxillary sinusitis    Homocysteinemia    Lipids abnormal    Vitamin D deficiency    Abnormal blood level of uric acid    Left wrist pain    Other intestinal malabsorption    BMI 45.0-49.9, adult (HCC)    Pain of right hip joint    Essential hypertension    New onset atrial fibrillation ([address])    Anticoagulated on Coumadin    Sleep apnea    Coronary atherosclerosis    Pacemaker    Encounter for therapeutic drug monitoring    Type 2 diabetes mellitus    Chronic renal disease, stage III ([address]) [267484]    Type 2 diabetes mellitus with chronic kidney disease    Abnormal nuclear stress test    Chest pain due to CAD ([address])    Contact with and (suspected) exposure to unspecified communicable disease

## 2023-08-16 ENCOUNTER — HOSPITAL ENCOUNTER (OUTPATIENT)
Dept: PHARMACY | Age: 73
Setting detail: THERAPIES SERIES
Discharge: HOME | End: 2023-08-16
Payer: MEDICARE

## 2023-08-16 ENCOUNTER — OFFICE VISIT (OUTPATIENT)
Dept: CARDIOLOGY CLINIC | Age: 73
End: 2023-08-16
Payer: MEDICARE

## 2023-08-16 VITALS
OXYGEN SATURATION: 96 % | HEART RATE: 62 BPM | DIASTOLIC BLOOD PRESSURE: 76 MMHG | SYSTOLIC BLOOD PRESSURE: 110 MMHG | WEIGHT: 272 LBS | TEMPERATURE: 97.9 F | BODY MASS INDEX: 46.69 KG/M2

## 2023-08-16 DIAGNOSIS — Z51.81 ENCOUNTER FOR THERAPEUTIC DRUG MONITORING: ICD-10-CM

## 2023-08-16 DIAGNOSIS — I25.119 CHEST PAIN DUE TO CAD (HCC): ICD-10-CM

## 2023-08-16 DIAGNOSIS — E78.5 DYSLIPIDEMIA (HIGH LDL; LOW HDL): ICD-10-CM

## 2023-08-16 DIAGNOSIS — I10 ESSENTIAL HYPERTENSION: Primary | ICD-10-CM

## 2023-08-16 DIAGNOSIS — Z79.01 ANTICOAGULATED ON COUMADIN: Primary | ICD-10-CM

## 2023-08-16 LAB — POC INR: 3.5 (ref 0.8–1.2)

## 2023-08-16 PROCEDURE — 3078F DIAST BP <80 MM HG: CPT | Performed by: INTERNAL MEDICINE

## 2023-08-16 PROCEDURE — 85610 PROTHROMBIN TIME: CPT

## 2023-08-16 PROCEDURE — 1123F ACP DISCUSS/DSCN MKR DOCD: CPT | Performed by: INTERNAL MEDICINE

## 2023-08-16 PROCEDURE — 99212 OFFICE O/P EST SF 10 MIN: CPT

## 2023-08-16 PROCEDURE — 99214 OFFICE O/P EST MOD 30 MIN: CPT | Performed by: INTERNAL MEDICINE

## 2023-08-16 PROCEDURE — 3074F SYST BP LT 130 MM HG: CPT | Performed by: INTERNAL MEDICINE

## 2023-08-16 PROCEDURE — 36416 COLLJ CAPILLARY BLOOD SPEC: CPT

## 2023-08-16 RX ORDER — METOPROLOL SUCCINATE 25 MG/1
25 TABLET, EXTENDED RELEASE ORAL 2 TIMES DAILY
COMMUNITY
Start: 2023-08-15

## 2023-08-16 ASSESSMENT — ENCOUNTER SYMPTOMS
APNEA: 0
VOICE CHANGE: 0
ANAL BLEEDING: 0
STRIDOR: 0
CHOKING: 0
ABDOMINAL DISTENTION: 0
ABDOMINAL PAIN: 0
CHEST TIGHTNESS: 0
VOMITING: 0
COUGH: 0
TROUBLE SWALLOWING: 0
BLOOD IN STOOL: 0
SHORTNESS OF BREATH: 0
NAUSEA: 0
COLOR CHANGE: 0
WHEEZING: 0

## 2023-08-16 NOTE — PROGRESS NOTES
[address] [facility] [address]  [address] [facility],[address]  Dept: [phone]  Loc: [phone]     8/16/2023       Jordan Koosharem is here today for   Chief Complaint   Patient presents with    Hypertension     1yr HTN f/u. Referring Physician:  No ref. provider found     Patient Active Problem List   Diagnosis    Carpal tunnel syndrome    Headache    Other and unspecified nonspecific immunological findings    Pain in joint, lower leg    Abnormal weight gain    Toenail fungus    Osteoarthritis    Hypothyroidism    Nonspecific immunological findings    Gastroesophageal reflux disease    Positive serological test result    Knee pain    Weight gain    Subacute maxillary sinusitis    Homocysteinemia    Lipids abnormal    Vitamin D deficiency    Abnormal blood level of uric acid    Left wrist pain    Other intestinal malabsorption    BMI 45.0-49.9, adult (HCC)    Pain of right hip joint    Essential hypertension    New onset atrial fibrillation ([address])    Anticoagulated on Coumadin    Sleep apnea    Coronary atherosclerosis    Pacemaker    Encounter for therapeutic drug monitoring    Type 2 diabetes mellitus    Chronic renal disease, stage III ([address]) [[id number]]    Type 2 diabetes mellitus with chronic kidney disease    Abnormal nuclear stress test    Chest pain due to CAD ([address])    Contact with and (suspected) exposure to unspecified communicable disease    Osteoarthritis of right hip    Pre-diabetes    Long term (current) use of anticoagulants    Mass of lower extremity       Review of Systems   Constitutional:  Negative for activity change, appetite change, fatigue, fever and unexpected weight change. HENT:  Negative for congestion, trouble swallowing and voice change. Eyes:  Negative for visual disturbance. Respiratory:  Negative for apnea, cough, choking, chest tightness, shortness of breath, wheezing and stridor.     Cardiovascular:

## 2023-08-16 NOTE — PROGRESS NOTES
Medication Management [address] [place] [address]  [phone] (phone)  [phone] (fax)    Ms. Hardik Deutsch is a 68 y.o.  female with history of atrial fib. , per Dr. Dulce Huynh referral, who presents today for Warfarin monitoring and adjustment (3 week visit - late for today's visit). Patient verifies current dosing regimen and tablet strength. Has good supply. No missed (except as ordered last visit) or extra doses. Patient denies bruising/swelling/SOB. Had 2 minor nosebleeds, after blowing nose, about 3 days after steroid injection. Also same time, left eye became very bloodshot (states was also having high blood pressure/headaches then). States usually other eye gets bloodshot at times. No blood in urine or stool. No dietary changes. Sometimes misses daily can of Glucerna; reminded of need to be consistent due to Vitamin K content. No changes in medication/OTC agents/herbals. No change in alcohol use or tobacco use. No change in activity level. Patient denies falls. No vomiting/diarrhea or acute illness. No procedures scheduled in the future at this time. Had 80 mg Depomedrol injection to left shoulder 7/27. Assessment:   Lab Results   Component Value Date    INR 3.50 (H) 08/16/2023    INR 3.30 (H) 07/25/2023    INR 3.20 (H) 06/27/2023     INR supratherapeutic - goal 2-3. Recent Labs     08/16/23  1029   INR 3.50*        Plan:  POCT INR performed/result reviewed. Decrease PO Coumadin to 5 mg MF, 2.5 mg TWThSS (from 5 mg MWF, 2.5 mg TThSS=10% decrease). Recheck INR in 3 week(s). (Report given - orders entered by Rhonda Dee RP., PharmD.)   Patient reminded to call the Anticoagulation Clinic with any signs or symptoms of bleeding or with any medication changes. Patient given instructions utilizing the teach back method. After visit summary printed and reviewed with patient. Advised extra caution.   Discharged ambulatory in no apparent distress, with

## 2023-08-21 ENCOUNTER — OFFICE VISIT (OUTPATIENT)
Dept: INTERNAL MEDICINE CLINIC | Age: 73
End: 2023-08-21
Payer: MEDICARE

## 2023-08-21 VITALS — BODY MASS INDEX: 49.44 KG/M2 | WEIGHT: 289.6 LBS | HEIGHT: 64 IN

## 2023-08-21 DIAGNOSIS — E11.22 TYPE 2 DIABETES MELLITUS WITH CHRONIC KIDNEY DISEASE, WITHOUT LONG-TERM CURRENT USE OF INSULIN, UNSPECIFIED CKD STAGE (HCC): Primary | ICD-10-CM

## 2023-08-21 DIAGNOSIS — K90.9 INTESTINAL MALABSORPTION, UNSPECIFIED TYPE: ICD-10-CM

## 2023-08-21 PROCEDURE — 97802 MEDICAL NUTRITION INDIV IN: CPT | Performed by: DIETITIAN, REGISTERED

## 2023-08-21 PROCEDURE — NBSRV NON-BILLABLE SERVICE: Performed by: DIETITIAN, REGISTERED

## 2023-08-21 NOTE — PATIENT INSTRUCTIONS
Good idea to up your water intake - start to with 3 - 16 bottles/day     1.)  Get familiar with reading the nutrition facts label by looking at serving size and total carbohydrates. - Without a label refer to your carb count guide booklet. 2.)  Measure foods for accuracy in carb counting.    3.)  Healthy carb choices:  whole grains, whole wheat pasta, starchy vegetables, fresh fruit and lowfat/non-fat milk and yogurt. 4.)  Your meal plan is found on the inside cover of your carb counting guide booklet:  1st meal:  3 carb servings + 1-2 oz protein  2nd meal: 3 carb servings + 2-3 oz protein + non-starchy veggies  3rd meal:  3 carb servings + 2-3 oz protein + non- starchy veggies    Snack time:  15 - 20 gms carbohydrates + 1 oz protein    5.)  Choose lean protein most of the time and Cut in 1/2 added fats to help with weight loss efforts. 6.) Good idea to do the [place] and let me know your password and I can review before our next appt. Thanks. Check BS:  1-2x/week.   Fasting BS (1st thing in the morning) or before a meal (at least 4 hour since last ate), BS goal:  90 - 130  2 hours after the start of a meal, BS goal:  100 - 150

## 2023-09-07 ENCOUNTER — HOSPITAL ENCOUNTER (OUTPATIENT)
Dept: PHARMACY | Age: 73
Setting detail: THERAPIES SERIES
Discharge: HOME | End: 2023-09-07

## 2023-09-07 DIAGNOSIS — Z51.81 ENCOUNTER FOR THERAPEUTIC DRUG MONITORING: ICD-10-CM

## 2023-09-07 DIAGNOSIS — Z79.01 ANTICOAGULATED ON COUMADIN: Primary | ICD-10-CM

## 2023-09-07 LAB — POC INR: 2.2 (ref 0.8–1.2)

## 2023-09-07 RX ORDER — CELECOXIB 200 MG/1
200 CAPSULE ORAL DAILY PRN
COMMUNITY

## 2023-09-07 NOTE — PROGRESS NOTES
Medication Management [address] [place] [address]  [phone] (phone)  [phone] (fax)    Ms. Scott Marie is a 68 y.o.  female with history of Afib who presents today for anticoagulation monitoring and adjustment. Patient verifies current dosing regimen and tablet strength. No missed or extra doses. Patient denies s/s bleeding/bruising/swelling/SOB  No blood in urine or stool. No dietary changes. Started Celebrex again for her shoulder pain. Advised using sparingly to decrease risk of bleeding with Coumadin. She is taking Tylenol every day. No change in alcohol use or tobacco use. Pt stated since her shoulder pain has returned she has not been as active. Patient denies falls. No vomiting/diarrhea or acute illness. No Procedures scheduled in the future at this time. Assessment:     Lab Results   Component Value Date    INR 2.20 (H) 09/07/2023    INR 3.50 (H) 08/16/2023    INR 3.30 (H) 07/25/2023     INR therapeutic   Recent Labs     09/07/23  1334   INR 2.20*      Patient interview completed and discussed with pharmacist by Delmar ArnoldD Candidate      Plan:  Continue Coumadin 5 mg MoFr and 2.5 mg SuTuWeThSa. Recheck INR in 3 week(s). Patient reminded to call the Anticoagulation Clinic with any signs or symptoms of bleeding or with any medication changes. Patient given instructions utilizing the teach back method. After visit summary printed and reviewed with patient. Discharged ambulatory in no apparent distress.     For Pharmacy Admin Tracking Only  Time Spent (min): 15

## 2023-09-28 ENCOUNTER — HOSPITAL ENCOUNTER (OUTPATIENT)
Dept: PHARMACY | Age: 73
Setting detail: THERAPIES SERIES
Discharge: HOME | End: 2023-09-28
Payer: MEDICARE

## 2023-09-28 DIAGNOSIS — Z79.01 ANTICOAGULATED ON COUMADIN: Primary | ICD-10-CM

## 2023-09-28 DIAGNOSIS — Z51.81 ENCOUNTER FOR THERAPEUTIC DRUG MONITORING: ICD-10-CM

## 2023-09-28 LAB — POC INR: 2.3 (ref 0.8–1.2)

## 2023-09-28 PROCEDURE — 36416 COLLJ CAPILLARY BLOOD SPEC: CPT | Performed by: PHARMACIST

## 2023-09-28 PROCEDURE — 85610 PROTHROMBIN TIME: CPT | Performed by: PHARMACIST

## 2023-09-28 PROCEDURE — 99211 OFF/OP EST MAY X REQ PHY/QHP: CPT | Performed by: PHARMACIST

## 2023-09-28 NOTE — PROGRESS NOTES
Medication Management [address] [place] [address]  [phone] (phone)  [phone] (fax)    Ms. Paulina Baumgarten is a 68 y.o.  female with history of Afib who presents today for anticoagulation monitoring and adjustment. Patient verifies current dosing regimen and tablet strength. No missed or extra doses. Patient denies s/s bleeding/bruising/swelling/SOB  No blood in urine or stool. No dietary changes. No changes in medication/OTC agents/Herbals. No change in alcohol use or tobacco use. No change in activity level. Patient denies falls. No vomiting/diarrhea or acute illness. No Procedures scheduled in the future at this time. Assessment:   Lab Results   Component Value Date    INR 2.30 (H) 09/28/2023    INR 2.20 (H) 09/07/2023    INR 3.50 (H) 08/16/2023     INR therapeutic   Recent Labs     09/28/23  1457   INR 2.30*         Plan:  Continue Coumadin 5mg MF and 2.5mg TWThSaS. Recheck INR in 4 week(s). Patient reminded to call the Anticoagulation Clinic with any signs or symptoms of bleeding or with any medication changes. Patient given instructions utilizing the teach back method. After visit summary printed and reviewed with patient. Discharged ambulatory in no apparent distress.     For Pharmacy Admin Tracking Only    Time Spent (min): 20

## 2023-10-03 ENCOUNTER — OFFICE VISIT (OUTPATIENT)
Dept: INTERNAL MEDICINE CLINIC | Age: 73
End: 2023-10-03

## 2023-10-03 VITALS — HEIGHT: 64 IN | BODY MASS INDEX: 50.02 KG/M2 | WEIGHT: 293 LBS

## 2023-10-03 DIAGNOSIS — E11.9 TYPE 2 DIABETES MELLITUS WITHOUT COMPLICATION, WITHOUT LONG-TERM CURRENT USE OF INSULIN (HCC): Primary | ICD-10-CM

## 2023-10-03 DIAGNOSIS — K90.89 OTHER INTESTINAL MALABSORPTION: ICD-10-CM

## 2023-10-03 PROCEDURE — NBSRV NON-BILLABLE SERVICE: Performed by: DIETITIAN, REGISTERED

## 2023-10-03 NOTE — PATIENT INSTRUCTIONS
Check your BS 3-4x/week and bring to the next  center appt with Miller Blancas - Nurse educator. Have a brkf in the morning. Btw 715 and 745 am.    Make a grocery list of some of the brkf, lunch ideas ingredients. - Try the lunch bowls. Prep your veggies and bowls for the week. Work in fresh fruit in  and snack planning. Get back to doing lower body exercises as able. Fresh fruit and veggies and nonfat low in added sugars yogurts is great for Blood pressure and should help with your fluid retention in your legs. Along with limiting or avoiding high sodium foods. You can do this!!!    - do your fitnesspal.  Ignore the comments that it gives you.

## 2023-10-03 NOTE — PROGRESS NOTES
[facility]  [address] [facility] [place] [facility], [address] [place] [address]  [phone] (phone)  [phone] (fax)    Patient Name: Werner Garcia. Date of Birth: [id number]. MRN: [id number]      Assessment: Patient is a 68 y.o. female seen for follow-up MNT visit for Type 2 DB and Obesity.     -Nutritionally relevant labs:   Lab Results   Component Value Date/Time    LABA1C 6.3 06/05/2023 12:46 PM    LABA1C 6.0 11/22/2021 10:41 AM    LABA1C 5.7 06/14/2021 11:47 AM    GLUCOSE 98 11/30/2022 11:44 AM    GLUCOSE 103 07/07/2022 06:37 AM    CHOL 145 06/05/2023 12:46 PM    HDL 68 06/05/2023 12:46 PM    LDLCALC 67 06/05/2023 12:46 PM    TRIG 48 06/05/2023 12:46 PM     8/21/23 - Initial RD visit:  Pt States: wears Cpap - sleeping better and taking OTC ? Zegerid. ~Easter of this past year - started [employer], which is a digital lifestyle change program.  Pt states, not getting feedback and only learns things through watching a lesson each week. This program is to help with weight and bp. 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 HH - includes  and daughter and 2 grandsons. Today's visit:  -Blood sugar trends: Not required to check her BS. Pt has a meter at home. Pt expresses that it bothers her when the [employer] food logging kiah will report back to her what she is doing wrong.    -Food recall: Did some myfitnesspal food logging. Typically eating 2 meals/day. Late brkf after home from work btw 10 am and Noon. 1st meal (10 - Noon) - 3 brkf sausages, Glucerna, 1 egg, 2 sl white toast with butter OR this same brkf without the sausage OR just a Glucerna or Boost supplement drink.    Lunch: Skips, but trying to have a snack mid-day - popcorn microwave and 1 time with a ice cream cup OR Veggies and

## 2023-10-24 ENCOUNTER — OFFICE VISIT (OUTPATIENT)
Dept: INTERNAL MEDICINE CLINIC | Age: 73
End: 2023-10-24
Payer: MEDICARE

## 2023-10-24 VITALS
TEMPERATURE: 98 F | HEIGHT: 64 IN | BODY MASS INDEX: 50.02 KG/M2 | DIASTOLIC BLOOD PRESSURE: 64 MMHG | SYSTOLIC BLOOD PRESSURE: 132 MMHG | WEIGHT: 293 LBS | HEART RATE: 76 BPM

## 2023-10-24 DIAGNOSIS — E11.9 TYPE 2 DIABETES MELLITUS WITHOUT COMPLICATION, WITHOUT LONG-TERM CURRENT USE OF INSULIN (HCC): Primary | ICD-10-CM

## 2023-10-24 LAB — HBA1C MFR BLD: 6.2 % (ref 4.3–5.7)

## 2023-10-24 PROCEDURE — 83036 HEMOGLOBIN GLYCOSYLATED A1C: CPT | Performed by: REGISTERED NURSE

## 2023-10-24 PROCEDURE — NBSRV NON-BILLABLE SERVICE: Performed by: REGISTERED NURSE

## 2023-10-24 NOTE — PROGRESS NOTES
[facility]  [facility] [place] 155 [facility], [address] [place] [address]  [phone] (phone)  [phone] (fax)    Patient ID: Steve Stein [id number]  Referring Provider: Dr. Taqueria Ratliff     Diabetes Mellitus Type 2, Initial Visit: Patient here for an initial evaluation of Type 2 diabetes mellitus. Last c-peptide was unkown. Current symptoms/problems include none. The patient was initially diagnosed with Type 2 diabetes mellitus since 2019. Known diabetic complications: none  Cardiovascular risk factors: advanced age (older than 54 for men, 72 for women), diabetes mellitus, dyslipidemia, family history of premature cardiovascular disease, hypertension, obesity (BMI >= 30 kg/m2), sedentary lifestyle, and smoking/ tobacco exposure  Family history of diabetes: mom and dad  Weight trend: in the past year has gained about 30-40 pounds    Current Diabetes Pharmacotherapy:  Diet only  Using GoLo supplement    Glucose Trends:   Glucose at 2 hrs PPD today resulted at 129mg/dl  Current monitoring regimen: Fingerstick blood tests - less than once daily times daily.  Did not bring meter to today's appt  Home blood sugar trends:    -Fasting AM: \"110, 139, 106\"   -Before lunch:    -Before dinner:    -Bedtime:  Any episodes of hypoglycemia? no   -Treats with na    Lifestyle Factors:   Previous visit with dietician: yes - 10/3/2023  Current diet: B: 2 toast/omelette/ protein drink                                -usually            L:  couple of crackers or above breakfast (brunch)                       D: tacos -large shell                       Snacks: rare                        Beverages: water; rare soda  Current exercise: Silver sneakers --stopped, PT--stopped      Health Maintenance:  Eye exam current (within one year): yes Date: 11/2022, Dr. Debbie Hooks  Any history of foot problems? no  Last foot exam: yes Date: 10/24/2023, Pedal pulses:   peripheral pulses symmetrical   Results of monofilament test: 10/10              Skin noted to be

## 2023-10-24 NOTE — PATIENT INSTRUCTIONS
Review you meal plan   -don't skip breakfast --use your protein drink (carb smart)   -limit any beverages that have sugar in them   -limit simple sugars   -make sure you have proteins at all meals and if you have a snack       *meats, cheese, egg, nuts, peanut butter, avacado, beans  Add vegetables to lunch and dinner   -celery sticks/ carrots/ steamers   -add a side salad or have a main dish salad  Exercise 15-20 minutes almost every day--focus on legs   -stationery bike or pedaling frame while sitting and watching TV   -walking if you can do this safely  Check your blood sugar almost every day         Either waking up or 2hours after you have eaten        Goal for before meals                   2hours after eating  -under 160          *bring you meter to your next appointment

## 2023-10-26 ENCOUNTER — HOSPITAL ENCOUNTER (OUTPATIENT)
Dept: PHARMACY | Age: 73
Setting detail: THERAPIES SERIES
Discharge: HOME | End: 2023-10-26
Payer: MEDICARE

## 2023-10-26 DIAGNOSIS — Z51.81 ENCOUNTER FOR THERAPEUTIC DRUG MONITORING: ICD-10-CM

## 2023-10-26 DIAGNOSIS — Z79.01 ANTICOAGULATED ON COUMADIN: Primary | ICD-10-CM

## 2023-10-26 LAB — POC INR: 2.1 (ref 0.8–1.2)

## 2023-10-26 PROCEDURE — 36416 COLLJ CAPILLARY BLOOD SPEC: CPT

## 2023-10-26 PROCEDURE — 99212 OFFICE O/P EST SF 10 MIN: CPT

## 2023-10-26 PROCEDURE — 85610 PROTHROMBIN TIME: CPT

## 2023-10-26 RX ORDER — WARFARIN SODIUM 5 MG/1
TABLET ORAL
Qty: 75 TABLET | Refills: 3 | Status: SHIPPED | OUTPATIENT
Start: 2023-10-26

## 2023-10-26 RX ORDER — WARFARIN SODIUM 5 MG/1
TABLET ORAL EVERY EVENING
COMMUNITY

## 2023-10-26 NOTE — PROGRESS NOTES
Medication Management [address] [place] [address]  [phone] (phone)  [phone] (fax)    Ms. Linh Wray is a 68 y.o.  female with history of atrial fib. , per Dr. Syed Pizarro referral, who presents today for Warfarin monitoring and adjustment (4 week visit). Patient verifies current dosing regimen and tablet strength. No missed or extra doses. Patient denies bleeding/bruising. Has usual SOB. Has usual swelling of legs/ankles toward evening. No blood in urine or stool. No dietary changes. No changes in medication/OTC agents/herbals. No change in alcohol use or tobacco use. No change in activity level. Patient denies falls. No vomiting/diarrhea or acute illness. Procedures scheduled in the future at this time: 12/5 left rotator cuff surgery in [place] - doesn't have instructions yet, but asked her to call this clinic as soon as she knows what surgeon wants done with Coumadin. States was off Coumadin 5 days before other joint surgeries - didn't use Lovenox for those. Cannot take Lovenox since hematoma after breast biopsy. Notified clinic pharmacist of procedure. Advised patient to notify Dr. Marlyn Call of surgery. Assessment:   Lab Results   Component Value Date    INR 2.10 (H) 10/26/2023    INR 2.30 (H) 09/28/2023    INR 2.20 (H) 09/07/2023     INR therapeutic - goal 2-3. Recent Labs     10/26/23  1323   INR 2.10*        Plan:  POCT INR performed/result reviewed. Continue PO Coumadin 5 mg MF, 2.5 mg TWThSS. Recheck INR in 4.5 week(s). Patient reminded to call the Anticoagulation Clinic with any signs or symptoms of bleeding or with any medication changes. Patient given instructions utilizing the teach back method. After visit summary printed and reviewed with patient. Discharged ambulatory in no apparent distress.   Prescription renewed electronically by clinic pharmacist.    For Pharmacy Admin Tracking Only    Intervention Detail: Refill(s)

## 2023-11-06 ENCOUNTER — NURSE ONLY (OUTPATIENT)
Dept: LAB | Age: 73
End: 2023-11-06

## 2023-11-06 DIAGNOSIS — I48.0 PAROXYSMAL ATRIAL FIBRILLATION (HCC): ICD-10-CM

## 2023-11-06 DIAGNOSIS — Z79.01 ANTICOAGULATED ON COUMADIN: ICD-10-CM

## 2023-11-06 DIAGNOSIS — R79.83 HOMOCYSTEINEMIA: ICD-10-CM

## 2023-11-06 DIAGNOSIS — I10 ESSENTIAL HYPERTENSION: ICD-10-CM

## 2023-11-06 DIAGNOSIS — N18.30 STAGE 3 CHRONIC KIDNEY DISEASE, UNSPECIFIED WHETHER STAGE 3A OR 3B CKD (HCC): ICD-10-CM

## 2023-11-06 DIAGNOSIS — K90.89 OTHER SPECIFIED INTESTINAL MALABSORPTION: ICD-10-CM

## 2023-11-06 DIAGNOSIS — E03.9 HYPOTHYROIDISM, UNSPECIFIED TYPE: ICD-10-CM

## 2023-11-06 DIAGNOSIS — E11.22 TYPE 2 DIABETES MELLITUS WITH CHRONIC KIDNEY DISEASE, WITHOUT LONG-TERM CURRENT USE OF INSULIN, UNSPECIFIED CKD STAGE (HCC): ICD-10-CM

## 2023-11-06 DIAGNOSIS — G47.30 SLEEP APNEA, UNSPECIFIED TYPE: ICD-10-CM

## 2023-11-06 DIAGNOSIS — M25.562 ARTHRALGIA OF LEFT LOWER LEG: ICD-10-CM

## 2023-11-06 LAB
25(OH)D3 SERPL-MCNC: 41 NG/ML (ref 30–100)
ANION GAP SERPL CALC-SCNC: 14 MEQ/L (ref 8–16)
BASOPHILS ABSOLUTE: 0.1 THOU/MM3 (ref 0–0.1)
BASOPHILS NFR BLD AUTO: 0.7 %
BUN SERPL-MCNC: 24 MG/DL (ref 7–22)
CALCIUM SERPL-MCNC: 9.7 MG/DL (ref 8.5–10.5)
CHLORIDE SERPL-SCNC: 103 MEQ/L (ref 98–111)
CHOLEST SERPL-MCNC: 137 MG/DL (ref 100–199)
CO2 SERPL-SCNC: 24 MEQ/L (ref 23–33)
CREAT SERPL-MCNC: 1 MG/DL (ref 0.4–1.2)
D DIMER PPP IA.FEU-MCNC: 592 NG/ML FEU (ref 0–500)
DEPRECATED RDW RBC AUTO: 54.1 FL (ref 35–45)
EOSINOPHIL NFR BLD AUTO: 2.6 %
EOSINOPHILS ABSOLUTE: 0.2 THOU/MM3 (ref 0–0.4)
ERYTHROCYTE [DISTWIDTH] IN BLOOD BY AUTOMATED COUNT: 15.1 % (ref 11.5–14.5)
GFR SERPL CREATININE-BSD FRML MDRD: 60 ML/MIN/1.73M2
GLUCOSE SERPL-MCNC: 118 MG/DL (ref 70–108)
HCT VFR BLD AUTO: 40.7 % (ref 37–47)
HDLC SERPL-MCNC: 68 MG/DL
HGB BLD-MCNC: 12.9 GM/DL (ref 12–16)
IMM GRANULOCYTES # BLD AUTO: 0.03 THOU/MM3 (ref 0–0.07)
IMM GRANULOCYTES NFR BLD AUTO: 0.4 %
LDLC SERPL CALC-MCNC: 61 MG/DL
LYMPHOCYTES ABSOLUTE: 1.7 THOU/MM3 (ref 1–4.8)
LYMPHOCYTES NFR BLD AUTO: 24.2 %
MAGNESIUM SERPL-MCNC: 2.2 MG/DL (ref 1.6–2.4)
MCH RBC QN AUTO: 30.8 PG (ref 26–33)
MCHC RBC AUTO-ENTMCNC: 31.7 GM/DL (ref 32.2–35.5)
MCV RBC AUTO: 97.1 FL (ref 81–99)
MONOCYTES ABSOLUTE: 0.7 THOU/MM3 (ref 0.4–1.3)
MONOCYTES NFR BLD AUTO: 9.1 %
NEUTROPHILS NFR BLD AUTO: 63 %
NRBC BLD AUTO-RTO: 0 /100 WBC
PLATELET # BLD AUTO: 239 THOU/MM3 (ref 130–400)
PMV BLD AUTO: 10.1 FL (ref 9.4–12.4)
POTASSIUM SERPL-SCNC: 5 MEQ/L (ref 3.5–5.2)
PROGEST SERPL-MCNC: < 0.05 NG/ML
RBC # BLD AUTO: 4.19 MILL/MM3 (ref 4.2–5.4)
SEGMENTED NEUTROPHILS ABSOLUTE COUNT: 4.5 THOU/MM3 (ref 1.8–7.7)
SODIUM SERPL-SCNC: 141 MEQ/L (ref 135–145)
TRIGL SERPL-MCNC: 40 MG/DL (ref 0–199)
WBC # BLD AUTO: 7.2 THOU/MM3 (ref 4.8–10.8)

## 2023-11-07 LAB
C-REACTIVE PROTEIN, HIGH SENSITIVITY: < 0.2 MG/L
DEPRECATED MEAN GLUCOSE BLD GHB EST-ACNC: 123 MG/DL (ref 70–126)
ERYTHROCYTE [SEDIMENTATION RATE] IN BLOOD BY WESTERGREN METHOD: 56 MM/HR (ref 0–20)
HBA1C MFR BLD HPLC: 6.1 % (ref 4.4–6.4)
HOMOCYSTEINE: 11.8 UMOL/L
PTH-INTACT SERPL-MCNC: 30.6 PG/ML (ref 15–65)

## 2023-11-08 LAB
DHEA-S SERPL-MCNC: 25 UG/DL (ref 10–90)
ESTRADIOL LEVEL: 61 PG/ML

## 2023-11-09 LAB
DHEA SERPL-MCNC: 1.32 NG/ML (ref 0.63–4.7)
TESTOSTERONE, FREE W SHGB, FEMALES/CHILDREN: NORMAL
TTG IGA SER IA-ACNC: 1 U/ML
TTG IGG SER IA-ACNC: 0.9 U/ML

## 2023-11-10 LAB — THYROPEROXIDASE AB SERPL IA-ACNC: < 4 IU/ML (ref 0–25)

## 2023-11-14 ENCOUNTER — OFFICE VISIT (OUTPATIENT)
Dept: FAMILY MEDICINE CLINIC | Age: 73
End: 2023-11-14
Payer: MEDICARE

## 2023-11-14 VITALS
BODY MASS INDEX: 50.02 KG/M2 | WEIGHT: 293 LBS | SYSTOLIC BLOOD PRESSURE: 152 MMHG | RESPIRATION RATE: 12 BRPM | HEIGHT: 64 IN | DIASTOLIC BLOOD PRESSURE: 80 MMHG | TEMPERATURE: 97.8 F | HEART RATE: 89 BPM | OXYGEN SATURATION: 97 %

## 2023-11-14 DIAGNOSIS — G89.29 CHRONIC LEFT SHOULDER PAIN: Primary | ICD-10-CM

## 2023-11-14 DIAGNOSIS — R79.83 HOMOCYSTEINEMIA: ICD-10-CM

## 2023-11-14 DIAGNOSIS — E11.22 TYPE 2 DIABETES MELLITUS WITH STAGE 3 CHRONIC KIDNEY DISEASE, WITHOUT LONG-TERM CURRENT USE OF INSULIN, UNSPECIFIED WHETHER STAGE 3A OR 3B CKD (HCC): ICD-10-CM

## 2023-11-14 DIAGNOSIS — N18.30 STAGE 3 CHRONIC KIDNEY DISEASE, UNSPECIFIED WHETHER STAGE 3A OR 3B CKD (HCC): ICD-10-CM

## 2023-11-14 DIAGNOSIS — I10 ESSENTIAL HYPERTENSION: ICD-10-CM

## 2023-11-14 DIAGNOSIS — M25.512 CHRONIC LEFT SHOULDER PAIN: Primary | ICD-10-CM

## 2023-11-14 DIAGNOSIS — Z79.01 ANTICOAGULATED ON COUMADIN: ICD-10-CM

## 2023-11-14 DIAGNOSIS — K90.89 OTHER SPECIFIED INTESTINAL MALABSORPTION: ICD-10-CM

## 2023-11-14 DIAGNOSIS — N18.30 TYPE 2 DIABETES MELLITUS WITH STAGE 3 CHRONIC KIDNEY DISEASE, WITHOUT LONG-TERM CURRENT USE OF INSULIN, UNSPECIFIED WHETHER STAGE 3A OR 3B CKD (HCC): ICD-10-CM

## 2023-11-14 PROCEDURE — 1123F ACP DISCUSS/DSCN MKR DOCD: CPT | Performed by: FAMILY MEDICINE

## 2023-11-14 PROCEDURE — 3079F DIAST BP 80-89 MM HG: CPT | Performed by: FAMILY MEDICINE

## 2023-11-14 PROCEDURE — 99213 OFFICE O/P EST LOW 20 MIN: CPT | Performed by: FAMILY MEDICINE

## 2023-11-14 PROCEDURE — 3077F SYST BP >= 140 MM HG: CPT | Performed by: FAMILY MEDICINE

## 2023-11-14 PROCEDURE — 3044F HG A1C LEVEL LT 7.0%: CPT | Performed by: FAMILY MEDICINE

## 2023-11-14 RX ORDER — AA/PROT/LYSINE/METHIO/VIT C/B6 50-12.5 MG
1 TABLET ORAL 2 TIMES DAILY
COMMUNITY

## 2023-11-14 NOTE — PATIENT INSTRUCTIONS
Thank you   Thank you for trusting us with your healthcare needs. You may receive a survey regarding today's visit. It would help us out if you would take a few moments to provide your feedback. We value your input. Please bring in ALL medications BOTTLES, including inhalers, herbal supplements, over the counter, prescribed & non-prescribed medicine. The office would like actual medication bottles and a list.   Please note our OFFICE POLICIES:   Prior to getting your labs drawn, please check with your insurance company for benefits and eligibility of lab services. Often, insurance companies cover certain tests for preventative visits only. It is patient's responsibility to see what is covered. We are unable to change a diagnosis after the test has been performed. Please hold onto your original lab orders and take them to your lab to be completed. If you no show your scheduled appointment three times, you will be dismissed from this practice. Reschedules must be completed 24 hours prior to your schedule appointment. If the list below has been completed, PLEASE FAX RECORDS TO OUR OFFICE @ [phone].  Once the records have been received we will update your records at our office:  Health Maintenance Due   Topic Date Due    Diabetic foot exam  Never done    Diabetic retinal exam  Never done    Hepatitis B vaccine (1 of 3 - Risk 3-dose series) Never done    Diabetic Alb to Cr ratio (uACR) test  11/22/2022    Breast cancer screen  07/27/2023    COVID-19 Vaccine (4 - 2023-24 season) 09/01/2023

## 2023-11-14 NOTE — PROGRESS NOTES
[address] [facility] [address] [place] [address]  Dept: [phone]  Dept Fax: [phone]  Loc: [phone]      Kira Collazo is a 68 y.o. White female. Mauricio Cannon  presents to the [address] [place] [address] today for   Chief Complaint   Patient presents with    Discuss Labs   , and;   1. Chronic left shoulder pain    2. Type 2 diabetes mellitus with stage 3 chronic kidney disease, without long-term current use of insulin, unspecified whether stage 3a or 3b CKD ([address])    3. Essential hypertension    4. Stage 3 chronic kidney disease, unspecified whether stage 3a or 3b CKD ([address])    5. Homocysteinemia    6. Other specified intestinal malabsorption    7. Anticoagulated on Coumadin          I have reviewed [address] surgical and other pertinent history in detail, and have updated medication and allergy information in the computerized patient record. Clinical Care Team:     -Referring Provider for today's consult: self  -Primary Care Provider: Diana Gonsalez DO    Medical/Surgical History:   She  has a past medical history of A-fib [facility]), Atrial fibrillation ([address]), CAD (coronary artery disease), Carpal tunnel syndrome, Diabetes mellitus ([address]), GERD (gastroesophageal reflux disease), Headache(784.0), HTN (hypertension), Hyperlipidemia, Hypothyroidism, Osteoarthritis, Pacemaker, Sleep apnea, Tendonitis of both wrists, and Toenail fungus. Her  has a past surgical history that includes Carpal tunnel release; hernia repair;  section;  section;  section; Foot surgery (Left, 2018); joint replacement (Left, 2010); joint replacement (Right, 2016); Colonoscopy (N/A, 10/08/2021); Upper gastrointestinal endoscopy (N/A, 10/08/2021); Upper gastrointestinal endoscopy (10/08/2021); and Total hip arthroplasty (22 & 22).     Family/Social History:     Her family history includes

## 2023-11-21 ENCOUNTER — OFFICE VISIT (OUTPATIENT)
Dept: INTERNAL MEDICINE CLINIC | Age: 73
End: 2023-11-21

## 2023-11-21 VITALS — WEIGHT: 293 LBS | BODY MASS INDEX: 50.02 KG/M2 | HEIGHT: 64 IN

## 2023-11-21 DIAGNOSIS — E11.22 TYPE 2 DIABETES MELLITUS WITH CHRONIC KIDNEY DISEASE, WITHOUT LONG-TERM CURRENT USE OF INSULIN, UNSPECIFIED CKD STAGE (HCC): Primary | ICD-10-CM

## 2023-11-21 PROCEDURE — NBSRV NON-BILLABLE SERVICE: Performed by: DIETITIAN, REGISTERED

## 2023-11-21 NOTE — PROGRESS NOTES
[facility]  [address] [facility] [place]. 155 [facility] [place], [address] [place], [place] [address]  [phone] (phone)  [phone] (fax)    Patient Name: Vincenza Hashimoto. Date of Birth: [id number]. MRN: [id number]      Assessment: Patient is a 68 y.o. female seen for follow-up MNT visit for Type 2 DB with CKD 3.    -Nutritionally relevant labs:   Lab Results   Component Value Date/Time    LABA1C 6.1 11/06/2023 10:25 AM    LABA1C 6.2 (H) 10/24/2023 02:02 PM    LABA1C 6.3 06/05/2023 12:46 PM    LABA1C 6.0 11/22/2021 10:41 AM    GLUCOSE 118 (H) 11/06/2023 10:25 AM    GLUCOSE 98 11/30/2022 11:44 AM    CHOL 137 11/06/2023 10:25 AM    HDL 68 11/06/2023 10:25 AM    LDLCALC 61 11/06/2023 10:25 AM    TRIG 40 11/06/2023 10:25 AM      Latest Reference Range & Units 11/06/23 10:25   Sodium 135 - 145 meq/L 141   Potassium 3.5 - 5.2 meq/L 5.0   BUN,BUNPL 7 - 22 mg/dL 24 (H)   Creatinine 0.4 - 1.2 mg/dL 1.0   Est, Glom Filt Rate >60 ml/min/1.73m2 60   (H): Data is abnormally high    Seen Amy - podiatrist.  Swelling in feet and legs x 1 year. Now planning to see [facility].    -Blood sugar trends: Not required to check her BS.    -Food recall: 4 days of food logging on [email or web address] kiah. Breakfast: Protein drink - Ensure Max OR 1 sl raisin bread OR 5 crackers  Lunch: 2 TB pbutter, 1 red delicious apple OR cranberry juice, 3 sl Bulgarian medina, 2 sl white toast, 2 fried eggs OR Tammie's - 1 cup coke, small fries, 4 pce chicken nuggets, 1 Eamon medina cheeseburger OR 1 egg sc. With cheese, 2 sl white toast.   Dinner: 3 strips medina, 1 TB gaytan, Tomato slices, 2 sl white bread OR Soft beef tacos OR Happy Daz - Strawberry milkshake regular size, mozzarella sticks OR Fiber one Chocolate chip brownie, 1 slice deluxe pizza. Snacks: skips or banana. Will snack on Cookies or candies if around. Does not typically eat snack chips.   Pt states she has lunches for 400 calories or less but has not purchased the

## 2023-11-21 NOTE — PATIENT INSTRUCTIONS
[email or web address] then go to the search bar and type in part of this title and the video will pop up. - Lymphatic Drainage Exercise for the Legs: An Exercise Routine for Lymphatic Flow and Lymphedema    Prepare vegetable soup and freeze in individual portions so you can pull out from the freezer michael when laid up after surgery. Plan ahead for busy errand days so when you get  home you can heat up leftovers or have a healthy salad ready to eat. Limit Fast food or pizza take out to no more than 3x/month. Check your BS 3-4x/week. At various times of the day  FBS or before a meal, BS goal:  90 - 130  2 hours after the start of a meal, BS goal:  100-150    Keep doing your myfitnesspal to help you stay on track. - 45-50 gms carbs + lean protein + veggies. Keep total fat grams for the day <50 gms fat/day    Drink 1 high protein (20 - 30 gms protein) nutritional drink - Ensure Max OR Glucerna Shake OR Boost Women's shake.

## 2023-11-27 ENCOUNTER — HOSPITAL ENCOUNTER (OUTPATIENT)
Dept: PHARMACY | Age: 73
Setting detail: THERAPIES SERIES
Discharge: HOME | End: 2023-11-27
Payer: MEDICARE

## 2023-11-27 DIAGNOSIS — Z51.81 ENCOUNTER FOR THERAPEUTIC DRUG MONITORING: ICD-10-CM

## 2023-11-27 DIAGNOSIS — Z79.01 ANTICOAGULATED ON COUMADIN: Primary | ICD-10-CM

## 2023-11-27 LAB — POC INR: 3.3 (ref 0.8–1.2)

## 2023-11-27 PROCEDURE — 85610 PROTHROMBIN TIME: CPT

## 2023-11-27 PROCEDURE — 99212 OFFICE O/P EST SF 10 MIN: CPT

## 2023-11-27 PROCEDURE — 36416 COLLJ CAPILLARY BLOOD SPEC: CPT

## 2023-11-27 NOTE — PROGRESS NOTES
Medication Management [address] [place] [address]  [phone] (phone)  [phone] (fax)    Ms. Emilie Aviles is a 68 y.o.  female with history of Afib who presents today for anticoagulation monitoring and adjustment. Patient verifies current dosing regimen and tablet strength. No missed or extra doses. Patient denies s/s bleeding/bruising/swelling/SOB  No blood in urine or stool. Patient had a glass of cranberry juice 2-3 days early last week as she thought she was getting a kidney infection. No changes in medication/OTC agents/Herbals. No change in tobacco use. Patient had a glass of wine over Thanksgiving. No change in activity level. Patient denies falls. No vomiting/diarrhea or acute illness. Patient has shoulder replacement surgery planned for 12/5/23 and believes she has to be off Coumadin for 5 days prior to the procedure. She will notify clinic if this changes or if procedure gets rescheduled. Patient was not bridged in 2022 for similar hold due to bleeding issues with Lovenox in the past.     Assessment:   Lab Results   Component Value Date    INR 3.30 (H) 11/27/2023    INR 2.10 (H) 10/26/2023    INR 2.30 (H) 09/28/2023     INR supratherapeutic   Recent Labs     11/27/23  1317   INR 3.30*     Patient is slightly supratherapeutic today which is likely due to recent cranberry and alcohol intake. Previously, patient ahd three consecutive therapeutic INRs while on current regimen. Plan:  Coumadin 2.5 mg x 1 dose today 11/27/23 then continue Coumadin 5 mg MF and 2.5 mg TuWThSaSu. HOLD Coumadin 11/30/23-12/5/23 for procedure per MD. Take Coumadin 5 mg x 2 doses (12/6/23-12/7/23), then resume home dose of Coumadin 5 mg MF and 2.5 mg TuWThSaSu. Recheck INR in 2 week(s) (1 week after procedure). Patient reminded to call the Anticoagulation Clinic with any signs or symptoms of bleeding or with any medication changes.   Patient given instructions utilizing the

## 2023-12-11 ENCOUNTER — TELEPHONE (OUTPATIENT)
Dept: CARDIOLOGY CLINIC | Age: 73
End: 2023-12-11

## 2023-12-11 NOTE — TELEPHONE ENCOUNTER
Pt and spouse Paddy Sandoval 7/26/1946 were both patients of Dr. Michelle Lyons and they are wanting to establish care with Dr. Philip York in the [facility] office.   Please contact pt at [phone] to schedule appointments

## 2023-12-12 ENCOUNTER — HOSPITAL ENCOUNTER (OUTPATIENT)
Dept: PHARMACY | Age: 73
Setting detail: THERAPIES SERIES
Discharge: HOME | End: 2023-12-12
Payer: MEDICARE

## 2023-12-12 DIAGNOSIS — Z51.81 ENCOUNTER FOR THERAPEUTIC DRUG MONITORING: ICD-10-CM

## 2023-12-12 DIAGNOSIS — Z79.01 ANTICOAGULATED ON COUMADIN: Primary | ICD-10-CM

## 2023-12-12 LAB — POC INR: 2.1 (ref 0.8–1.2)

## 2023-12-12 PROCEDURE — 36416 COLLJ CAPILLARY BLOOD SPEC: CPT

## 2023-12-12 PROCEDURE — 99211 OFF/OP EST MAY X REQ PHY/QHP: CPT

## 2023-12-12 PROCEDURE — 85610 PROTHROMBIN TIME: CPT

## 2023-12-12 RX ORDER — OXYCODONE HYDROCHLORIDE 5 MG/1
5 TABLET ORAL EVERY 4 HOURS PRN
COMMUNITY
Start: 2023-12-05

## 2024-01-08 ENCOUNTER — TELEPHONE (OUTPATIENT)
Dept: CARDIOLOGY CLINIC | Age: 74
End: 2024-01-08

## 2024-01-09 ENCOUNTER — HOSPITAL ENCOUNTER (OUTPATIENT)
Dept: PHARMACY | Age: 74
Setting detail: THERAPIES SERIES
Discharge: HOME | End: 2024-01-09
Payer: MEDICARE

## 2024-01-09 ENCOUNTER — TELEPHONE (OUTPATIENT)
Dept: FAMILY MEDICINE CLINIC | Age: 74
End: 2024-01-09

## 2024-01-09 ENCOUNTER — TELEPHONE (OUTPATIENT)
Dept: PHARMACY | Age: 74
End: 2024-01-09

## 2024-01-09 ENCOUNTER — TELEPHONE (OUTPATIENT)
Dept: CARDIOLOGY CLINIC | Age: 74
End: 2024-01-09

## 2024-01-09 DIAGNOSIS — Z51.81 ENCOUNTER FOR THERAPEUTIC DRUG MONITORING: ICD-10-CM

## 2024-01-09 DIAGNOSIS — I48.0 PAF (PAROXYSMAL ATRIAL FIBRILLATION) (HCC): Primary | ICD-10-CM

## 2024-01-09 DIAGNOSIS — Z79.01 ANTICOAGULATED ON COUMADIN: Primary | ICD-10-CM

## 2024-01-09 DIAGNOSIS — I48.0 PAROXYSMAL ATRIAL FIBRILLATION (HCC): ICD-10-CM

## 2024-01-09 LAB — POC INR: 2.7 (ref 0.8–1.2)

## 2024-01-09 PROCEDURE — 36416 COLLJ CAPILLARY BLOOD SPEC: CPT

## 2024-01-09 PROCEDURE — 99211 OFF/OP EST MAY X REQ PHY/QHP: CPT

## 2024-01-09 PROCEDURE — 85610 PROTHROMBIN TIME: CPT

## 2024-01-09 RX ORDER — ACETAMINOPHEN 500 MG
500 TABLET ORAL EVERY 6 HOURS PRN
COMMUNITY

## 2024-01-09 NOTE — TELEPHONE ENCOUNTER
----- Message from Dana Flaherty RN sent at 1/8/2024 12:52 PM EST -----  Had followed for Dr. Campbell.  Patient not seeing Dr. Sanabria until August. Is it possible to have Anticoagulation referral now or should we ask PCP to cover?  Thanks, YOEL Flaherty, RN BSN  Cou[facility]in Clinic    
Dr Duncan entered the referral  
Elly patient  But likely needs to see PCP  
Starting a separate encounter.  I could not change provider to Bing as this encounter is closed.    
[phone]

## 2024-01-09 NOTE — PROGRESS NOTES
Medication Management Clinic  [facility]  Anticoagulation Clinic  [phone] (phone)  [phone] (fax)    Ms. Leah Bautista is a 73 y.o.  female with history of atrial fib., who presents today for Warfarin monitoring and adjustment (4 week visit).    Patient verifies current dosing regimen and tablet strength.  No missed or extra doses.  Patient denies bleeding/bruising/SOB. Has usual swelling of legs/ankles/feet.  [facility] Noticed some redness of ankles - she will address with doctor.  No blood in urine or stool.  No dietary changes.  Changes in medication/OTC agents/herbals: has resumed MVI (except has skipped past few days). Is consistent with 1 can Ensure (or Boost or Glucerna) daily.  Has resumed other supplements, but not faithfully taking some of them or not as often per day as listed.  No change in alcohol use or tobacco use.  No change in activity level.  Patient denies falls.  No vomiting/diarrhea or acute illness.   No procedures scheduled in the future at this time.  Doing PT post-left shoulder surgery (arm in sling).  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    Assessment:   Lab Results   Component Value Date    INR 2.70 (H) 01/09/2024    INR 2.10 (H) 12/12/2023    INR 3.30 (H) 11/27/2023     INR therapeutic - goal 2-3.  Recent Labs     01/09/24  1328   INR 2.70*        Plan:  POCT INR performed/result reviewed.  Continue PO Coumadin 5 mg MF, 2.5 mg TWThSS.  Recheck INR in 4 week(s).  Patient reminded to call the Anticoagulation Clinic with any signs or symptoms of bleeding or with any medication changes.  Patient given instructions utilizing the teach back method.       After visit summary printed and reviewed with patient.      Discharged via transport chair in no apparent distress, with .    For Pharmacy Admin Tracking Only    Time Spent (min): 20

## 2024-01-09 NOTE — TELEPHONE ENCOUNTER
Received a call from Dana with the [facility] stating that Dr. Campbell follow the patient at the Coumadin Clinic.  Dr. Campbell has retired and the patient does not see Dr. Ventura until August.  Dana is asking if you can place the referral #[id number] for the clinic.  Please advise.      Dana will check EPIC for the referral this afternoon.

## 2024-01-09 NOTE — TELEPHONE ENCOUNTER
Noted entry from [facility] asking that PCP refer for Anticoagulation Monitoring (had followed for Dr. Campbell - retired).  Called Dr. Duncan's office - they will ask doctor.

## 2024-01-09 NOTE — TELEPHONE ENCOUNTER
From separate encounter-this will need to be under Wilfrid Newby NP, MD Physician Signed Yesterday     Copy     Elly patient  But likely needs to see PCP             Amna Cummings CMA Medical Assistant Signed Yesterday     Copy     ----- Message from Dana Flaherty RN sent at 1/8/2024 12:52 PM EST -----  Had followed for Dr. Campbell.  Patient not seeing Dr. Sanabria until August. Is it possible to have Anticoagulation referral now or should we ask PCP to cover?  Thanks, YOEL Flaherty RN BSN  Coumadin Clinic

## 2024-02-08 ENCOUNTER — HOSPITAL ENCOUNTER (OUTPATIENT)
Dept: PHARMACY | Age: 74
Setting detail: THERAPIES SERIES
Discharge: HOME | End: 2024-02-08
Payer: MEDICARE

## 2024-02-08 DIAGNOSIS — Z79.01 ANTICOAGULATED ON COUMADIN: Primary | ICD-10-CM

## 2024-02-08 DIAGNOSIS — Z51.81 ENCOUNTER FOR THERAPEUTIC DRUG MONITORING: ICD-10-CM

## 2024-02-08 LAB — POC INR: 2.3 (ref 0.8–1.2)

## 2024-02-08 PROCEDURE — 85610 PROTHROMBIN TIME: CPT

## 2024-02-08 PROCEDURE — 36416 COLLJ CAPILLARY BLOOD SPEC: CPT

## 2024-02-08 PROCEDURE — 99211 OFF/OP EST MAY X REQ PHY/QHP: CPT

## 2024-02-08 RX ORDER — TRAMADOL HYDROCHLORIDE 50 MG/1
50 TABLET ORAL EVERY 6 HOURS PRN
COMMUNITY

## 2024-02-08 NOTE — PROGRESS NOTES
Medication Management Clinic  [facility]  Anticoagulation Clinic  [phone] (phone)  [phone] (fax)    Ms. Leah Bautista is a 73 y.o.  female with history of atrial fib., per Dr. Duncan's referral,  who presents today for Warfarin monitoring and adjustment (4 week visit).    Patient verifies current dosing regimen and tablet strength.  No missed or extra doses.  Patient denies bleeding/bruising.  Has usual WILLS/swelling of legs, feet, and ankles. Has [facility] seen [facility] - now has PCDs (to hips?). May also be starting L[facility]ema Clinic.  No blood in urine or stool.  No dietary changes, except for small serving of cranberry juice once. [facility] to [facility] better, but no change in Vitamin K foods, yet.  No changes in medication/OTC agents/herbals.  No change in alcohol use or tobacco use.  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  Tripped over rug one night and fell against toilet - did not hit head.  No vomiting/diarrhea or acute illness.   No procedures scheduled in the future at this time.    Assessment:   Lab Results   Component Value Date    INR 2.30 (H) 02/08/2024    INR 2.70 (H) 01/09/2024    INR 2.10 (H) 12/12/2023     INR therapeutic - goal 2-3.  Recent Labs     02/08/24  1328   INR 2.30*        Plan:  POCT INR performed/result reviewed.  Continue PO Coumadin 5 mg MF, 2.5 mg TWThSS.  Recheck INR in 4 week(s).  Patient reminded to call the Anticoagulation Clinic with any signs or symptoms of bleeding or with any medication changes.  Patient given instructions utilizing the teach back method.       After visit summary printed and reviewed with patient.      Discharged ambulatory in no apparent distress.     For Pharmacy Admin Tracking Only    Time Spent (min): 20

## 2024-02-18 ENCOUNTER — HOSPITAL ENCOUNTER (EMERGENCY)
Age: 74
Discharge: HOME | End: 2024-02-18
Attending: FAMILY MEDICINE
Payer: MEDICARE

## 2024-02-18 VITALS
HEART RATE: 74 BPM | DIASTOLIC BLOOD PRESSURE: 54 MMHG | BODY MASS INDEX: 49.51 KG/M2 | HEIGHT: 64 IN | OXYGEN SATURATION: 97 % | TEMPERATURE: 98.3 F | WEIGHT: 290 LBS | SYSTOLIC BLOOD PRESSURE: 128 MMHG | RESPIRATION RATE: 18 BRPM

## 2024-02-18 DIAGNOSIS — J06.9 UPPER RESPIRATORY TRACT INFECTION, UNSPECIFIED TYPE: ICD-10-CM

## 2024-02-18 DIAGNOSIS — L50.9 URTICARIA: Primary | ICD-10-CM

## 2024-02-18 PROCEDURE — 99283 EMERGENCY DEPT VISIT LOW MDM: CPT

## 2024-02-18 RX ORDER — PREDNISONE 10 MG/1
TABLET ORAL
Qty: 20 TABLET | Refills: 0 | Status: SHIPPED | OUTPATIENT
Start: 2024-02-18 | End: 2024-02-28

## 2024-02-18 RX ORDER — HYDROXYZINE HYDROCHLORIDE 25 MG/1
25 TABLET, FILM COATED ORAL EVERY 6 HOURS PRN
Qty: 20 TABLET | Refills: 0 | Status: SHIPPED | OUTPATIENT
Start: 2024-02-18 | End: 2024-02-28

## 2024-02-18 ASSESSMENT — PAIN - FUNCTIONAL ASSESSMENT: PAIN_FUNCTIONAL_ASSESSMENT: NONE - DENIES PAIN

## 2024-02-18 NOTE — DISCHARGE INSTRUCTIONS
Prednisone as prescribed. Atarax as prescribed. Cool compresses to rash if itching is not improving.

## 2024-02-18 NOTE — ED PROVIDER NOTES
[facility]  eMERGENCY dEPARTMENT eNCOUnter          CHIEF COMPLAINT     No chief complaint on file.      Nurses Notes reviewed and I agree except as noted in the HPI.      HISTORY OF PRESENT ILLNESS    Leah Bautista is a 73 y.o. female who presents with diffuse rash to whole body. Rash is itchy. No fever. Patient notes preceding cough,congestion over past few days. Denies any current fever. No recent antibiotics. No known environmental allergies. Denies any alleviating measures. No new medications.          REVIEW OF SYSTEMS     Review of Systems   Constitutional:  Negative for chills and fever.   HENT:  Positive for congestion. Negative for facial swelling, sore throat and trouble swallowing.    Respiratory:  Positive for cough. Negative for shortness of breath and wheezing.    Musculoskeletal:  Negative for arthralgias and myalgias.   Skin:  Positive for rash. Negative for pallor.   All other systems reviewed and are negative.        PAST MEDICAL HISTORY    has a past medical history of A-fib (HCC), Atrial fibrillation (HCC), CAD (coronary artery disease), Carpal tunnel syndrome, Diabetes mellitus (HCC), GERD (gastroesophageal reflux disease), Headache(784.0), HTN (hypertension), Hyperlipidemia, Hypothyroidism, Osteoarthritis, Pacemaker, Sleep apnea, Tendonitis of both wrists, and Toenail fungus.    SURGICAL HISTORY      has a past surgical history that includes Carpal tunnel release; hernia repair;  section;  section;  section; Foot surgery (Left, 2018); joint replacement (Left, 2010); joint replacement (Right, 2016); Colonoscopy (N/A, 10/08/2021); Upper gastrointestinal endoscopy (N/A, 10/08/2021); Upper gastrointestinal endoscopy (10/08/2021); and Total hip arthroplasty (22 & 22).    CURRENT MEDICATIONS       Previous Medications    ACETAMINOPHEN (TYLENOL ARTHRITIS PAIN PO)    Take by mouth Indications: Pain PRN. Don't take more then 3,000 mg each

## 2024-02-18 NOTE — DISCHARGE INSTR - COC
Continuity of Care Form    Patient Name: Leah Bautista   :  1950  MRN:  [id number]    Admit date:  2024  Discharge date:  ***    Code Status Order: Prior   Advance Directives:     Admitting Physician:  No admitting provider for patient encounter.  PCP: Carlos Duncan DO    Discharging Nurse: ***  Discharging Hospital Unit/Room#: E7/E7  Discharging Unit Phone Number: ***    Emergency Contact:   Extended Emergency Contact Information  Primary Emergency Contact: Dominic Bautista  Address: [address] [place] [id number] [facility]  Home Phone: [phone]  Mobile Phone: [phone]  Relation: Spouse  Hearing or visual needs: None  Other needs: None  Preferred language: English   needed? No    Past Surgical History:  Past Surgical History:   Procedure Laterality Date    CARPAL TUNNEL RELEASE      both hands     SECTION       SECTION       SECTION      COLONOSCOPY N/A 10/08/2021    COLONOSCOPY performed by Enma Chambers MD at [facility]    FOOT SURGERY Left 2018    [facility]    HERNIA REPAIR      JOINT REPLACEMENT Left 2010    Left Total Knee    JOINT REPLACEMENT Right 2016    Right Total Knee    TOTAL HIP ARTHROPLASTY  22 & 22    UPPER GASTROINTESTINAL ENDOSCOPY N/A 10/08/2021    EGD BIOPSY performed by Enma Chambers MD at [facility]    UPPER GASTROINTESTINAL ENDOSCOPY  10/08/2021    EGD DILATION SAVORY performed by Enma Chambers MD at [facility]       Immunization History:   Immunization History   Administered Date(s) Administered    COVID-19, MODERNA BLUE border, Primary or Immunocompromised, (age 12y+), IM, 100 mcg/0.5mL 2021, 2021, 2021    Influenza Virus Vaccine 2011, 10/09/2014, 2015, 2020, 10/04/2021    Influenza, FLUAD, (age 65 y+), Adjuvanted, 0.5mL 10/04/2021, 10/20/2023    Influenza, FLUARIX, FLULAVAL, FLUZONE (age 6 mo+) AND AFLURIA,

## 2024-02-18 NOTE — ED NOTES
Pt resting, resp even and unlabored, skin pink, warm and dry. Pt given discharge instructions and verbalizes understanding, pt released.

## 2024-02-18 NOTE — ED NOTES
Pt complains of a rash that started 2 days ago. Pt denies taking any new medication, foods, etc. Pt alert, resp even and unlabored, skin pink, warm and dry. Fine pin point rash noted.

## 2024-02-19 ENCOUNTER — TELEPHONE (OUTPATIENT)
Dept: FAMILY MEDICINE CLINIC | Age: 74
End: 2024-02-19

## 2024-02-21 ENCOUNTER — OFFICE VISIT (OUTPATIENT)
Dept: INTERNAL MEDICINE CLINIC | Age: 74
End: 2024-02-21
Payer: MEDICARE

## 2024-02-21 VITALS
WEIGHT: 293 LBS | SYSTOLIC BLOOD PRESSURE: 168 MMHG | HEART RATE: 77 BPM | DIASTOLIC BLOOD PRESSURE: 82 MMHG | TEMPERATURE: 98.6 F | BODY MASS INDEX: 50.98 KG/M2

## 2024-02-21 DIAGNOSIS — E11.22 TYPE 2 DIABETES MELLITUS WITH CHRONIC KIDNEY DISEASE, WITHOUT LONG-TERM CURRENT USE OF INSULIN, UNSPECIFIED CKD STAGE (HCC): Primary | ICD-10-CM

## 2024-02-21 PROCEDURE — NBSRV NON-BILLABLE SERVICE

## 2024-02-21 PROCEDURE — G0108 DIAB MANAGE TRN  PER INDIV: HCPCS

## 2024-02-21 NOTE — PROGRESS NOTES
[facility]  [address] [place] [address]  [phone] (phone)  [phone] (fax)    Patient ID: Leah Bautista 1950  Referring Provider: Dr. Duncan     Patient's name and  were verified.    Subjective:    She presents for a follow-up diabetic visit. She has type 2 diabetes mellitus. She is compliant a majority of the time.  Assessment:     Lab Results   Component Value Date/Time    LABA1C 6.1 2023 10:25 AM    BUN 24 2023 10:25 AM    CREATININE 1.0 2023 10:25 AM     There were no vitals filed for this visit.  Wt Readings from Last 3 Encounters:   24 131.5 kg (290 lb)   23 136.1 kg (300 lb)   23 134.8 kg (297 lb 3.2 oz)     Ht Readings from Last 3 Encounters:   24 1.626 m (5' 4\")   23 1.626 m (5' 4\")   23 1.626 m (5' 4\")     Est, Glom Filt Rate   Date Value Ref Range Status   2023 60 >60 ml/min/1.73m2 Final       Diabetes Pharmacotherapy:  Diet only  Golo supplement    Glucose Trends:   Glucose at 3 hrs PPD today resulted at 153 mg/dl  Current monitoring regimen: Fingerstick blood tests - 0-1 times daily  Home blood sugar trends: numbers per patient recall  Any episodes of hypoglycemia? None reported    Lifestyle Factors:   Previous visit with dietician: yes - 2023  Current diet: B: 2 whole wheat muffin halves w/ cream cheese + glucerna OR boost protein            L: 2 slices bread w/ 2 slices ham                       D: scrambled eggs OR vegetables                       Snacks: popsicle                       Beverages: cranberry or orange juice  Current exercise:  did not review in depth    Health Maintenance:  Diabetes Management   Topic Date Due    Diabetic foot exam  Never done    Diabetic Alb to Cr ratio (uACR) test  2022       Eye exam current (within one year): yes Date: 2023, Dr. Queen **need records  Any history of foot problems? no  Last foot exam: yes Date: 10/24/2023, [facility]   Immunizations up to date:

## 2024-02-21 NOTE — PATIENT INSTRUCTIONS
Set a goal to check BG at least 3 times a week, ideally daily, at varying times  -Fasting morning, before lunch, before supper, 2 hours after supper  -Blood Sugar Goals:  -Fasting AM:    -Before lunch/dinner:    -2 hours after eating/at bedtime: 100-180    2. Try to switch from a diet/ \"don't eat\" mindset. Focus on eating healthy grains, low fat proteins, and healthy fruits and veggies   -Aim for 4-5 servings of fruits and veggies per day   -Consider air frying a chicken breast or fish filet and have a steamer bag of veggies   -Try to avoid fruit juices, stick with whole fruits    3. Try to commit to walking 3 days per week   -Set increasing step goals for yourself

## 2024-03-07 ENCOUNTER — HOSPITAL ENCOUNTER (OUTPATIENT)
Dept: PHARMACY | Age: 74
Setting detail: THERAPIES SERIES
Discharge: HOME | End: 2024-03-07
Payer: MEDICARE

## 2024-03-07 DIAGNOSIS — Z79.01 ANTICOAGULATED ON COUMADIN: Primary | ICD-10-CM

## 2024-03-07 DIAGNOSIS — Z51.81 ENCOUNTER FOR THERAPEUTIC DRUG MONITORING: ICD-10-CM

## 2024-03-07 LAB — POC INR: 3.2 (ref 0.8–1.2)

## 2024-03-07 PROCEDURE — 99212 OFFICE O/P EST SF 10 MIN: CPT | Performed by: PHARMACIST

## 2024-03-07 PROCEDURE — 36416 COLLJ CAPILLARY BLOOD SPEC: CPT | Performed by: PHARMACIST

## 2024-03-07 PROCEDURE — 85610 PROTHROMBIN TIME: CPT | Performed by: PHARMACIST

## 2024-03-07 NOTE — PROGRESS NOTES
Medication Management Clinic  [facility]  Anticoagulation Clinic  [phone] (phone)  [phone] (fax)    Ms. Leah Bautista is a 74 y.o.  female with history of Afib who presents today for anticoagulation monitoring and adjustment.    Patient verifies current dosing regimen and tablet strength.  No missed or extra doses.  Patient denies s/s bleeding/swelling/SOB  - Does have a small bruise on abdomen secondary to lymphedema suite  No blood in urine or stool.  No dietary changes.  - Been drinking Boost/Glucerna/Ensure most days  Changes in medication/OTC agents/Herbals.  - Received 5 day course of prednisone 40 mg daily on 24 for urticaria  - Taken a few doses of Celebrex after physical therapy on shoulder due to increased pain  No change in alcohol use or tobacco use.  Change in activity level.  - Receiving physical therapy twice weekly  - Would like to get back to taking exercise classes at the [facility]  Patient denies falls.  No vomiting/diarrhea or acute illness.   No Procedures scheduled in the future at this time.    Assessment:   Lab Results   Component Value Date    INR 3.20 (H) 2024    INR 2.30 (H) 2024    INR 2.70 (H) 2024     INR supratherapeutic   Recent Labs     24  1331   INR 3.20*     Plan:  Coumadin 2.5 mg today and tomorrow, then continue Coumadin 5 mg MF, 2.5 mg all other days.  Recheck INR in 4 week(s).  Patient reminded to call the Anticoagulation Clinic with any signs or symptoms of bleeding or with any medication changes.  Patient given instructions utilizing the teach back method.        After visit summary printed and reviewed with patient.      Discharged ambulatory in no apparent distress.    For Pharmacy Admin Tracking Only    Intervention Detail: Adherence Monitorin and Dose Adjustment: 1, reason: Therapy Optimization  Total # of Interventions Recommended: 2  Total # of Interventions Accepted: 2  Time Spent (min): 20    Adwoa Yen, PharmD,

## 2024-04-01 ENCOUNTER — OFFICE VISIT (OUTPATIENT)
Dept: INTERNAL MEDICINE CLINIC | Age: 74
End: 2024-04-01
Payer: MEDICARE

## 2024-04-01 VITALS — WEIGHT: 293 LBS | HEIGHT: 64 IN | BODY MASS INDEX: 50.02 KG/M2

## 2024-04-01 DIAGNOSIS — E11.9 TYPE 2 DIABETES MELLITUS WITHOUT COMPLICATION, WITHOUT LONG-TERM CURRENT USE OF INSULIN (HCC): Primary | ICD-10-CM

## 2024-04-01 DIAGNOSIS — E66.01 CLASS 3 SEVERE OBESITY WITH BODY MASS INDEX (BMI) OF 50.0 TO 59.9 IN ADULT, UNSPECIFIED OBESITY TYPE, UNSPECIFIED WHETHER SERIOUS COMORBIDITY PRESENT (HCC): ICD-10-CM

## 2024-04-01 PROCEDURE — NBSRV NON-BILLABLE SERVICE: Performed by: DIETITIAN, REGISTERED

## 2024-04-01 PROCEDURE — 97803 MED NUTRITION INDIV SUBSEQ: CPT | Performed by: DIETITIAN, REGISTERED

## 2024-04-01 NOTE — PATIENT INSTRUCTIONS
Do your pedals everyday when watching tv. Each night.    Www.ugichem.com then go to the search bar and type in part of this title and the video will pop up.  - Lymphatic Drainage Exercise for the Legs: An Exercise Routine for Lymphatic Flow and Lymphedema    Plan ahead for busy errand days so when you get home you can heat up leftovers or have a healthy salad ready to eat.     Drink a glass of water after each meal.    Check your BS at various times of the day.    You can ask to see ORESTES Meraz at Dr Pendleton's office.

## 2024-04-01 NOTE — PROGRESS NOTES
[facility]  [facility]. Diabetes & Nutrition Clinic  [address] [place] [address]  [phone] (phone)  [phone] (fax)    Patient Name: Leah Bautista. Date of Birth: [birth year]. MRN: [id number]      Assessment: Patient is a 74 y.o. female seen for follow-up MNT visit for Type 2 DB.     -Nutritionally relevant labs:   Lab Results   Component Value Date/Time    LABA1C 6.1 11/06/2023 10:25 AM    LABA1C 6.2 (H) 10/24/2023 02:02 PM    LABA1C 6.3 06/05/2023 12:46 PM    LABA1C 6.0 11/22/2021 10:41 AM    GLUCOSE 118 (H) 11/06/2023 10:25 AM    GLUCOSE 98 11/30/2022 11:44 AM    CHOL 137 11/06/2023 10:25 AM    HDL 68 11/06/2023 10:25 AM    LDLCALC 61 11/06/2023 10:25 AM    TRIG 40 11/06/2023 10:25 AM     2/21/24 - Pharm D initial visit:  Set a goal to check BG at least 3 times a week, ideally daily, at varying times - has not been checking her BS  -Fasting morning, before lunch, before supper, 2 hours after supper  -Blood Sugar Goals:  -Fasting AM:               -Before lunch/dinner:               -2 hours after eating/at bedtime: 100-180     2. Try to switch from a diet/ \"don't eat\" mindset. Focus on eating healthy grains, low fat proteins, and healthy fruits and veggies              -Aim for 4-5 servings of fruits and veggies per day - not eating fresh fruit and vegetables on a regular basis              -Consider air frying a chicken breast or fish filet and have a steamer bag of veggies              -Try to avoid fruit juices, stick with whole fruits - avoiding juice.     3. Try to commit to walking 3 days per week              -Set increasing step goals for yourself - pt finds it difficult to walk.  Fears falling too.    Today's Visit:  No DB meds.  -Blood sugar trends: Pt's last AIC remains in prediabetes range.  Has not been checking her BS  Pt participating in our Diabetes Prevention Program.    -Food recall:   Breakfast: skips.   Lunch: BBQ beef sandwich and 1 cup

## 2024-04-04 ENCOUNTER — HOSPITAL ENCOUNTER (OUTPATIENT)
Dept: PHARMACY | Age: 74
Setting detail: THERAPIES SERIES
Discharge: HOME | End: 2024-04-04
Payer: MEDICARE

## 2024-04-04 DIAGNOSIS — Z79.01 ANTICOAGULATED ON COUMADIN: Primary | ICD-10-CM

## 2024-04-04 DIAGNOSIS — Z51.81 ENCOUNTER FOR THERAPEUTIC DRUG MONITORING: ICD-10-CM

## 2024-04-04 LAB — POC INR: 4.1 (ref 0.8–1.2)

## 2024-04-04 PROCEDURE — 36416 COLLJ CAPILLARY BLOOD SPEC: CPT | Performed by: PHARMACIST

## 2024-04-04 PROCEDURE — 85610 PROTHROMBIN TIME: CPT | Performed by: PHARMACIST

## 2024-04-04 PROCEDURE — 99211 OFF/OP EST MAY X REQ PHY/QHP: CPT | Performed by: PHARMACIST

## 2024-04-04 NOTE — PROGRESS NOTES
Medication Management Clinic  [facility]  Anticoagulation Clinic  [phone] (phone)  [phone] (fax)    Ms. Leah Bautista is a 74 y.o.  female with history of Afib who presents today for anticoagulation monitoring and adjustment.    Patient verifies current dosing regimen and tablet strength.  No missed or extra doses.  Patient denies s/s bleeding/bruising/swelling/SOB  No blood in urine or stool.  No dietary changes. Boost only drinking as needed.  No changes in medication/OTC agents/Herbals. Took celebrex last Saturday only takes when she has pain.   No change in alcohol use or tobacco use. A glass of wine on Easter.   No change in activity level.Still doing physical therapy.   Patient denies falls.  No vomiting/diarrhea or acute illness.   No Procedures scheduled in the future at this time.    Assessment:   Lab Results   Component Value Date    INR 4.10 (H) 04/04/2024    INR 3.20 (H) 03/07/2024    INR 2.30 (H) 02/08/2024     INR supratherapeutic   Recent Labs     04/04/24  1315   INR 4.10*   2nd consecutive supratherapeutic INR     Plan:  HOLD today then decrease Coumadin 5mg W 2.5mg SMTuThFS 11.1% decrease.  Recheck INR in 1.5 week(s). Requested 1 week but patient would not due to conflict and she said times available with parking. Patient reminded to call the Anticoagulation Clinic with signs or symptoms of bleeding or with any medication changes.  Patient given instructions utilizing the teach back method.    After visit summary printed and reviewed with patient.      Discharged ambulatory in no apparent distress.    For Pharmacy Admin Tracking Only    Intervention Detail: Dose Adjustment: 1, reason: Therapy Optimization  Total # of Interventions Recommended: 1  Total # of Interventions Accepted: 1  Time Spent (min): 20

## 2024-04-16 ENCOUNTER — APPOINTMENT (OUTPATIENT)
Dept: PHARMACY | Age: 74
End: 2024-04-16
Payer: MEDICARE

## 2024-04-16 DIAGNOSIS — Z51.81 ENCOUNTER FOR THERAPEUTIC DRUG MONITORING: ICD-10-CM

## 2024-04-16 DIAGNOSIS — Z79.01 ANTICOAGULATED ON COUMADIN: Primary | ICD-10-CM

## 2024-04-16 LAB — POC INR: 1.5 (ref 0.8–1.2)

## 2024-04-16 PROCEDURE — 85610 PROTHROMBIN TIME: CPT

## 2024-04-16 PROCEDURE — 99212 OFFICE O/P EST SF 10 MIN: CPT

## 2024-04-16 PROCEDURE — 36416 COLLJ CAPILLARY BLOOD SPEC: CPT

## 2024-04-16 NOTE — PROGRESS NOTES
Medication Management Clinic  [facility]  Anticoagulation Clinic  [phone] (phone)  [phone] (fax)    Ms. Leah Bautista is a 74 y.o.  female with history of atrial fib., per Dr. Duncan's referral, who presents today for Warfarin monitoring and adjustment (12 day visit after decreasing dose by 11.1%).    Patient verifies current dosing regimen and tablet strength.  No missed (except as ordered last visit) or extra doses.  Patient denies bleeding/bruising/SOB. Has usual swelling of legs.  No blood in urine or stool.  No dietary changes. Fairly consistent with Boost - encouraged to be consistent due to Vitamin K content.  No changes in medication/OTC agents/herbals. Fairly consistent with MVI - encouraged to be consistent due to Vitamin K content.  No change in alcohol use or tobacco use.  No change in activity level.  Has been doing PT.  Patient denies falls.  No vomiting/diarrhea or acute illness.   No procedures scheduled in the future at this time.    Assessment:   Lab Results   Component Value Date    INR 1.50 (H) 04/16/2024    INR 4.10 (H) 04/04/2024    INR 3.20 (H) 03/07/2024     INR subtherapeutic - goal 2-3.  Recent Labs     04/16/24  1117   INR 1.50*        Plan:  POCT INR performed/result reviewed.  5 mg today, T, then continue PO Coumadin 5 mg W, 2.5 mg MTThFSS.  Recheck INR in 2 week(s). (Report given - orders entered by YOEL Bronson [facility], PharmD.)  Patient reminded to call the Anticoagulation Clinic with any signs or symptoms of bleeding or with any medication changes.  Patient given instructions utilizing the teach back method.       After visit summary printed and reviewed with patient.      Discharged ambulatory in no apparent distress.    For Pharmacy Admin Tracking Only    Intervention Detail: Dose Adjustment: 1, reason: Therapy Optimization  Total # of Interventions Recommended: 1  Total # of Interventions Accepted: 1  Time Spent (min):  23

## 2024-04-30 ENCOUNTER — HOSPITAL ENCOUNTER (OUTPATIENT)
Dept: PHARMACY | Age: 74
Setting detail: THERAPIES SERIES
Discharge: HOME | End: 2024-04-30
Payer: MEDICARE

## 2024-04-30 DIAGNOSIS — Z79.01 ANTICOAGULATED ON COUMADIN: Primary | ICD-10-CM

## 2024-04-30 DIAGNOSIS — Z51.81 ENCOUNTER FOR THERAPEUTIC DRUG MONITORING: ICD-10-CM

## 2024-04-30 LAB — POC INR: 1.8 (ref 0.8–1.2)

## 2024-04-30 PROCEDURE — 85610 PROTHROMBIN TIME: CPT

## 2024-04-30 PROCEDURE — 36416 COLLJ CAPILLARY BLOOD SPEC: CPT

## 2024-04-30 PROCEDURE — 99212 OFFICE O/P EST SF 10 MIN: CPT

## 2024-04-30 RX ORDER — WARFARIN SODIUM 3 MG/1
TABLET ORAL
Qty: 30 TABLET | Refills: 3 | Status: SHIPPED | OUTPATIENT
Start: 2024-04-30

## 2024-04-30 RX ORDER — DIOSMIN COMPLEX NO.1 630 MG
1 TABLET ORAL DAILY
COMMUNITY

## 2024-04-30 NOTE — PROGRESS NOTES
Medication Management Clinic  [facility]  Anticoagulation Clinic  [phone] (phone)  [phone] (fax)    Ms. Leah Bautista is a 74 y.o.  female with history of atrial fib., per Dr. Duncan's referral, who presents today for Warfarin monitoring and adjustment (2 week visit).    Patient verifies current dosing regimen and tablet strength.  No missed or extra doses, except for bolus ordered last visit.  Patient denies bleeding/bruising. Has usual SOB. States had atrial fib. the other night - took Metoprolol and went to bed. Has usual swelling from waist down.  [facility].  Will be starting lymphatic massage.  No blood in urine or stool.  No dietary changes. Consistent with Boost.  Changes in medication/OTC agents/herbals: started Vasculera shortly after last visit - titrated down to 1/day now.  No change in alcohol use or tobacco use.  No change in activity level. Had more stress.  Patient denies falls.  No vomiting/diarrhea or acute illness.   No procedures scheduled in the future at this time.    Assessment:     Lab Results   Component Value Date    INR 1.80 (H) 04/30/2024    INR 1.50 (H) 04/16/2024    INR 4.10 (H) 04/04/2024     INR subtherapeutic - goal 2-3.  Recent Labs     04/30/24  1439   INR 1.80*      Plan:  POCT INR performed/result reviewed.  5 mg today, then increase Coumadin to 3 mg daily PO (from 5 mg W, 2.5 mg MTThFSS=5% increase).  Recheck INR in 2 week(s). (Report given - orders entered by YOEL Bronson RPh., PharmD., who electronically sent prescription for new tablet of 3 mg.)  Patient will get new tablet today.  Patient reminded to call the Anticoagulation Clinic with any signs or symptoms of bleeding or with any medication changes.  Patient given instructions utilizing the teach back method.     After visit summary printed and reviewed with patient.      Discharged ambulatory in no apparent distress, with cane.     For Pharmacy Admin Tracking Only    Intervention

## 2024-05-14 ENCOUNTER — APPOINTMENT (OUTPATIENT)
Dept: PHARMACY | Age: 74
End: 2024-05-14
Payer: MEDICARE

## 2024-05-16 ENCOUNTER — HOSPITAL ENCOUNTER (OUTPATIENT)
Dept: PHARMACY | Age: 74
Setting detail: THERAPIES SERIES
Discharge: HOME | End: 2024-05-16
Payer: MEDICARE

## 2024-05-16 DIAGNOSIS — Z79.01 ANTICOAGULATED ON COUMADIN: Primary | ICD-10-CM

## 2024-05-16 DIAGNOSIS — Z51.81 ENCOUNTER FOR THERAPEUTIC DRUG MONITORING: ICD-10-CM

## 2024-05-16 LAB — POC INR: 2.4 (ref 0.8–1.2)

## 2024-05-16 PROCEDURE — 85610 PROTHROMBIN TIME: CPT

## 2024-05-16 PROCEDURE — 99211 OFF/OP EST MAY X REQ PHY/QHP: CPT

## 2024-05-16 PROCEDURE — 36416 COLLJ CAPILLARY BLOOD SPEC: CPT

## 2024-05-16 NOTE — PROGRESS NOTES
Medication Management Clinic  [facility]  Anticoagulation Clinic  [phone] (phone)  [phone] (fax)    Ms. Leah Bautista is a 74 y.o.  female with history of Afib who presents today for anticoagulation monitoring and adjustment.    Patient verifies current dosing regimen and tablet strength.  No missed or extra doses.  Patient denies s/s bleeding/bruising/swelling/SOB. +lymphedema at baseline  No blood in the stool. Red clots in the urine x 2 days last week or so. Resolved, per pt. No associated symptoms. Encouraged pt to inform PCP.  No dietary changes.   No changes in medication/OTC agents/Herbals.  No change in alcohol use or tobacco use.   No change in activity level.  Patient denies falls.   No vomiting/diarrhea or acute illness.   No Procedures scheduled in the future at this time.       Assessment:   Lab Results   Component Value Date    INR 2.40 (H) 05/16/2024    INR 1.80 (H) 04/30/2024    INR 1.50 (H) 04/16/2024     INR therapeutic   Recent Labs     05/16/24  1401   INR 2.40*         Plan:  Continue Coumadin 3 mg daily.  Recheck INR in 2 week(s).  Patient reminded to call the Anticoagulation Clinic with any signs or symptoms of bleeding or with any medication changes.  Patient given instructions utilizing the teach back method.        After visit summary printed and reviewed with patient.      Discharged ambulatory in no apparent distress.    Sylvie Kilpatrick RPh  5/16/2024 2:07 PM     For Pharmacy Admin Tracking Only    Time Spent (min): 20

## 2024-05-28 ENCOUNTER — TELEPHONE (OUTPATIENT)
Dept: FAMILY MEDICINE CLINIC | Age: 74
End: 2024-05-28

## 2024-05-28 DIAGNOSIS — G89.29 CHRONIC LEFT SHOULDER PAIN: ICD-10-CM

## 2024-05-28 DIAGNOSIS — Z79.01 ANTICOAGULATED ON COUMADIN: Primary | ICD-10-CM

## 2024-05-28 DIAGNOSIS — I10 ESSENTIAL HYPERTENSION: ICD-10-CM

## 2024-05-28 DIAGNOSIS — M25.562 ARTHRALGIA OF LEFT LOWER LEG: ICD-10-CM

## 2024-05-28 DIAGNOSIS — M25.512 CHRONIC LEFT SHOULDER PAIN: ICD-10-CM

## 2024-05-28 DIAGNOSIS — G47.30 SLEEP APNEA, UNSPECIFIED TYPE: ICD-10-CM

## 2024-05-28 DIAGNOSIS — E11.22 TYPE 2 DIABETES MELLITUS WITH STAGE 3 CHRONIC KIDNEY DISEASE, WITHOUT LONG-TERM CURRENT USE OF INSULIN, UNSPECIFIED WHETHER STAGE 3A OR 3B CKD (HCC): ICD-10-CM

## 2024-05-28 DIAGNOSIS — R79.83 HOMOCYSTEINEMIA: ICD-10-CM

## 2024-05-28 DIAGNOSIS — K90.89 OTHER SPECIFIED INTESTINAL MALABSORPTION: ICD-10-CM

## 2024-05-28 DIAGNOSIS — N18.30 STAGE 3 CHRONIC KIDNEY DISEASE, UNSPECIFIED WHETHER STAGE 3A OR 3B CKD (HCC): ICD-10-CM

## 2024-05-28 DIAGNOSIS — N18.30 TYPE 2 DIABETES MELLITUS WITH STAGE 3 CHRONIC KIDNEY DISEASE, WITHOUT LONG-TERM CURRENT USE OF INSULIN, UNSPECIFIED WHETHER STAGE 3A OR 3B CKD (HCC): ICD-10-CM

## 2024-05-28 DIAGNOSIS — I48.0 PAROXYSMAL ATRIAL FIBRILLATION (HCC): ICD-10-CM

## 2024-05-28 NOTE — TELEPHONE ENCOUNTER
Pt called, sobia that she needs lab orders mailed to her.  She wants an A1c, and states the lymphadema or swelling is still bad.    Orders pended, please review, add diagnosis, approve or deny

## 2024-06-04 ENCOUNTER — APPOINTMENT (OUTPATIENT)
Dept: PHARMACY | Age: 74
End: 2024-06-04
Payer: MEDICARE

## 2024-06-05 ENCOUNTER — HOSPITAL ENCOUNTER (OUTPATIENT)
Dept: PHARMACY | Age: 74
Setting detail: THERAPIES SERIES
Discharge: HOME | End: 2024-06-05
Payer: MEDICARE

## 2024-06-05 DIAGNOSIS — Z51.81 ENCOUNTER FOR THERAPEUTIC DRUG MONITORING: ICD-10-CM

## 2024-06-05 DIAGNOSIS — Z79.01 ANTICOAGULATED ON COUMADIN: Primary | ICD-10-CM

## 2024-06-05 LAB — POC INR: 2.5 (ref 0.8–1.2)

## 2024-06-05 PROCEDURE — 36416 COLLJ CAPILLARY BLOOD SPEC: CPT

## 2024-06-05 PROCEDURE — 85610 PROTHROMBIN TIME: CPT

## 2024-06-05 PROCEDURE — 99211 OFF/OP EST MAY X REQ PHY/QHP: CPT

## 2024-06-05 NOTE — PROGRESS NOTES
Medication Management Clinic  [facility]  Anticoagulation Clinic  [phone] (phone)  [phone] (fax)    Ms. Leah Bautista is a 74 y.o.  female with history of atrial fib., per Dr. Duncan's referral, who presents today for Warfarin monitoring and adjustment (3 week visit - late due to parking issue).    Patient verifies current dosing regimen and tablet strength.  No missed or extra doses.  Patient denies bleeding/bruising.  Has usual WILLS. Now followed by therapist at [facility] for lymphedema - both legs are wrapped. Not much change yet in swelling, she states.  No blood in urine or stool.  No dietary changes.  No changes in medication/OTC agents/herbals, except for patch she got off [employer] for sleep (knew it had 7 mg Melatonin and Valerian root, something else). Started 4 days ago.  No change in alcohol use or tobacco use.  No change in activity level. To meet with physiologist 6/19, then may be exercising 2-3 times/week.  Patient denies falls.  No vomiting/diarrhea or acute illness.   No procedures scheduled in the future at this time.    Assessment:   Lab Results   Component Value Date    INR 2.50 (H) 06/05/2024    INR 2.40 (H) 05/16/2024    INR 1.80 (H) 04/30/2024     INR therapeutic - goal 2-3.  Recent Labs     06/05/24  1440   INR 2.50*        Plan:  POCT INR performed/result reviewed.  Continue Coumadin 3 mg daily PO.  Recheck INR in 4 week(s).  Patient reminded to call the Anticoagulation Clinic with any signs or symptoms of bleeding or with any medication changes.  Patient given instructions utilizing the teach back method.       After visit summary printed and reviewed with patient.      Discharged ambulatory in no apparent distress.     For Pharmacy Admin Tracking Only    Time Spent (min): 20

## 2024-06-10 ENCOUNTER — NURSE ONLY (OUTPATIENT)
Dept: LAB | Age: 74
End: 2024-06-10

## 2024-06-10 DIAGNOSIS — I48.0 PAROXYSMAL ATRIAL FIBRILLATION (HCC): ICD-10-CM

## 2024-06-10 DIAGNOSIS — M25.512 CHRONIC LEFT SHOULDER PAIN: ICD-10-CM

## 2024-06-10 DIAGNOSIS — I10 ESSENTIAL HYPERTENSION: ICD-10-CM

## 2024-06-10 DIAGNOSIS — K90.89 OTHER SPECIFIED INTESTINAL MALABSORPTION: ICD-10-CM

## 2024-06-10 DIAGNOSIS — G47.30 SLEEP APNEA, UNSPECIFIED TYPE: ICD-10-CM

## 2024-06-10 DIAGNOSIS — E11.22 TYPE 2 DIABETES MELLITUS WITH STAGE 3 CHRONIC KIDNEY DISEASE, WITHOUT LONG-TERM CURRENT USE OF INSULIN, UNSPECIFIED WHETHER STAGE 3A OR 3B CKD (HCC): ICD-10-CM

## 2024-06-10 DIAGNOSIS — Z79.01 ANTICOAGULATED ON COUMADIN: ICD-10-CM

## 2024-06-10 DIAGNOSIS — N18.30 STAGE 3 CHRONIC KIDNEY DISEASE, UNSPECIFIED WHETHER STAGE 3A OR 3B CKD (HCC): ICD-10-CM

## 2024-06-10 DIAGNOSIS — R79.83 HOMOCYSTEINEMIA: ICD-10-CM

## 2024-06-10 DIAGNOSIS — M25.562 ARTHRALGIA OF LEFT LOWER LEG: ICD-10-CM

## 2024-06-10 DIAGNOSIS — G89.29 CHRONIC LEFT SHOULDER PAIN: ICD-10-CM

## 2024-06-10 DIAGNOSIS — N18.30 TYPE 2 DIABETES MELLITUS WITH STAGE 3 CHRONIC KIDNEY DISEASE, WITHOUT LONG-TERM CURRENT USE OF INSULIN, UNSPECIFIED WHETHER STAGE 3A OR 3B CKD (HCC): ICD-10-CM

## 2024-06-10 LAB
BASOPHILS ABSOLUTE: 0.1 THOU/MM3 (ref 0–0.1)
BASOPHILS NFR BLD AUTO: 1.1 %
DEPRECATED RDW RBC AUTO: 52.6 FL (ref 35–45)
EOSINOPHIL NFR BLD AUTO: 2.7 %
EOSINOPHILS ABSOLUTE: 0.2 THOU/MM3 (ref 0–0.4)
ERYTHROCYTE [DISTWIDTH] IN BLOOD BY AUTOMATED COUNT: 15.3 % (ref 11.5–14.5)
HCT VFR BLD AUTO: 44.2 % (ref 37–47)
HGB BLD-MCNC: 14.2 GM/DL (ref 12–16)
IMM GRANULOCYTES # BLD AUTO: 0.02 THOU/MM3 (ref 0–0.07)
IMM GRANULOCYTES NFR BLD AUTO: 0.3 %
LYMPHOCYTES ABSOLUTE: 1.6 THOU/MM3 (ref 1–4.8)
LYMPHOCYTES NFR BLD AUTO: 21.2 %
MCH RBC QN AUTO: 30.3 PG (ref 26–33)
MCHC RBC AUTO-ENTMCNC: 32.1 GM/DL (ref 32.2–35.5)
MCV RBC AUTO: 94.2 FL (ref 81–99)
MONOCYTES ABSOLUTE: 0.6 THOU/MM3 (ref 0.4–1.3)
MONOCYTES NFR BLD AUTO: 7.5 %
NEUTROPHILS ABSOLUTE: 5 THOU/MM3 (ref 1.8–7.7)
NEUTROPHILS NFR BLD AUTO: 67.2 %
NRBC BLD AUTO-RTO: 0 /100 WBC
PLATELET # BLD AUTO: 276 THOU/MM3 (ref 130–400)
PMV BLD AUTO: 10.6 FL (ref 9.4–12.4)
RBC # BLD AUTO: 4.69 MILL/MM3 (ref 4.2–5.4)
WBC # BLD AUTO: 7.5 THOU/MM3 (ref 4.8–10.8)

## 2024-06-11 LAB
25(OH)D3 SERPL-MCNC: 38 NG/ML (ref 30–100)
ALBUMIN SERPL BCG-MCNC: 3.5 G/DL (ref 3.5–5.1)
ALP SERPL-CCNC: 76 U/L (ref 38–126)
ALT SERPL W/O P-5'-P-CCNC: 17 U/L (ref 11–66)
ANION GAP SERPL CALC-SCNC: 12 MEQ/L (ref 8–16)
AST SERPL-CCNC: 28 U/L (ref 5–40)
BILIRUB CONJ SERPL-MCNC: < 0.2 MG/DL (ref 0–0.3)
BILIRUB SERPL-MCNC: 0.5 MG/DL (ref 0.3–1.2)
BUN SERPL-MCNC: 17 MG/DL (ref 7–22)
C-REACTIVE PROTEIN, HIGH SENSITIVITY: 43.1 MG/L (ref 0–3)
CHLORIDE SERPL-SCNC: 103 MEQ/L (ref 98–111)
CHOLEST SERPL-MCNC: 143 MG/DL (ref 100–199)
CO2 SERPL-SCNC: 25 MEQ/L (ref 23–33)
CREAT SERPL-MCNC: 0.9 MG/DL (ref 0.4–1.2)
DEPRECATED MEAN GLUCOSE BLD GHB EST-ACNC: 153 MG/DL (ref 70–126)
DHEA-S SERPL-MCNC: 368 UG/DL (ref 9.4–246)
ERYTHROCYTE [SEDIMENTATION RATE] IN BLOOD BY WESTERGREN METHOD: 45 MM/HR (ref 0–20)
GFR SERPL CREATININE-BSD FRML MDRD: 67 ML/MIN/1.73M2
GLUCOSE SERPL-MCNC: 129 MG/DL (ref 70–108)
HBA1C MFR BLD HPLC: 7.1 % (ref 4.4–6.4)
HDLC SERPL-MCNC: 57 MG/DL
HOMOCYSTEINE: 10.8 UMOL/L (ref 0–15)
LDLC SERPL CALC-MCNC: 76 MG/DL
LIPASE SERPL-CCNC: 18.5 U/L (ref 5.6–51.3)
MAGNESIUM SERPL-MCNC: 2.1 MG/DL (ref 1.6–2.4)
POTASSIUM SERPL-SCNC: 4.3 MEQ/L (ref 3.5–5.2)
PROT SERPL-MCNC: 8 G/DL (ref 6.1–8)
PTH-INTACT SERPL-MCNC: 22.9 PG/ML (ref 15–65)
RHEUMATOID FACT SERPL-ACNC: 10 IU/ML (ref 0–13)
SODIUM SERPL-SCNC: 140 MEQ/L (ref 135–145)
T3 TOTAL: 117 NG/DL (ref 80–200)
T4 SERPL-MCNC: 6.7 UG/DL (ref 4.5–11.7)
TRIGL SERPL-MCNC: 50 MG/DL (ref 0–199)
TSH SERPL DL<=0.005 MIU/L-ACNC: 1.59 UIU/ML (ref 0.4–4.2)

## 2024-06-12 LAB — GLIADIN IGG SER IA-ACNC: < 0.4 U/ML

## 2024-06-13 LAB
THYROGLOBULIN: NORMAL
THYROPEROXIDASE AB SERPL IA-ACNC: < 4 IU/ML (ref 0–25)

## 2024-06-16 SDOH — ECONOMIC STABILITY: FOOD INSECURITY: WITHIN THE PAST 12 MONTHS, YOU WORRIED THAT YOUR FOOD WOULD RUN OUT BEFORE YOU GOT MONEY TO BUY MORE.: NEVER TRUE

## 2024-06-16 SDOH — ECONOMIC STABILITY: INCOME INSECURITY: HOW HARD IS IT FOR YOU TO PAY FOR THE VERY BASICS LIKE FOOD, HOUSING, MEDICAL CARE, AND HEATING?: NOT HARD AT ALL

## 2024-06-16 SDOH — ECONOMIC STABILITY: FOOD INSECURITY: WITHIN THE PAST 12 MONTHS, THE FOOD YOU BOUGHT JUST DIDN'T LAST AND YOU DIDN'T HAVE MONEY TO GET MORE.: NEVER TRUE

## 2024-06-16 SDOH — ECONOMIC STABILITY: TRANSPORTATION INSECURITY
IN THE PAST 12 MONTHS, HAS LACK OF TRANSPORTATION KEPT YOU FROM MEETINGS, WORK, OR FROM GETTING THINGS NEEDED FOR DAILY LIVING?: NO

## 2024-06-16 SDOH — ECONOMIC STABILITY: HOUSING INSECURITY
IN THE LAST 12 MONTHS, WAS THERE A TIME WHEN YOU DID NOT HAVE A STEADY PLACE TO SLEEP OR SLEPT IN A SHELTER (INCLUDING NOW)?: NO

## 2024-06-16 ASSESSMENT — PATIENT HEALTH QUESTIONNAIRE - PHQ9
SUM OF ALL RESPONSES TO PHQ9 QUESTIONS 1 & 2: 0
1. LITTLE INTEREST OR PLEASURE IN DOING THINGS: NOT AT ALL
SUM OF ALL RESPONSES TO PHQ QUESTIONS 1-9: 0
SUM OF ALL RESPONSES TO PHQ QUESTIONS 1-9: 0
2. FEELING DOWN, DEPRESSED OR HOPELESS: NOT AT ALL
1. LITTLE INTEREST OR PLEASURE IN DOING THINGS: NOT AT ALL
2. FEELING DOWN, DEPRESSED OR HOPELESS: NOT AT ALL
SUM OF ALL RESPONSES TO PHQ QUESTIONS 1-9: 0
SUM OF ALL RESPONSES TO PHQ QUESTIONS 1-9: 0
SUM OF ALL RESPONSES TO PHQ9 QUESTIONS 1 & 2: 0

## 2024-06-17 ENCOUNTER — OFFICE VISIT (OUTPATIENT)
Dept: FAMILY MEDICINE CLINIC | Age: 74
End: 2024-06-17
Payer: MEDICARE

## 2024-06-17 VITALS
SYSTOLIC BLOOD PRESSURE: 134 MMHG | DIASTOLIC BLOOD PRESSURE: 82 MMHG | HEART RATE: 83 BPM | RESPIRATION RATE: 12 BRPM | WEIGHT: 293 LBS | OXYGEN SATURATION: 98 % | BODY MASS INDEX: 50.02 KG/M2 | HEIGHT: 64 IN | TEMPERATURE: 97.9 F

## 2024-06-17 DIAGNOSIS — E11.22 TYPE 2 DIABETES MELLITUS WITH STAGE 3 CHRONIC KIDNEY DISEASE, WITHOUT LONG-TERM CURRENT USE OF INSULIN, UNSPECIFIED WHETHER STAGE 3A OR 3B CKD (HCC): ICD-10-CM

## 2024-06-17 DIAGNOSIS — I10 ESSENTIAL HYPERTENSION: ICD-10-CM

## 2024-06-17 DIAGNOSIS — R79.83 HOMOCYSTEINEMIA: ICD-10-CM

## 2024-06-17 DIAGNOSIS — K21.9 GASTROESOPHAGEAL REFLUX DISEASE, UNSPECIFIED WHETHER ESOPHAGITIS PRESENT: ICD-10-CM

## 2024-06-17 DIAGNOSIS — N18.30 TYPE 2 DIABETES MELLITUS WITH STAGE 3 CHRONIC KIDNEY DISEASE, WITHOUT LONG-TERM CURRENT USE OF INSULIN, UNSPECIFIED WHETHER STAGE 3A OR 3B CKD (HCC): ICD-10-CM

## 2024-06-17 DIAGNOSIS — K90.89 OTHER SPECIFIED INTESTINAL MALABSORPTION: ICD-10-CM

## 2024-06-17 DIAGNOSIS — I48.0 PAROXYSMAL ATRIAL FIBRILLATION (HCC): Primary | ICD-10-CM

## 2024-06-17 LAB
DHEA SERPL-MCNC: 3.44 NG/ML (ref 0.63–4.7)
TESTOSTERONE, FREE W SHGB, FEMALES/CHILDREN: NORMAL

## 2024-06-17 PROCEDURE — 3079F DIAST BP 80-89 MM HG: CPT | Performed by: FAMILY MEDICINE

## 2024-06-17 PROCEDURE — 1123F ACP DISCUSS/DSCN MKR DOCD: CPT | Performed by: FAMILY MEDICINE

## 2024-06-17 PROCEDURE — 99215 OFFICE O/P EST HI 40 MIN: CPT | Performed by: FAMILY MEDICINE

## 2024-06-17 PROCEDURE — 3075F SYST BP GE 130 - 139MM HG: CPT | Performed by: FAMILY MEDICINE

## 2024-06-17 PROCEDURE — 3051F HG A1C>EQUAL 7.0%<8.0%: CPT | Performed by: FAMILY MEDICINE

## 2024-06-17 NOTE — PROGRESS NOTES
[facility]  [address]  [facility] [address]  Dept: [phone]  Dept Fax: [phone]  Loc: [phone]      Leah Bautista is a 74 y.o. White female. Leah  presents to the [facility] clinic today for   Chief Complaint   Patient presents with    Discuss Labs    Edema     Lymphedema        prediabetes     On a special diet using boost ensure, raised A1c, superbeets, humann tumeric chews    Weight Management     Not going down   , and;   1. Paroxysmal atrial fibrillation (HCC)    2. Type 2 diabetes mellitus with stage 3 chronic kidney disease, without long-term current use of insulin, unspecified whether stage 3a or 3b CKD (HCC)    3. Essential hypertension    4. Homocysteinemia    5. Other specified intestinal malabsorption    6. Gastroesophageal reflux disease, unspecified whether esophagitis present          I have reviewed Leah Bautista medical, surgical and other pertinent history in detail, and have updated medication and allergy information in the computerized patient record.     Clinical Care Team:     -Referring Provider for today's consult: self  -Primary Care Provider: Carlos Duncan,     Medical/Surgical History:   She  has a past medical history of A-fib (HCC), Atrial fibrillation (HCC), CAD (coronary artery disease), Carpal tunnel syndrome, Diabetes mellitus (HCC), GERD (gastroesophageal reflux disease), Headache(784.0), HTN (hypertension), Hyperlipidemia, Hypothyroidism, Osteoarthritis, Pacemaker, Sleep apnea, Tendonitis of both wrists, and Toenail fungus.  Her  has a past surgical history that includes Carpal tunnel release; hernia repair;  section;  section;  section; Foot surgery (Left, 2018); joint replacement (Left, 2010); joint replacement (Right, 2016); Colonoscopy (N/A, 10/08/2021); Upper gastrointestinal endoscopy (N/A, 10/08/2021); Upper

## 2024-06-24 ENCOUNTER — OFFICE VISIT (OUTPATIENT)
Dept: INTERNAL MEDICINE CLINIC | Age: 74
End: 2024-06-24
Payer: MEDICARE

## 2024-06-24 VITALS
WEIGHT: 293 LBS | TEMPERATURE: 98 F | HEART RATE: 86 BPM | SYSTOLIC BLOOD PRESSURE: 132 MMHG | DIASTOLIC BLOOD PRESSURE: 82 MMHG | BODY MASS INDEX: 52.7 KG/M2

## 2024-06-24 DIAGNOSIS — E11.9 TYPE 2 DIABETES MELLITUS WITHOUT COMPLICATION, WITHOUT LONG-TERM CURRENT USE OF INSULIN (HCC): Primary | ICD-10-CM

## 2024-06-24 PROCEDURE — G0108 DIAB MANAGE TRN  PER INDIV: HCPCS | Performed by: PHARMACIST

## 2024-06-24 NOTE — PROGRESS NOTES
willing to participate in this plan of care and verbalized understanding of all instructions provided. Teach back used to verify comprehension.      Follow-up: 1 month PCP, 2 month dietician, 3 month Dr. Evans, 4 month DM clinic    Total time involved in direct patient education: 60 minutes.   Individual Education appointment justified due to: Class Availability    For Pharmacy Admin Tracking Only    Program: Medical Group  CPA in place:  No  Time Spent (min): 60    Adwoa Yen PharmD, BCPS  6/24/2024  12:04 PM

## 2024-06-24 NOTE — PATIENT INSTRUCTIONS
Set a goal to check BG at least 3 times a week, ideally daily, at varying times  -Fasting morning, before lunch, before supper, 2 hours after supper  -Blood Sugar Goals:  -Fasting AM:   -Before lunch/dinner:   -2 hours after eating/at bedtime: 100-180  2. Continue to increase exercising  3. Increase water intake throughout the day.

## 2024-06-26 DIAGNOSIS — E03.9 HYPOTHYROIDISM, UNSPECIFIED TYPE: ICD-10-CM

## 2024-06-26 RX ORDER — LEVOTHYROXINE SODIUM 0.1 MG/1
TABLET ORAL
Qty: 90 TABLET | Refills: 0 | Status: SHIPPED | OUTPATIENT
Start: 2024-06-26

## 2024-06-26 RX ORDER — LIOTHYRONINE SODIUM 5 UG/1
TABLET ORAL
Qty: 90 TABLET | Refills: 3 | Status: SHIPPED | OUTPATIENT
Start: 2024-06-26

## 2024-07-03 ENCOUNTER — HOSPITAL ENCOUNTER (OUTPATIENT)
Dept: PHARMACY | Age: 74
Setting detail: THERAPIES SERIES
Discharge: HOME | End: 2024-07-03
Payer: MEDICARE

## 2024-07-03 DIAGNOSIS — Z79.01 ANTICOAGULATED ON COUMADIN: Primary | ICD-10-CM

## 2024-07-03 DIAGNOSIS — Z51.81 ENCOUNTER FOR THERAPEUTIC DRUG MONITORING: ICD-10-CM

## 2024-07-03 LAB — POC INR: 2.6 (ref 0.8–1.2)

## 2024-07-03 PROCEDURE — 36416 COLLJ CAPILLARY BLOOD SPEC: CPT

## 2024-07-03 PROCEDURE — 85610 PROTHROMBIN TIME: CPT

## 2024-07-03 PROCEDURE — 99211 OFF/OP EST MAY X REQ PHY/QHP: CPT

## 2024-07-03 NOTE — PROGRESS NOTES
Medication Management Clinic  [facility]  Anticoagulation Clinic  [phone] (phone)  [phone] (fax)    Ms. Leah Bautista is a 74 y.o.  female with history of atrial fib., per Dr. Duncan's referral, who presents today for Warfarin monitoring and adjustment (4 week visit).    Patient verifies current dosing regimen and tablet strength.  No missed or extra doses.  Patient denies bleeding/bruising/SOB. Has usual swelling of legs - [facility] (wrapped or compression hose).  No blood in urine or stool.  No dietary changes, except for small amount of cranberry juice last week - knows she shouldn't have it.  No changes in medication/OTC agents/herbals.  No change in alcohol use or tobacco use.  Change in activity level: will be starting exercise class 7/9. Had more stress lately.  Patient denies falls.  No vomiting/diarrhea or acute illness.   No procedures scheduled in the future at this time.    Assessment:   Lab Results   Component Value Date    INR 2.60 (H) 07/03/2024    INR 2.50 (H) 06/05/2024    INR 2.40 (H) 05/16/2024     INR therapeutic - goal 2-3.  Recent Labs     07/03/24  1306   INR 2.60*        Plan:  POCT INR performed/result reviewed.  Continue Coumadin 3 mg daily PO.  Recheck INR in 4 week(s).  Patient reminded to call the Anticoagulation Clinic with any signs or symptoms of bleeding or with any medication changes.  Patient given instructions utilizing the teach back method.       After visit summary printed and reviewed with patient.      Discharged via transport chair in no apparent distress, with  and 2 grandchildren.     For Pharmacy Admin Tracking Only    Time Spent (min): 20

## 2024-07-29 ENCOUNTER — OFFICE VISIT (OUTPATIENT)
Dept: FAMILY MEDICINE CLINIC | Age: 74
End: 2024-07-29
Payer: MEDICARE

## 2024-07-29 ENCOUNTER — HOSPITAL ENCOUNTER (OUTPATIENT)
Dept: PHARMACY | Age: 74
Setting detail: THERAPIES SERIES
Discharge: HOME | End: 2024-07-29
Payer: MEDICARE

## 2024-07-29 VITALS
WEIGHT: 293 LBS | HEIGHT: 64 IN | RESPIRATION RATE: 16 BRPM | DIASTOLIC BLOOD PRESSURE: 70 MMHG | SYSTOLIC BLOOD PRESSURE: 110 MMHG | HEART RATE: 64 BPM | BODY MASS INDEX: 50.02 KG/M2

## 2024-07-29 DIAGNOSIS — I10 ESSENTIAL HYPERTENSION: ICD-10-CM

## 2024-07-29 DIAGNOSIS — I48.0 PAROXYSMAL ATRIAL FIBRILLATION (HCC): ICD-10-CM

## 2024-07-29 DIAGNOSIS — E03.9 HYPOTHYROIDISM, UNSPECIFIED TYPE: ICD-10-CM

## 2024-07-29 DIAGNOSIS — Z51.81 ENCOUNTER FOR THERAPEUTIC DRUG MONITORING: ICD-10-CM

## 2024-07-29 DIAGNOSIS — R79.83 HOMOCYSTEINEMIA: ICD-10-CM

## 2024-07-29 DIAGNOSIS — K21.9 GASTROESOPHAGEAL REFLUX DISEASE, UNSPECIFIED WHETHER ESOPHAGITIS PRESENT: ICD-10-CM

## 2024-07-29 DIAGNOSIS — Z79.01 ANTICOAGULATED ON COUMADIN: Primary | ICD-10-CM

## 2024-07-29 DIAGNOSIS — Z00.00 MEDICARE ANNUAL WELLNESS VISIT, SUBSEQUENT: Primary | ICD-10-CM

## 2024-07-29 DIAGNOSIS — N18.30 TYPE 2 DIABETES MELLITUS WITH STAGE 3 CHRONIC KIDNEY DISEASE, WITHOUT LONG-TERM CURRENT USE OF INSULIN, UNSPECIFIED WHETHER STAGE 3A OR 3B CKD (HCC): ICD-10-CM

## 2024-07-29 DIAGNOSIS — E11.22 TYPE 2 DIABETES MELLITUS WITH STAGE 3 CHRONIC KIDNEY DISEASE, WITHOUT LONG-TERM CURRENT USE OF INSULIN, UNSPECIFIED WHETHER STAGE 3A OR 3B CKD (HCC): ICD-10-CM

## 2024-07-29 DIAGNOSIS — N18.30 STAGE 3 CHRONIC KIDNEY DISEASE, UNSPECIFIED WHETHER STAGE 3A OR 3B CKD (HCC): ICD-10-CM

## 2024-07-29 LAB — POC INR: 3.4 (ref 0.8–1.2)

## 2024-07-29 PROCEDURE — 1123F ACP DISCUSS/DSCN MKR DOCD: CPT | Performed by: FAMILY MEDICINE

## 2024-07-29 PROCEDURE — 99214 OFFICE O/P EST MOD 30 MIN: CPT | Performed by: FAMILY MEDICINE

## 2024-07-29 PROCEDURE — 3051F HG A1C>EQUAL 7.0%<8.0%: CPT | Performed by: FAMILY MEDICINE

## 2024-07-29 PROCEDURE — 85610 PROTHROMBIN TIME: CPT | Performed by: PHARMACIST

## 2024-07-29 PROCEDURE — 3074F SYST BP LT 130 MM HG: CPT | Performed by: FAMILY MEDICINE

## 2024-07-29 PROCEDURE — 99212 OFFICE O/P EST SF 10 MIN: CPT | Performed by: PHARMACIST

## 2024-07-29 PROCEDURE — 3078F DIAST BP <80 MM HG: CPT | Performed by: FAMILY MEDICINE

## 2024-07-29 PROCEDURE — 36416 COLLJ CAPILLARY BLOOD SPEC: CPT | Performed by: PHARMACIST

## 2024-07-29 PROCEDURE — G0439 PPPS, SUBSEQ VISIT: HCPCS | Performed by: FAMILY MEDICINE

## 2024-07-29 ASSESSMENT — PATIENT HEALTH QUESTIONNAIRE - PHQ9
SUM OF ALL RESPONSES TO PHQ QUESTIONS 1-9: 0
SUM OF ALL RESPONSES TO PHQ9 QUESTIONS 1 & 2: 0
1. LITTLE INTEREST OR PLEASURE IN DOING THINGS: NOT AT ALL
2. FEELING DOWN, DEPRESSED OR HOPELESS: NOT AT ALL

## 2024-07-29 ASSESSMENT — ENCOUNTER SYMPTOMS
GASTROINTESTINAL NEGATIVE: 1
RESPIRATORY NEGATIVE: 1

## 2024-07-29 NOTE — PROGRESS NOTES
Medication Management Clinic  [facility]  Anticoagulation Clinic  [phone] (phone)  [phone] (fax)    Ms. Leah Bautista is a 74 y.o.  female with history of Afib who presents today for anticoagulation monitoring and adjustment.    Patient verifies current dosing regimen and tablet strength.  No missed or extra doses.  Patient denies s/s bleeding/bruising/swelling/SOB  No blood in urine or stool.  Patient stopped drinking Ensure about two weeks ago. She has no plans to restart.   No changes in medication/Herbals. Patient has not been using Vasculera. She has only been taking Vitamin D 2,000 units daily.  No change in alcohol use or tobacco use.  Patient has been exercising twice weekly.   Patient denies falls.  No vomiting/diarrhea or acute illness.   No Procedures scheduled in the future at this time.    Assessment:   Lab Results   Component Value Date    INR 3.40 (H) 07/29/2024    INR 2.60 (H) 07/03/2024    INR 2.50 (H) 06/05/2024     INR supratherapeutic   Recent Labs     07/29/24  1011   INR 3.40*     Patient is supratherapeutic today, which is likely due to decreased Vitamin K intake from stopping Ensure. Since she has no plans to restart it, patient may benefit from a dose reduction.    Plan:  HOLD Coumadin x 1 dose today 7/29/24 then decrease Coumadin from 3 mg daily to Coumadin 1.5 mg MF and 3 mg TuWThSaSu (14.3% decrease).  Recheck INR in 2 week(s).  Patient reminded to call the Anticoagulation Clinic with signs or symptoms of bleeding or with any medication changes.  Patient given instructions utilizing the teach back method.    After visit summary printed and reviewed with patient.      Discharged ambulatory in no apparent distress.    For Pharmacy Admin Tracking Only    Intervention Detail: Dose Adjustment: 1, reason: Therapy Optimization  Total # of Interventions Recommended: 1  Total # of Interventions Accepted: 1  Time Spent (min): 20    Calixto Her, PharmD,

## 2024-07-29 NOTE — PATIENT INSTRUCTIONS
UVB radiation with an SPF of 30 or greater. Reapply every 2 to 3 hours or after sweating, drying off with a towel, or swimming.  Always wear a seat belt when traveling in a car. Always wear a helmet when riding a bicycle or motorcycle.    [facility]  [facility] [place] [address]  [phone]

## 2024-07-29 NOTE — PROGRESS NOTES
2024    Leah Bautista (:  1950) is a 74 y.o. female, here for a preventive medicine evaluation.  Chief Complaint   Patient presents with    Medicare AWV     AWV.      Continues to follow closely with Dr. Tijerina, several labs since last seen in this office.    Elevated A1C.  Treating with dietary changes.  She does seen the diabetic center.  Lab Results   Component Value Date    LABA1C 7.1 (H) 06/10/2024    LABA1C 6.1 2023    LABA1C 6.2 (H) 10/24/2023     Lab Results   Component Value Date    MALBCR 9 2021    CREATININE 0.9 06/10/2024     BP Readings from Last 3 Encounters:   24 110/70   24 132/82   24 134/82     Wt Readings from Last 3 Encounters:   24 (!) 137.2 kg (302 lb 6.4 oz)   24 (!) 139.3 kg (307 lb)   24 (!) 137 kg (302 lb)         Subjective   Patient Active Problem List   Diagnosis    Carpal tunnel syndrome    Headache    Other and unspecified nonspecific immunological findings    Pain in joint, lower leg    Abnormal weight gain    Toenail fungus    Osteoarthritis    Hypothyroidism    Nonspecific immunological findings    Gastroesophageal reflux disease    Positive serological test result    Knee pain    Weight gain    Subacute maxillary sinusitis    Homocysteinemia    Lipids abnormal    Vitamin D deficiency    Abnormal blood level of uric acid    Left wrist pain    Other intestinal malabsorption    BMI 45.0-49.9, adult (HCC)    Pain of right hip joint    Essential hypertension    New onset atrial fibrillation (HCC)    Anticoagulated on Coumadin    Sleep apnea    Coronary atherosclerosis    Pacemaker    Encounter for therapeutic drug monitoring    Type 2 diabetes mellitus    Chronic renal disease, stage III (HCC) [766407]    Type 2 diabetes mellitus with chronic kidney disease    Abnormal nuclear stress test    Chest pain due to CAD (HCC)    Contact with and (suspected) exposure to unspecified communicable disease    Osteoarthritis of

## 2024-08-08 DIAGNOSIS — M25.562 ARTHRALGIA OF LEFT LOWER LEG: ICD-10-CM

## 2024-08-08 RX ORDER — TIZANIDINE 2 MG/1
TABLET ORAL
Qty: 30 TABLET | Refills: 1 | Status: SHIPPED | OUTPATIENT
Start: 2024-08-08

## 2024-08-12 ENCOUNTER — ANTI-COAG VISIT (OUTPATIENT)
Age: 74
End: 2024-08-12
Payer: MEDICARE

## 2024-08-12 DIAGNOSIS — Z51.81 ENCOUNTER FOR THERAPEUTIC DRUG MONITORING: ICD-10-CM

## 2024-08-12 DIAGNOSIS — Z79.01 ANTICOAGULATED ON COUMADIN: Primary | ICD-10-CM

## 2024-08-12 LAB — POC INR: 1.7 (ref 0.8–1.2)

## 2024-08-12 PROCEDURE — 85610 PROTHROMBIN TIME: CPT | Performed by: PHARMACIST

## 2024-08-12 PROCEDURE — 99212 OFFICE O/P EST SF 10 MIN: CPT | Performed by: PHARMACIST

## 2024-08-12 NOTE — PROGRESS NOTES
Medication Management Clinic  [facility]  Anticoagulation Clinic  [phone] (phone)  [phone] (fax)    Ms. Leah Bautista is a 74 y.o.  female with history of Afib who presents today for anticoagulation monitoring and adjustment.    Patient verifies current dosing regimen and tablet strength.  No missed or extra doses.  Patient denies s/s bleeding/bruising/swelling/SOB  No blood in urine or stool.  No dietary changes.-patient reports she has NOT resumed ensure  No changes in medication/OTC agents/Herbals.  --PT states that she believes she will be starting a GLP-1 RA sometime in the near future; may need to monitor INR for weight fluctuations  No change in alcohol use or tobacco use.  No change in activity level.  Patient denies falls.  No vomiting/diarrhea or acute illness.   No Procedures scheduled in the future at this time.    Assessment:   Lab Results   Component Value Date    INR 1.70 (H) 08/12/2024    INR 3.40 (H) 07/29/2024    INR 2.60 (H) 07/03/2024     INR supratherapeutic   Recent Labs     08/12/24  0944   INR 1.70*     Patient interview completed and discussed with pharmacist by Venancio Swanson, Pharmacy Intern.    Plan:  Coumadin 3 mg x 1 then increase Coumadin 1.5 mg M, 3 mg TWThFSS.  Recheck INR in 2 week(s).   Patient reminded to call the Anticoagulation Clinic with signs or symptoms of bleeding or with any medication changes. Patient given instructions utilizing the teach back method.      After visit summary printed and reviewed with patient.      Discharged ambulatory in no apparent distress.    For Pharmacy Admin Tracking Only    Intervention Detail: Dose Adjustment: 1, reason: Therapy Optimization  Total # of Interventions Recommended: 1  Total # of Interventions Accepted: 1  Time Spent (min): 20    Shilpi Whiting, DelmarD, BCPS  8/12/2024  10:22 AM

## 2024-08-13 NOTE — PATIENT INSTRUCTIONS
Your Provider for Today: Dr. Sanabria  Your nurses for today: Christelle and Fifi    You may receive a survey regarding the care you received during your visit.  Your input is valuable to us.  We encourage you to complete and return your survey.  We hope you will choose us in the future for your healthcare needs.

## 2024-08-14 ENCOUNTER — OFFICE VISIT (OUTPATIENT)
Dept: CARDIOLOGY CLINIC | Age: 74
End: 2024-08-14
Payer: MEDICARE

## 2024-08-14 VITALS
SYSTOLIC BLOOD PRESSURE: 112 MMHG | DIASTOLIC BLOOD PRESSURE: 74 MMHG | HEART RATE: 63 BPM | BODY MASS INDEX: 51.91 KG/M2 | HEIGHT: 64 IN

## 2024-08-14 DIAGNOSIS — I25.119 CHEST PAIN DUE TO CAD (HCC): ICD-10-CM

## 2024-08-14 DIAGNOSIS — I48.0 PAF (PAROXYSMAL ATRIAL FIBRILLATION) (HCC): Primary | ICD-10-CM

## 2024-08-14 PROCEDURE — 1123F ACP DISCUSS/DSCN MKR DOCD: CPT | Performed by: INTERNAL MEDICINE

## 2024-08-14 PROCEDURE — 93000 ELECTROCARDIOGRAM COMPLETE: CPT | Performed by: INTERNAL MEDICINE

## 2024-08-14 PROCEDURE — 3074F SYST BP LT 130 MM HG: CPT | Performed by: INTERNAL MEDICINE

## 2024-08-14 PROCEDURE — 3078F DIAST BP <80 MM HG: CPT | Performed by: INTERNAL MEDICINE

## 2024-08-14 PROCEDURE — 99214 OFFICE O/P EST MOD 30 MIN: CPT | Performed by: INTERNAL MEDICINE

## 2024-08-14 NOTE — PROGRESS NOTES
Reviewed:      I haveindividually reviewed the below cardiac tests    EKG:    ECHO: No results found for this or any previous visit.      STRESS:    CATH:    Assessment/Plan       Diagnosis Orders   1. PAF (paroxysmal atrial fibrillation) ([facility])  EKG 12 Lead      2. Chest pain due to CAD (HCC)  EKG 12 Lead        PAF on flecanide and coumadin  PPM, pacemaker is being followed up at [facility]  BMI 52    No anginal or heart failure symptoms  On CPAP  EKG is SR 1st degree AVB  EF 55%  > 4 Mets  Has hip issues  Advised patient to call office or seek immediate medical attention if there is any new onset of  any chest pain, sob, palpitations, lightheadedness, dizziness, orthopnea, PND or pedal edema.   All medication side effects were discussed in details.    Thank youfor allowing me to participate in the care of this patient.   Please do not hesitate to contact me for any further questions.     Return in about 1 year (around 8/14/2025), or if symptoms worsen or fail to improve, for Review testing, Regular follow up.       Electronically signed by Mitchell Sanabria MD [facility]  8/14/2024 at 1:59 PM EDT

## 2024-08-27 ENCOUNTER — ANTI-COAG VISIT (OUTPATIENT)
Age: 74
End: 2024-08-27
Payer: MEDICARE

## 2024-08-27 ENCOUNTER — OFFICE VISIT (OUTPATIENT)
Dept: INTERNAL MEDICINE CLINIC | Age: 74
End: 2024-08-27
Payer: MEDICARE

## 2024-08-27 VITALS — HEIGHT: 64 IN | BODY MASS INDEX: 50.02 KG/M2 | WEIGHT: 293 LBS

## 2024-08-27 DIAGNOSIS — Z79.01 ANTICOAGULATED ON COUMADIN: Primary | ICD-10-CM

## 2024-08-27 DIAGNOSIS — E11.9 TYPE 2 DIABETES MELLITUS WITHOUT COMPLICATION, WITHOUT LONG-TERM CURRENT USE OF INSULIN (HCC): Primary | ICD-10-CM

## 2024-08-27 DIAGNOSIS — Z51.81 ENCOUNTER FOR THERAPEUTIC DRUG MONITORING: ICD-10-CM

## 2024-08-27 LAB — POC INR: 2.4 (ref 0.8–1.2)

## 2024-08-27 PROCEDURE — 85610 PROTHROMBIN TIME: CPT | Performed by: PHARMACIST

## 2024-08-27 PROCEDURE — NBSRV NON-BILLABLE SERVICE: Performed by: DIETITIAN, REGISTERED

## 2024-08-27 PROCEDURE — 97803 MED NUTRITION INDIV SUBSEQ: CPT | Performed by: DIETITIAN, REGISTERED

## 2024-08-27 PROCEDURE — 99212 OFFICE O/P EST SF 10 MIN: CPT | Performed by: PHARMACIST

## 2024-08-27 RX ORDER — WARFARIN SODIUM 3 MG/1
TABLET ORAL
Qty: 90 TABLET | Refills: 3 | Status: SHIPPED | OUTPATIENT
Start: 2024-08-27

## 2024-08-27 RX ORDER — TIRZEPATIDE 2.5 MG/.5ML
2.5 INJECTION, SOLUTION SUBCUTANEOUS WEEKLY
COMMUNITY

## 2024-08-27 NOTE — PROGRESS NOTES
[facility]  [facility]  [address] [place] [address]  [phone] (phone)  [phone] (fax)    Patient Name: Leah Bautista. Date of Birth: [id number]. MRN: [id number]      Assessment: Patient is a 74 y.o. female seen for follow-up MNT visit for Type 2 DB.     -Nutritionally relevant labs:   Lab Results   Component Value Date/Time    LABA1C 7.1 (H) 06/10/2024 11:24 AM    LABA1C 6.1 11/06/2023 10:25 AM    LABA1C 6.2 (H) 10/24/2023 02:02 PM    LABA1C 6.3 06/05/2023 12:46 PM    GLUCOSE 129 (H) 06/10/2024 11:24 AM    GLUCOSE 118 (H) 11/06/2023 10:25 AM    CHOL 143 06/10/2024 11:24 AM    HDL 57 06/10/2024 11:24 AM    TRIG 50 06/10/2024 11:24 AM     DB med:  Mounjaro 2.5 mg weekly.  Started Friday of last week 8/23/24.  She follows with her prescribing provider - vein provider in 3 weeks who will increase the Mounjaro as tolerated.  -Blood sugar trends: 5 FBS readings. 121, 121, 157, 140, 131  Pt states she feels the Mounjaro is working already in curbing her cravings and thoughts of food.    Reviewed her food logging from Earlier this year that she completed for the Diabetes Prevention Program (DPP).  -Food recall:   Breakfast: May skip OR has a Protein drink.   Lunch: Fast food meal OR leftovers.   Dinner: Meat/pot/meal OR pizza.   Snacks: Occ will have raw veggies for snacks OR banana.    Pt states she will eat fresh fruit more often then she does vegetables.  Exercise - doing daily \"Legcercise\" using her pedals each evening.  She is also participating in the Movement Program at [facility].  Pt seeing Vein specialist to help her with her legs and lymphedema.    -Main Beverages: Trying to drink more water/day since starting on the Mounjaro.    -Impression of Dietary Intake: on average, 2 meals per day, on average, 2+ fast food meals per week, on average, 1-2 servings fruit per day, on average, 0-1 servings vegetables per day, low fiber, lacking routine

## 2024-08-27 NOTE — PROGRESS NOTES
Medication Management Clinic  [facility]  Anticoagulation Clinic  [phone] (phone)  [phone] (fax)    Ms. Leah Bautista is a 74 y.o.  female with history of Afib who presents today for anticoagulation monitoring and adjustment.    Patient verifies current dosing regimen and tablet strength.  No missed or extra doses.  Patient denies s/s bleeding/bruising/swelling/SOB  No blood in urine or stool.  No dietary changes.  Mounjaro 2.5 mg weekly started 8/23/24   No change in alcohol use or tobacco use.  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  No vomiting/diarrhea or acute illness.   No Procedures scheduled in the future at this time.    Assessment:   Lab Results   Component Value Date    INR 1.70 (H) 08/12/2024    INR 3.40 (H) 07/29/2024    INR 2.60 (H) 07/03/2024     INR therapeutic No results for input(s): \"INR\" in the last 72 hours.    Plan:  Continue Coumadin 1.5 mg M, 3 mg all other days.  Recheck INR in 3 week(s).  Patient reminded to call the Anticoagulation Clinic with any signs or symptoms of bleeding or with any medication changes.  Patient given instructions utilizing the teach back method. Sent Rx to [facility] per patient request     After visit summary printed and reviewed with patient.      Discharged ambulatory in no apparent distress.    Velma Yen PharmD 8/27/2024 3:40 PM    For Pharmacy Admin Tracking Only    Intervention Detail: Refill(s) Provided  Total # of Interventions Recommended: 1  Total # of Interventions Accepted: 1  Time Spent (min): 20

## 2024-08-27 NOTE — PATIENT INSTRUCTIONS
Good job on your \"leg-ercise\" daily!!    Prepare for errand days so not relying on Fast food    Like your veggies idea for snacking even with a little Ranch.    Continue eating fresh fruit each day.    Eat small meals and snacks especially since starting Mounjaro.

## 2024-09-09 ENCOUNTER — LAB (OUTPATIENT)
Dept: LAB | Age: 74
End: 2024-09-09

## 2024-09-09 DIAGNOSIS — R79.83 HOMOCYSTEINEMIA: ICD-10-CM

## 2024-09-09 DIAGNOSIS — I10 ESSENTIAL HYPERTENSION: ICD-10-CM

## 2024-09-09 DIAGNOSIS — K90.89 OTHER SPECIFIED INTESTINAL MALABSORPTION: ICD-10-CM

## 2024-09-09 DIAGNOSIS — K21.9 GASTROESOPHAGEAL REFLUX DISEASE, UNSPECIFIED WHETHER ESOPHAGITIS PRESENT: ICD-10-CM

## 2024-09-09 DIAGNOSIS — I48.0 PAROXYSMAL ATRIAL FIBRILLATION (HCC): ICD-10-CM

## 2024-09-09 DIAGNOSIS — N18.30 TYPE 2 DIABETES MELLITUS WITH STAGE 3 CHRONIC KIDNEY DISEASE, WITHOUT LONG-TERM CURRENT USE OF INSULIN, UNSPECIFIED WHETHER STAGE 3A OR 3B CKD (HCC): ICD-10-CM

## 2024-09-09 DIAGNOSIS — E11.22 TYPE 2 DIABETES MELLITUS WITH STAGE 3 CHRONIC KIDNEY DISEASE, WITHOUT LONG-TERM CURRENT USE OF INSULIN, UNSPECIFIED WHETHER STAGE 3A OR 3B CKD (HCC): ICD-10-CM

## 2024-09-09 LAB
25(OH)D3 SERPL-MCNC: 39 NG/ML (ref 30–100)
CALCIUM SERPL-MCNC: 9.4 MG/DL (ref 8.5–10.5)
CRP SERPL-MCNC: 3.78 MG/DL (ref 0–1)
DEPRECATED MEAN GLUCOSE BLD GHB EST-ACNC: 144 MG/DL (ref 70–126)
ERYTHROCYTE [SEDIMENTATION RATE] IN BLOOD BY WESTERGREN METHOD: 60 MM/HR (ref 0–20)
HBA1C MFR BLD HPLC: 6.8 % (ref 4.4–6.4)
MAGNESIUM SERPL-MCNC: 2 MG/DL (ref 1.6–2.4)
PTH-INTACT SERPL-MCNC: 29.7 PG/ML (ref 15–65)

## 2024-09-17 ENCOUNTER — ANTI-COAG VISIT (OUTPATIENT)
Age: 74
End: 2024-09-17
Payer: MEDICARE

## 2024-09-17 DIAGNOSIS — Z79.01 ANTICOAGULATED ON COUMADIN: Primary | ICD-10-CM

## 2024-09-17 DIAGNOSIS — Z51.81 ENCOUNTER FOR THERAPEUTIC DRUG MONITORING: ICD-10-CM

## 2024-09-17 LAB — POC INR: 2.8 (ref 0.8–1.2)

## 2024-09-17 PROCEDURE — 99211 OFF/OP EST MAY X REQ PHY/QHP: CPT

## 2024-09-17 PROCEDURE — 85610 PROTHROMBIN TIME: CPT

## 2024-10-02 ENCOUNTER — COMMUNITY OUTREACH (OUTPATIENT)
Dept: FAMILY MEDICINE CLINIC | Age: 74
End: 2024-10-02

## 2024-10-11 DIAGNOSIS — E03.9 HYPOTHYROIDISM, UNSPECIFIED TYPE: ICD-10-CM

## 2024-10-11 RX ORDER — LEVOTHYROXINE SODIUM 100 UG/1
TABLET ORAL
Qty: 90 TABLET | Refills: 2 | Status: SHIPPED | OUTPATIENT
Start: 2024-10-11

## 2024-10-11 NOTE — TELEPHONE ENCOUNTER
Pt called office requesting a refill of the Levothyroxine 100mcg to [facility] [place]. If no call back she will check with them after noon today.

## 2024-10-15 ENCOUNTER — ANTI-COAG VISIT (OUTPATIENT)
Age: 74
End: 2024-10-15
Payer: MEDICARE

## 2024-10-15 DIAGNOSIS — Z51.81 ENCOUNTER FOR THERAPEUTIC DRUG MONITORING: ICD-10-CM

## 2024-10-15 DIAGNOSIS — Z79.01 ANTICOAGULATED ON COUMADIN: Primary | ICD-10-CM

## 2024-10-15 LAB — POC INR: 3.9 (ref 0.8–1.2)

## 2024-10-15 PROCEDURE — 85610 PROTHROMBIN TIME: CPT | Performed by: PHARMACIST

## 2024-10-15 PROCEDURE — 99212 OFFICE O/P EST SF 10 MIN: CPT | Performed by: PHARMACIST

## 2024-10-15 NOTE — PROGRESS NOTES
Medication Management Clinic  [facility]  Anticoagulation Clinic  [phone] (phone)  [phone] (fax)    Ms. Leah Bautista is a 74 y.o.  female with history of Afib who presents today for anticoagulation monitoring and adjustment.    Patient verifies current dosing regimen and tablet strength.  No missed or extra doses.  Patient denies s/s bleeding/bruising/swelling/SOB  No blood in urine or stool.  No dietary changes.  No changes in medication/OTC agents/Herbals.  No change in alcohol use or tobacco use.  No change in activity level.  Patient denies falls.  No vomiting/diarrhea or acute illness.   No Procedures scheduled in the future at this time.    Assessment:   Lab Results   Component Value Date    INR 3.90 (H) 10/15/2024    INR 2.80 (H) 09/17/2024    INR 2.40 (H) 08/27/2024     INR supratherapeutic   Recent Labs     10/15/24  1403   INR 3.90*         Plan:  Hold today, then continue Coumadin 1.5mg M and 3mg all other days.  Recheck INR in 2 week(s).  Patient reminded to call the Anticoagulation Clinic with any signs or symptoms of bleeding or with any medication changes.  Patient given instructions utilizing the teach back method.        After visit summary printed and reviewed with patient.      Discharged ambulatory in no apparent distress.      For Pharmacy Admin Tracking Only    Intervention Detail: Dose Adjustment: 1, reason: Therapy De-escalation  Total # of Interventions Recommended: 1  Total # of Interventions Accepted: 1  Time Spent (min): 20

## 2024-10-17 ENCOUNTER — OFFICE VISIT (OUTPATIENT)
Dept: FAMILY MEDICINE CLINIC | Age: 74
End: 2024-10-17

## 2024-10-17 VITALS
WEIGHT: 275.4 LBS | SYSTOLIC BLOOD PRESSURE: 138 MMHG | RESPIRATION RATE: 12 BRPM | OXYGEN SATURATION: 97 % | HEART RATE: 65 BPM | DIASTOLIC BLOOD PRESSURE: 70 MMHG | HEIGHT: 64 IN | TEMPERATURE: 97.5 F | BODY MASS INDEX: 47.02 KG/M2

## 2024-10-17 DIAGNOSIS — M25.562 ARTHRALGIA OF LEFT LOWER LEG: ICD-10-CM

## 2024-10-17 DIAGNOSIS — K90.89 OTHER SPECIFIED INTESTINAL MALABSORPTION: ICD-10-CM

## 2024-10-17 DIAGNOSIS — E03.9 HYPOTHYROIDISM, UNSPECIFIED TYPE: Primary | ICD-10-CM

## 2024-10-17 DIAGNOSIS — N18.30 TYPE 2 DIABETES MELLITUS WITH STAGE 3 CHRONIC KIDNEY DISEASE, WITHOUT LONG-TERM CURRENT USE OF INSULIN, UNSPECIFIED WHETHER STAGE 3A OR 3B CKD (HCC): ICD-10-CM

## 2024-10-17 DIAGNOSIS — I48.0 PAROXYSMAL ATRIAL FIBRILLATION (HCC): ICD-10-CM

## 2024-10-17 DIAGNOSIS — E11.22 TYPE 2 DIABETES MELLITUS WITH STAGE 3 CHRONIC KIDNEY DISEASE, WITHOUT LONG-TERM CURRENT USE OF INSULIN, UNSPECIFIED WHETHER STAGE 3A OR 3B CKD (HCC): ICD-10-CM

## 2024-10-17 DIAGNOSIS — K21.9 GASTROESOPHAGEAL REFLUX DISEASE, UNSPECIFIED WHETHER ESOPHAGITIS PRESENT: ICD-10-CM

## 2024-10-17 DIAGNOSIS — Z79.01 ANTICOAGULATED ON COUMADIN: ICD-10-CM

## 2024-10-17 DIAGNOSIS — I10 ESSENTIAL HYPERTENSION: ICD-10-CM

## 2024-10-17 DIAGNOSIS — M62.838 MUSCLE SPASM OF LEFT LOWER EXTREMITY: ICD-10-CM

## 2024-10-17 DIAGNOSIS — G47.30 SLEEP APNEA, UNSPECIFIED TYPE: ICD-10-CM

## 2024-10-17 DIAGNOSIS — R79.83 HOMOCYSTEINEMIA: ICD-10-CM

## 2024-10-17 RX ORDER — TIRZEPATIDE 5 MG/.5ML
5 INJECTION, SOLUTION SUBCUTANEOUS WEEKLY
COMMUNITY
Start: 2024-09-09

## 2024-10-17 RX ORDER — VITAMIN B COMPLEX
1 CAPSULE ORAL
COMMUNITY

## 2024-10-17 NOTE — PROGRESS NOTES
[facility]  [address]  [facility] [address]  Dept: [phone]  Dept Fax: [phone]  Loc: [phone]      Leah Bautista is a 74 y.o. White female. Leah  presents to the [facility] clinic today for   Chief Complaint   Patient presents with    Discuss Labs   , and;   1. Hypothyroidism, unspecified type    2. Arthralgia of left lower leg    3. Type 2 diabetes mellitus with stage 3 chronic kidney disease, without long-term current use of insulin, unspecified whether stage 3a or 3b CKD (HCC)    4. Paroxysmal atrial fibrillation (HCC)    5. Essential hypertension    6. Homocysteinemia    7. Gastroesophageal reflux disease, unspecified whether esophagitis present    8. Other specified intestinal malabsorption    9. Anticoagulated on Coumadin    10. Sleep apnea, unspecified type    11. Muscle spasm of left lower extremity          I have reviewed Leah Bautista medical, surgical and other pertinent history in detail, and have updated medication and allergy information in the computerized patient record.     Clinical Care Team:     -Referring Provider for today's consult: self  -Primary Care Provider: Carlos Duncan DO    Medical/Surgical History:   She  has a past medical history of A-fib (HCC), Atrial fibrillation (HCC), CAD (coronary artery disease), Carpal tunnel syndrome, Diabetes mellitus (HCC), GERD (gastroesophageal reflux disease), Headache(784.0), HTN (hypertension), Hyperlipidemia, Hypothyroidism, Osteoarthritis, Pacemaker, Sleep apnea, Tendonitis of both wrists, and Toenail fungus.  Her  has a past surgical history that includes Carpal tunnel release; hernia repair;  section;  section;  section; Foot surgery (Left, 2018); joint replacement (Left, 2010); joint replacement (Right, 2016); Colonoscopy (N/A, 10/08/2021); Upper gastrointestinal endoscopy (N/A,

## 2024-10-28 ENCOUNTER — ANTI-COAG VISIT (OUTPATIENT)
Age: 74
End: 2024-10-28
Payer: MEDICARE

## 2024-10-28 ENCOUNTER — OFFICE VISIT (OUTPATIENT)
Dept: INTERNAL MEDICINE CLINIC | Age: 74
End: 2024-10-28
Payer: MEDICARE

## 2024-10-28 VITALS
TEMPERATURE: 97.6 F | DIASTOLIC BLOOD PRESSURE: 82 MMHG | BODY MASS INDEX: 46.98 KG/M2 | WEIGHT: 275.2 LBS | SYSTOLIC BLOOD PRESSURE: 138 MMHG | HEIGHT: 64 IN | HEART RATE: 81 BPM

## 2024-10-28 DIAGNOSIS — Z51.81 ENCOUNTER FOR THERAPEUTIC DRUG MONITORING: ICD-10-CM

## 2024-10-28 DIAGNOSIS — Z79.01 ANTICOAGULATED ON COUMADIN: Primary | ICD-10-CM

## 2024-10-28 DIAGNOSIS — E11.69 TYPE 2 DIABETES MELLITUS WITH OTHER SPECIFIED COMPLICATION, WITHOUT LONG-TERM CURRENT USE OF INSULIN (HCC): Primary | ICD-10-CM

## 2024-10-28 LAB — POC INR: 2.6 (ref 0.8–1.2)

## 2024-10-28 PROCEDURE — G0108 DIAB MANAGE TRN  PER INDIV: HCPCS | Performed by: REGISTERED NURSE

## 2024-10-28 PROCEDURE — 99211 OFF/OP EST MAY X REQ PHY/QHP: CPT

## 2024-10-28 PROCEDURE — 85610 PROTHROMBIN TIME: CPT

## 2024-10-28 PROCEDURE — NBSRV NON-BILLABLE SERVICE: Performed by: REGISTERED NURSE

## 2024-10-28 NOTE — PATIENT INSTRUCTIONS
Stay focused on meal planning    --avoid simple sugars    --try to eat a little something 3 \"meals per day\"    --keep focused on getting your protein 1-2 serving daily  Check your blood sugar at least 3-4 times per week     Fasting:  goal is      2hours after a meal:  under 150  Really try to get 15 minutes of exercise almost every day            -try your Eliptical again for your foot

## 2024-10-28 NOTE — PROGRESS NOTES
[facility] Management Clinic  [facility]  Anticoagulation Clinic  [phone] (phone)  [phone] (fax)    Ms. Leah Bautista is a 74 y.o.  female with history of atrial fib., per Dr. Duncan's referral, who presents today for Warfarin monitoring and adjustment (2 week visit).    Patient verifies current dosing regimen and tablet strength.  No missed (except as ordered last visit) or extra doses.  Patient denies bruising/swelling/SOB. Has been having monthly vaginal bleeding \"for awhile,\" but a little more lately.  Unsure if she's been through menopause. States periods were always very irregular.  Hasn't seen GYN in 10 years.  Advised to let her doctor know of bleeding.  No blood in urine or stool (black from iron supplement).  Dietary changes: had broccoli several days last week (normally wouldn't have), plus has been eating more salads to lose weight. Plans to continue eating this way.  Changes in medication/OTC agents/herbals: Mounjaro will be increasing 11/1 to 7.5 mg, from 5 mg (started approx. 9/9 - has lost 30# since then). Dr. Liang wants her to start new supplements: [employer] brand - Catalyn tablets, Olprime EPA/DHA, SP Complete (chocolate) powder (latter contains alfalfa and [place] sprouts, etc.). Recently saw Dr. Evans, nutritionist - advised to let him know of these. Plans to start these.  No change in alcohol use or tobacco use.  Change in activity level: has not exercised past 2 weeks - left foot hurts. Saw Dr. Liang, podiatrist, 10/24 - injected Cortisone. Also has new orthotic. Sees him again early Dec.  Patient denies falls.  No vomiting/diarrhea or acute illness.   No procedures scheduled in the future at this time.    Assessment:   Lab Results   Component Value Date    INR 2.60 (H) 10/28/2024    INR 3.90 (H) 10/15/2024    INR 2.80 (H) 09/17/2024     INR therapeutic - goal 2-3.  Recent Labs     10/28/24  1410   INR 2.60*        Plan:  POCT INR performed/result

## 2024-10-28 NOTE — PROGRESS NOTES
[facility]  [address] [place] [address]  [phone] (phone)  [phone] (fax)    Patient ID: Leah Bautista 1950  Referring Provider: Dr. Duncan     Patient's name and  were verified.    Subjective:    She presents for a follow-up diabetic visit. She has type 2 diabetes mellitus. She is compliant some of the time.  Assessment:     Lab Results   Component Value Date/Time    LABA1C 6.0 2024 09:39 AM    BUN 16 2024 09:39 AM    CREATININE 1.0 2024 09:39 AM     Vitals:    10/28/24 1615 10/28/24 1746   BP: (!) 140/84 138/82   Site: Left Upper Arm Left Upper Arm   Position: Sitting Sitting   Pulse: 81 81   Temp: 97.6 °F (36.4 °C) 97.6 °F (36.4 °C)   Weight: 124.8 kg (275 lb 3.2 oz) 124.8 kg (275 lb 3.2 oz)   Height: 1.626 m (5' 4\") 1.626 m (5' 4\")     Wt Readings from Last 3 Encounters:   10/28/24 124.8 kg (275 lb 3.2 oz)   10/17/24 124.9 kg (275 lb 6.4 oz)   24 135.9 kg (299 lb 9.6 oz)     Ht Readings from Last 3 Encounters:   10/28/24 1.626 m (5' 4\")   10/17/24 1.626 m (5' 4\")   24 1.626 m (5' 4\")     Est, Glom Filt Rate   Date Value Ref Range Status   06/10/2024 67 >60 ml/min/1.73m2 Final   2023 60 >60 ml/min/1.73m2 Final     Diabetes Pharmacotherapy:  Mounjaro 5mg weekly;                 To begin 7.5mg weekly  Glucose Trends:   Glucose at 4 hrs PPD today resulted at 89mg/dl  Current monitoring regimen: Fingerstick blood tests - 1  times daily  Home blood sugar trends:    -Fasting AM: has not checked for several weeks   -Before lunch:   -Before dinner:   -Bedtime:  Any episodes of hypoglycemia? no   -Treats with na    Lifestyle Factors:   Previous visit with dietician: yes - 2024  Current diet: B: skip            L: 11-12p eggs or left overs                                    Ham n cheese                       D: 6pm grilled chicken salad 1/                       Snacks: rare                       Beverages: water; regular pop less than

## 2024-11-04 ENCOUNTER — ANTI-COAG VISIT (OUTPATIENT)
Age: 74
End: 2024-11-04
Payer: MEDICARE

## 2024-11-04 DIAGNOSIS — Z79.01 ANTICOAGULATED ON COUMADIN: Primary | ICD-10-CM

## 2024-11-04 DIAGNOSIS — Z51.81 ENCOUNTER FOR THERAPEUTIC DRUG MONITORING: ICD-10-CM

## 2024-11-04 LAB — POC INR: 2.5 (ref 0.8–1.2)

## 2024-11-04 PROCEDURE — 99211 OFF/OP EST MAY X REQ PHY/QHP: CPT | Performed by: PHARMACIST

## 2024-11-04 PROCEDURE — 85610 PROTHROMBIN TIME: CPT | Performed by: PHARMACIST

## 2024-11-04 NOTE — PROGRESS NOTES
Medication Management Clinic  [facility]  Anticoagulation Clinic  [phone] (phone)  [phone] (fax)    Ms. Leah Bautista is a 74 y.o.  female with history of Afib who presents today for anticoagulation monitoring and adjustment.    Patient verifies current dosing regimen and tablet strength.  No missed or extra doses.  Patient denies s/s bleeding/bruising/swelling/SOB   No blood in urine or stool. no vaginal bleeding since last visit. She did make an appointment to go see someone.   No dietary changes.  No changes in medication/OTC agents/Herbals. Mounjaro increased on 11/1 to 7.5 mg, from 5 mg.   Dr. Liang started her on supplements: [employer] brand - Catalyn tablets, Olprime EPA/DHA, SP Complete (chocolate) powder (latter contains alfalfa and [place] sprouts, etc.).  Started both last Monday after appointment.  No change in alcohol use or tobacco use.  No change in activity level.  Patient denies falls.  No vomiting/diarrhea or acute illness.   No Procedures scheduled in the future at this time.    Assessment:   Lab Results   Component Value Date    INR 2.50 (H) 11/04/2024    INR 2.60 (H) 10/28/2024    INR 3.90 (H) 10/15/2024     INR therapeutic   Recent Labs     11/04/24  1425   INR 2.50*     Plan:  Continue Coumadin 1.5mg M 3mg STuWThFS.  Recheck INR in 3 week(s).  Patient reminded to call the Anticoagulation Clinic with any signs or symptoms of bleeding or with any medication changes.  Patient given instructions utilizing the teach back method.    After visit summary printed and reviewed with patient.      Discharged ambulatory in no apparent distress.    For Pharmacy Admin Tracking Only    Intervention Detail:   Total # of Interventions Recommended: 0  Total # of Interventions Accepted: 0  Time Spent (min): 20

## 2024-11-18 ENCOUNTER — TELEPHONE (OUTPATIENT)
Dept: INTERNAL MEDICINE CLINIC | Age: 74
End: 2024-11-18

## 2024-11-18 ENCOUNTER — LAB (OUTPATIENT)
Dept: LAB | Age: 74
End: 2024-11-18

## 2024-11-18 DIAGNOSIS — E11.69 TYPE 2 DIABETES MELLITUS WITH OTHER SPECIFIED COMPLICATION, WITHOUT LONG-TERM CURRENT USE OF INSULIN (HCC): Primary | ICD-10-CM

## 2024-11-18 LAB
25(OH)D3 SERPL-MCNC: 37 NG/ML (ref 30–100)
ALBUMIN SERPL BCG-MCNC: 3.8 G/DL (ref 3.5–5.1)
ALP SERPL-CCNC: 97 U/L (ref 38–126)
ALT SERPL W/O P-5'-P-CCNC: 20 U/L (ref 11–66)
ANION GAP SERPL CALC-SCNC: 14 MEQ/L (ref 8–16)
AST SERPL-CCNC: 29 U/L (ref 5–40)
BILIRUB CONJ SERPL-MCNC: 0.2 MG/DL (ref 0.1–13.8)
BILIRUB SERPL-MCNC: 0.5 MG/DL (ref 0.3–1.2)
BUN SERPL-MCNC: 16 MG/DL (ref 7–22)
CALCIUM SERPL-MCNC: 9.4 MG/DL (ref 8.5–10.5)
CHLORIDE SERPL-SCNC: 105 MEQ/L (ref 98–111)
CO2 SERPL-SCNC: 24 MEQ/L (ref 23–33)
CREAT SERPL-MCNC: 1 MG/DL (ref 0.4–1.2)
DEPRECATED MEAN GLUCOSE BLD GHB EST-ACNC: 120 MG/DL (ref 70–126)
DEPRECATED RDW RBC AUTO: 53.5 FL (ref 35–45)
ERYTHROCYTE [DISTWIDTH] IN BLOOD BY AUTOMATED COUNT: 15.7 % (ref 11.5–14.5)
FOLATE SERPL-MCNC: 11 NG/ML (ref 4.8–24.2)
GFR SERPL CREATININE-BSD FRML MDRD: 59 ML/MIN/1.73M2
GLUCOSE SERPL-MCNC: 113 MG/DL (ref 70–108)
HBA1C MFR BLD HPLC: 6 % (ref 4.4–6.4)
HCT VFR BLD AUTO: 47 % (ref 37–47)
HGB BLD-MCNC: 15.1 GM/DL (ref 12–16)
MCH RBC QN AUTO: 29.9 PG (ref 26–33)
MCHC RBC AUTO-ENTMCNC: 32.1 GM/DL (ref 32.2–35.5)
MCV RBC AUTO: 93.1 FL (ref 81–99)
PLATELET # BLD AUTO: 234 THOU/MM3 (ref 130–400)
PMV BLD AUTO: 11.1 FL (ref 9.4–12.4)
POTASSIUM SERPL-SCNC: 4.6 MEQ/L (ref 3.5–5.2)
PROT SERPL-MCNC: 7.5 G/DL (ref 6.1–8)
RBC # BLD AUTO: 5.05 MILL/MM3 (ref 4.2–5.4)
SODIUM SERPL-SCNC: 143 MEQ/L (ref 135–145)
T3FREE SERPL-MCNC: 2.8 PG/ML (ref 2–4.4)
T4 FREE SERPL-MCNC: 1.23 NG/DL (ref 0.93–1.68)
TSH SERPL DL<=0.005 MIU/L-ACNC: 0.18 UIU/ML (ref 0.4–4.2)
VIT B12 SERPL-MCNC: 903 PG/ML (ref 211–911)
WBC # BLD AUTO: 5.9 THOU/MM3 (ref 4.8–10.8)

## 2024-11-18 RX ORDER — LANCETS 30 GAUGE
1 EACH MISCELLANEOUS DAILY
Qty: 100 EACH | Refills: 3 | Status: SHIPPED | OUTPATIENT
Start: 2024-11-18

## 2024-11-18 RX ORDER — BLOOD-GLUCOSE METER
1 EACH MISCELLANEOUS ONCE
Qty: 1 KIT | Refills: 0 | Status: SHIPPED | OUTPATIENT
Start: 2024-11-18 | End: 2024-11-18

## 2024-11-18 RX ORDER — GLUCOSAMINE HCL/CHONDROITIN SU 500-400 MG
CAPSULE ORAL
Qty: 100 STRIP | Refills: 2 | Status: SHIPPED | OUTPATIENT
Start: 2024-11-18

## 2024-11-18 NOTE — TELEPHONE ENCOUNTER
Leah is in of a new glucose meter and test strips. She is requesting a script. Need to test once daily

## 2024-11-25 ENCOUNTER — ANTI-COAG VISIT (OUTPATIENT)
Age: 74
End: 2024-11-25
Payer: MEDICARE

## 2024-11-25 ENCOUNTER — OFFICE VISIT (OUTPATIENT)
Dept: INTERNAL MEDICINE CLINIC | Age: 74
End: 2024-11-25
Payer: MEDICARE

## 2024-11-25 VITALS — WEIGHT: 269.2 LBS | HEIGHT: 64 IN | BODY MASS INDEX: 45.96 KG/M2

## 2024-11-25 DIAGNOSIS — N18.30 TYPE 2 DIABETES MELLITUS WITH STAGE 3 CHRONIC KIDNEY DISEASE, WITHOUT LONG-TERM CURRENT USE OF INSULIN, UNSPECIFIED WHETHER STAGE 3A OR 3B CKD (HCC): Primary | ICD-10-CM

## 2024-11-25 DIAGNOSIS — Z79.01 ANTICOAGULATED ON COUMADIN: Primary | ICD-10-CM

## 2024-11-25 DIAGNOSIS — E11.22 TYPE 2 DIABETES MELLITUS WITH STAGE 3 CHRONIC KIDNEY DISEASE, WITHOUT LONG-TERM CURRENT USE OF INSULIN, UNSPECIFIED WHETHER STAGE 3A OR 3B CKD (HCC): Primary | ICD-10-CM

## 2024-11-25 DIAGNOSIS — Z51.81 ENCOUNTER FOR THERAPEUTIC DRUG MONITORING: ICD-10-CM

## 2024-11-25 LAB — POC INR: 2.3 (ref 0.8–1.2)

## 2024-11-25 PROCEDURE — 99211 OFF/OP EST MAY X REQ PHY/QHP: CPT

## 2024-11-25 PROCEDURE — 85610 PROTHROMBIN TIME: CPT

## 2024-11-25 PROCEDURE — 97803 MED NUTRITION INDIV SUBSEQ: CPT | Performed by: DIETITIAN, REGISTERED

## 2024-11-25 RX ORDER — TIRZEPATIDE 7.5 MG/.5ML
INJECTION, SOLUTION SUBCUTANEOUS
COMMUNITY
Start: 2024-10-28

## 2024-11-25 NOTE — PATIENT INSTRUCTIONS
When Daughter making a high fat meat meal - you make something different for your protein  - Ok to add quinoa or brown rice or Gluten free pasta - 1/2 cup.  - Add veggie  - Add fresh fruit    Do not skip meals.  Plan for your busy appointment days or when running errands   - ok to drink high protein supplement drink.    Eat small meals and snacks.  - 2 meals and 2 snacks/day.    GLB manual has snack and meal ideas.    Bump up water for the day.    - Drink 1st thing in the morning. Have a small snack in the morning before teaching Cheondoism  -  Drink a full 1 cup water with meds 3x/day  - Drink 1 cup water at the end of each meal.

## 2024-11-25 NOTE — PROGRESS NOTES
[facility]  [facility]  [facility] [address]  [phone] (phone)  [phone] (fax)    Patient Name: Leah Bautista. Date of Birth: [id number]. MRN: [id number]      Assessment: Patient is a 74 y.o. female seen for follow-up MNT visit for Type 2 DB.     -Nutritionally relevant labs:   Lab Results   Component Value Date/Time    LABA1C 6.0 11/18/2024 09:39 AM    LABA1C 6.8 (H) 09/09/2024 09:33 AM    LABA1C 7.1 (H) 06/10/2024 11:24 AM    GLUCOSE 113 (H) 11/18/2024 09:39 AM    GLUCOSE 129 (H) 06/10/2024 11:24 AM    CHOL 143 06/10/2024 11:24 AM    HDL 57 06/10/2024 11:24 AM    TRIG 50 06/10/2024 11:24 AM     10/28/24 - RN - F/u appt  Patient Instructions   Stay focused on meal planning    --avoid simple sugars    --try to eat a little something 3 \"meals per day\"    --keep focused on getting your protein 1-2 serving daily  Check your blood sugar at least 3-4 times per week     Fasting:  goal is      2hours after a meal:  under 150  Really try to get 15 minutes of exercise almost every day - not doing. Needs to get different shoes.            -try your Eliptical again for your foot    Today's Visit:  DB med:   Mounjaro - starting 7.5 mg - starting 11/30/24    -Blood sugar trends: Did not discuss. Prior DB center visit 4 weeks ago - checking BS once daily.    -Food recall:   Breakfast: skips.   Lunch: 11 - 1 cup 2% milk with supplement powder from Dr Liang's office. 1 cup rice chex with 1 cup 2% milk, 1 cup strawberries or blueberries OR just the supplement drink & apple OR 1/2 cup shredded chicken, 1/2 cup green beans, 1 cookie OR supplement drink, 1/2 cup chicken, 1/2 cup green beans, 1/2 cup applesauce.  Dinner: Pasta, chicken & vegetables, 1 cup Eric-aid, 3 sugar wafers OR supplement drink and 1/2 cup spaghetti OR 1/2 cup shredded chicken, mashed pot, 1/2 cup green beans and 2 cookies OR 3 smokee links, 1 sl toast, 1 TB margarine OR 1

## 2024-11-25 NOTE — PROGRESS NOTES
Medication Management Clinic  [facility]  Anticoagulation Clinic  [phone] (phone)  [phone] (fax)    Ms. Leah Bautista is a 74 y.o.  female with history of atrial fib., per Dr. Duncan's referral, who presents today for Warfarin monitoring and adjustment (3 week visit - late due to diabetes visit running over).    Patient verifies current dosing regimen and tablet strength.  Interchanged doses 2 weeks ago on M/T.  No missed or extra doses.  Patient denies bleeding/bruising/swelling/SOB. Vaginal bleeding has stopped, but is seeing GYN provider.  No blood in urine or stool.  Dietary changes: started having 1 can of Glucerna per day 2 days ago - plans to continue.  Reminded of need to be consistent.  No changes in medication/OTC agents/herbals, except for increasing doses of Mounjaro. Has lost 30-35# in past 3 months.  No change in tobacco use. Plans to have wine in 3 days, Thanksgiving - hasn't had in a long time.  No change in activity level, but hopes to increase as left foot improves (followed by podiatrist).  Patient denies falls.  No vomiting/diarrhea or acute illness.   No procedures scheduled in the future at this time.    Assessment:     Lab Results   Component Value Date    INR 2.30 (H) 11/25/2024    INR 2.50 (H) 11/04/2024    INR 2.60 (H) 10/28/2024     INR therapeutic - goal 2-3.  Recent Labs     11/25/24  1455   INR 2.30*      Plan:  POCT INR performed/result reviewed.  Continue PO Coumadin 1.5 mg M, 3 mg TWThFSS.  Recheck INR in 2 week(s). (Report given - orders received from/verified with DIANE Yen [facility] PharmD.)  Patient reminded to call the Anticoagulation Clinic with any signs or symptoms of bleeding or with any medication changes.  Patient given instructions utilizing the teach back method.       After visit summary printed and reviewed with patient.      Discharged ambulatory in no apparent distress.     For Pharmacy Admin Tracking Only    Time Spent (min):  23

## 2024-12-09 ENCOUNTER — LAB (OUTPATIENT)
Dept: LAB | Age: 74
End: 2024-12-09

## 2024-12-11 ENCOUNTER — APPOINTMENT (OUTPATIENT)
Age: 74
End: 2024-12-11
Payer: MEDICARE

## 2024-12-13 ENCOUNTER — LAB (OUTPATIENT)
Dept: LAB | Age: 74
End: 2024-12-13

## 2024-12-13 ENCOUNTER — TELEPHONE (OUTPATIENT)
Dept: CARDIOLOGY CLINIC | Age: 74
End: 2024-12-13

## 2024-12-13 LAB — TSH SERPL DL<=0.005 MIU/L-ACNC: 0.2 UIU/ML (ref 0.4–4.2)

## 2024-12-13 NOTE — TELEPHONE ENCOUNTER
Pt last seen by Dr. Sanabria 8-14-24.  Pt is needing clearance for a DNC with Dr. Radha Clarke.  Pt on Coumadin.

## 2024-12-16 ENCOUNTER — ANTI-COAG VISIT (OUTPATIENT)
Age: 74
End: 2024-12-16
Payer: MEDICARE

## 2024-12-16 DIAGNOSIS — Z79.01 ANTICOAGULATED ON COUMADIN: Primary | ICD-10-CM

## 2024-12-16 DIAGNOSIS — Z51.81 ENCOUNTER FOR THERAPEUTIC DRUG MONITORING: ICD-10-CM

## 2024-12-16 LAB — POC INR: 3.1 (ref 0.8–1.2)

## 2024-12-16 PROCEDURE — 85610 PROTHROMBIN TIME: CPT

## 2024-12-16 PROCEDURE — 99211 OFF/OP EST MAY X REQ PHY/QHP: CPT

## 2024-12-16 NOTE — PROGRESS NOTES
Medication Management Clinic  [facility]  Anticoagulation Clinic  [phone] (phone)  [phone] (fax)    Ms. Leah Bautista is a 74 y.o.  female with history of Afib who presents today for anticoagulation monitoring and adjustment.    Patient verifies current dosing regimen and tablet strength.  No missed or extra doses.  Patient denies s/s bleeding/bruising/swelling/SOB  No blood in urine or stool.  No dietary changes.  No changes in medication/OTC agents/Herbals.  No change in alcohol use or tobacco use.  No change in activity level.- lost around 40 lbs since August when started Mounjaro  Patient denies falls.  No vomiting/diarrhea or acute illness.   No Procedures scheduled in the future at this time.- possible D&C procedure not scheduled yet with Radha Clarke (needs Dr. Sanabria's approval per patient)- Patient refuses and future Lovenox bridge  -Per note from Dr. Sanabria- patient approved for surgery with recommended 5 day hold of Coumadin (surgery not yet scheduled)    Assessment:   Lab Results   Component Value Date    INR 3.10 (H) 12/16/2024    INR 2.30 (H) 11/25/2024    INR 2.50 (H) 11/04/2024     INR supratherapeutic   Recent Labs     12/16/24  1445   INR 3.10*        Plan:  Coumadin 1.5 mg today 12/16/24 and tomorrow 12/17/24 then continue Coumadin 1.5 mg M and 3 mg TWThFSaSu.  Recheck INR in 3 week(s).  Patient reminded to call the Anticoagulation Clinic with any signs or symptoms of bleeding or with any medication changes.  Patient given instructions utilizing the teach back method.      After visit summary printed and reviewed with patient.      Discharged ambulatory in no apparent distress.    For Pharmacy Admin Tracking Only    Intervention Detail: Dose Adjustment: 1, reason: Therapy De-escalation  Total # of Interventions Recommended: 1  Total # of Interventions Accepted: 1  Time Spent (min): 20      Joyce Varela, PharmD 12/16/2024 3:18 PM

## 2024-12-23 ENCOUNTER — TELEPHONE (OUTPATIENT)
Dept: FAMILY MEDICINE CLINIC | Age: 74
End: 2024-12-23

## 2024-12-23 ENCOUNTER — TELEPHONE (OUTPATIENT)
Age: 74
End: 2024-12-23

## 2024-12-23 DIAGNOSIS — I48.91 NEW ONSET ATRIAL FIBRILLATION (HCC): ICD-10-CM

## 2024-12-23 DIAGNOSIS — Z79.01 ANTICOAGULATED ON COUMADIN: Primary | ICD-10-CM

## 2024-12-23 NOTE — TELEPHONE ENCOUNTER
[facility] calling for new order as current one will be expiring soon.  Please generate order.  Will check epic later today

## 2025-01-06 ENCOUNTER — APPOINTMENT (OUTPATIENT)
Age: 75
End: 2025-01-06
Payer: MEDICARE

## 2025-01-08 ENCOUNTER — TELEPHONE (OUTPATIENT)
Dept: CARDIOLOGY CLINIC | Age: 75
End: 2025-01-08

## 2025-01-08 ENCOUNTER — TELEPHONE (OUTPATIENT)
Age: 75
End: 2025-01-08

## 2025-01-08 NOTE — TELEPHONE ENCOUNTER
Received cardiac clearance request from [facility]- scanned into media  Copy of clearance faxed to office [phone].

## 2025-01-08 NOTE — TELEPHONE ENCOUNTER
Pt has cardiac clearance (see 12/16 note).  Will be holding warfarin with no Lovenox bridge 1/9/25 - 1/13/25.  Procedure on 1/14/25.  Usual dose is 1.5 mg M, 3 mg TWThFSaSu.    Resume warfarin as follows:  Tues. 1/14:  6 mg  Wed. 1/15:   4.5 mg  Thurs. 1/16: 4.5 mg  Then resume usual scheduled of 1.5 mg M, 3 mg TWThFSaSu.    Rechecking INR Tuesday 1/21/25.

## 2025-01-08 NOTE — TELEPHONE ENCOUNTER
Noted patient is scheduled for hysteroscopy D&C 1/14. Do not yet see cardiac clearance approval in media.  Currently on clinic schedule tomorrow.  Message forwarded to clinic pharmacist.

## 2025-01-09 ENCOUNTER — APPOINTMENT (OUTPATIENT)
Age: 75
End: 2025-01-09
Payer: MEDICARE

## 2025-01-09 RX ORDER — TIRZEPATIDE 10 MG/.5ML
10 INJECTION, SOLUTION SUBCUTANEOUS WEEKLY
COMMUNITY

## 2025-01-10 NOTE — PROGRESS NOTES
Dr. Sanabria cleared as moderate risk, information given to anesthesia for review. Per Dr. Anthony marks to proceed at the surgery center 1/14

## 2025-01-13 ENCOUNTER — LAB (OUTPATIENT)
Dept: LAB | Age: 75
End: 2025-01-13

## 2025-01-13 ENCOUNTER — HOSPITAL ENCOUNTER (OUTPATIENT)
Age: 75
Discharge: HOME | End: 2025-01-13
Payer: MEDICARE

## 2025-01-13 LAB
BASOPHILS ABSOLUTE: 0.1 THOU/MM3 (ref 0–0.1)
BASOPHILS NFR BLD AUTO: 1 %
DEPRECATED RDW RBC AUTO: 51.2 FL (ref 35–45)
EKG ATRIAL RATE: 69 BPM
EKG P-R INTERVAL: 390 MS
EKG Q-T INTERVAL: 426 MS
EKG QRS DURATION: 136 MS
EKG QTC CALCULATION (BAZETT): 456 MS
EKG R AXIS: -111 DEGREES
EKG T AXIS: 19 DEGREES
EKG VENTRICULAR RATE: 69 BPM
EOSINOPHIL NFR BLD AUTO: 1.6 %
EOSINOPHILS ABSOLUTE: 0.1 THOU/MM3 (ref 0–0.4)
ERYTHROCYTE [DISTWIDTH] IN BLOOD BY AUTOMATED COUNT: 15.1 % (ref 11.5–14.5)
HCT VFR BLD AUTO: 43.7 % (ref 37–47)
HGB BLD-MCNC: 14 GM/DL (ref 12–16)
IMM GRANULOCYTES # BLD AUTO: 0.06 THOU/MM3 (ref 0–0.07)
IMM GRANULOCYTES NFR BLD AUTO: 0.7 %
LYMPHOCYTES ABSOLUTE: 1.8 THOU/MM3 (ref 1–4.8)
LYMPHOCYTES NFR BLD AUTO: 20.3 %
MCH RBC QN AUTO: 29.9 PG (ref 26–33)
MCHC RBC AUTO-ENTMCNC: 32 GM/DL (ref 32.2–35.5)
MCV RBC AUTO: 93.2 FL (ref 81–99)
MONOCYTES ABSOLUTE: 0.8 THOU/MM3 (ref 0.4–1.3)
MONOCYTES NFR BLD AUTO: 8.8 %
NEUTROPHILS ABSOLUTE: 6.2 THOU/MM3 (ref 1.8–7.7)
NEUTROPHILS NFR BLD AUTO: 67.6 %
NRBC BLD AUTO-RTO: 0 /100 WBC
PLATELET # BLD AUTO: 277 THOU/MM3 (ref 130–400)
PMV BLD AUTO: 10.3 FL (ref 9.4–12.4)
RBC # BLD AUTO: 4.69 MILL/MM3 (ref 4.2–5.4)
WBC # BLD AUTO: 9.1 THOU/MM3 (ref 4.8–10.8)

## 2025-01-13 PROCEDURE — 93010 ELECTROCARDIOGRAM REPORT: CPT | Performed by: INTERNAL MEDICINE

## 2025-01-13 PROCEDURE — 93005 ELECTROCARDIOGRAM TRACING: CPT | Performed by: STUDENT IN AN ORGANIZED HEALTH CARE EDUCATION/TRAINING PROGRAM

## 2025-01-14 ENCOUNTER — HOSPITAL ENCOUNTER (OUTPATIENT)
Age: 75
Setting detail: OUTPATIENT SURGERY
Discharge: HOME | End: 2025-01-14
Attending: STUDENT IN AN ORGANIZED HEALTH CARE EDUCATION/TRAINING PROGRAM | Admitting: STUDENT IN AN ORGANIZED HEALTH CARE EDUCATION/TRAINING PROGRAM
Payer: MEDICARE

## 2025-01-14 ENCOUNTER — ANESTHESIA (OUTPATIENT)
Dept: OPERATING ROOM | Age: 75
End: 2025-01-14
Payer: MEDICARE

## 2025-01-14 ENCOUNTER — ANESTHESIA EVENT (OUTPATIENT)
Dept: OPERATING ROOM | Age: 75
End: 2025-01-14
Payer: MEDICARE

## 2025-01-14 VITALS
HEART RATE: 60 BPM | SYSTOLIC BLOOD PRESSURE: 159 MMHG | RESPIRATION RATE: 10 BRPM | WEIGHT: 263 LBS | BODY MASS INDEX: 44.9 KG/M2 | HEIGHT: 64 IN | TEMPERATURE: 97.5 F | DIASTOLIC BLOOD PRESSURE: 72 MMHG | OXYGEN SATURATION: 96 %

## 2025-01-14 DIAGNOSIS — N95.0 POSTMENOPAUSAL BLEEDING: ICD-10-CM

## 2025-01-14 LAB
GLUCOSE BLD STRIP.AUTO-MCNC: 102 MG/DL (ref 70–108)
POC INR: 1.4 (ref 0.8–1.2)

## 2025-01-14 PROCEDURE — 7100000001 HC PACU RECOVERY - ADDTL 15 MIN: Performed by: STUDENT IN AN ORGANIZED HEALTH CARE EDUCATION/TRAINING PROGRAM

## 2025-01-14 PROCEDURE — 2580000003 HC RX 258: Performed by: NURSE ANESTHETIST, CERTIFIED REGISTERED

## 2025-01-14 PROCEDURE — 2500000003 HC RX 250 WO HCPCS: Performed by: NURSE ANESTHETIST, CERTIFIED REGISTERED

## 2025-01-14 PROCEDURE — 6360000002 HC RX W HCPCS: Performed by: NURSE ANESTHETIST, CERTIFIED REGISTERED

## 2025-01-14 PROCEDURE — 7100000000 HC PACU RECOVERY - FIRST 15 MIN: Performed by: STUDENT IN AN ORGANIZED HEALTH CARE EDUCATION/TRAINING PROGRAM

## 2025-01-14 PROCEDURE — 3600000003 HC SURGERY LEVEL 3 BASE: Performed by: STUDENT IN AN ORGANIZED HEALTH CARE EDUCATION/TRAINING PROGRAM

## 2025-01-14 PROCEDURE — 3700000001 HC ADD 15 MINUTES (ANESTHESIA): Performed by: STUDENT IN AN ORGANIZED HEALTH CARE EDUCATION/TRAINING PROGRAM

## 2025-01-14 PROCEDURE — 3600000013 HC SURGERY LEVEL 3 ADDTL 15MIN: Performed by: STUDENT IN AN ORGANIZED HEALTH CARE EDUCATION/TRAINING PROGRAM

## 2025-01-14 PROCEDURE — 88305 TISSUE EXAM BY PATHOLOGIST: CPT

## 2025-01-14 PROCEDURE — 7100000010 HC PHASE II RECOVERY - FIRST 15 MIN: Performed by: STUDENT IN AN ORGANIZED HEALTH CARE EDUCATION/TRAINING PROGRAM

## 2025-01-14 PROCEDURE — 82948 REAGENT STRIP/BLOOD GLUCOSE: CPT

## 2025-01-14 PROCEDURE — 7100000011 HC PHASE II RECOVERY - ADDTL 15 MIN: Performed by: STUDENT IN AN ORGANIZED HEALTH CARE EDUCATION/TRAINING PROGRAM

## 2025-01-14 PROCEDURE — 3700000000 HC ANESTHESIA ATTENDED CARE: Performed by: STUDENT IN AN ORGANIZED HEALTH CARE EDUCATION/TRAINING PROGRAM

## 2025-01-14 PROCEDURE — 6370000000 HC RX 637 (ALT 250 FOR IP): Performed by: STUDENT IN AN ORGANIZED HEALTH CARE EDUCATION/TRAINING PROGRAM

## 2025-01-14 PROCEDURE — 2709999900 HC NON-CHARGEABLE SUPPLY: Performed by: STUDENT IN AN ORGANIZED HEALTH CARE EDUCATION/TRAINING PROGRAM

## 2025-01-14 RX ORDER — KETOROLAC TROMETHAMINE 30 MG/ML
15 INJECTION, SOLUTION INTRAMUSCULAR; INTRAVENOUS EVERY 6 HOURS
Status: CANCELLED | OUTPATIENT
Start: 2025-01-14 | End: 2025-01-16

## 2025-01-14 RX ORDER — ONDANSETRON 2 MG/ML
4 INJECTION INTRAMUSCULAR; INTRAVENOUS
Status: DISCONTINUED | OUTPATIENT
Start: 2025-01-14 | End: 2025-01-14 | Stop reason: HOSPADM

## 2025-01-14 RX ORDER — SODIUM CHLORIDE 9 MG/ML
INJECTION, SOLUTION INTRAVENOUS PRN
Status: CANCELLED | OUTPATIENT
Start: 2025-01-14

## 2025-01-14 RX ORDER — SODIUM CHLORIDE, SODIUM LACTATE, POTASSIUM CHLORIDE, CALCIUM CHLORIDE 600; 310; 30; 20 MG/100ML; MG/100ML; MG/100ML; MG/100ML
INJECTION, SOLUTION INTRAVENOUS SEE ADMIN INSTRUCTIONS
Status: CANCELLED | OUTPATIENT
Start: 2025-01-14

## 2025-01-14 RX ORDER — ONDANSETRON 2 MG/ML
8 INJECTION INTRAMUSCULAR; INTRAVENOUS EVERY 8 HOURS PRN
Status: DISCONTINUED | OUTPATIENT
Start: 2025-01-14 | End: 2025-01-14 | Stop reason: HOSPADM

## 2025-01-14 RX ORDER — SODIUM CHLORIDE 0.9 % (FLUSH) 0.9 %
5-40 SYRINGE (ML) INJECTION EVERY 12 HOURS SCHEDULED
Status: DISCONTINUED | OUTPATIENT
Start: 2025-01-14 | End: 2025-01-14 | Stop reason: HOSPADM

## 2025-01-14 RX ORDER — FENTANYL CITRATE 50 UG/ML
INJECTION, SOLUTION INTRAMUSCULAR; INTRAVENOUS
Status: DISCONTINUED | OUTPATIENT
Start: 2025-01-14 | End: 2025-01-14 | Stop reason: SDUPTHER

## 2025-01-14 RX ORDER — SODIUM CHLORIDE 0.9 % (FLUSH) 0.9 %
5-40 SYRINGE (ML) INJECTION PRN
Status: DISCONTINUED | OUTPATIENT
Start: 2025-01-14 | End: 2025-01-14 | Stop reason: HOSPADM

## 2025-01-14 RX ORDER — LIDOCAINE HYDROCHLORIDE 20 MG/ML
INJECTION, SOLUTION INTRAVENOUS
Status: DISCONTINUED | OUTPATIENT
Start: 2025-01-14 | End: 2025-01-14 | Stop reason: SDUPTHER

## 2025-01-14 RX ORDER — OXYCODONE HYDROCHLORIDE 5 MG/1
5 TABLET ORAL EVERY 4 HOURS PRN
Status: CANCELLED | OUTPATIENT
Start: 2025-01-14

## 2025-01-14 RX ORDER — NALOXONE HYDROCHLORIDE 0.4 MG/ML
INJECTION, SOLUTION INTRAMUSCULAR; INTRAVENOUS; SUBCUTANEOUS PRN
Status: DISCONTINUED | OUTPATIENT
Start: 2025-01-14 | End: 2025-01-14 | Stop reason: HOSPADM

## 2025-01-14 RX ORDER — FENTANYL CITRATE 50 UG/ML
50 INJECTION, SOLUTION INTRAMUSCULAR; INTRAVENOUS EVERY 5 MIN PRN
Status: DISCONTINUED | OUTPATIENT
Start: 2025-01-14 | End: 2025-01-14 | Stop reason: HOSPADM

## 2025-01-14 RX ORDER — DIPHENHYDRAMINE HYDROCHLORIDE 50 MG/ML
12.5 INJECTION INTRAMUSCULAR; INTRAVENOUS
Status: DISCONTINUED | OUTPATIENT
Start: 2025-01-14 | End: 2025-01-14 | Stop reason: HOSPADM

## 2025-01-14 RX ORDER — PROPOFOL 10 MG/ML
INJECTION, EMULSION INTRAVENOUS
Status: DISCONTINUED | OUTPATIENT
Start: 2025-01-14 | End: 2025-01-14 | Stop reason: SDUPTHER

## 2025-01-14 RX ORDER — SODIUM CHLORIDE 9 MG/ML
INJECTION, SOLUTION INTRAVENOUS PRN
Status: DISCONTINUED | OUTPATIENT
Start: 2025-01-14 | End: 2025-01-14 | Stop reason: HOSPADM

## 2025-01-14 RX ORDER — ACETAMINOPHEN 325 MG/1
650 TABLET ORAL ONCE
Status: COMPLETED | OUTPATIENT
Start: 2025-01-14 | End: 2025-01-14

## 2025-01-14 RX ORDER — MORPHINE SULFATE 2 MG/ML
2 INJECTION, SOLUTION INTRAMUSCULAR; INTRAVENOUS
Status: CANCELLED | OUTPATIENT
Start: 2025-01-14

## 2025-01-14 RX ORDER — SODIUM CHLORIDE 9 MG/ML
INJECTION, SOLUTION INTRAVENOUS
Status: DISCONTINUED | OUTPATIENT
Start: 2025-01-14 | End: 2025-01-14 | Stop reason: SDUPTHER

## 2025-01-14 RX ORDER — ROCURONIUM BROMIDE 10 MG/ML
INJECTION, SOLUTION INTRAVENOUS
Status: DISCONTINUED | OUTPATIENT
Start: 2025-01-14 | End: 2025-01-14 | Stop reason: SDUPTHER

## 2025-01-14 RX ORDER — SODIUM CHLORIDE 0.9 % (FLUSH) 0.9 %
5-40 SYRINGE (ML) INJECTION PRN
Status: CANCELLED | OUTPATIENT
Start: 2025-01-14

## 2025-01-14 RX ORDER — MORPHINE SULFATE 2 MG/ML
4 INJECTION, SOLUTION INTRAMUSCULAR; INTRAVENOUS
Status: CANCELLED | OUTPATIENT
Start: 2025-01-14

## 2025-01-14 RX ORDER — ONDANSETRON 2 MG/ML
INJECTION INTRAMUSCULAR; INTRAVENOUS
Status: DISCONTINUED | OUTPATIENT
Start: 2025-01-14 | End: 2025-01-14 | Stop reason: SDUPTHER

## 2025-01-14 RX ORDER — ACETAMINOPHEN 500 MG
1000 TABLET ORAL EVERY 8 HOURS PRN
Status: CANCELLED | OUTPATIENT
Start: 2025-01-14

## 2025-01-14 RX ORDER — ONDANSETRON 4 MG/1
4 TABLET, ORALLY DISINTEGRATING ORAL EVERY 8 HOURS PRN
Status: CANCELLED | OUTPATIENT
Start: 2025-01-14

## 2025-01-14 RX ORDER — SODIUM CHLORIDE, SODIUM LACTATE, POTASSIUM CHLORIDE, CALCIUM CHLORIDE 600; 310; 30; 20 MG/100ML; MG/100ML; MG/100ML; MG/100ML
INJECTION, SOLUTION INTRAVENOUS CONTINUOUS
Status: DISCONTINUED | OUTPATIENT
Start: 2025-01-14 | End: 2025-01-14 | Stop reason: HOSPADM

## 2025-01-14 RX ORDER — KETOROLAC TROMETHAMINE 30 MG/ML
INJECTION, SOLUTION INTRAMUSCULAR; INTRAVENOUS
Status: DISCONTINUED | OUTPATIENT
Start: 2025-01-14 | End: 2025-01-14 | Stop reason: SDUPTHER

## 2025-01-14 RX ORDER — SODIUM CHLORIDE 0.9 % (FLUSH) 0.9 %
5-40 SYRINGE (ML) INJECTION EVERY 12 HOURS SCHEDULED
Status: CANCELLED | OUTPATIENT
Start: 2025-01-14

## 2025-01-14 RX ORDER — ONDANSETRON 2 MG/ML
4 INJECTION INTRAMUSCULAR; INTRAVENOUS EVERY 6 HOURS PRN
Status: CANCELLED | OUTPATIENT
Start: 2025-01-14

## 2025-01-14 RX ORDER — OXYCODONE HYDROCHLORIDE 5 MG/1
10 TABLET ORAL EVERY 4 HOURS PRN
Status: CANCELLED | OUTPATIENT
Start: 2025-01-14

## 2025-01-14 RX ADMIN — FENTANYL CITRATE 50 MCG: 50 INJECTION, SOLUTION INTRAMUSCULAR; INTRAVENOUS at 10:05

## 2025-01-14 RX ADMIN — LIDOCAINE HYDROCHLORIDE 60 MG: 20 INJECTION, SOLUTION INTRAVENOUS at 10:12

## 2025-01-14 RX ADMIN — SODIUM CHLORIDE: 9 INJECTION, SOLUTION INTRAVENOUS at 10:04

## 2025-01-14 RX ADMIN — ONDANSETRON 4 MG: 2 INJECTION INTRAMUSCULAR; INTRAVENOUS at 10:32

## 2025-01-14 RX ADMIN — PROPOFOL 150 MG: 10 INJECTION, EMULSION INTRAVENOUS at 10:12

## 2025-01-14 RX ADMIN — ROCURONIUM BROMIDE 30 MG: 10 INJECTION INTRAVENOUS at 10:12

## 2025-01-14 RX ADMIN — KETOROLAC TROMETHAMINE 15 MG: 30 INJECTION, SOLUTION INTRAMUSCULAR; INTRAVENOUS at 10:32

## 2025-01-14 RX ADMIN — ACETAMINOPHEN 650 MG: 325 TABLET ORAL at 11:27

## 2025-01-14 RX ADMIN — SUGAMMADEX 200 MG: 100 INJECTION, SOLUTION INTRAVENOUS at 10:39

## 2025-01-14 ASSESSMENT — PAIN - FUNCTIONAL ASSESSMENT
PAIN_FUNCTIONAL_ASSESSMENT: 0-10
PAIN_FUNCTIONAL_ASSESSMENT: NONE - DENIES PAIN

## 2025-01-14 NOTE — PROGRESS NOTES
1043 Patient to pacu from surgery, report from Crystal MOON and Carla GORMAN. Patient opens eyes to name and follows commands. Denies any pain at this time. Vitals are stable on room air. Paced at 60 on telemetry. Ulysses pad in place with no drainage noted. SCDs on.  1045 Patient repositioned for comfort.  1050 Vitals remain stable on room air.   1055 Patient denies any pain at this time.   1100 Vitals remain stable on room air.   1105 Patient states she is feeling sore, rates it 1/10 in ulysses area. Repositioned for comfort. Ice chips given.  1110 Ulysses pad still in place, no drainage noted.  1115 Patient meets criteria to move to phase 2, vitals have remained stable.  at bedside, call light in reach.   1120 Patient assisted to bathroom in stable condition.  1127 Tylenol given per patient request.  1150 AVS discussed with patient and spouse. All questions answered at this time.  1155 IV taken out and dressing applied. Dr. Clarke verifies that patient can resume coumadin today if not bleeding heavily.  1205 Patient taken to discharge vehicle via wheelchair in stable condition. Discharged with AVS and all belongings. All questions answered at this time.

## 2025-01-14 NOTE — OP NOTE
Gyn Service    Operative Report        Pt Name: Leah Bautista  MRN: [id number] Acct #: [id number]  YOB: 1950  Procedure Performed By: Radha Clarke DO      Pre-operative Diagnosis:  Post menopausal bleeding    Post-operative Diagnosis:  SAME    Procedure:  HYSTEROSCOPY, D AND C    Surgeon:  Radha Clarke DO     Anesthesia:  General    Estimated blood loss: 10mL    Complications:  NONE    Specimens: Endometrial curettings    Findings  Uterus sounded to 7cm  2 Hysteroscopy: Limited visualization       Description of Procedure in Detail  After appropriate consents were signed the patient was taken to the operating room and general anesthesia was administered. The patient was then prepped and draped in the normal sterile fashion. She was placed in dorsal lithotomy position with legs supported by stirrups. A weighted speculum was then placed in the vaginal canal. A right angle retractor was used to visualize the cervix. Double tooth tenaculum was used to grasp the anterior lip of cervix. The cervix was then serially dilated to accommodate the hysteroscope. The hysteroscope was then assembled and white balanced. The hysteroscope was then inserted using normal saline as distending media. Examination of the uterine cavity was limited due to difficulty getting a clear view with the scope. The hysteroscope was then removed. A sharp curette was inserted and all surfaces of the uterine cavity were curettaged until gritty texture was felt. The endometrial curettings were then sent to pathology for further evaluation. At this point, all instruments were removed from the vagina. Silver nitrate was applied to the tenaculum site. Good hemostasis was then noted.       At the end of the procedure, all instrument, needle and sponge counts were noted to be correct ×2. The patient tolerated the procedure well and was transferred to recovery room in a stable and satisfactory condition.       Radha Clarke DO

## 2025-01-14 NOTE — ANESTHESIA PRE PROCEDURE
Passive exposure: Never   • Smokeless tobacco: Never   • Tobacco comments:     Have never smoked   Substance Use Topics   • Alcohol use: Yes     Comment: only occasionally                                Counseling given: Not Answered  Tobacco comments: Have never smoked      Vital Signs (Current):   Vitals:    01/09/25 1305 01/14/25 0916   BP:  (!) 166/75   Pulse:  61   Resp:  16   Temp:  97.6 °F (36.4 °C)   TempSrc:  Temporal   SpO2:  98%   Weight: 117.9 kg (260 lb) 119.3 kg (263 lb)   Height: 1.626 m (5' 4\")                                               BP Readings from Last 3 Encounters:   01/14/25 (!) 166/75   10/28/24 138/82   10/17/24 138/70       NPO Status: Time of last liquid consumption: 2230                        Time of last solid consumption: 1800                        Date of last liquid consumption: 01/13/25                        Date of last solid food consumption: 01/13/25    BMI:   Wt Readings from Last 3 Encounters:   01/14/25 119.3 kg (263 lb)   11/25/24 122.1 kg (269 lb 3.2 oz)   10/28/24 124.8 kg (275 lb 3.2 oz)     Body mass index is 45.14 kg/m².    CBC:   Lab Results   Component Value Date/Time    WBC 9.1 01/13/2025 10:38 AM    RBC 4.69 01/13/2025 10:38 AM    HGB 14.0 01/13/2025 10:38 AM    HCT 43.7 01/13/2025 10:38 AM    MCV 93.2 01/13/2025 10:38 AM    RDW 15.2 02/09/2018 03:23 PM     01/13/2025 10:38 AM       CMP:   Lab Results   Component Value Date/Time     11/18/2024 09:39 AM    K 4.6 11/18/2024 09:39 AM    K 4.9 10/07/2019 05:52 AM     11/18/2024 09:39 AM    CO2 24 11/18/2024 09:39 AM    BUN 16 11/18/2024 09:39 AM    CREATININE 1.0 11/18/2024 09:39 AM    LABGLOM 59 11/18/2024 09:39 AM    LABGLOM 60 11/06/2023 10:25 AM    GLUCOSE 113 11/18/2024 09:39 AM    CALCIUM 9.4 11/18/2024 09:39 AM    BILITOT 0.5 11/18/2024 09:39 AM    ALKPHOS 97 11/18/2024 09:39 AM    AST 29 11/18/2024 09:39 AM    ALT 20 11/18/2024 09:39 AM       POC Tests:   Recent Labs     01/14/25  0943

## 2025-01-14 NOTE — DISCHARGE INSTRUCTIONS
DILATATION & CURETTAGE AND/OR HYSTEROSCOPY and/or ABLATION  DISCHARGE INSTRUCTIONS FOR  PATIENTS AND FAMILY  [facility]  [phone]  [address] [address]  1) Since you may experience some intermittent light-headedness for the next several hours, we suggest you plan on bed rest or quiet relaxation this evening. You must have a friend or relative stay with you tonight.    2)  Because of the sedation you have received, it is recommended that you do not drive a motor vehicle, operate any kind of machinery, or sign any contractual agreement for 24 hours following the procedure.    3)  You should not take alcoholic beverages tonight and only take sleeping medication that has been specifically prescribed for you by your physician.    4)  Eat lightly for your first meal and then gradually progress yourself back to a regular diet.    5)  If you experience pain in your surgical area, take Tylenol, or the pain medication prescribed by your doctor. Some pain medications are very irritating when taken on an empty stomach, so try to take the medication with a light meal or a glass of milk.    6)  If you have any fever, chills, bleeding, or uncontrollable pain or any other problems, notify your surgeon.    7)  Other instructions:   Pain:  Ibuprofen every 6 hours as needed, call if no relief.   Activity:  Take it easy for 1-2 days   Exercise:  None for 1 week   Sex, douching, tampons:  None for 1 week   Shower:  In 24 hours   Tub bath, swimming:  None for 1 week   Appointment:  1/24/25 3PM     Radha Clarke DO 1/14/2025 9:51 AM4

## 2025-01-14 NOTE — H&P
[place], [place]  History and Physicial      Patient:  Leah Bautista  YOB: 1950  MRN: [id number]     Acct: [id number]    HISTORY OF PRESENT ILLNESS:     Leah Bautista is a 74 y.o. female who presents for scheduled hysteroscopy, D&C for PMB and thickened endometrium. Pt has Afib and a pacemaker and is on Warfarin. She did get cardiac clearance for surgery and was told to hold her Warfarin for 5 days prior to procedure.    Obstetric History:   OB History   No obstetric history on file.       Past Medical History:        Diagnosis Date    A-fib (HCC)     Atrial fibrillation (HCC)     PAROXYSMAL    CAD (coronary artery disease)     Carpal tunnel syndrome     Diabetes mellitus (HCC)     GERD (gastroesophageal reflux disease)     Headache(784.0)     HTN (hypertension)     Hyperlipidemia     Hypothyroidism     Osteoarthritis     Pacemaker     Sleep apnea 2019    Mild USES CPAP    Tendonitis of both wrists 2017    Toenail fungus        Past Surgical History:        Procedure Laterality Date    CARPAL TUNNEL RELEASE      both hands     SECTION       SECTION       SECTION      COLONOSCOPY N/A 10/08/2021    COLONOSCOPY performed by Enma Chambers MD at [facility]    FOOT SURGERY Left 2018    [facility]    HERNIA REPAIR      JOINT REPLACEMENT Left 2010    Left Total Knee    JOINT REPLACEMENT Right 2016    Right Total Knee    SHOULDER SURGERY Left 2023    [place]    TOTAL HIP ARTHROPLASTY  22 & 22    UPPER GASTROINTESTINAL ENDOSCOPY N/A 10/08/2021    EGD BIOPSY performed by Enma Chambers MD at [facility]    UPPER GASTROINTESTINAL ENDOSCOPY  10/08/2021    EGD DILATION SAVORY performed by Enma Chambers MD at [facility]       Medications: Scheduled Meds:   sodium chloride flush  5-40 mL IntraVENous 2 times per day     Continuous Infusions:   lactated ringers      sodium chloride       PRN Meds:.sodium chloride

## 2025-01-14 NOTE — ANESTHESIA POSTPROCEDURE EVALUATION
Department of Anesthesiology  Postprocedure Note    Patient: Leah Bautista  MRN: [id number]  YOB: 1950  Date of evaluation: 1/14/2025    Procedure Summary       Date: 01/14/25 Room / Location: [address]    Anesthesia Start: 1004 Anesthesia Stop: 1046    Procedure: HYSTEROSCOPY D&C Diagnosis:       Postmenopausal bleeding      (Postmenopausal bleeding [N95.0])    Surgeons: Radha Clarke DO Responsible Provider: Anthony Cruz DO    Anesthesia Type: General ASA Status: 3            Anesthesia Type: General    Carolyn Phase I: Carolyn Score: 10    Carolyn Phase II: Carolyn Score: 10    Anesthesia Post Evaluation    Patient location during evaluation: PACU  Patient participation: complete - patient participated  Level of consciousness: awake and alert  Airway patency: patent  Nausea & Vomiting: no vomiting and no nausea  Cardiovascular status: hemodynamically stable  Respiratory status: acceptable  Hydration status: stable  Pain management: adequate    No notable events documented.

## 2025-01-21 ENCOUNTER — ANTI-COAG VISIT (OUTPATIENT)
Age: 75
End: 2025-01-21
Payer: MEDICARE

## 2025-01-21 DIAGNOSIS — Z51.81 ENCOUNTER FOR THERAPEUTIC DRUG MONITORING: ICD-10-CM

## 2025-01-21 DIAGNOSIS — Z79.01 ANTICOAGULATED ON COUMADIN: Primary | ICD-10-CM

## 2025-01-21 LAB — POC INR: 2.1 (ref 0.8–1.2)

## 2025-01-21 PROCEDURE — 99211 OFF/OP EST MAY X REQ PHY/QHP: CPT | Performed by: PHARMACIST

## 2025-01-21 PROCEDURE — 85610 PROTHROMBIN TIME: CPT | Performed by: PHARMACIST

## 2025-01-21 NOTE — PROGRESS NOTES
Medication Management Clinic  [facility]  Anticoagulation Clinic  [phone] (phone)  [phone] (fax)    Ms. Leah Bautista is a 74 y.o.  female with history of Afib who presents today for anticoagulation monitoring and adjustment.    Patient verifies current dosing regimen and tablet strength.  No missed or extra doses.  Patient denies s/s bleeding/bruising/swelling/SOB  No blood in urine or stool.  No dietary changes.  No changes in medication/OTC agents/Herbals.  No change in alcohol use or tobacco use.  No change in activity level.  Patient denies falls.  No vomiting/diarrhea or acute illness.   No Procedures scheduled in the future at this time.    Assessment:   Lab Results   Component Value Date    INR 2.10 (H) 01/21/2025    INR 1.40 (H) 01/14/2025    INR 3.10 (H) 12/16/2024     INR therapeutic   Recent Labs     01/21/25  1311   INR 2.10*         Plan:  Continue Coumadin 1.5mg M and 3mg all other days.  Recheck INR in 4 week(s).  Patient reminded to call the Anticoagulation Clinic with any signs or symptoms of bleeding or with any medication changes.  Patient given instructions utilizing the teach back method.        After visit summary printed and reviewed with patient.      Discharged ambulatory in no apparent distress.    Anabela Bronson, DelmarD, BCPS, CACP, CTTS     For Pharmacy Admin Tracking Only    Time Spent (min): 20

## 2025-02-11 ENCOUNTER — OFFICE VISIT (OUTPATIENT)
Dept: INTERNAL MEDICINE CLINIC | Age: 75
End: 2025-02-11
Payer: MEDICARE

## 2025-02-11 VITALS — WEIGHT: 255.4 LBS | BODY MASS INDEX: 43.6 KG/M2 | HEIGHT: 64 IN

## 2025-02-11 DIAGNOSIS — E11.22 TYPE 2 DIABETES MELLITUS WITH STAGE 3 CHRONIC KIDNEY DISEASE, WITHOUT LONG-TERM CURRENT USE OF INSULIN, UNSPECIFIED WHETHER STAGE 3A OR 3B CKD (HCC): Primary | ICD-10-CM

## 2025-02-11 DIAGNOSIS — N18.30 TYPE 2 DIABETES MELLITUS WITH STAGE 3 CHRONIC KIDNEY DISEASE, WITHOUT LONG-TERM CURRENT USE OF INSULIN, UNSPECIFIED WHETHER STAGE 3A OR 3B CKD (HCC): Primary | ICD-10-CM

## 2025-02-11 PROCEDURE — 97803 MED NUTRITION INDIV SUBSEQ: CPT | Performed by: DIETITIAN, REGISTERED

## 2025-02-11 PROCEDURE — NBSRV NON-BILLABLE SERVICE: Performed by: DIETITIAN, REGISTERED

## 2025-02-11 NOTE — PROGRESS NOTES
for Regular, Balanced Meals & Snacks, The Importance of Regular Physical Activity, and protein food sources.  Make lean protein sources at home using easy recipes given. Oven ready to eat protein sources that will be full of nitrates & sodium.    -Handouts given for: The power for protein for people with diabetes, lean protein recipes, Ensure max & Protality oral supplement drinks and coupons.    Patient Instructions   Bring a 1-2 week food log to next dietitian appt.    Check out www.myplate.gov for healthier lower sodium and lower fat recipes.    When Daughter making a high fat meat meal - you make something different for your protein  - Ok to add quinoa or brown rice or Gluten free pasta - 1/2 cup.  - Add veggie  - Add fresh fruit     Do not skip meals.  Plan for your busy appointment days or when running errands   - ok to drink high protein supplement drink.     Eat small meals and snacks. Refer to sample menu for small meal and snack ideas.    - 2 meals and 2 snacks/day.     GLB manual has snack and meal ideas. Session 5.     Bump up water for the day.    - Drink 1st thing in the morning. 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    -Nutrition prescription: 1500 calories/day, 150 - 165 g carbs/day.     Comprehension verified using teachback method.    Monitoring/Evaluation:   -Followup visit: 8 weeks with dietitian.   -Receptiveness to education/goals: Agreeable.  -Evaluation of education: Indicates understanding.  -Readiness to change: precontemplative- drinking more water throughout the day and trying the lean protein recipes given.  -Expected compliance: fair.    Thank you for your referral of this patient.     Total time involved in direct patient education: 45 minutes for follow-up MNT visit.

## 2025-02-11 NOTE — PATIENT INSTRUCTIONS
Bring a 1-2 week food log to next dietitian appt.    Check out www.myplate.gov for healthier lower sodium and lower fat recipes.    When Daughter making a high fat meat meal - you make something different for your protein  - Ok to add quinoa or brown rice or Gluten free pasta - 1/2 cup.  - Add veggie  - Add fresh fruit     Do not skip meals.  Plan for your busy appointment days or when running errands   - ok to drink high protein supplement drink.     Eat small meals and snacks. Refer to sample menu for small meal and snack ideas.    - 2 meals and 2 snacks/day.     GLB manual has snack and meal ideas. Session 5.     Bump up water for the day.    - Drink 1st thing in the morning. 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

## 2025-02-14 ENCOUNTER — LAB (OUTPATIENT)
Dept: LAB | Age: 75
End: 2025-02-14

## 2025-02-14 LAB — TSH SERPL DL<=0.005 MIU/L-ACNC: 1.81 UIU/ML (ref 0.4–4.2)

## 2025-02-18 ENCOUNTER — ANTI-COAG VISIT (OUTPATIENT)
Age: 75
End: 2025-02-18
Payer: MEDICARE

## 2025-02-18 DIAGNOSIS — Z51.81 ENCOUNTER FOR THERAPEUTIC DRUG MONITORING: ICD-10-CM

## 2025-02-18 DIAGNOSIS — Z79.01 ANTICOAGULATED ON COUMADIN: Primary | ICD-10-CM

## 2025-02-18 LAB — POC INR: 2.3 (ref 0.8–1.2)

## 2025-02-18 PROCEDURE — 85610 PROTHROMBIN TIME: CPT

## 2025-02-18 PROCEDURE — 99211 OFF/OP EST MAY X REQ PHY/QHP: CPT

## 2025-02-18 RX ORDER — TIRZEPATIDE 12.5 MG/.5ML
INJECTION, SOLUTION SUBCUTANEOUS WEEKLY
COMMUNITY

## 2025-02-18 NOTE — PROGRESS NOTES
Medication Management Clinic  [facility]  Anticoagulation Clinic  [phone] (phone)  [phone] (fax)    Ms. Leah Bautista is a 74 y.o.  female with history of atrial fib., per Dr. Duncan's referral, who presents today for Warfarin monitoring and adjustment (4 week visit - late for today's visit due to parking issues).    Patient verifies current dosing regimen and tablet strength. About 2 weeks ago, interchanged M/T doses accidentally.  No missed or extra doses.  Patient denies bleeding/swelling/SOB. States has significant bruising from lab draw right antecubital - is fading.  No blood in urine or stool.  No dietary changes, except hasn't had Glucerna in quite a while - currently using Protein Premier drinks (doesn't always have faithfully every day).  Had been Ensure. Reminded of need to be consistent.  Sometimes uses a pain cream from [employer] without Aspirin. Has been on 12.5 mg Mounjaro - thinks it may be increased to 15 mg today.  Hast lost 5-10# in 4 weeks.  No change in alcohol use or tobacco use.  Change in activity level: increased - back to PT for plantar fasciitis.  Patient denies falls.  No vomiting/diarrhea or acute illness.   No procedures scheduled in the future at this time.      Assessment:     Lab Results   Component Value Date    INR 2.30 (H) 02/18/2025    INR 2.10 (H) 01/21/2025    INR 1.40 (H) 01/14/2025     INR therapeutic - goal 2-3.  Recent Labs     02/18/25  1329   INR 2.30*      Plan:  POCT INR performed/result reviewed.  Continue PO Coumadin 1.5 mg M, 3 mg TWThFSS.  Recheck INR in 4 week(s).  Patient reminded to call the Anticoagulation Clinic with any signs or symptoms of bleeding or with any medication changes.  Patient given instructions utilizing the teach back method.       After visit summary printed and reviewed with patient.      Discharged ambulatory in no apparent distress.  To [facility] next.     For Pharmacy Admin Tracking Only    Time Spent (min):

## 2025-03-12 ENCOUNTER — LAB (OUTPATIENT)
Dept: LAB | Age: 75
End: 2025-03-12

## 2025-03-14 LAB — NUCLEAR IGG SER QL IA: NORMAL

## 2025-03-18 ENCOUNTER — ANTI-COAG VISIT (OUTPATIENT)
Age: 75
End: 2025-03-18
Payer: MEDICARE

## 2025-03-18 DIAGNOSIS — Z51.81 ENCOUNTER FOR THERAPEUTIC DRUG MONITORING: ICD-10-CM

## 2025-03-18 DIAGNOSIS — Z79.01 ANTICOAGULATED ON COUMADIN: Primary | ICD-10-CM

## 2025-03-18 LAB — POC INR: 2.3 (ref 0.8–1.2)

## 2025-03-18 PROCEDURE — 85610 PROTHROMBIN TIME: CPT | Performed by: PHARMACIST

## 2025-03-18 PROCEDURE — 99211 OFF/OP EST MAY X REQ PHY/QHP: CPT | Performed by: PHARMACIST

## 2025-03-18 NOTE — PROGRESS NOTES
Medication Management Clinic  [facility]  Anticoagulation Clinic  [phone] (phone)  [phone] (fax)    Ms. Leah Bautista is a 75 y.o.  female with history of Afib who presents today for anticoagulation monitoring and adjustment.    Patient verifies current dosing regimen and tablet strength.  No missed or extra doses.  Patient denies s/s bleeding/bruising/swelling/SOB  No blood in urine or stool.  No dietary changes. Still drinking Premier Protein.  She thinks she has lost another 7-10 pounds, thinks she is in the 240's.   No changes in medication/OTC agents/Herbals.   No change in alcohol use or tobacco use.  No change in activity level.  Patient denies falls.  No vomiting/diarrhea or acute illness.   No Procedures scheduled in the future at this time.      Assessment:   Lab Results   Component Value Date    INR 2.30 (H) 03/18/2025    INR 2.30 (H) 02/18/2025    INR 2.10 (H) 01/21/2025     INR therapeutic   Recent Labs     03/18/25  1352   INR 2.30*         Plan:  Continue Coumadin 1.5mg M and 3mg all other days.  Recheck INR in 4 week(s).  Patient reminded to call the Anticoagulation Clinic with any signs or symptoms of bleeding or with any medication changes.  Patient given instructions utilizing the teach back method.        After visit summary printed and reviewed with patient.      Discharged ambulatory in no apparent distress.      Anabela Bronson, PharmD, BCPS, CTTS     For Pharmacy Admin Tracking Only  Time Spent (min): 20

## 2025-03-20 ENCOUNTER — TELEPHONE (OUTPATIENT)
Dept: FAMILY MEDICINE CLINIC | Age: 75
End: 2025-03-20

## 2025-03-20 DIAGNOSIS — E11.22 TYPE 2 DIABETES MELLITUS WITH CHRONIC KIDNEY DISEASE, WITHOUT LONG-TERM CURRENT USE OF INSULIN, UNSPECIFIED CKD STAGE (HCC): Primary | ICD-10-CM

## 2025-03-20 NOTE — TELEPHONE ENCOUNTER
I called pt to move her to a Friday at 11 am in person.  She would like an A1c ordered.  Her last one was done 11/18/2024.  Said has been working on getting lower.  She has labs to be done soon.  Told her I would change the dates so she will be able to without issues.  Please review order approve or deny, gets done [facility].

## 2025-04-07 ENCOUNTER — LAB (OUTPATIENT)
Dept: LAB | Age: 75
End: 2025-04-07

## 2025-04-07 DIAGNOSIS — M25.562 ARTHRALGIA OF LEFT LOWER LEG: ICD-10-CM

## 2025-04-07 DIAGNOSIS — G47.30 SLEEP APNEA, UNSPECIFIED TYPE: ICD-10-CM

## 2025-04-07 DIAGNOSIS — M62.838 MUSCLE SPASM OF LEFT LOWER EXTREMITY: ICD-10-CM

## 2025-04-07 DIAGNOSIS — R79.83 HOMOCYSTEINEMIA: ICD-10-CM

## 2025-04-07 DIAGNOSIS — N18.30 TYPE 2 DIABETES MELLITUS WITH STAGE 3 CHRONIC KIDNEY DISEASE, WITHOUT LONG-TERM CURRENT USE OF INSULIN, UNSPECIFIED WHETHER STAGE 3A OR 3B CKD (HCC): ICD-10-CM

## 2025-04-07 DIAGNOSIS — I48.0 PAROXYSMAL ATRIAL FIBRILLATION (HCC): ICD-10-CM

## 2025-04-07 DIAGNOSIS — Z79.01 ANTICOAGULATED ON COUMADIN: ICD-10-CM

## 2025-04-07 DIAGNOSIS — E11.22 TYPE 2 DIABETES MELLITUS WITH CHRONIC KIDNEY DISEASE, WITHOUT LONG-TERM CURRENT USE OF INSULIN, UNSPECIFIED CKD STAGE (HCC): ICD-10-CM

## 2025-04-07 DIAGNOSIS — E03.9 HYPOTHYROIDISM, UNSPECIFIED TYPE: ICD-10-CM

## 2025-04-07 DIAGNOSIS — K90.89 OTHER SPECIFIED INTESTINAL MALABSORPTION: ICD-10-CM

## 2025-04-07 DIAGNOSIS — K21.9 GASTROESOPHAGEAL REFLUX DISEASE, UNSPECIFIED WHETHER ESOPHAGITIS PRESENT: ICD-10-CM

## 2025-04-07 DIAGNOSIS — E11.22 TYPE 2 DIABETES MELLITUS WITH STAGE 3 CHRONIC KIDNEY DISEASE, WITHOUT LONG-TERM CURRENT USE OF INSULIN, UNSPECIFIED WHETHER STAGE 3A OR 3B CKD (HCC): ICD-10-CM

## 2025-04-07 DIAGNOSIS — I10 ESSENTIAL HYPERTENSION: ICD-10-CM

## 2025-04-07 LAB
25(OH)D3 SERPL-MCNC: 49 NG/ML (ref 30–100)
BASOPHILS ABSOLUTE: 0.1 THOU/MM3 (ref 0–0.1)
BASOPHILS NFR BLD AUTO: 0.8 %
C-REACTIVE PROTEIN, HIGH SENSITIVITY: 29.6 MG/L (ref 0–3)
CALCIUM SERPL-MCNC: 9.6 MG/DL (ref 8.8–10.2)
DEPRECATED MEAN GLUCOSE BLD GHB EST-ACNC: 120 MG/DL (ref 70–126)
DEPRECATED RDW RBC AUTO: 51.4 FL (ref 35–45)
EOSINOPHIL NFR BLD AUTO: 2.6 %
EOSINOPHILS ABSOLUTE: 0.2 THOU/MM3 (ref 0–0.4)
ERYTHROCYTE [DISTWIDTH] IN BLOOD BY AUTOMATED COUNT: 14.9 % (ref 11.5–14.5)
ERYTHROCYTE [SEDIMENTATION RATE] IN BLOOD BY WESTERGREN METHOD: 0 MM/HR (ref 0–20)
ESTRADIOL LEVEL: 65.8 PG/ML
HBA1C MFR BLD HPLC: 6 % (ref 4–6)
HCT VFR BLD AUTO: 44.3 % (ref 37–47)
HGB BLD-MCNC: 14.7 GM/DL (ref 12–16)
HOMOCYSTEINE: 14.4 UMOL/L (ref 0–15)
IMM GRANULOCYTES # BLD AUTO: 0.02 THOU/MM3 (ref 0–0.07)
IMM GRANULOCYTES NFR BLD AUTO: 0.3 %
LYMPHOCYTES ABSOLUTE: 1.5 THOU/MM3 (ref 1–4.8)
LYMPHOCYTES NFR BLD AUTO: 19.9 %
MAGNESIUM SERPL-MCNC: 2.3 MG/DL (ref 1.6–2.6)
MCH RBC QN AUTO: 30.8 PG (ref 26–33)
MCHC RBC AUTO-ENTMCNC: 33.2 GM/DL (ref 32.2–35.5)
MCV RBC AUTO: 92.9 FL (ref 81–99)
MONOCYTES ABSOLUTE: 0.6 THOU/MM3 (ref 0.4–1.3)
MONOCYTES NFR BLD AUTO: 8.1 %
NEUTROPHILS ABSOLUTE: 5 THOU/MM3 (ref 1.8–7.7)
NEUTROPHILS NFR BLD AUTO: 68.3 %
NRBC BLD AUTO-RTO: 0 /100 WBC
PLATELET # BLD AUTO: 255 THOU/MM3 (ref 130–400)
PMV BLD AUTO: 10.7 FL (ref 9.4–12.4)
PTH-INTACT SERPL-MCNC: 35 PG/ML (ref 15–65)
RBC # BLD AUTO: 4.77 MILL/MM3 (ref 4.2–5.4)
SHBG SERPL-SCNC: 56 NMOL/L (ref 17–125)
TESTOST FREE MFR SERPL: 9.8 PG/ML (ref 0.6–3.8)
TESTOST SERPL-MCNC: 76 NG/DL (ref 3–41)
WBC # BLD AUTO: 7.3 THOU/MM3 (ref 4.8–10.8)

## 2025-04-08 ENCOUNTER — RESULTS FOLLOW-UP (OUTPATIENT)
Dept: FAMILY MEDICINE CLINIC | Age: 75
End: 2025-04-08

## 2025-04-08 LAB
DHEA-S SERPL-MCNC: 39 UG/DL (ref 9.4–246)
GLIADIN IGG SER IA-ACNC: 0.7 U/ML
THYROPEROXIDASE AB SERPL IA-ACNC: < 4 IU/ML (ref 0–25)
TTG IGA SER IA-ACNC: 1.1 U/ML
TTG IGG SER IA-ACNC: 1 U/ML

## 2025-04-10 ENCOUNTER — LAB (OUTPATIENT)
Dept: LAB | Age: 75
End: 2025-04-10

## 2025-04-10 ENCOUNTER — HOSPITAL ENCOUNTER (OUTPATIENT)
Dept: GENERAL RADIOLOGY | Age: 75
Discharge: HOME | End: 2025-04-10
Payer: MEDICARE

## 2025-04-10 ENCOUNTER — HOSPITAL ENCOUNTER (OUTPATIENT)
Age: 75
Discharge: HOME | End: 2025-04-10
Payer: MEDICARE

## 2025-04-10 DIAGNOSIS — M79.642 LEFT HAND PAIN: ICD-10-CM

## 2025-04-10 LAB
BASOPHILS ABSOLUTE: 0.1 THOU/MM3 (ref 0–0.1)
BASOPHILS NFR BLD AUTO: 0.7 %
DEPRECATED RDW RBC AUTO: 52.9 FL (ref 35–45)
DHEA SERPL-MCNC: 1.56 NG/ML (ref 0.63–4.7)
EOSINOPHIL NFR BLD AUTO: 1.5 %
EOSINOPHILS ABSOLUTE: 0.1 THOU/MM3 (ref 0–0.4)
ERYTHROCYTE [DISTWIDTH] IN BLOOD BY AUTOMATED COUNT: 15 % (ref 11.5–14.5)
ERYTHROCYTE [SEDIMENTATION RATE] IN BLOOD BY WESTERGREN METHOD: 57 MM/HR (ref 0–20)
HCT VFR BLD AUTO: 45.5 % (ref 37–47)
HGB BLD-MCNC: 14.5 GM/DL (ref 12–16)
IMM GRANULOCYTES # BLD AUTO: 0.04 THOU/MM3 (ref 0–0.07)
IMM GRANULOCYTES NFR BLD AUTO: 0.5 %
LYMPHOCYTES ABSOLUTE: 1.8 THOU/MM3 (ref 1–4.8)
LYMPHOCYTES NFR BLD AUTO: 20.3 %
MCH RBC QN AUTO: 30.3 PG (ref 26–33)
MCHC RBC AUTO-ENTMCNC: 31.9 GM/DL (ref 32.2–35.5)
MCV RBC AUTO: 95 FL (ref 81–99)
MONOCYTES ABSOLUTE: 0.7 THOU/MM3 (ref 0.4–1.3)
MONOCYTES NFR BLD AUTO: 7.9 %
NEUTROPHILS ABSOLUTE: 6 THOU/MM3 (ref 1.8–7.7)
NEUTROPHILS NFR BLD AUTO: 69.1 %
NRBC BLD AUTO-RTO: 0 /100 WBC
PLATELET # BLD AUTO: 211 THOU/MM3 (ref 130–400)
PMV BLD AUTO: 10.3 FL (ref 9.4–12.4)
RBC # BLD AUTO: 4.79 MILL/MM3 (ref 4.2–5.4)
RHEUMATOID FACT SERPL-ACNC: < 10 IU/ML (ref 0–13)
URATE SERPL-MCNC: 8.9 MG/DL (ref 2.4–5.7)
WBC # BLD AUTO: 8.7 THOU/MM3 (ref 4.8–10.8)

## 2025-04-10 PROCEDURE — 73130 X-RAY EXAM OF HAND: CPT

## 2025-04-11 LAB — C-REACTIVE PROTEIN, HIGH SENSITIVITY: 77.7 MG/L (ref 0–3)

## 2025-04-13 LAB
HLA-B27 QL FC: NEGATIVE
NUCLEAR IGG SER QL IA: NORMAL

## 2025-04-14 LAB — CYCLIC CITRULLINATED PEPTIDE ANTIBODY IGG: 1.9 U/ML (ref 0–7)

## 2025-04-15 ENCOUNTER — ANTI-COAG VISIT (OUTPATIENT)
Age: 75
End: 2025-04-15
Payer: MEDICARE

## 2025-04-15 DIAGNOSIS — Z51.81 ENCOUNTER FOR THERAPEUTIC DRUG MONITORING: ICD-10-CM

## 2025-04-15 DIAGNOSIS — Z79.01 ANTICOAGULATED ON COUMADIN: Primary | ICD-10-CM

## 2025-04-15 LAB
B2 GLYCOPROT1 IGG SERPL IA-ACNC: < 10 SGU
B2 GLYCOPROT1 IGM SERPL IA-ACNC: < 10 SMU
CARDIOLIPIN IGG SER IA-ACNC: < 10 GPL
CARDIOLIPIN IGM SER IA-ACNC: < 10 MPL
CONFIRM DRVVT STA-STACLOT: 15.1 S
DRVVT SCREEN TO CONFIRM RATIO: 1.03 {RATIO}
DRVVT/DRVVT CFM P DOAC NEUT NORM PPP-RTO: ABNORMAL {RATIO}
HEPARIN NT PPP QL: ABNORMAL
LA 3 SCREEN W REFLEX-IMP: ABNORMAL
LA 3 SCREEN W REFLEX-IMP: ABNORMAL
LMW HEPARIN IND PLT AB SER QL: ABNORMAL
MIXING DRVVT/NORMAL: 1.25 %
POC INR: 2.1 (ref 0.8–1.2)
PROTHROMBIN TIME: 21.3 S (ref 12–15.5)
SCREEN APTT/NORMAL: 1.6
SCREEN APTT/NORMAL: ABNORMAL
SCREEN DRVVT/NORMAL: 1.51 %
THROMBIN TIME: 18.2 S

## 2025-04-15 PROCEDURE — 85610 PROTHROMBIN TIME: CPT

## 2025-04-15 PROCEDURE — 99212 OFFICE O/P EST SF 10 MIN: CPT

## 2025-04-15 RX ORDER — PRASTERONE (DHEA) 50 MG
25 TABLET ORAL EVERY OTHER DAY
COMMUNITY

## 2025-04-15 RX ORDER — PREDNISONE 10 MG/1
TABLET ORAL
COMMUNITY
Start: 2025-04-15

## 2025-04-15 RX ORDER — WARFARIN SODIUM 3 MG/1
TABLET ORAL
Qty: 90 TABLET | Refills: 3 | Status: SHIPPED | OUTPATIENT
Start: 2025-04-15

## 2025-04-15 NOTE — PROGRESS NOTES
Medication Management Clinic  [facility]  Anticoagulation Clinic  [phone] (phone)  [phone] (fax)    Ms. Leah Bautista is a 75 y.o.  female with history of atrial fib., per Dr. Duncan's referral, who presents today for Warfarin monitoring and adjustment (4 week visit).    Patient verifies current dosing regimen and tablet strength.  No missed doses. Believes she took 3 mg last night, instead of 1.5 mg.  Patient denies bleeding. SOB improving. C/o low BP/weakness after using Zanaflex recently.  No blood in urine or stool.  No dietary changes. Weight stable in the low 240s.  Changes in medication/OTC agents/herbals: started Prednisone 10 mg tablet, 7 today, then to decrease by 1 pill each day until gone (1 week). Taking more Tylenol than usual.  Saw outside provider yesterday for painful/swollen/slightly bruised left index finger. States it has felt hot.  Had no trauma. Swelling gone now.  Had X-ray.  To see specialist in [place] this week for other findings.  Updated DHEA dose - has been on for some time.  No change in tobacco use. Has a glass of wine on holidays, so will have 4/20 (Easter).  No change in activity level.  Patient denies falls.  No vomiting/diarrhea or acute illness.   No procedures scheduled in the future at this time.      Assessment:       INR goal: 2.0-3.0  Lab Results   Component Value Date    INR 2.10 (H) 04/15/2025    INR 2.30 (H) 03/18/2025    INR 2.30 (H) 02/18/2025     INR therapeutic   Recent Labs     04/15/25  1309   INR 2.10*      Plan:  POCT INR performed/result reviewed.  1.5 mg today, T, then continue PO Coumadin 1.5 mg M, 3 mg TWThFSS, except 1.5 mg 4/18.  Recheck INR in 2 week(s). (Report given - orders entered by YOEL Bronson [facility] PharmD.)  Patient reminded to call the Anticoagulation Clinic with any signs or symptoms of bleeding or with any medication changes.  Patient given instructions utilizing the teach back method.    Refill sent electronically per CPA

## 2025-04-17 NOTE — PROGRESS NOTES
[facility]  [address]  [facility] [address]  Dept: [phone]  Dept Fax: [phone]  Loc: [phone]      Leah Bautista is a 75 y.o. White female. Leah  presents to the [facility] clinic today for   Chief Complaint   Patient presents with    Discuss Labs   , and;   1. Type 2 diabetes mellitus with chronic kidney disease, without long-term current use of insulin, unspecified CKD stage (HCC)    2. Anticoagulated on Coumadin    3. Gastroesophageal reflux disease, unspecified whether esophagitis present    4. Other specified intestinal malabsorption    5. Homocysteinemia    6. Essential hypertension    7. Paroxysmal atrial fibrillation (HCC)    8. Type 2 diabetes mellitus with stage 3 chronic kidney disease, without long-term current use of insulin, unspecified whether stage 3a or 3b CKD (HCC)    9. Arthralgia of left lower leg    10. Hypothyroidism, unspecified type          I have reviewed Leah Bautista medical, surgical and other pertinent history in detail, and have updated medication and allergy information in the computerized patient record.     Clinical Care Team:     -Referring Provider for today's consult: self  -Primary Care Provider: Carlos Duncan,     Medical/Surgical History:   She  has a past medical history of A-fib (HCC), Atrial fibrillation (HCC), CAD (coronary artery disease), Carpal tunnel syndrome, Diabetes mellitus (HCC), GERD (gastroesophageal reflux disease), Headache(784.0), HTN (hypertension), Hyperlipidemia, Hypothyroidism, Osteoarthritis, Pacemaker, Sleep apnea, Tendonitis of both wrists, and Toenail fungus.  Her  has a past surgical history that includes Carpal tunnel release; hernia repair;  section;  section;  section; Foot surgery (Left, 2018); joint replacement (Left, 2010); joint replacement (Right, 2016); Colonoscopy (N/A,

## 2025-04-18 ENCOUNTER — OFFICE VISIT (OUTPATIENT)
Dept: FAMILY MEDICINE CLINIC | Age: 75
End: 2025-04-18
Payer: MEDICARE

## 2025-04-18 VITALS
SYSTOLIC BLOOD PRESSURE: 138 MMHG | RESPIRATION RATE: 12 BRPM | DIASTOLIC BLOOD PRESSURE: 84 MMHG | TEMPERATURE: 97.6 F | OXYGEN SATURATION: 97 % | HEIGHT: 64 IN | HEART RATE: 75 BPM | BODY MASS INDEX: 41.21 KG/M2 | WEIGHT: 241.4 LBS

## 2025-04-18 DIAGNOSIS — K90.89 OTHER SPECIFIED INTESTINAL MALABSORPTION: ICD-10-CM

## 2025-04-18 DIAGNOSIS — K21.9 GASTROESOPHAGEAL REFLUX DISEASE, UNSPECIFIED WHETHER ESOPHAGITIS PRESENT: ICD-10-CM

## 2025-04-18 DIAGNOSIS — I10 ESSENTIAL HYPERTENSION: ICD-10-CM

## 2025-04-18 DIAGNOSIS — M25.562 ARTHRALGIA OF LEFT LOWER LEG: ICD-10-CM

## 2025-04-18 DIAGNOSIS — E11.22 TYPE 2 DIABETES MELLITUS WITH CHRONIC KIDNEY DISEASE, WITHOUT LONG-TERM CURRENT USE OF INSULIN, UNSPECIFIED CKD STAGE (HCC): Primary | ICD-10-CM

## 2025-04-18 DIAGNOSIS — I48.0 PAROXYSMAL ATRIAL FIBRILLATION (HCC): ICD-10-CM

## 2025-04-18 DIAGNOSIS — R79.83 HOMOCYSTEINEMIA: ICD-10-CM

## 2025-04-18 DIAGNOSIS — N18.30 TYPE 2 DIABETES MELLITUS WITH STAGE 3 CHRONIC KIDNEY DISEASE, WITHOUT LONG-TERM CURRENT USE OF INSULIN, UNSPECIFIED WHETHER STAGE 3A OR 3B CKD (HCC): ICD-10-CM

## 2025-04-18 DIAGNOSIS — Z79.01 ANTICOAGULATED ON COUMADIN: ICD-10-CM

## 2025-04-18 DIAGNOSIS — E11.22 TYPE 2 DIABETES MELLITUS WITH STAGE 3 CHRONIC KIDNEY DISEASE, WITHOUT LONG-TERM CURRENT USE OF INSULIN, UNSPECIFIED WHETHER STAGE 3A OR 3B CKD (HCC): ICD-10-CM

## 2025-04-18 DIAGNOSIS — E03.9 HYPOTHYROIDISM, UNSPECIFIED TYPE: ICD-10-CM

## 2025-04-18 PROCEDURE — 1123F ACP DISCUSS/DSCN MKR DOCD: CPT | Performed by: FAMILY MEDICINE

## 2025-04-18 PROCEDURE — 99214 OFFICE O/P EST MOD 30 MIN: CPT | Performed by: FAMILY MEDICINE

## 2025-04-18 PROCEDURE — 3044F HG A1C LEVEL LT 7.0%: CPT | Performed by: FAMILY MEDICINE

## 2025-04-18 PROCEDURE — 3075F SYST BP GE 130 - 139MM HG: CPT | Performed by: FAMILY MEDICINE

## 2025-04-18 PROCEDURE — 3079F DIAST BP 80-89 MM HG: CPT | Performed by: FAMILY MEDICINE

## 2025-04-18 SDOH — ECONOMIC STABILITY: FOOD INSECURITY: WITHIN THE PAST 12 MONTHS, YOU WORRIED THAT YOUR FOOD WOULD RUN OUT BEFORE YOU GOT MONEY TO BUY MORE.: NEVER TRUE

## 2025-04-18 SDOH — ECONOMIC STABILITY: FOOD INSECURITY: WITHIN THE PAST 12 MONTHS, THE FOOD YOU BOUGHT JUST DIDN'T LAST AND YOU DIDN'T HAVE MONEY TO GET MORE.: NEVER TRUE

## 2025-04-18 ASSESSMENT — PATIENT HEALTH QUESTIONNAIRE - PHQ9
2. FEELING DOWN, DEPRESSED OR HOPELESS: NOT AT ALL
SUM OF ALL RESPONSES TO PHQ QUESTIONS 1-9: 0
1. LITTLE INTEREST OR PLEASURE IN DOING THINGS: NOT AT ALL
SUM OF ALL RESPONSES TO PHQ QUESTIONS 1-9: 0

## 2025-04-24 NOTE — PROGRESS NOTES
Subjective:      Patient ID: Niraj Zelaya is a 78 y.o. female. HPI Niraj Zelaya is a 78 y.o. White female. Luana Ahmadi  presents to the [address] today for   Chief Complaint   Patient presents with    3 Month Follow-Up     WEE protocol     Discuss Labs     Discuss CRP-high, uric acid,estradiol-high    Flank Pain     right flank pain during the night and day-allopurinol caused sharp pain in kidney-stopped allopurinol and pain is dull     Other     discuss DHEA. Was taking too much, should she continue to take DHEA    Other     cold intolerance-patient is taking thyroid medication     Stress     can stress cause elevated CRP?    ,  and;   Flank pain    Homocysteinemia (HCC)    Lipids abnormal    Vitamin D deficiency    Subacute maxillary sinusitis    Gastroesophageal reflux disease with esophagitis    Acute pain of right knee    Weight gain    Hypothyroidism due to Hashimoto's thyroiditis    Chronic paroxysmal hemicrania, not intractable    Abnormal weight gain    Toenail fungus    Primary osteoarthritis involving multiple joints    Nonspecific immunological findings    Positive serological test result    Other intestinal malabsorption (CODE)    Blood glucose elevated      I have reviewed [address], surgical and other pertinent history in detail, and have updated medication and allergy information in the computerized patient record. Clinical Care Team:     -Referring Provider for today's consult: Self Referred  -Primary Care Provider: Marina Aguilar DO    Medical/Surgical History:   She  has a past medical history of Carpal tunnel syndrome; GERD (gastroesophageal reflux disease); Headache(784.0); Hypothyroidism; Osteoarthritis; Tendonitis of both wrists; and Toenail fungus. Her  has a past surgical history that includes Carpal tunnel release; hernia repair;  section;  section;  section; and joint replacement.     Family/Social History:     Her mg TABLET before and as finish breakfast and dinner, Disp: , Rfl:     B Complex CAPS, Take 1 capsule by mouth 3 times daily B-right by Marcos Miranda, Disp: , Rfl:     Misc Natural Products (OSTEO BI-FLEX TRIPLE STRENGTH PO), Take 1 tablet by mouth 2 times daily Use MSM x 4 per day til gone, Disp: , Rfl:     Specialty Vitamins Products (MAGNESIUM, AMINO ACID CHELATE,) 133 MG tablet, Take 2 tablets by mouth daily , Disp: , Rfl:     DHEA 25 MG CAPS, Take 1 capsule by mouth daily. , Disp: , Rfl:     Ferrous Sulfate (IRON) 325 (65 FE) MG TABS, Take 1 tablet by mouth once a week , Disp: , Rfl:     Cinnamon 500 MG CAPS, Take 1,500 mg by mouth 4 times daily (before meals and nightly) , Disp: , Rfl:     Vitamin D (CHOLECALCIFEROL) 1000 UNITS CAPS capsule, Take 5,000 Units by mouth daily . , Disp: , Rfl:     B Complex-Biotin-FA (B-100 TR) TBCR, Take 1 tablet by mouth 3 times daily (before meals) , Disp: , Rfl:     aspirin (LUCY ASPIRIN) 325 MG tablet, Take 325 mg by mouth daily Ice cream or half and half, Disp: , Rfl:   Allergies: Review of patient's allergies indicates no known allergies. ,  Immunizations:   Immunization History   Administered Date(s) Administered    Influenza Virus Vaccine 11/03/2011, 10/09/2014, 11/17/2015    Influenza, MDCK, Preservative free 10/31/2017    Influenza, Quadv, 3 yrs and older, IM, Preservative Free 01/19/2017    Pneumococcal 13-valent Conjugate ([id number]) 07/11/2017    Tdap (Boostrix, Adacel) 10/09/2014    Zoster 05/05/2014        History of Present Illness:     Leah's had concerns including 3 Month Follow-Up (WEE protocol ); Discuss Labs (Discuss CRP-high, uric acid,estradiol-high); Flank Pain (right flank pain during the night and day-allopurinol caused sharp pain in kidney-stopped allopurinol and pain is dull ); Other (discuss DHEA. Was taking too much, should she continue to take DHEA);  Other (cold intolerance-patient is taking thyroid medication ); and Stress (can stress cause having a blood draw to ask the  to divide their lab draws into multiple draws over several days if not feeling good at the time of the lab draw or if either prefers to do several smaller blood draws over several days  - Patient instructed to check with insurer before each lab draw and to to to the lab which the insurer directs them for the most cost effective lab draw with the least patient's cost  - Nathan Young  will be scheduled subsequent to those results. Anamika Recio will bring in her drink and food log to her next visit    Chronic Problems Addressed on this Visit:                                   1.  Intensity of Service; Uncontrolled items at this visit; Chief Complaint   Patient presents with    3 Month Follow-Up     WEE protocol     Discuss Labs     Discuss CRP-high, uric acid,estradiol-high    Flank Pain     right flank pain during the night and day-allopurinol caused sharp pain in kidney-stopped allopurinol and pain is dull     Other     discuss DHEA. Was taking too much, should she continue to take DHEA    Other     cold intolerance-patient is taking thyroid medication     Stress     can stress cause elevated CRP?    ;              Improved items at this visit; Stable items at this visit;  2. Patients food and drinks were reviewed with the patient,       - Nathan Young will bring food+drink symptom log to next visit for inclusion in their record      - 75 better food list reviewed & given to patient with the omega 6 food list to avoid         - Gluten in corn and oats abstracts sheet reviewed and given to the patient today   3. Greater than 25 minutes were spent face to face on this visit of which >50% was for counseling and coordination of care.       Patients food and drinks were reviewed with the patient,   - they will bring a food drink symptom log to future visits for inclusion in their record    - 75 better food list reviewed & given to patient along with the omega 6 food list to avoid      - Gluten in corn and oats abstracts sheet reviewed and given to the patient today    - 23 Foods containing Latex-like proteins was reviewed and copy to be taken if desired     - Nutrient Supplements list provided and copy to be taken if desired    - [email or web address] web site offered to patient to review at their convenience by staff with login information    Note:   I have discussed with the patient that with all nutraceuticals, there is often mixed data and emerging research which needs to be monitored; as well as an array of NIH fact sheets on nutrients and supplements. If I have recommended cinnamon at the request of this patient to assist them in control of their blood sugar, triglyceride and or weight issues. I discussed that the patient's clinical use of cinnamon bark, calcium, magnesium, Vitamin D and pharmaceutical grade [employer] #118574 fish oil or triple-strength fish oil, and B-75 two phase time-released B complex by Renée Andujar will be for a time-limited trial to determine their individual effectiveness and safety in this patient. I also referred the patient to the NMCD: Nutrition, Metabolism, and Cardiovascular Diseases (journal) and concerns about long-term use and hepatotoxicity of cinnamon and other nutrients and suggest they frequently search nih.gov for the latest non-proprietary information on nutriceuticals as well as consider a subscription to [employer] for details on reviewed supplements, or at the least review the nutrient files at [address] at [facility], [address] [place] [address]     Cinnamon bark, an insulin mimetic, reduces some High Carbohydrate Dietary Impacts. Methylhydroxychalcone polymers insulin-enhancing properties in fat cells are responsible for enhanced glucose uptake, inhibiting hepatic HMG-CoA reductase and lowers lipids. www.jacn. org/content/20/4/327.full     But cinnamon with additives such as Chromium or Cinnamon Extract are not effective You can access the FollowMyHealth Patient Portal offered by [facility] by registering at the following website: [email or web address]. By joining [employer]’s FollowMyHealth portal, you will also be able to view your health information using other applications (apps) compatible with our system.

## 2025-04-29 ENCOUNTER — OFFICE VISIT (OUTPATIENT)
Dept: INTERNAL MEDICINE CLINIC | Age: 75
End: 2025-04-29
Payer: MEDICARE

## 2025-04-29 ENCOUNTER — ANTI-COAG VISIT (OUTPATIENT)
Age: 75
End: 2025-04-29
Payer: MEDICARE

## 2025-04-29 VITALS — BODY MASS INDEX: 42.17 KG/M2 | WEIGHT: 247 LBS | HEIGHT: 64 IN

## 2025-04-29 DIAGNOSIS — Z51.81 ENCOUNTER FOR THERAPEUTIC DRUG MONITORING: ICD-10-CM

## 2025-04-29 DIAGNOSIS — E11.22 TYPE 2 DIABETES MELLITUS WITH STAGE 3 CHRONIC KIDNEY DISEASE, WITHOUT LONG-TERM CURRENT USE OF INSULIN, UNSPECIFIED WHETHER STAGE 3A OR 3B CKD (HCC): Primary | ICD-10-CM

## 2025-04-29 DIAGNOSIS — N18.30 TYPE 2 DIABETES MELLITUS WITH STAGE 3 CHRONIC KIDNEY DISEASE, WITHOUT LONG-TERM CURRENT USE OF INSULIN, UNSPECIFIED WHETHER STAGE 3A OR 3B CKD (HCC): Primary | ICD-10-CM

## 2025-04-29 DIAGNOSIS — Z79.01 ANTICOAGULATED ON COUMADIN: Primary | ICD-10-CM

## 2025-04-29 LAB — POC INR: 3.9 (ref 0.8–1.2)

## 2025-04-29 PROCEDURE — 97803 MED NUTRITION INDIV SUBSEQ: CPT | Performed by: DIETITIAN, REGISTERED

## 2025-04-29 PROCEDURE — 99212 OFFICE O/P EST SF 10 MIN: CPT | Performed by: PHARMACIST

## 2025-04-29 PROCEDURE — NBSRV NON-BILLABLE SERVICE: Performed by: DIETITIAN, REGISTERED

## 2025-04-29 PROCEDURE — 85610 PROTHROMBIN TIME: CPT | Performed by: PHARMACIST

## 2025-04-29 RX ORDER — GABAPENTIN 300 MG/1
300 CAPSULE ORAL NIGHTLY
COMMUNITY
Start: 2025-03-26

## 2025-04-29 NOTE — PROGRESS NOTES
Medication Management Clinic  [facility]  Anticoagulation Clinic  [phone] (phone)  [phone] (fax)    Ms. Leah Bautista is a 75 y.o.  female with history of Afib who presents today for anticoagulation monitoring and adjustment.    Patient verifies current dosing regimen and tablet strength.  No missed or extra doses.  Patient denies s/s bleeding/bruising/swelling/SOB  No blood in urine or stool.  No dietary changes.  No changes in medication/OTC agents/Herbals. She states that she uses her supplements sporadically. States she has been taking Levothyroxine 50mcg daily and Liothyronine 2.5mg daily for several months.  Started Gabapentin.    No change in tobacco use. 2 glasses of wine on Easter  No change in activity level.  Patient denies falls.  No vomiting/diarrhea or acute illness.   No Procedures scheduled in the future at this time.      Assessment:   Lab Results   Component Value Date    INR 3.90 (H) 04/29/2025    INR 2.10 (H) 04/15/2025    INR 2.30 (H) 03/18/2025     INR supratherapeutic   Recent Labs     04/29/25  1320   INR 3.90*         INR goal: 2.0-3.0    Plan:  Hold Coumadin today, then continue Coumadin 1.5mg M and 3mg all other days.  Recheck INR in 2 week(s).  Patient reminded to call the Anticoagulation Clinic with any signs or symptoms of bleeding or with any medication changes.  Patient given instructions utilizing the teach back method.      After visit summary printed and reviewed with patient.      Discharged ambulatory in no apparent distress.    Anabela Bronson, DelmarD, BCPS, CTTS     For Pharmacy Admin Tracking Only    Intervention Detail: Dose Adjustment: 1, reason: Therapy De-escalation  Total # of Interventions Recommended: 1  Total # of Interventions Accepted: 1  Time Spent (min): 20

## 2025-04-29 NOTE — PATIENT INSTRUCTIONS
Like the idea to do Air [employer]er recipes.    Consider batch cooking and freeze leftovers for future meals.    Consider rotisserie chicken for lean protein option.    Add veggies each day - somewhere in your day.    Drink a full glass of water with each meal.    Get meal and snack ideas from Session 5 in your GLB manual.    Do your physical therapy exercises.  Consider cardio exercises too, that are safe for you to do.    Check your blood sugar at least 3-4 times per week     Fasting:  goal is      2hours after a meal:  under 150

## 2025-04-29 NOTE — PROGRESS NOTES
education: Indicates understanding.  -Readiness to change: precontemplative- trying protein ideas, drinking more water and working in vegetables.  -Expected compliance: fair to good.    Thank you for your referral of this patient.     Total time involved in direct patient education: 60 minutes for follow-up MNT visit.

## 2025-05-13 ENCOUNTER — ANTI-COAG VISIT (OUTPATIENT)
Age: 75
End: 2025-05-13
Payer: MEDICARE

## 2025-05-13 DIAGNOSIS — Z79.01 ANTICOAGULATED ON COUMADIN: Primary | ICD-10-CM

## 2025-05-13 DIAGNOSIS — Z51.81 ENCOUNTER FOR THERAPEUTIC DRUG MONITORING: ICD-10-CM

## 2025-05-13 LAB — POC INR: 2.1 (ref 0.8–1.2)

## 2025-05-13 PROCEDURE — 85610 PROTHROMBIN TIME: CPT | Performed by: PHARMACIST

## 2025-05-13 PROCEDURE — 99211 OFF/OP EST MAY X REQ PHY/QHP: CPT | Performed by: PHARMACIST

## 2025-05-13 NOTE — PROGRESS NOTES
Medication Management Clinic  [facility]  Anticoagulation Clinic  [phone] (phone)  [phone] (fax)    Ms. Leah Bautista is a 75 y.o.  female with history of Afib who presents today for anticoagulation monitoring and adjustment.    Patient verifies current dosing regimen and tablet strength.  No missed or extra doses.  Patient denies s/s bleeding/bruising/swelling/SOB  No blood in urine or stool.  No dietary changes.  No changes in medication/OTC agents/Herbals.  No change in alcohol use or tobacco use.  No change in activity level.  Patient denies falls.  No vomiting/diarrhea or acute illness.   No Procedures scheduled in the future at this time.      Assessment:   Lab Results   Component Value Date    INR 2.10 (H) 05/13/2025    INR 3.90 (H) 04/29/2025    INR 2.10 (H) 04/15/2025     INR therapeutic   Recent Labs     05/13/25  1328   INR 2.10*         INR goal: 2.0-3.0    Plan:  Continue Coumadin 1.5mg M and 3mg all other days.  Recheck INR in 4 week(s).  Patient reminded to call the Anticoagulation Clinic with any signs or symptoms of bleeding or with any medication changes.  Patient given instructions utilizing the teach back method.    After visit summary printed and reviewed with patient.      Discharged ambulatory in no apparent distress.    Anabela Bronson, DelmarD, BCPS, CTTS     For Pharmacy Admin Tracking Only  Time Spent (min): 20

## 2025-06-10 ENCOUNTER — ANTI-COAG VISIT (OUTPATIENT)
Age: 75
End: 2025-06-10
Payer: MEDICARE

## 2025-06-10 DIAGNOSIS — Z51.81 ENCOUNTER FOR THERAPEUTIC DRUG MONITORING: ICD-10-CM

## 2025-06-10 DIAGNOSIS — Z79.01 ANTICOAGULATED ON COUMADIN: Primary | ICD-10-CM

## 2025-06-10 LAB — POC INR: 1.8 (ref 0.8–1.2)

## 2025-06-10 PROCEDURE — 85610 PROTHROMBIN TIME: CPT

## 2025-06-10 PROCEDURE — 99211 OFF/OP EST MAY X REQ PHY/QHP: CPT

## 2025-06-10 NOTE — PROGRESS NOTES
Only    Intervention Detail: Dose Adjustment: 1, reason: Therapy Optimization  Total # of Interventions Recommended: 1  Total # of Interventions Accepted: 1  Time Spent (min): 20

## 2025-07-03 ENCOUNTER — APPOINTMENT (OUTPATIENT)
Age: 75
End: 2025-07-03
Payer: MEDICARE

## 2025-07-08 ENCOUNTER — ANTI-COAG VISIT (OUTPATIENT)
Age: 75
End: 2025-07-08
Payer: MEDICARE

## 2025-07-08 DIAGNOSIS — Z79.01 ANTICOAGULATED ON COUMADIN: Primary | ICD-10-CM

## 2025-07-08 DIAGNOSIS — Z51.81 ENCOUNTER FOR THERAPEUTIC DRUG MONITORING: ICD-10-CM

## 2025-07-08 LAB — POC INR: 3.4 (ref 0.8–1.2)

## 2025-07-08 PROCEDURE — 85610 PROTHROMBIN TIME: CPT | Performed by: PHARMACIST

## 2025-07-08 PROCEDURE — 99212 OFFICE O/P EST SF 10 MIN: CPT | Performed by: PHARMACIST

## 2025-07-08 NOTE — PROGRESS NOTES
Medication Management Clinic  [facility]  Anticoagulation Clinic  [phone] (phone)  [phone] (fax)    Ms. Leah Bautista is a 75 y.o.  female with history of Afib who presents today for anticoagulation monitoring and adjustment.    Patient verifies current dosing regimen and tablet strength.  Patient states she missed her dose 7/3/25 (3 mg) so she took a total of 6 mg on 7/4/25 (instructed to take 3 mg).   Patient denies s/s bleeding/bruising/swelling/SOB  No blood in urine or stool.  Patient states she had cranberry juice 3-4 days last week for a possible UTI.  No changes in medication/OTC agents/Herbals.  No change in alcohol use or tobacco use.  No change in activity level.  Patient denies falls.  No vomiting/diarrhea or acute illness.   No Procedures scheduled in the future at this time. Patient may have a foot injection in the future. She will let clinic know if/when this is scheduled.    Assessment:   Lab Results   Component Value Date    INR 3.40 (H) 07/08/2025    INR 1.80 (H) 06/10/2025    INR 2.10 (H) 05/13/2025     INR supratherapeutic   Recent Labs     07/08/25  1348   INR 3.40*     INR goal: 2.0-3.0    Patient has a recent history of labile INRs, but is likely supratherapeutic today due to interaction with cranberry.     Plan:  HOLD Coumadin x 1 dose today 7/8/25 then continue Coumadin 1.5 mg M and 3 mg [id number].  Recheck INR in 2 week(s).  Patient reminded to call the Anticoagulation Clinic with any signs or symptoms of bleeding or with any medication changes.  Patient given instructions utilizing the teach back method.    After visit summary printed and reviewed with patient.      Discharged ambulatory in no apparent distress.    For Pharmacy Admin Tracking Only    Intervention Detail: Dose Adjustment: 1, reason: Therapy Optimization  Total # of Interventions Recommended: 1  Total # of Interventions Accepted: 1  Time Spent (min): 20    Calixto Her, PharmD,

## 2025-07-08 NOTE — PATIENT INSTRUCTIONS
Your Provider for Today: Dr. Sanabria  Your nurses for today: Brien    You may receive a survey regarding the care you received during your visit.  Your input is valuable to us.  We encourage you to complete and return your survey.  We hope you will choose us in the future for your healthcare needs.

## 2025-07-09 ENCOUNTER — OFFICE VISIT (OUTPATIENT)
Dept: CARDIOLOGY CLINIC | Age: 75
End: 2025-07-09
Payer: MEDICARE

## 2025-07-09 VITALS
SYSTOLIC BLOOD PRESSURE: 144 MMHG | DIASTOLIC BLOOD PRESSURE: 80 MMHG | HEIGHT: 64 IN | BODY MASS INDEX: 40.12 KG/M2 | HEART RATE: 67 BPM | WEIGHT: 235 LBS

## 2025-07-09 DIAGNOSIS — I48.91 ATRIAL FIBRILLATION, UNSPECIFIED TYPE (HCC): Primary | ICD-10-CM

## 2025-07-09 PROCEDURE — 1123F ACP DISCUSS/DSCN MKR DOCD: CPT | Performed by: INTERNAL MEDICINE

## 2025-07-09 PROCEDURE — 3079F DIAST BP 80-89 MM HG: CPT | Performed by: INTERNAL MEDICINE

## 2025-07-09 PROCEDURE — 3077F SYST BP >= 140 MM HG: CPT | Performed by: INTERNAL MEDICINE

## 2025-07-09 PROCEDURE — 99214 OFFICE O/P EST MOD 30 MIN: CPT | Performed by: INTERNAL MEDICINE

## 2025-07-09 RX ORDER — NITROGLYCERIN 0.4 MG/1
0.4 TABLET SUBLINGUAL EVERY 5 MIN PRN
Qty: 25 TABLET | Refills: 3 | Status: SHIPPED | OUTPATIENT
Start: 2025-07-09

## 2025-07-09 NOTE — PROGRESS NOTES
[facility] [place] [facility]  [facility] CARDIOLOGY  [address]  [place] [address]  Dept: [phone]  Dept Fax: [phone]  Loc: [phone]    Visit Date: 7/9/2025    Chief Complaint   Patient presents with    Atrial Fibrillation    Follow-up       HPI:   Ms. Bautista is a 75 y.o. female  who presented for:  PAF on flecanide and coumadin, PPM, BMI 52, HTN   History of Present Illness  The patient presents for atrial fibrillation, sleep issues, and weight management.    She has been experiencing difficulty sleeping, often lying awake until midnight or 2:00 AM. She is reluctant to take medication nightly unless absolutely necessary. She was previously advised against taking PM medication by a healthcare professional at the [facility].    She has been on Mounjaro since 01/2025, following a consultation with her bariatric surgeon at the [facility]. He suggested that weight loss could potentially eliminate the need for ablation. Her current body fat percentage is 3%, and the goal is to reduce it to 1%.    She was diagnosed with atrial fibrillation in 2019 by Dr. Campbell and Dr. Hernandez, who performed two heart catheterizations and found no abnormalities. They advised her to improve her sleep habits. She has a pacemaker, which is monitored every three months, with the next check scheduled for the end of 07/2025. She is currently on flecainide to prevent atrial fibrillation and has managed to avoid significant episodes since 01/2025. She uses a CPAP machine for sleep apnea. She recounted an incident from last Thursday when she forgot to take her medication before bed. The following morning, she noticed her heart pounding and realized she had missed her dose. Her heart rate fluctuated between 77 and 119 beats per minute, leading her to believe she was experiencing atrial fibrillation. She took her morning dose of flecainide and felt unwell for an hour or two

## 2025-07-09 NOTE — PROGRESS NOTES
Pt here for 1 yr check up may have upcoming foot surgery with Dr. Liang     Pt continues with numbness in left foot, not sleeping , passing out in evenings , swelling     Pt has questions about sleep aids     Pt not taking gabapentin holding

## 2025-07-22 ENCOUNTER — ANTI-COAG VISIT (OUTPATIENT)
Age: 75
End: 2025-07-22
Payer: MEDICARE

## 2025-07-22 DIAGNOSIS — Z51.81 ENCOUNTER FOR THERAPEUTIC DRUG MONITORING: ICD-10-CM

## 2025-07-22 DIAGNOSIS — Z79.01 ANTICOAGULATED ON COUMADIN: Primary | ICD-10-CM

## 2025-07-22 LAB — POC INR: 3 (ref 0.8–1.2)

## 2025-07-22 PROCEDURE — 85610 PROTHROMBIN TIME: CPT

## 2025-07-22 PROCEDURE — 99211 OFF/OP EST MAY X REQ PHY/QHP: CPT

## 2025-07-22 RX ORDER — ACETAMINOPHEN/DIPHENHYDRAMINE 500MG-25MG
1 TABLET ORAL NIGHTLY PRN
COMMUNITY

## 2025-07-22 NOTE — PROGRESS NOTES
Medication Management Clinic  [facility]  Anticoagulation Clinic  [phone] (phone)  [phone] (fax)    Ms. Leah Bautista is a 75 y.o.  female with history of atrial fib., per Dr. Duncan's referral, who presents today for Warfarin monitoring and adjustment. (2 week visit)    Patient verifies current dosing regimen and tablet strength.  No missed (except as ordered last visit) or extra doses.  Patient denies bleeding/bruising/swelling.  No blood in urine or stool.  No dietary changes. Weight 228#, she states - steadily losing weight. Noted 235# in flowsheet 7/9/25.  Added Tylenol PM to list, but not new.  No change in alcohol use or tobacco use.  No change in activity level.  Patient denies falls.  No vomiting/diarrhea or acute illness.  Started with chills night of 7/16, felt cold next day, then 7/18 had bad headaches, SOB.  Used ES Tylenol then Painquil (more Tylenol than usual).  No procedures scheduled in the future at this time. Continues with PT for left foot.  States Dr. Liang wants to inject stem cells into that foot, but hasn't agreed to it.      Assessment:       INR goal: 2.0-3.0  Lab Results   Component Value Date    INR 3.00 (H) 07/22/2025    INR 3.40 (H) 07/08/2025    INR 1.80 (H) 06/10/2025     INR therapeutic   Recent Labs     07/22/25  1106   INR 3.00*      Plan:  POCT INR performed/result reviewed.  Continue PO Coumadin 1.5 mg M, 3 mg all other days.  Recheck INR in 2 week(s). (Report given - orders entered by YOEL Bronson formerly [facility] PharmD.)    Patient reminded to call the Anticoagulation Clinic with any signs or symptoms of bleeding or with any medication changes.  Patient given instructions utilizing the teach back method.     After visit summary printed and reviewed with patient.  Discharged ambulatory in no apparent distress.    For Pharmacy Admin Tracking Only    Time Spent (min): 22

## 2025-07-30 ENCOUNTER — OFFICE VISIT (OUTPATIENT)
Dept: FAMILY MEDICINE CLINIC | Age: 75
End: 2025-07-30
Payer: MEDICARE

## 2025-07-30 VITALS
WEIGHT: 236.4 LBS | SYSTOLIC BLOOD PRESSURE: 128 MMHG | DIASTOLIC BLOOD PRESSURE: 76 MMHG | OXYGEN SATURATION: 97 % | BODY MASS INDEX: 40.36 KG/M2 | HEIGHT: 64 IN | HEART RATE: 59 BPM

## 2025-07-30 DIAGNOSIS — G47.30 SLEEP APNEA, UNSPECIFIED TYPE: ICD-10-CM

## 2025-07-30 DIAGNOSIS — E55.9 VITAMIN D DEFICIENCY: ICD-10-CM

## 2025-07-30 DIAGNOSIS — E03.9 HYPOTHYROIDISM, UNSPECIFIED TYPE: ICD-10-CM

## 2025-07-30 DIAGNOSIS — E11.22 TYPE 2 DIABETES MELLITUS WITH CHRONIC KIDNEY DISEASE, WITHOUT LONG-TERM CURRENT USE OF INSULIN, UNSPECIFIED CKD STAGE (HCC): ICD-10-CM

## 2025-07-30 DIAGNOSIS — K21.9 GASTROESOPHAGEAL REFLUX DISEASE, UNSPECIFIED WHETHER ESOPHAGITIS PRESENT: ICD-10-CM

## 2025-07-30 DIAGNOSIS — I25.10 ATHEROSCLEROSIS OF NATIVE CORONARY ARTERY OF NATIVE HEART WITHOUT ANGINA PECTORIS: ICD-10-CM

## 2025-07-30 DIAGNOSIS — Z00.00 MEDICARE ANNUAL WELLNESS VISIT, SUBSEQUENT: Primary | ICD-10-CM

## 2025-07-30 DIAGNOSIS — E66.01 MORBID OBESITY (HCC): ICD-10-CM

## 2025-07-30 DIAGNOSIS — R79.83 HOMOCYSTEINEMIA: ICD-10-CM

## 2025-07-30 DIAGNOSIS — I10 ESSENTIAL HYPERTENSION: ICD-10-CM

## 2025-07-30 PROCEDURE — 3074F SYST BP LT 130 MM HG: CPT | Performed by: FAMILY MEDICINE

## 2025-07-30 PROCEDURE — 3078F DIAST BP <80 MM HG: CPT | Performed by: FAMILY MEDICINE

## 2025-07-30 PROCEDURE — G0439 PPPS, SUBSEQ VISIT: HCPCS | Performed by: FAMILY MEDICINE

## 2025-07-30 PROCEDURE — 1159F MED LIST DOCD IN RCRD: CPT | Performed by: FAMILY MEDICINE

## 2025-07-30 PROCEDURE — 3044F HG A1C LEVEL LT 7.0%: CPT | Performed by: FAMILY MEDICINE

## 2025-07-30 PROCEDURE — 99214 OFFICE O/P EST MOD 30 MIN: CPT | Performed by: FAMILY MEDICINE

## 2025-07-30 PROCEDURE — 1123F ACP DISCUSS/DSCN MKR DOCD: CPT | Performed by: FAMILY MEDICINE

## 2025-07-30 RX ORDER — LIOTHYRONINE SODIUM 5 UG/1
TABLET ORAL
Qty: 90 TABLET | Refills: 3 | Status: CANCELLED | OUTPATIENT
Start: 2025-07-30

## 2025-07-30 RX ORDER — LIOTHYRONINE SODIUM 5 UG/1
2.5 TABLET ORAL DAILY
Qty: 45 TABLET | Refills: 3 | Status: SHIPPED | OUTPATIENT
Start: 2025-07-30

## 2025-07-30 ASSESSMENT — ENCOUNTER SYMPTOMS
GASTROINTESTINAL NEGATIVE: 1
RESPIRATORY NEGATIVE: 1

## 2025-07-30 ASSESSMENT — PATIENT HEALTH QUESTIONNAIRE - PHQ9
SUM OF ALL RESPONSES TO PHQ QUESTIONS 1-9: 0
2. FEELING DOWN, DEPRESSED OR HOPELESS: NOT AT ALL
SUM OF ALL RESPONSES TO PHQ QUESTIONS 1-9: 0
1. LITTLE INTEREST OR PLEASURE IN DOING THINGS: NOT AT ALL
SUM OF ALL RESPONSES TO PHQ QUESTIONS 1-9: 0
SUM OF ALL RESPONSES TO PHQ QUESTIONS 1-9: 0

## 2025-07-30 NOTE — PROGRESS NOTES
Leah Bautista (:  1950) is a 75 y.o. female,Established patient, here for evaluation of the following chief complaint(s):  Medicare AWV          Subjective   SUBJECTIVE/OBJECTIVE:  HPI  Chief Complaint   Patient presents with    Medicare AWV     AWV.    Pt continues to follow with several specialists.    Plans to have labs in near future with Dr. Tijerina.    BP Readings from Last 3 Encounters:   25 128/76   25 (!) 144/80   25 138/84     Wt Readings from Last 3 Encounters:   25 107.2 kg (236 lb 6.4 oz)   25 106.6 kg (235 lb)   25 112 kg (247 lb)       Patient Active Problem List   Diagnosis    Carpal tunnel syndrome    Headache    Other and unspecified nonspecific immunological findings    Pain in joint, lower leg    Abnormal weight gain    Toenail fungus    Osteoarthritis    Hypothyroidism    Nonspecific immunological findings    Gastroesophageal reflux disease    Positive serological test result    Knee pain    Subacute maxillary sinusitis    Homocysteinemia    Lipids abnormal    Vitamin D deficiency    Abnormal blood level of uric acid    Left wrist pain    Other intestinal malabsorption    BMI 45.0-49.9, adult (HCC)    Pain of right hip joint    Essential hypertension    New onset atrial fibrillation (HCC)    Anticoagulated on Coumadin    Sleep apnea    Coronary atherosclerosis    Pacemaker    Encounter for therapeutic drug monitoring    Type 2 diabetes mellitus    Chronic renal disease, stage III (HCC) [791588]    Type 2 diabetes mellitus with chronic kidney disease    Abnormal nuclear stress test    Chest pain due to CAD    Contact with and (suspected) exposure to unspecified communicable disease    Osteoarthritis of right hip    Pre-diabetes    Long term (current) use of anticoagulants    Mass of lower extremity    Postmenopausal bleeding       Current Outpatient Medications   Medication Sig Dispense Refill    liothyronine (CYTOMEL) 5 MCG tablet Take 0.5 tablets

## 2025-08-04 ENCOUNTER — ANTI-COAG VISIT (OUTPATIENT)
Age: 75
End: 2025-08-04
Payer: MEDICARE

## 2025-08-04 DIAGNOSIS — Z79.01 ANTICOAGULATED ON COUMADIN: Primary | ICD-10-CM

## 2025-08-04 DIAGNOSIS — Z51.81 ENCOUNTER FOR THERAPEUTIC DRUG MONITORING: ICD-10-CM

## 2025-08-04 LAB — POC INR: 2.5 (ref 0.8–1.2)

## 2025-08-04 PROCEDURE — 99211 OFF/OP EST MAY X REQ PHY/QHP: CPT

## 2025-08-04 PROCEDURE — 85610 PROTHROMBIN TIME: CPT

## 2025-08-11 ENCOUNTER — OFFICE VISIT (OUTPATIENT)
Dept: INTERNAL MEDICINE CLINIC | Age: 75
End: 2025-08-11
Payer: MEDICARE

## 2025-08-11 VITALS — HEIGHT: 64 IN | WEIGHT: 232.4 LBS | BODY MASS INDEX: 39.67 KG/M2

## 2025-08-11 DIAGNOSIS — E11.69 TYPE 2 DIABETES MELLITUS WITH OTHER SPECIFIED COMPLICATION, WITHOUT LONG-TERM CURRENT USE OF INSULIN (HCC): Primary | ICD-10-CM

## 2025-08-11 PROCEDURE — 97803 MED NUTRITION INDIV SUBSEQ: CPT | Performed by: DIETITIAN, REGISTERED

## 2025-08-11 PROCEDURE — NBSRV NON-BILLABLE SERVICE: Performed by: DIETITIAN, REGISTERED

## 2025-08-27 ENCOUNTER — ANTI-COAG VISIT (OUTPATIENT)
Age: 75
End: 2025-08-27
Payer: MEDICARE

## 2025-08-27 DIAGNOSIS — Z79.01 ANTICOAGULATED ON COUMADIN: Primary | ICD-10-CM

## 2025-08-27 DIAGNOSIS — Z51.81 ENCOUNTER FOR THERAPEUTIC DRUG MONITORING: ICD-10-CM

## 2025-08-27 LAB — POC INR: 2 (ref 0.8–1.2)

## 2025-08-27 PROCEDURE — 85610 PROTHROMBIN TIME: CPT | Performed by: PHARMACIST

## 2025-08-27 PROCEDURE — 99211 OFF/OP EST MAY X REQ PHY/QHP: CPT | Performed by: PHARMACIST

## (undated) DEVICE — KENDALL SCD EXPRESS SLEEVES, KNEE LENGTH, MEDIUM: Brand: KENDALL SCD

## (undated) DEVICE — TUBING, SUCTION, 1/4" X 12', STRAIGHT: Brand: MEDLINE

## (undated) DEVICE — SOLUTION SCRB 4% CHLORHEXADINE GNT 16OZ DYNAHEX

## (undated) DEVICE — GOWN,SIRUS,NON REINFRCD,LARGE,SET IN SL: Brand: MEDLINE

## (undated) DEVICE — GLOVE ORANGE PI 7   MSG9070

## (undated) DEVICE — CATHETER ABLATN RF

## (undated) DEVICE — GLOVE 6.5 LTX STRL ULTRAFREE MAX PF

## (undated) DEVICE — GLOVE ORTHO 8   MSG9480

## (undated) DEVICE — SET UP PACK: Brand: MEDLINE INDUSTRIES, INC.

## (undated) DEVICE — RED RUBBER ROBINSON URETHRAL CATHETER, RADIOPAQUE, SMOOTH ROUNDED TIP, 14 FR (4.7 MM): Brand: DOVER

## (undated) DEVICE — PAD,NON-ADHERENT,3X8,STERILE,LF,1/PK: Brand: MEDLINE

## (undated) DEVICE — DRAPE,UNDERBUTTOCKS,PCH,STERILE: Brand: MEDLINE

## (undated) DEVICE — BIOGUARD A/W CLEANING ADAPTER

## (undated) DEVICE — PAD,SANITARY,11 IN,MAXI,W/WINGS,N-STRL: Brand: MEDLINE

## (undated) DEVICE — SOLUTION IV 1000ML 0.9% SOD CHL PH 5 INJ USP VIAFLX PLAS

## (undated) DEVICE — GLOVE SURG SZ 65 THK91MIL LTX FREE SYN POLYISOPRENE

## (undated) DEVICE — CYSTO/BLADDER IRRIGATION SET, REGULATING CLAMP